# Patient Record
Sex: MALE | Race: WHITE | NOT HISPANIC OR LATINO | Employment: OTHER | URBAN - METROPOLITAN AREA
[De-identification: names, ages, dates, MRNs, and addresses within clinical notes are randomized per-mention and may not be internally consistent; named-entity substitution may affect disease eponyms.]

---

## 2017-01-09 ENCOUNTER — OFFICE VISIT (OUTPATIENT)
Dept: FAMILY MEDICINE CLINIC | Facility: CLINIC | Age: 72
End: 2017-01-09

## 2017-01-09 VITALS
SYSTOLIC BLOOD PRESSURE: 148 MMHG | DIASTOLIC BLOOD PRESSURE: 80 MMHG | HEART RATE: 114 BPM | OXYGEN SATURATION: 95 % | HEIGHT: 72 IN | WEIGHT: 259 LBS | BODY MASS INDEX: 35.08 KG/M2

## 2017-01-09 DIAGNOSIS — IMO0002 UNCONTROLLED TYPE 2 DIABETES MELLITUS WITH COMPLICATION, WITHOUT LONG-TERM CURRENT USE OF INSULIN: Primary | ICD-10-CM

## 2017-01-09 DIAGNOSIS — I10 ESSENTIAL HYPERTENSION: ICD-10-CM

## 2017-01-09 DIAGNOSIS — M51.36 DISC DEGENERATION, LUMBAR: ICD-10-CM

## 2017-01-09 LAB — HBA1C MFR BLD: 7.6 %

## 2017-01-09 PROCEDURE — 99214 OFFICE O/P EST MOD 30 MIN: CPT | Performed by: FAMILY MEDICINE

## 2017-01-09 PROCEDURE — 83036 HEMOGLOBIN GLYCOSYLATED A1C: CPT | Performed by: FAMILY MEDICINE

## 2017-01-09 RX ORDER — LOSARTAN POTASSIUM 50 MG/1
50 TABLET ORAL DAILY
Qty: 90 TABLET | Refills: 3 | Status: SHIPPED | OUTPATIENT
Start: 2017-01-09 | End: 2018-01-04 | Stop reason: SDUPTHER

## 2017-01-09 NOTE — MR AVS SNAPSHOT
Cristian Watt   1/9/2017 11:45 AM   Office Visit    Provider:  Amanda Tate MD   Department:  [facility]   Dept Phone:  [phone]                Your Full Care Plan              Today's Medication Changes          These changes are accurate as of: 1/9/17 12:17 PM.  If you have any questions, ask your nurse or doctor.               New Medication(s)Ordered:     losartan 50 MG tablet   Commonly known as:  COZAAR   Take 1 tablet by mouth Daily.   Started by:  Amanda Tate MD         Stop taking medication(s)listed here:     insulin detemir 100 UNIT/ML injection   Commonly known as:  LEVEMIR   Stopped by:  Amanda Tate MD                Where to Get Your Medications      [place] [address] [address] [facility] [address] - [phone]  - [phone]   [address] [facility] [address]     Phone:  [phone]     losartan 50 MG tablet                  Your Updated Medication List          This list is accurate as of: 1/9/17 12:17 PM.  Always use your most recent med list.                aspirin 81 MG tablet       glimepiride 2 MG tablet   Commonly known as:  AMARYL   Take 1 tablet by mouth Every Morning Before Breakfast.       glucose blood test strip   Use as instructed       JANUVIA 100 MG tablet   Generic drug:  SITagliptin   TAKE ONE TABLET BY MOUTH DAILY       losartan 50 MG tablet   Commonly known as:  COZAAR   Take 1 tablet by mouth Daily.       metFORMIN 1000 MG (OSM) 24 hr tablet   Commonly known as:  FORTAMET   Take 1 tablet by mouth daily.       oxyCODONE-acetaminophen  MG per tablet   Commonly known as:  PERCOCET       rosuvastatin 20 MG tablet   Commonly known as:  CRESTOR   Take 1 tablet by mouth daily.               You Were Diagnosed With        Codes Comments    Uncontrolled type 2 diabetes mellitus with complication, without long-term current  "use of insulin    -  Primary ICD-10-CM: E11.8, E11.65  ICD-9-CM: 250.82     Disc degeneration, lumbar     ICD-10-CM: M51.36  ICD-9-CM: 722.52     Essential hypertension     ICD-10-CM: I10  ICD-9-CM: 401.9       Instructions     None    Patient Instructions History      MyChart Signup     [facility] [employer] allows you to send messages to your doctor, view your test results, renew your prescriptions, schedule appointments, and more. To sign up, go to [employer] and click on the Sign Up Now link in the New User? box. Enter your [employer] Activation Code exactly as it appears below along with the last four digits of your Social Security Number and your Date of Birth () to complete the sign-up process. If you do not sign up before the expiration date, you must request a new code.    [employer] Activation Code: [email or web address]  Expires: 2017 12:17 PM    If you have questions, you can email [email or web address] or call [phone] to talk to our [employer] staff. Remember, [employer] is NOT to be used for urgent needs. For medical emergencies, dial 911.               Other Info from Your Visit           Your Appointments     Feb 15, 2017  2:00 PM EST   New Patient with Misty RODGERS MD   [facility] (--)    [address] [place] [id number] [phone]           Bring all previous medical records and films, along with current medications and insurance information.              Allergies     Penicillins      Shellfish-derived Products        Vital Signs     Blood Pressure Pulse Height Weight Oxygen Saturation Body Mass Index    148/80 114 72\" (182.9 cm) 259 lb (117 kg) 95% 35.13 kg/m2    Smoking Status                   Current Every Day Smoker           Problems and Diagnoses Noted     Degeneration of lumbar or lumbosacral intervertebral disc    High blood pressure    Type II diabetes mellitus without control       "   Care Plan (most recent)      Care Management - 01/09/17 1140     Lifestyle Goals    Lifestyle Goals Eat a healthy diet    Barriers    Barriers Unhealthy food    Self Management     Self Management Dietary Changes - Eat More Fruits/Vegetables    Medication Adherence    Medication Adherence Medications understood    Goal Progress    Goal Progress Making Progress Toward Goal(s)    Readiness Scale    Readiness Scale 5    Confidence Scale    Confidence Scale 5            Treatment Goal(s) as of 1/9/2017 at 12:17 PM     1. Plan meals

## 2017-01-09 NOTE — ASSESSMENT & PLAN NOTE
Hypertension is worsening.  Dietary sodium restriction.  Medication changes per orders.  Blood pressure will be reassessed at the next regular appointment.

## 2017-01-09 NOTE — PROGRESS NOTES
Subjective   Cristian Watt is a 71 y.o. male.  Pt is here to fu on a1c      Diabetes   He has type 2 diabetes mellitus. No MedicAlert identification noted. The initial diagnosis of diabetes was made 2 years ago. His disease course has been improving. Hypoglycemia symptoms include sweats. Pertinent negatives for hypoglycemia include no confusion, dizziness, headaches or nervousness/anxiousness. Pertinent negatives for diabetes include no chest pain, no fatigue, no polydipsia, no polyuria and no weakness. There are no hypoglycemic complications. Symptoms are improving. There are no diabetic complications. Risk factors for coronary artery disease include diabetes mellitus, dyslipidemia, family history, hypertension, male sex, obesity and tobacco exposure. Current diabetic treatment includes diet and oral agent (triple therapy). He is compliant with treatment all of the time. His weight is increasing steadily. He is following a diabetic and generally healthy diet. Meal planning includes ADA exchanges and avoidance of concentrated sweets. He has had a previous visit with a dietitian. He rarely participates in exercise. His home blood glucose trend is decreasing steadily. His breakfast blood glucose range is generally 130-140 mg/dl. (133 at midnight. High 180 140-155) An ACE inhibitor/angiotensin II receptor blocker is being taken. He sees a podiatrist.Eye exam is current.   Hypertension   This is a new problem. The current episode started more than 1 month ago. The problem is uncontrolled. Associated symptoms include malaise/fatigue and sweats. Pertinent negatives include no anxiety, chest pain, headaches, neck pain, palpitations or shortness of breath. There are no associated agents to hypertension. Risk factors for coronary artery disease include dyslipidemia, family history, diabetes mellitus, male gender, smoking/tobacco exposure and sedentary lifestyle. Treatments tried: pt connot recall if ever took ace inh.    "There is no history of angina.   he has seen optho and diabetes educator.  He has started watching sugar intake.      discuss getting refill for percocet for back pain. Taking pain medication 1/2 tab bid.  Helps with his ability to move and get out and do things.  He has only been taking 1/2 tab daily.  He is not overusing the rx.      The following portions of the patient's history were reviewed and updated as appropriate: allergies, current medications, past family history, past medical history, past social history, past surgical history and problem list.    Review of Systems   Constitutional: Positive for malaise/fatigue. Negative for chills, fatigue, fever and unexpected weight change.   HENT: Negative for congestion, ear pain, nosebleeds, rhinorrhea, sinus pressure, sneezing, sore throat and trouble swallowing.    Eyes: Negative for itching and visual disturbance.   Respiratory: Negative for cough, chest tightness, shortness of breath and wheezing.    Cardiovascular: Negative for chest pain, palpitations and leg swelling.   Gastrointestinal: Negative for abdominal pain, anal bleeding, blood in stool, constipation, diarrhea, nausea and vomiting.   Endocrine: Negative for cold intolerance, heat intolerance, polydipsia and polyuria.   Genitourinary: Negative for difficulty urinating, frequency, hematuria and urgency.   Musculoskeletal: Negative for back pain, gait problem, myalgias and neck pain.   Skin: Negative for rash and wound.   Neurological: Negative for dizziness, weakness, numbness and headaches.   Hematological: Negative for adenopathy. Does not bruise/bleed easily.   Psychiatric/Behavioral: Negative for agitation, confusion, decreased concentration and suicidal ideas. The patient is not nervous/anxious.        Objective     Vitals:    01/09/17 1137   BP: 148/80   Pulse: 114   SpO2: 95%   Weight: 259 lb (117 kg)   Height: 72\" (182.9 cm)       Physical Exam   Constitutional: He is oriented to person, " place, and time. He appears well-developed and well-nourished.   HENT:   Head: Normocephalic and atraumatic.   Eyes: Conjunctivae and EOM are normal. Pupils are equal, round, and reactive to light. Right eye exhibits no discharge. Left eye exhibits no discharge. No scleral icterus.   Neck: Normal range of motion. Neck supple. No JVD present. No tracheal deviation present. No thyromegaly present.   Cardiovascular: Normal rate, regular rhythm and normal heart sounds.  Exam reveals no gallop and no friction rub.    No murmur heard.  Pulmonary/Chest: Effort normal and breath sounds normal. No respiratory distress. He has no wheezes. He has no rales. He exhibits no tenderness.   Abdominal: Soft. Bowel sounds are normal. He exhibits no distension and no mass. There is no tenderness.   Musculoskeletal: Normal range of motion. He exhibits no edema.   Neurological: He is alert and oriented to person, place, and time. He has normal reflexes. He displays normal reflexes. No cranial nerve deficit. Coordination normal.   Skin: Skin is warm and dry.   Psychiatric: He has a normal mood and affect. His behavior is normal. Judgment and thought content normal.   Nursing note and vitals reviewed.      Assessment/Plan     Problem List Items Addressed This Visit        Cardiovascular and Mediastinum    Hypertension    Current Assessment & Plan     Hypertension is worsening.  Dietary sodium restriction.  Medication changes per orders.  Blood pressure will be reassessed at the next regular appointment.         Relevant Medications    losartan (COZAAR) 50 MG tablet       Endocrine    Type 2 diabetes mellitus, uncontrolled - Primary    Relevant Medications    losartan (COZAAR) 50 MG tablet    Other Relevant Orders    POC Glycosylated Hemoglobin (Hb A1C)       Musculoskeletal and Integument    Disc degeneration, lumbar    Current Assessment & Plan     Refill oxycodone/acetaminophen 10/325 bid #120 no rf. Compliance with medication discussed.   Avoid with etoh or other sedating meds.  Avoid taking when he can.  Expresses no tolerance or increase in medication need or freq in taking med.               zina reviewed and appropriate.   Start arb, continue current medications for DM.  a1c 7.6 today.  Much improved.    Eye exam utd

## 2017-03-23 RX ORDER — METFORMIN HYDROCHLORIDE EXTENDED-RELEASE TABLETS 1000 MG/1
TABLET, FILM COATED, EXTENDED RELEASE ORAL
Qty: 30 TABLET | Refills: 0 | Status: SHIPPED | OUTPATIENT
Start: 2017-03-23 | End: 2017-04-19 | Stop reason: SDUPTHER

## 2017-04-05 DIAGNOSIS — E78.5 HYPERLIPIDEMIA: ICD-10-CM

## 2017-04-05 RX ORDER — ROSUVASTATIN CALCIUM 20 MG/1
TABLET, COATED ORAL
Qty: 90 TABLET | Refills: 2 | Status: SHIPPED | OUTPATIENT
Start: 2017-04-05 | End: 2018-01-04 | Stop reason: SDUPTHER

## 2017-04-10 ENCOUNTER — OFFICE VISIT (OUTPATIENT)
Dept: FAMILY MEDICINE CLINIC | Facility: CLINIC | Age: 72
End: 2017-04-10

## 2017-04-10 VITALS
HEART RATE: 102 BPM | SYSTOLIC BLOOD PRESSURE: 110 MMHG | DIASTOLIC BLOOD PRESSURE: 78 MMHG | HEIGHT: 72 IN | BODY MASS INDEX: 35.35 KG/M2 | OXYGEN SATURATION: 93 % | WEIGHT: 261 LBS

## 2017-04-10 DIAGNOSIS — IMO0002 UNCONTROLLED TYPE 2 DIABETES MELLITUS WITH COMPLICATION, WITHOUT LONG-TERM CURRENT USE OF INSULIN: Primary | ICD-10-CM

## 2017-04-10 DIAGNOSIS — M51.36 DISC DEGENERATION, LUMBAR: ICD-10-CM

## 2017-04-10 LAB — HBA1C MFR BLD: 8 %

## 2017-04-10 PROCEDURE — 83036 HEMOGLOBIN GLYCOSYLATED A1C: CPT | Performed by: FAMILY MEDICINE

## 2017-04-10 PROCEDURE — 99214 OFFICE O/P EST MOD 30 MIN: CPT | Performed by: FAMILY MEDICINE

## 2017-04-10 NOTE — PROGRESS NOTES
Subjective   Cristian Watt is a 71 y.o. male.  Pt is here due to chronic back, shoulder and knee. Pt also is needs a1c checked.    Diabetes   He presents for his follow-up diabetic visit. He has type 2 diabetes mellitus. No MedicAlert identification noted. The initial diagnosis of diabetes was made 1 year ago. His disease course has been fluctuating. There are no hypoglycemic associated symptoms. Pertinent negatives for hypoglycemia include no confusion, dizziness, headaches or nervousness/anxiousness. Pertinent negatives for diabetes include no chest pain, no fatigue, no polydipsia, no polyuria and no weakness. There are no hypoglycemic complications. Symptoms are stable. There are no diabetic complications. Risk factors for coronary artery disease include dyslipidemia, diabetes mellitus, hypertension, male sex, sedentary lifestyle and tobacco exposure. Current diabetic treatment includes oral agent (monotherapy). He is compliant with treatment all of the time. His weight is stable. He is following a generally healthy diet. Meal planning includes avoidance of concentrated sweets. He has not had a previous visit with a dietitian. He rarely participates in exercise. His home blood glucose trend is increasing steadily. An ACE inhibitor/angiotensin II receptor blocker is being taken. He does not see a podiatrist.Eye exam is current.      He also reports he needs a refill of the Percocet that he takes at night and as needed for his lower back and knee pains.  He tolerates the medicine well.  He has no side effects.  He never takes more than he is prescribed.  He does not have any side effects from the medicine.  He does not have a history of abuse or family history of abuse.  Reports that the medication allows him to be able to do activities of daily living without pain.  Nothing seems to make the symptoms worse.  He rates the pain as a  Constant 4 out of 10.  Is nonradiating.  His lower back and his knees.    The  "following portions of the patient's history were reviewed and updated as appropriate: allergies, current medications, past family history, past medical history, past social history, past surgical history and problem list.    Review of Systems   Constitutional: Negative for chills, fatigue, fever and unexpected weight change.   HENT: Negative for congestion, ear pain, nosebleeds, rhinorrhea, sinus pressure, sneezing, sore throat and trouble swallowing.    Eyes: Negative for itching and visual disturbance.   Respiratory: Negative for cough, chest tightness, shortness of breath and wheezing.    Cardiovascular: Negative for chest pain, palpitations and leg swelling.   Gastrointestinal: Negative for abdominal pain, anal bleeding, blood in stool, constipation, diarrhea, nausea and vomiting.   Endocrine: Negative for cold intolerance, heat intolerance, polydipsia and polyuria.   Genitourinary: Negative for difficulty urinating, frequency, hematuria and urgency.   Musculoskeletal: Negative for back pain, gait problem and myalgias.   Skin: Negative for rash and wound.   Neurological: Negative for dizziness, weakness, numbness and headaches.   Hematological: Negative for adenopathy. Does not bruise/bleed easily.   Psychiatric/Behavioral: Negative for agitation, confusion, decreased concentration and suicidal ideas. The patient is not nervous/anxious.        Objective     Vitals:    04/10/17 1309   BP: 110/78   Pulse: 102   SpO2: 93%   Weight: 261 lb (118 kg)   Height: 72\" (182.9 cm)       Physical Exam   Constitutional: He is oriented to person, place, and time. He appears well-developed and well-nourished.   HENT:   Head: Normocephalic and atraumatic.   Eyes: Conjunctivae and EOM are normal. Pupils are equal, round, and reactive to light. Right eye exhibits no discharge. Left eye exhibits no discharge. No scleral icterus.   Neck: Normal range of motion. Neck supple. No JVD present. No tracheal deviation present. No " thyromegaly present.   Cardiovascular: Normal rate, regular rhythm and normal heart sounds.  Exam reveals no gallop and no friction rub.    No murmur heard.  Pulmonary/Chest: Effort normal and breath sounds normal. No respiratory distress. He has no wheezes. He has no rales. He exhibits no tenderness.   Abdominal: Soft. Bowel sounds are normal. He exhibits no distension and no mass. There is no tenderness.   Musculoskeletal: Normal range of motion. He exhibits no edema.   Neurological: He is alert and oriented to person, place, and time. He has normal reflexes. He displays normal reflexes. No cranial nerve deficit. Coordination normal.   Skin: Skin is warm and dry.   Psychiatric: He has a normal mood and affect. His behavior is normal. Judgment and thought content normal.   Nursing note and vitals reviewed.      Assessment/Plan     Problem List Items Addressed This Visit        Endocrine    Type 2 diabetes mellitus, uncontrolled - Primary    Relevant Orders    POC Glycosylated Hemoglobin (Hb A1C)       Musculoskeletal and Integument    Disc degeneration, lumbar        I believe the medical necessity for and safety in prescribing the controlled substance substantially outweighs the risk of unlawful use or diversion of the controlled substance  Percocet 10/325 bid-qid #120+0  Fu 3 mo recheck a1c  a1c increased 8.0 today.

## 2017-04-19 RX ORDER — METFORMIN HYDROCHLORIDE EXTENDED-RELEASE TABLETS 1000 MG/1
TABLET, FILM COATED, EXTENDED RELEASE ORAL
Qty: 30 TABLET | Refills: 0 | Status: SHIPPED | OUTPATIENT
Start: 2017-04-19 | End: 2017-05-18 | Stop reason: SDUPTHER

## 2017-05-18 RX ORDER — METFORMIN HYDROCHLORIDE EXTENDED-RELEASE TABLETS 1000 MG/1
TABLET, FILM COATED, EXTENDED RELEASE ORAL
Qty: 90 TABLET | Refills: 0 | Status: SHIPPED | OUTPATIENT
Start: 2017-05-18 | End: 2017-08-21 | Stop reason: SDUPTHER

## 2017-08-07 ENCOUNTER — TELEPHONE (OUTPATIENT)
Dept: INTERNAL MEDICINE | Facility: CLINIC | Age: 72
End: 2017-08-07

## 2017-08-07 NOTE — TELEPHONE ENCOUNTER
PATIENT WILL NEED TO RESCHEDULE HIS APPOINTMENT THAT HE HAD SCHEDULED WITH DR CASTILLO ON AUG 8 @ 2:00. PATIENT IS OUT OF TOWN AND WILL BE BACK IN TOWN NEXT MONTH. PT'S CALL BACK NUMBER [phone]

## 2017-08-21 RX ORDER — METFORMIN HYDROCHLORIDE EXTENDED-RELEASE TABLETS 1000 MG/1
TABLET, FILM COATED, EXTENDED RELEASE ORAL
Qty: 90 TABLET | Refills: 0 | Status: SHIPPED | OUTPATIENT
Start: 2017-08-21 | End: 2017-09-18 | Stop reason: SDUPTHER

## 2017-09-18 ENCOUNTER — OFFICE VISIT (OUTPATIENT)
Dept: FAMILY MEDICINE CLINIC | Facility: CLINIC | Age: 72
End: 2017-09-18

## 2017-09-18 VITALS
RESPIRATION RATE: 16 BRPM | HEART RATE: 102 BPM | HEIGHT: 72 IN | WEIGHT: 159 LBS | OXYGEN SATURATION: 93 % | TEMPERATURE: 98.3 F | BODY MASS INDEX: 21.54 KG/M2

## 2017-09-18 DIAGNOSIS — E66.01 MORBID OBESITY DUE TO EXCESS CALORIES (HCC): ICD-10-CM

## 2017-09-18 DIAGNOSIS — R53.83 FATIGUE DUE TO DEPRESSION: Primary | ICD-10-CM

## 2017-09-18 DIAGNOSIS — F32.A DEPRESSION, UNSPECIFIED DEPRESSION TYPE: ICD-10-CM

## 2017-09-18 DIAGNOSIS — F32.A FATIGUE DUE TO DEPRESSION: Primary | ICD-10-CM

## 2017-09-18 DIAGNOSIS — IMO0001 UNCONTROLLED TYPE 2 DIABETES MELLITUS WITHOUT COMPLICATION, WITHOUT LONG-TERM CURRENT USE OF INSULIN: ICD-10-CM

## 2017-09-18 DIAGNOSIS — I10 ESSENTIAL HYPERTENSION: ICD-10-CM

## 2017-09-18 DIAGNOSIS — E78.5 HYPERLIPIDEMIA, UNSPECIFIED HYPERLIPIDEMIA TYPE: ICD-10-CM

## 2017-09-18 DIAGNOSIS — K62.89 RECTAL PAIN: ICD-10-CM

## 2017-09-18 LAB
ALBUMIN SERPL-MCNC: 4.7 G/DL (ref 3.2–4.8)
ALBUMIN/GLOB SERPL: 1.6 G/DL (ref 1.5–2.5)
ALP SERPL-CCNC: 120 U/L (ref 25–100)
ALT SERPL W P-5'-P-CCNC: 29 U/L (ref 7–40)
ANION GAP SERPL CALCULATED.3IONS-SCNC: 7 MMOL/L (ref 3–11)
ARTICHOKE IGE QN: 82 MG/DL (ref 0–130)
AST SERPL-CCNC: 36 U/L (ref 0–33)
BASOPHILS # BLD AUTO: 0.05 10*3/MM3 (ref 0–0.2)
BASOPHILS NFR BLD AUTO: 0.3 % (ref 0–1)
BILIRUB BLD-MCNC: NEGATIVE MG/DL
BILIRUB SERPL-MCNC: 0.3 MG/DL (ref 0.3–1.2)
BUN BLD-MCNC: 12 MG/DL (ref 9–23)
BUN/CREAT SERPL: 12 (ref 7–25)
CALCIUM SPEC-SCNC: 9.9 MG/DL (ref 8.7–10.4)
CHLORIDE SERPL-SCNC: 101 MMOL/L (ref 99–109)
CHOLEST SERPL-MCNC: 153 MG/DL (ref 0–200)
CLARITY, POC: CLEAR
CO2 SERPL-SCNC: 28 MMOL/L (ref 20–31)
COLOR UR: YELLOW
CREAT BLD-MCNC: 1 MG/DL (ref 0.6–1.3)
DEPRECATED RDW RBC AUTO: 49.1 FL (ref 37–54)
EOSINOPHIL # BLD AUTO: 0.14 10*3/MM3 (ref 0–0.3)
EOSINOPHIL NFR BLD AUTO: 0.9 % (ref 0–3)
ERYTHROCYTE [DISTWIDTH] IN BLOOD BY AUTOMATED COUNT: 13.1 % (ref 11.3–14.5)
GFR SERPL CREATININE-BSD FRML MDRD: 74 ML/MIN/1.73
GLOBULIN UR ELPH-MCNC: 3 GM/DL
GLUCOSE BLD-MCNC: 298 MG/DL (ref 70–100)
GLUCOSE UR STRIP-MCNC: ABNORMAL MG/DL
HBA1C MFR BLD: 9.6 %
HCT VFR BLD AUTO: 50 % (ref 38.9–50.9)
HDLC SERPL-MCNC: 41 MG/DL (ref 40–60)
HGB BLD-MCNC: 17 G/DL (ref 13.1–17.5)
IMM GRANULOCYTES # BLD: 0.09 10*3/MM3 (ref 0–0.03)
IMM GRANULOCYTES NFR BLD: 0.6 % (ref 0–0.6)
KETONES UR QL: ABNORMAL
LEUKOCYTE EST, POC: NEGATIVE
LYMPHOCYTES # BLD AUTO: 4.97 10*3/MM3 (ref 0.6–4.8)
LYMPHOCYTES NFR BLD AUTO: 32.9 % (ref 24–44)
MCH RBC QN AUTO: 34.5 PG (ref 27–31)
MCHC RBC AUTO-ENTMCNC: 34 G/DL (ref 32–36)
MCV RBC AUTO: 101.4 FL (ref 80–99)
MONOCYTES # BLD AUTO: 1.03 10*3/MM3 (ref 0–1)
MONOCYTES NFR BLD AUTO: 6.8 % (ref 0–12)
NEUTROPHILS # BLD AUTO: 8.83 10*3/MM3 (ref 1.5–8.3)
NEUTROPHILS NFR BLD AUTO: 58.5 % (ref 41–71)
NITRITE UR-MCNC: NEGATIVE MG/ML
PH UR: 5.5 [PH] (ref 5–8)
PLATELET # BLD AUTO: 331 10*3/MM3 (ref 150–450)
PMV BLD AUTO: 10.2 FL (ref 6–12)
POC CREATININE URINE: 200
POC MICROALBUMIN URINE: 80
POTASSIUM BLD-SCNC: 4.8 MMOL/L (ref 3.5–5.5)
PROT SERPL-MCNC: 7.7 G/DL (ref 5.7–8.2)
PROT UR STRIP-MCNC: NEGATIVE MG/DL
RBC # BLD AUTO: 4.93 10*6/MM3 (ref 4.2–5.76)
RBC # UR STRIP: ABNORMAL /UL
SODIUM BLD-SCNC: 136 MMOL/L (ref 132–146)
SP GR UR: 1.02 (ref 1–1.03)
T4 FREE SERPL-MCNC: 0.97 NG/DL (ref 0.89–1.76)
TRIGL SERPL-MCNC: 404 MG/DL (ref 0–150)
TSH SERPL DL<=0.05 MIU/L-ACNC: 1.47 MIU/ML (ref 0.35–5.35)
UROBILINOGEN UR QL: NORMAL
VIT B12 BLD-MCNC: 424 PG/ML (ref 211–911)
WBC NRBC COR # BLD: 15.11 10*3/MM3 (ref 3.5–10.8)

## 2017-09-18 PROCEDURE — 81003 URINALYSIS AUTO W/O SCOPE: CPT | Performed by: NURSE PRACTITIONER

## 2017-09-18 PROCEDURE — 84439 ASSAY OF FREE THYROXINE: CPT | Performed by: NURSE PRACTITIONER

## 2017-09-18 PROCEDURE — 80053 COMPREHEN METABOLIC PANEL: CPT | Performed by: NURSE PRACTITIONER

## 2017-09-18 PROCEDURE — 99214 OFFICE O/P EST MOD 30 MIN: CPT | Performed by: NURSE PRACTITIONER

## 2017-09-18 PROCEDURE — 85025 COMPLETE CBC W/AUTO DIFF WBC: CPT | Performed by: NURSE PRACTITIONER

## 2017-09-18 PROCEDURE — 82607 VITAMIN B-12: CPT | Performed by: NURSE PRACTITIONER

## 2017-09-18 PROCEDURE — 80061 LIPID PANEL: CPT | Performed by: NURSE PRACTITIONER

## 2017-09-18 PROCEDURE — 82044 UR ALBUMIN SEMIQUANTITATIVE: CPT | Performed by: NURSE PRACTITIONER

## 2017-09-18 PROCEDURE — 36415 COLL VENOUS BLD VENIPUNCTURE: CPT | Performed by: NURSE PRACTITIONER

## 2017-09-18 PROCEDURE — 83036 HEMOGLOBIN GLYCOSYLATED A1C: CPT | Performed by: NURSE PRACTITIONER

## 2017-09-18 PROCEDURE — 84443 ASSAY THYROID STIM HORMONE: CPT | Performed by: NURSE PRACTITIONER

## 2017-09-18 RX ORDER — LANCETS
EACH MISCELLANEOUS
COMMUNITY
Start: 2014-11-10 | End: 2021-07-07

## 2017-09-18 RX ORDER — METFORMIN HYDROCHLORIDE EXTENDED-RELEASE TABLETS 1000 MG/1
1000 TABLET, FILM COATED, EXTENDED RELEASE ORAL 2 TIMES DAILY WITH MEALS
Qty: 180 TABLET | Refills: 0 | Status: SHIPPED | OUTPATIENT
Start: 2017-09-18 | End: 2018-02-05 | Stop reason: SDUPTHER

## 2017-09-18 RX ORDER — BUPROPION HYDROCHLORIDE 150 MG/1
TABLET, EXTENDED RELEASE ORAL
Qty: 30 TABLET | Refills: 5 | Status: SHIPPED | OUTPATIENT
Start: 2017-09-18 | End: 2021-07-07

## 2017-09-18 RX ORDER — LISINOPRIL 20 MG/1
TABLET ORAL
COMMUNITY
Start: 2012-07-03 | End: 2019-04-24 | Stop reason: SDUPTHER

## 2017-09-18 NOTE — PROGRESS NOTES
Subjective   Cristian Watt is a 71 y.o. male.     History of Present Illness Mr Watt is brought in by his son who is a physician with multiple concerns.  1. Son and patient feel he is depressed. Does not want to go out at night, poor appetite, not sleeping well. Has a depressed mood. Not suicidal. Lacks motivation. Lives alone. They are considering some type of assisted living situation.  2. Requests lidocaine jelly for rectal fissure. Having severe pain. Has appt with Dr Botello this week. Denies constipation. Has 2-5 soft brown stools a day. Denies hematochezia and melena. Has not used preparation H.  3. Admits that his blood sugar is high. Readings in 200 range. Eating sweets and bread. Can't exercise due to painful OA in knees. Denies chest pain, palpitations and sob. Denies dysuria.    The following portions of the patient's history were reviewed and updated as appropriate: allergies, current medications, past family history, past medical history, past social history, past surgical history and problem list.    Review of Systems   Constitutional: Negative for fatigue, fever and unexpected weight change.   HENT: Negative for congestion, hearing loss, nosebleeds, rhinorrhea, sore throat, trouble swallowing and voice change.    Eyes: Negative for pain, discharge, redness and visual disturbance.   Respiratory: Negative for cough, chest tightness, shortness of breath and wheezing.    Cardiovascular: Negative for chest pain, palpitations and leg swelling.   Gastrointestinal: Positive for rectal pain. Negative for abdominal distention, abdominal pain, anal bleeding, blood in stool, constipation, diarrhea, nausea and vomiting.   Endocrine: Negative for cold intolerance, heat intolerance, polydipsia, polyphagia and polyuria.   Genitourinary: Negative for dysuria, flank pain, frequency and hematuria.   Musculoskeletal: Negative for arthralgias, gait problem, joint swelling and myalgias.   Skin: Negative for color  change and rash.   Neurological: Negative for dizziness, tremors, seizures, syncope, speech difficulty, weakness, numbness and headaches.   Hematological: Negative.    Psychiatric/Behavioral: Positive for dysphoric mood.       Objective   Physical Exam   Constitutional: He is oriented to person, place, and time. He appears well-developed and well-nourished. No distress.   HENT:   Head: Normocephalic and atraumatic.   Right Ear: Tympanic membrane and external ear normal.   Left Ear: Tympanic membrane and external ear normal.   Nose: Nose normal.   Mouth/Throat: Oropharynx is clear and moist. No oropharyngeal exudate.   Eyes: Conjunctivae are normal. Pupils are equal, round, and reactive to light. Right eye exhibits no discharge. Left eye exhibits no discharge. No scleral icterus.   Neck: Neck supple. No tracheal deviation present. No thyromegaly present.   Cardiovascular: Normal rate, regular rhythm and normal heart sounds.  Exam reveals no gallop and no friction rub.    No murmur heard.  Pulmonary/Chest: Effort normal and breath sounds normal. No respiratory distress. He has no wheezes.   Abdominal: Soft. Bowel sounds are normal. He exhibits no distension and no mass. There is no tenderness.   Musculoskeletal: He exhibits no edema or deformity.   Lymphadenopathy:     He has no cervical adenopathy.   Neurological: He is alert and oriented to person, place, and time. Coordination normal.   Skin: Skin is warm and dry. No rash noted. No erythema.   Psychiatric: He has a normal mood and affect. His speech is normal and behavior is normal. Judgment and thought content normal.   Nursing note and vitals reviewed.      Assessment/Plan   Cristian was seen today for anal fissure and med refill.    Diagnoses and all orders for this visit:    Fatigue due to depression  -     CBC & Differential  -     Vitamin B12  -     TSH  -     T4, Free  -     CBC Auto Differential    Essential hypertension    Hyperlipidemia, unspecified  hyperlipidemia type  -     Lipid Panel    Uncontrolled type 2 diabetes mellitus without complication, without long-term current use of insulin  -     POC Glycosylated Hemoglobin (Hb A1C)  -     POC Microalbumin  -     POC Urinalysis Dipstick, Automated  -     Comprehensive Metabolic Panel  -     metFORMIN (FORTAMET) 1000 MG (OSM) 24 hr tablet; Take 1 tablet by mouth 2 (Two) Times a Day With Meals.    Depression, unspecified depression type  -     buPROPion SR (WELLBUTRIN SR) 150 MG 12 hr tablet; I pill daily for one week, then bid    Morbid obesity due to excess calories    Rectal pain  -     lidocaine (XYLOCAINE) 2 % jelly; Apply  topically As Needed for Mild Pain  or Moderate Pain  (apply to rectal fissure bid prn. use sparringly).    A1c 9.6. Double Metformin. Await labs to make other changes.  Discussed the nature of the disease including, risks, complications, implications, management, safe and proper use of medications. Encouraged therapeutic lifestyle changes including low calorie diet with plenty of fruits and vegetables, daily exercise, medication compliance, and keeping scheduled follow up appointments with me and any other providers. Encouraged patient to have appointment for complete physical, fasting labs, appropriate screenings, and immunizations on an annual basis.    Discussed the importance of low carb diet, annual dilated eye exam, nightly foot checks, annual dentist visit, daily exercise. Monitor blood sugar at least twice a week. Maintain BMI under 25. Have regular follow up appointments for labs and screenings.    Follow up with PCP.

## 2017-09-19 ENCOUNTER — TELEPHONE (OUTPATIENT)
Dept: FAMILY MEDICINE CLINIC | Facility: CLINIC | Age: 72
End: 2017-09-19

## 2017-09-19 NOTE — TELEPHONE ENCOUNTER
Discussed labs with patient. He has elevated WBC. He denies fever, cough and abd pain. The only pain he has is rectal pain and he has appt with colo-rectal in 2 days. I told him we need to repeat this level next week. He verbalized understanding.

## 2017-11-12 DIAGNOSIS — E16.2 HYPOGLYCEMIA: ICD-10-CM

## 2017-11-13 RX ORDER — GLIMEPIRIDE 2 MG/1
TABLET ORAL
Qty: 90 TABLET | Refills: 2 | Status: SHIPPED | OUTPATIENT
Start: 2017-11-13 | End: 2018-02-05 | Stop reason: SDUPTHER

## 2017-12-04 ENCOUNTER — OFFICE VISIT (OUTPATIENT)
Dept: FAMILY MEDICINE CLINIC | Facility: CLINIC | Age: 72
End: 2017-12-04

## 2017-12-04 VITALS
HEART RATE: 105 BPM | DIASTOLIC BLOOD PRESSURE: 86 MMHG | OXYGEN SATURATION: 95 % | BODY MASS INDEX: 34.67 KG/M2 | HEIGHT: 72 IN | SYSTOLIC BLOOD PRESSURE: 128 MMHG | WEIGHT: 256 LBS

## 2017-12-04 DIAGNOSIS — IMO0002 UNCONTROLLED TYPE 2 DIABETES MELLITUS WITH COMPLICATION, WITHOUT LONG-TERM CURRENT USE OF INSULIN: Primary | ICD-10-CM

## 2017-12-04 DIAGNOSIS — Z23 NEEDS FLU SHOT: ICD-10-CM

## 2017-12-04 DIAGNOSIS — R19.7 DIARRHEA, UNSPECIFIED TYPE: ICD-10-CM

## 2017-12-04 DIAGNOSIS — M51.36 DISC DEGENERATION, LUMBAR: ICD-10-CM

## 2017-12-04 DIAGNOSIS — I10 ESSENTIAL HYPERTENSION: ICD-10-CM

## 2017-12-04 DIAGNOSIS — M15.9 OSTEOARTHRITIS OF MULTIPLE JOINTS, UNSPECIFIED OSTEOARTHRITIS TYPE: ICD-10-CM

## 2017-12-04 LAB
EXPIRATION DATE: NORMAL
HBA1C MFR BLD: 10.3 %
Lab: NORMAL

## 2017-12-04 PROCEDURE — 90662 IIV NO PRSV INCREASED AG IM: CPT | Performed by: FAMILY MEDICINE

## 2017-12-04 PROCEDURE — G0008 ADMIN INFLUENZA VIRUS VAC: HCPCS | Performed by: FAMILY MEDICINE

## 2017-12-04 PROCEDURE — 83036 HEMOGLOBIN GLYCOSYLATED A1C: CPT | Performed by: FAMILY MEDICINE

## 2017-12-04 PROCEDURE — 99214 OFFICE O/P EST MOD 30 MIN: CPT | Performed by: FAMILY MEDICINE

## 2017-12-04 NOTE — PROGRESS NOTES
Subjective   Cristian Watt is a 72 y.o. male.     Pt needs refill on metformin due to insurance no longer covering in 2018.    Pt needs refill on Percocet for pain.    Pt states he recently had  c-diff and treated by someone first name Kevin can recall last name or practice name. Pt states he got better but recently started having diarrhea again with stomach pain.    History of Present Illness he has multiple complaints today.  He reports that in the next month or so his metformin is no longer covered.  He has not been checking his fingerstick blood glucose levels.  He is also not been watching his diet.  He is right now taking metformin thousand milligrams daily along with Amaryl 2 mg daily and Januvia 100 mg daily.  He reports his glucose levels are probably out of control because he is just been eating whatever he wants.     He also requests a refill of Percocet.  He's been taking this medication for a number of years for lower back pain and generalized arthritic pain.  He does not take the medication every day but occasionally [place] take half a tablet daily.     He also reports that for the last week or 2 he has started again having diarrhea.  He has had C. difficile treated in the last few months per gastroenterology.    The following portions of the patient's history were reviewed and updated as appropriate: allergies, current medications, past family history, past medical history, past social history, past surgical history and problem list.    Review of Systems   Constitutional: Negative for chills, fatigue, fever and unexpected weight change.   HENT: Negative for congestion, ear pain, nosebleeds, rhinorrhea, sinus pressure, sneezing, sore throat and trouble swallowing.    Eyes: Negative for itching and visual disturbance.   Respiratory: Negative for cough, chest tightness, shortness of breath and wheezing.    Cardiovascular: Negative for chest pain, palpitations and leg swelling.   Gastrointestinal:  "Positive for diarrhea. Negative for abdominal pain, anal bleeding, blood in stool, constipation, nausea and vomiting.   Endocrine: Negative for cold intolerance, heat intolerance, polydipsia and polyuria.   Genitourinary: Negative for difficulty urinating, frequency, hematuria and urgency.   Musculoskeletal: Positive for arthralgias. Negative for back pain, gait problem and myalgias.   Skin: Negative for rash and wound.   Neurological: Negative for dizziness, weakness, numbness and headaches.   Hematological: Negative for adenopathy. Does not bruise/bleed easily.   Psychiatric/Behavioral: Negative for agitation, confusion, decreased concentration and suicidal ideas. The patient is not nervous/anxious.        Objective     Vitals:    12/04/17 1519   BP: 128/86   Pulse: 105   SpO2: 95%   Weight: 116 kg (256 lb)   Height: 182.9 cm (72\")       Physical Exam   Constitutional: He is oriented to person, place, and time. He appears well-developed and well-nourished.   HENT:   Head: Normocephalic and atraumatic.   Eyes: Conjunctivae and EOM are normal. Pupils are equal, round, and reactive to light. Right eye exhibits no discharge. Left eye exhibits no discharge. No scleral icterus.   Neck: Normal range of motion. Neck supple. No JVD present. No tracheal deviation present. No thyromegaly present.   Cardiovascular: Normal rate, regular rhythm and normal heart sounds.  Exam reveals no gallop and no friction rub.    No murmur heard.  Pulmonary/Chest: Effort normal and breath sounds normal. No respiratory distress. He has no wheezes. He has no rales. He exhibits no tenderness.   Abdominal: Soft. Bowel sounds are normal. He exhibits no distension and no mass. There is no tenderness.   Musculoskeletal: Normal range of motion. He exhibits no edema.   Neurological: He is alert and oriented to person, place, and time. He has normal reflexes. He displays normal reflexes. No cranial nerve deficit. Coordination normal.   Skin: Skin is " warm and dry.   Psychiatric: He has a normal mood and affect. His behavior is normal. Judgment and thought content normal.   Nursing note and vitals reviewed.      Assessment/Plan     Problem List Items Addressed This Visit        Cardiovascular and Mediastinum    Hypertension       Digestive    Diarrhea    Relevant Orders    Clostridium Difficile Toxin - Stool, Per Rectum       Endocrine    Type 2 diabetes mellitus, uncontrolled - Primary    Relevant Orders    POC Glycosylated Hemoglobin (Hb A1C) (Completed)    Ambulatory Referral to Endocrinology       Musculoskeletal and Integument    Disc degeneration, lumbar    Osteoarthritis      Other Visit Diagnoses     Needs flu shot        Relevant Orders    Flu Vaccine High Dose PF 65YR+ ([id number]) (Completed)        Percocet 10/325 as neeed.  rx given.  Thanh reviewed and appropriate.  #[id number]  Refer endo.  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  He does not routinely follow up.  He has difficulty at times getting out of his house due to anxiety.  We referred him to psychiatry in the past.  He does not eat on a routine basis.  Greater than 30 minutes were spent with patient, with greater than 95% of time spent in face-to-face communication.

## 2017-12-04 NOTE — PATIENT INSTRUCTIONS
"Influenza (Flu) Vaccine (Inactivated or Recombinant):   1. Why get vaccinated?  Influenza (\"flu\") is a contagious disease that spreads around the [place] every year, usually between October and May.  Flu is caused by influenza viruses, and is spread mainly by coughing, sneezing, and close contact.  Anyone can get flu. Flu strikes suddenly and can last several days. Symptoms vary by age, but can include:  · fever/chills  · sore throat  · muscle aches  · fatigue  · cough  · headache  · runny or stuffy nose  Flu can also lead to pneumonia and blood infections, and cause diarrhea and seizures in children. If you have a medical condition, such as heart or lung disease, flu can make it worse.  Flu is more dangerous for some people. Infants and young children, people 65 years of age and older, pregnant women, and people with certain health conditions or a weakened immune system are at greatest risk.  Each year thousands of people in the [place] die from flu, and many more are hospitalized.  Flu vaccine can:  · keep you from getting flu,  · make flu less severe if you do get it, and  · keep you from spreading flu to your family and other people.  2. Inactivated and recombinant flu vaccines  A dose of flu vaccine is recommended every flu season. Children 6 months through 8 years of age may need two doses during the same flu season. Everyone else needs only one dose each flu season.  Some inactivated flu vaccines contain a very small amount of a mercury-based preservative called thimerosal. Studies have not shown thimerosal in vaccines to be harmful, but flu vaccines that do not contain thimerosal are available.  There is no live flu virus in flu shots. They cannot cause the flu.  There are many flu viruses, and they are always changing. Each year a new flu vaccine is made to protect against three or four viruses that are likely to cause disease in the upcoming flu season. But even when the vaccine doesn't exactly " match these viruses, it may still provide some protection.  Flu vaccine cannot prevent:  · flu that is caused by a virus not covered by the vaccine, or  · illnesses that look like flu but are not.  It takes about 2 weeks for protection to develop after vaccination, and protection lasts through the flu season.  3. Some people should not get this vaccine  Tell the person who is giving you the vaccine:  · If you have any severe, life-threatening allergies. If you ever had a life-threatening allergic reaction after a dose of flu vaccine, or have a severe allergy to any part of this vaccine, you may be advised not to get vaccinated. Most, but not all, types of flu vaccine contain a small amount of egg protein.  · If you ever had Guillain-Boulder Syndrome (also called GBS). Some people with a history of GBS should not get this vaccine. This should be discussed with your doctor.  · If you are not feeling well. It is usually okay to get flu vaccine when you have a mild illness, but you might be asked to come back when you feel better.  4. Risks of a vaccine reaction  With any medicine, including vaccines, there is a chance of reactions. These are usually mild and go away on their own, but serious reactions are also possible.  Most people who get a flu shot do not have any problems with it.  Minor problems following a flu shot include:  · soreness, redness, or swelling where the shot was given  · hoarseness  · sore, red or itchy eyes  · cough  · fever  · aches  · headache  · itching  · fatigue  If these problems occur, they usually begin soon after the shot and last 1 or 2 days.  More serious problems following a flu shot can include the following:  · There may be a small increased risk of Guillain-Boulder Syndrome (GBS) after inactivated flu vaccine. This risk has been estimated at 1 or 2 additional cases per million people vaccinated. This is much lower than the risk of severe complications from flu, which can be prevented by  flu vaccine.  · Young children who get the flu shot along with pneumococcal vaccine (PCV13) and/or DTaP vaccine at the same time might be slightly more likely to have a seizure caused by fever. Ask your doctor for more information. Tell your doctor if a child who is getting flu vaccine has ever had a seizure.  Problems that could happen after any injected vaccine:  · People sometimes faint after a medical procedure, including vaccination. Sitting or lying down for about 15 minutes can help prevent fainting, and injuries caused by a fall. Tell your doctor if you feel dizzy, or have vision changes or ringing in the ears.  · Some people get severe pain in the shoulder and have difficulty moving the arm where a shot was given. This happens very rarely.  · Any medication can cause a severe allergic reaction. Such reactions from a vaccine are very rare, estimated at about 1 in a million doses, and would happen within a few minutes to a few hours after the vaccination.  As with any medicine, there is a very remote chance of a vaccine causing a serious injury or death.  The safety of vaccines is always being monitored. For more information, visit: www.cdc.gov/vaccinesafety/  5. What if there is a serious reaction?  What should I look for?  · Look for anything that concerns you, such as signs of a severe allergic reaction, very high fever, or unusual behavior.  Signs of a severe allergic reaction can include hives, swelling of the face and throat, difficulty breathing, a fast heartbeat, dizziness, and weakness. These would start a few minutes to a few hours after the vaccination.  What should I do?  · If you think it is a severe allergic reaction or other emergency that can't wait, call 9-1-1 and get the person to the nearest hospital. Otherwise, call your doctor.  · Reactions should be reported to the Vaccine Adverse Event Reporting System (VAERS). Your doctor should file this report, or you can do it yourself through the  VAERS web site at www.vaers.[facility].gov, or by calling 1-886.199.6258.  VAERS does not give medical advice.  6. The National Vaccine Injury Compensation Program  The National Vaccine Injury Compensation Program (VICP) is a federal program that was created to compensate people who may have been injured by certain vaccines.  Persons who believe they may have been injured by a vaccine can learn about the program and about filing a claim by calling 1-992.468.2268 or visiting the VICP website at www.[facility].gov/vaccinecompensation. There is a time limit to file a claim for compensation.  7. How can I learn more?  · Ask your healthcare provider. He or she can give you the vaccine package insert or suggest other sources of information.  · Call your local or state health department.  · Contact the Centers for Disease Control and Prevention (CDC):    Call 1-589.999.9145 (2-040-ZYX-INFO) or    Visit CDC's website at www.cdc.gov/flu  Vaccine Information Statement Inactivated Influenza Vaccine (08/07/2015)     This information is not intended to replace advice given to you by your health care provider. Make sure you discuss any questions you have with your health care provider.     Document Released: 10/12/2007 Document Revised: 01/08/2016 Document Reviewed: 08/10/2015  [employer]vier Interactive Patient Education ©2017 [employer]

## 2017-12-05 DIAGNOSIS — F32.A DEPRESSION, UNSPECIFIED DEPRESSION TYPE: ICD-10-CM

## 2017-12-05 RX ORDER — BUPROPION HYDROCHLORIDE 150 MG/1
TABLET, EXTENDED RELEASE ORAL
Qty: 60 TABLET | Refills: 4 | OUTPATIENT
Start: 2017-12-05

## 2017-12-13 DIAGNOSIS — E16.2 HYPOGLYCEMIA: ICD-10-CM

## 2017-12-13 RX ORDER — SITAGLIPTIN 100 MG/1
TABLET, FILM COATED ORAL
Qty: 90 TABLET | Refills: 2 | Status: SHIPPED | OUTPATIENT
Start: 2017-12-13 | End: 2018-02-05 | Stop reason: SDUPTHER

## 2017-12-18 RX ORDER — METFORMIN HYDROCHLORIDE EXTENDED-RELEASE TABLETS 1000 MG/1
TABLET, FILM COATED, EXTENDED RELEASE ORAL
Qty: 30 TABLET | Refills: 2 | Status: SHIPPED | OUTPATIENT
Start: 2017-12-18 | End: 2018-02-05 | Stop reason: SDUPTHER

## 2018-01-04 DIAGNOSIS — IMO0002 UNCONTROLLED TYPE 2 DIABETES MELLITUS WITH COMPLICATION, WITHOUT LONG-TERM CURRENT USE OF INSULIN: ICD-10-CM

## 2018-01-04 DIAGNOSIS — E78.5 HYPERLIPIDEMIA: ICD-10-CM

## 2018-01-08 RX ORDER — ROSUVASTATIN CALCIUM 20 MG/1
TABLET, COATED ORAL
Qty: 90 TABLET | Refills: 1 | Status: SHIPPED | OUTPATIENT
Start: 2018-01-08 | End: 2018-07-25 | Stop reason: SDUPTHER

## 2018-01-08 RX ORDER — LOSARTAN POTASSIUM 50 MG/1
TABLET ORAL
Qty: 90 TABLET | Refills: 2 | Status: SHIPPED | OUTPATIENT
Start: 2018-01-08 | End: 2018-10-24 | Stop reason: SDUPTHER

## 2018-02-05 ENCOUNTER — OFFICE VISIT (OUTPATIENT)
Dept: ENDOCRINOLOGY | Facility: CLINIC | Age: 73
End: 2018-02-05

## 2018-02-05 ENCOUNTER — TELEPHONE (OUTPATIENT)
Dept: INTERNAL MEDICINE | Facility: CLINIC | Age: 73
End: 2018-02-05

## 2018-02-05 VITALS
HEIGHT: 72 IN | HEART RATE: 91 BPM | WEIGHT: 253 LBS | SYSTOLIC BLOOD PRESSURE: 122 MMHG | BODY MASS INDEX: 34.27 KG/M2 | DIASTOLIC BLOOD PRESSURE: 80 MMHG | OXYGEN SATURATION: 98 %

## 2018-02-05 DIAGNOSIS — E78.2 MIXED HYPERLIPIDEMIA: ICD-10-CM

## 2018-02-05 DIAGNOSIS — E11.43 DIABETIC AUTONOMIC NEUROPATHY ASSOCIATED WITH TYPE 2 DIABETES MELLITUS (HCC): ICD-10-CM

## 2018-02-05 DIAGNOSIS — E11.65 UNCONTROLLED TYPE 2 DIABETES MELLITUS WITH HYPERGLYCEMIA, WITHOUT LONG-TERM CURRENT USE OF INSULIN (HCC): Primary | ICD-10-CM

## 2018-02-05 DIAGNOSIS — F17.219 CIGARETTE NICOTINE DEPENDENCE WITH NICOTINE-INDUCED DISORDER: ICD-10-CM

## 2018-02-05 PROBLEM — F33.9 EPISODE OF RECURRENT MAJOR DEPRESSIVE DISORDER: Status: ACTIVE | Noted: 2018-02-05

## 2018-02-05 LAB
ARTICHOKE IGE QN: 69 MG/DL (ref 0–130)
CHOLEST SERPL-MCNC: 166 MG/DL (ref 0–200)
GLUCOSE BLDC GLUCOMTR-MCNC: 500 MG/DL (ref 70–130)
HBA1C MFR BLD: 12.2 %
HDLC SERPL-MCNC: 39 MG/DL (ref 40–60)
TRIGL SERPL-MCNC: 762 MG/DL (ref 0–150)

## 2018-02-05 PROCEDURE — 99204 OFFICE O/P NEW MOD 45 MIN: CPT | Performed by: PHYSICIAN ASSISTANT

## 2018-02-05 PROCEDURE — 80061 LIPID PANEL: CPT | Performed by: PHYSICIAN ASSISTANT

## 2018-02-05 PROCEDURE — 83036 HEMOGLOBIN GLYCOSYLATED A1C: CPT | Performed by: PHYSICIAN ASSISTANT

## 2018-02-05 PROCEDURE — 82947 ASSAY GLUCOSE BLOOD QUANT: CPT | Performed by: PHYSICIAN ASSISTANT

## 2018-02-05 RX ORDER — OXYCODONE HYDROCHLORIDE AND ACETAMINOPHEN 5; 325 MG/1; MG/1
TABLET ORAL
COMMUNITY
Start: 2017-11-09 | End: 2021-07-07

## 2018-02-05 RX ORDER — METFORMIN HYDROCHLORIDE 1000 MG/1
1000 TABLET, FILM COATED, EXTENDED RELEASE ORAL
Qty: 90 TABLET | Refills: 1 | Status: SHIPPED | OUTPATIENT
Start: 2018-02-05 | End: 2018-03-05

## 2018-02-05 RX ORDER — GLIMEPIRIDE 2 MG/1
2 TABLET ORAL
Qty: 90 TABLET | Refills: 2 | Status: SHIPPED | OUTPATIENT
Start: 2018-02-05 | End: 2018-03-05 | Stop reason: SDUPTHER

## 2018-02-05 RX ORDER — METFORMIN HYDROCHLORIDE EXTENDED-RELEASE TABLETS 1000 MG/1
1000 TABLET, FILM COATED, EXTENDED RELEASE ORAL
Qty: 30 TABLET | Refills: 2 | Status: SHIPPED | OUTPATIENT
Start: 2018-02-05 | End: 2018-02-05

## 2018-02-05 RX ORDER — METRONIDAZOLE 250 MG/1
TABLET ORAL
COMMUNITY
Start: 2018-01-23 | End: 2021-07-07

## 2018-02-05 NOTE — TELEPHONE ENCOUNTER
PHARMACIST HAS SOME QUESTIONS ABOUT HOW THE METFORMIN IS PRESCRIBED WITH TWO DIFFERENT FORMULARIES.  SHE SAID BOTH OF THESE TYPES OF RELEASE ARE VERY EXPENSIVE AND WANTED TO MAKE SURE THEY WERE FOR A SPECIFIED PURPOSE.    PLEASE CALL THE PHARMACY BACK [phone]

## 2018-02-05 NOTE — PATIENT INSTRUCTIONS
Diabetes Mellitus and Food  It is important for you to manage your blood sugar (glucose) level. Your blood glucose level can be greatly affected by what you eat. Eating healthier foods in the appropriate amounts throughout the day at about the same time each day will help you control your blood glucose level. It can also help slow or prevent worsening of your diabetes mellitus. Healthy eating may even help you improve the level of your blood pressure and reach or maintain a healthy weight.  General recommendations for healthful eating and cooking habits include:  · Eating meals and snacks regularly. Avoid going long periods of time without eating to lose weight.  · Eating a diet that consists mainly of plant-based foods, such as fruits, vegetables, nuts, legumes, and whole grains.  · Using low-heat cooking methods, such as baking, instead of high-heat cooking methods, such as deep frying.  Work with your dietitian to make sure you understand how to use the Nutrition Facts information on food labels.  How can food affect me?  Carbohydrates   Carbohydrates affect your blood glucose level more than any other type of food. Your dietitian will help you determine how many carbohydrates to eat at each meal and teach you how to count carbohydrates. Counting carbohydrates is important to keep your blood glucose at a healthy level, especially if you are using insulin or taking certain medicines for diabetes mellitus.  Alcohol   Alcohol can cause sudden decreases in blood glucose (hypoglycemia), especially if you use insulin or take certain medicines for diabetes mellitus. Hypoglycemia can be a life-threatening condition. Symptoms of hypoglycemia (sleepiness, dizziness, and disorientation) are similar to symptoms of having too much alcohol.  If your health care provider has given you approval to drink alcohol, do so in moderation and use the following guidelines:  · Women should not have more than one drink per day, and men  should not have more than two drinks per day. One drink is equal to:  ¨ 12 oz of beer.  ¨ 5 oz of wine.  ¨ 1½ oz of hard liquor.  · Do not drink on an empty stomach.  · Keep yourself hydrated. Have water, diet soda, or unsweetened iced tea.  · Regular soda, juice, and other mixers might contain a lot of carbohydrates and should be counted.  What foods are not recommended?  As you make food choices, it is important to remember that all foods are not the same. Some foods have fewer nutrients per serving than other foods, even though they might have the same number of calories or carbohydrates. It is difficult to get your body what it needs when you eat foods with fewer nutrients. Examples of foods that you should avoid that are high in calories and carbohydrates but low in nutrients include:  · Trans fats (most processed foods list trans fats on the Nutrition Facts label).  · Regular soda.  · Juice.  · Candy.  · Sweets, such as cake, pie, doughnuts, and cookies.  · Fried foods.  What foods can I eat?  Eat nutrient-rich foods, which will nourish your body and keep you healthy. The food you should eat also will depend on several factors, including:  · The calories you need.  · The medicines you take.  · Your weight.  · Your blood glucose level.  · Your blood pressure level.  · Your cholesterol level.  You should eat a variety of foods, including:  · Protein.  ¨ Lean cuts of meat.  ¨ Proteins low in saturated fats, such as fish, egg whites, and beans. Avoid processed meats.  · Fruits and vegetables.  ¨ Fruits and vegetables that may help control blood glucose levels, such as apples, mangoes, and yams.  · Dairy products.  ¨ Choose fat-free or low-fat dairy products, such as milk, yogurt, and cheese.  · Grains, bread, pasta, and rice.  ¨ Choose whole grain products, such as multigrain bread, whole oats, and brown rice. These foods may help control blood pressure.  · Fats.  ¨ Foods containing healthful fats, such as nuts,  avocado, olive oil, canola oil, and fish.  Does everyone with diabetes mellitus have the same meal plan?  Because every person with diabetes mellitus is different, there is not one meal plan that works for everyone. It is very important that you meet with a dietitian who will help you create a meal plan that is just right for you.  This information is not intended to replace advice given to you by your health care provider. Make sure you discuss any questions you have with your health care provider.  Document Released: 09/14/2006 Document Revised: 05/25/2017 Document Reviewed: 11/14/2014  [employer] Interactive Patient Education © 2017 [employer]

## 2018-02-05 NOTE — PROGRESS NOTES
Chief Complaint  Establish care for Diabetes Mellitus.    HPI   Cristian Watt is a 72 y.o. male who is here today for evaluation of Diabetes Mellitus type 2. Patient was referred by Amanda Tate MD. The initial diagnosis of diabetes was made 2015.    Comes in with son today.  Quit taking wellbutrin cold turkey 3 weeks ago. Feels depressed. Son doesn't think it was working for him and needs something else. Taking a shower and taking medicine feels like a chore. Stopped taking all DM meds about a month ago when he ran out. Didn't have the energy to call for refills.   Smoking 2 1/2 ppd, drinking 2 beers and 2 vodkas per day.   Multiple BMs per day, but not necessarily diarrhea. Hx cdiff, son doesn't think it completely resolved.    Today 2/5/17- a1c 12.2, BG >500  Labs reviewed- 12/4/17 a1c 10.3, 9/2017- microalb/cr ok, a1c 9.6, TSH 1.4, FT4 0.97, B12 424, LDL 82, , GFR 74    Diabetic complications: none  Eye exam current (within one year): 1.5 years ago  Foot care and dental care: discussed    Current diabetic medications include:  Metformin- pt unsure of dose  januvia 100mg qd  Glimepiride 2mg qd  ACEI/ARB: losartan 50  Statin: crestor 20    Past medications: no    Diabetic Monitoring  - not checking    Nutrition:     Current diet: eats all day long, biggest meal is dinner. No sugary drinks.   Current exercise: none  Seen RD in past: no    The following portions of the patient's history were reviewed and updated by me as appropriate: allergies, current medications, past family history, past social history, past surgical history and problem list.    Past Medical History:   Diagnosis Date   • Depression    • Diabetes mellitus    • Disc degeneration, lumbar    • Emphysema of lung    • Generalized anxiety disorder    • GERD (gastroesophageal reflux disease)    • Hyperlipidemia    • Hypertension    • Hypogonadism male    • Osteoarthritis    • Type 2 diabetes mellitus, uncontrolled    • Vitamin D deficiency         Medications    Current Outpatient Prescriptions:   •  ACCU-CHEK FASTCLIX LANCETS misc, , Disp: , Rfl:   •  aspirin 81 MG tablet, Take  by mouth daily., Disp: , Rfl:   •  Blood Glucose Monitoring Suppl (ACCU-CHEK KYAW) device, Daily., Disp: , Rfl:   •  buPROPion SR (WELLBUTRIN SR) 150 MG 12 hr tablet, I pill daily for one week, then bid, Disp: 30 tablet, Rfl: 5  •  glimepiride (AMARYL) 2 MG tablet, TAKE ONE TABLET BY MOUTH EVERY MORNING BEFORE BREAKFAST, Disp: 90 tablet, Rfl: 2  •  glucose blood test strip, Use as instructed, Disp: 100 each, Rfl: 12  •  JANUVIA 100 MG tablet, TAKE ONE TABLET BY MOUTH DAILY, Disp: 90 tablet, Rfl: 2  •  lisinopril (PRINIVIL,ZESTRIL) 20 MG tablet, Take  by mouth., Disp: , Rfl:   •  losartan (COZAAR) 50 MG tablet, TAKE ONE TABLET BY MOUTH DAILY, Disp: 90 tablet, Rfl: 2  •  metFORMIN (FORTAMET) 1000 MG (OSM) 24 hr tablet, TAKE ONE TABLET BY MOUTH DAILY, Disp: 30 tablet, Rfl: 2  •  metroNIDAZOLE (FLAGYL) 250 MG tablet, , Disp: , Rfl:   •  oxyCODONE-acetaminophen (PERCOCET) 5-325 MG per tablet, , Disp: , Rfl:   •  rosuvastatin (CRESTOR) 20 MG tablet, TAKE ONE TABLET BY MOUTH DAILY, Disp: 90 tablet, Rfl: 1    Current Facility-Administered Medications:   •  lidocaine (XYLOCAINE) 2 % jelly, , Topical, PRN, Robby Velasco, DNP, APRN    Review of Systems  Review of Systems   Constitutional: Positive for fatigue and unexpected weight change (wt gain). Negative for chills and fever.        Feeling poorly   HENT: Negative for ear discharge, ear pain, hearing loss, nosebleeds, rhinorrhea and sore throat.    Eyes: Negative for pain, redness and visual disturbance.   Respiratory: Negative for cough, shortness of breath and wheezing.    Cardiovascular: Negative for chest pain, palpitations and leg swelling.   Gastrointestinal: Negative for abdominal pain, blood in stool, constipation, diarrhea, nausea and vomiting.   Endocrine: Positive for polydipsia. Negative for cold intolerance and  "heat intolerance.   Genitourinary: Negative for difficulty urinating, discharge, dysuria, hematuria, penile pain, penile swelling, scrotal swelling, testicular pain and urgency.   Musculoskeletal: Negative for arthralgias, gait problem, joint swelling and myalgias.   Skin: Negative for rash.   Allergic/Immunologic: Negative.    Neurological: Negative for dizziness, syncope, weakness, numbness and headaches.   Hematological: Negative for adenopathy. Does not bruise/bleed easily.   Psychiatric/Behavioral: Negative for sleep disturbance and suicidal ideas. The patient is not nervous/anxious.         Physical Exam    /80  Pulse 91  Ht 182.9 cm (72\")  Wt 115 kg (253 lb)  SpO2 98%  BMI 34.31 kg/m2Body mass index is 34.31 kg/(m^2).  Physical Exam   Constitutional: He is oriented to person, place, and time. He appears well-developed. No distress.   HENT:   Head: Normocephalic.   Right Ear: External ear normal.   Left Ear: External ear normal.   Nose: Nose normal.   Eyes: Conjunctivae are normal. Right eye exhibits no discharge. Left eye exhibits no discharge. No scleral icterus.   Neck: Neck supple. No JVD present. No tracheal deviation present. No thyromegaly present.   Cardiovascular: Normal rate, regular rhythm, normal heart sounds and intact distal pulses.    No murmur heard.  Pulmonary/Chest: Effort normal and breath sounds normal. No respiratory distress. He has no wheezes.   Abdominal: Soft. Bowel sounds are normal. There is no tenderness.   Musculoskeletal: He exhibits no edema or tenderness.    Cristian had a diabetic foot exam performed (dry skin) today.   During the foot exam he had a monofilament test performed (loss of feeling proximal foot bilaterally, starts to have feeling mid foot).    Vascular Status -  His exam exhibits right foot vasculature normal. His exam exhibits no right foot edema. His exam exhibits left foot vasculature normal. His exam exhibits no left foot edema.   Skin Integrity  -  " His right foot skin is intact.     Cristian 's left foot skin is intact. .  Neurological: He is alert and oriented to person, place, and time.   Skin: Skin is warm and dry. No rash noted. He is not diaphoretic. No erythema.   Psychiatric: He has a normal mood and affect. His behavior is normal. Judgment and thought content normal.       Labs and Imaging   Lab Results   Component Value Date    HGBA1C 10.3 12/04/2017    HGBA1C 9.6 09/18/2017    HGBA1C 8.0 04/10/2017     No visits with results within 1 Month(s) from this visit.  Latest known visit with results is:    Office Visit on 12/04/2017   Component Date Value Ref Range Status   • Hemoglobin A1C 12/04/2017 10.3  % Final   • Lot Number 12/04/2017 [id number]   Final   • Expiration Date 12/04/2017 6/2019   Final     No images are attached to the encounter or orders placed in the encounter.  No Images in the past 120 days found..    Assessment / Plan   Cristian was seen today for diabetes.    Diagnoses and all orders for this visit:    Uncontrolled type 2 diabetes mellitus with hyperglycemia, without long-term current use of insulin  -     Empagliflozin (JARDIANCE) 25 MG tablet; Take 1 tablet by mouth Daily.    Mixed hyperlipidemia  -     Lipid Panel      Diabetes Mellitus 2 is under poor control.  -A1c 12.2, average sugar 300  -noncompliant, mostly due to dperession  -will refill and resume his meds as a start- metformin 1000mg qd, januvia 100mg qd, glimepiride 2mg qd  -increase dose next visit and likely add SGLT2  -discussed with son and pt he ideally needs insulin, but feel like he would be noncompliant with it  -pt tells me he feels ashamed of his recent actions- smoking, drinking, alcohol, not taking care of himself  -BG >500 today, gave pt 10U rapid acting insulin  -asked him to check sugars qd  -discussed risk of hypoglycemia with alcohol intake- he will try to decrease to 2 drinks per day    1.  Diet: 3-4 carb servings per meal for females, 4-5 carb servings per  meal for males  Spread carb intake throughout the day  Increase lean protein and vegetable intake  Avoid sugary drinks and processed carbs including crackers, cookies, cakes  2.  Exercise: Recommend at least 30 minutes of exercise daily, at least 5 days per week. Increase exercise gradually.   3.  Blood Glucose Goal: Blood glucose goal <150 fasting, <180 2 hr postprandial  4.  Microalbumin due 9/2018  5.  Education performed during this visit: long term diabetic complications discussed. , annual eye examinations at [facility] discussed, dental hygiene discussed  and foot care reviewed., home glucose monitoring emphasized, all medications, side effects and compliance discussed carefully and Hypoglycemia management and prevention reviewed. Reviewed ‘ABCs’ of diabetes management (respective goals in parentheses):  A1C (<7), blood pressure (<130/80), and cholesterol (LDL <100, if CVD <70).    Hyperlipidemia  -recheck lipid panel    Depression  -wellbutrin did not help, stopped taking 3 weeks ago cold turkey  -son will make appt with PCP to discuss this  -this is contributing to poor DM control as he is not taking his meds    Neuropathy  -loss of feeling proximal foot bilaterally, but no ulcers or calluses  -will refer to podiatry next visit, not to overwhelm him today    Nicotine dependence  -discussed need to stop smoking, but will tackle after depression addressed    Patient Instructions   Diabetes Mellitus and Food  It is important for you to manage your blood sugar (glucose) level. Your blood glucose level can be greatly affected by what you eat. Eating healthier foods in the appropriate amounts throughout the day at about the same time each day will help you control your blood glucose level. It can also help slow or prevent worsening of your diabetes mellitus. Healthy eating may even help you improve the level of your blood pressure and reach or maintain a healthy weight.  General recommendations for healthful  eating and cooking habits include:  · Eating meals and snacks regularly. Avoid going long periods of time without eating to lose weight.  · Eating a diet that consists mainly of plant-based foods, such as fruits, vegetables, nuts, legumes, and whole grains.  · Using low-heat cooking methods, such as baking, instead of high-heat cooking methods, such as deep frying.  Work with your dietitian to make sure you understand how to use the Nutrition Facts information on food labels.  How can food affect me?  Carbohydrates   Carbohydrates affect your blood glucose level more than any other type of food. Your dietitian will help you determine how many carbohydrates to eat at each meal and teach you how to count carbohydrates. Counting carbohydrates is important to keep your blood glucose at a healthy level, especially if you are using insulin or taking certain medicines for diabetes mellitus.  Alcohol   Alcohol can cause sudden decreases in blood glucose (hypoglycemia), especially if you use insulin or take certain medicines for diabetes mellitus. Hypoglycemia can be a life-threatening condition. Symptoms of hypoglycemia (sleepiness, dizziness, and disorientation) are similar to symptoms of having too much alcohol.  If your health care provider has given you approval to drink alcohol, do so in moderation and use the following guidelines:  · Women should not have more than one drink per day, and men should not have more than two drinks per day. One drink is equal to:  ¨ 12 oz of beer.  ¨ 5 oz of wine.  ¨ 1½ oz of hard liquor.  · Do not drink on an empty stomach.  · Keep yourself hydrated. Have water, diet soda, or unsweetened iced tea.  · Regular soda, juice, and other mixers might contain a lot of carbohydrates and should be counted.  What foods are not recommended?  As you make food choices, it is important to remember that all foods are not the same. Some foods have fewer nutrients per serving than other foods, even though  they might have the same number of calories or carbohydrates. It is difficult to get your body what it needs when you eat foods with fewer nutrients. Examples of foods that you should avoid that are high in calories and carbohydrates but low in nutrients include:  · Trans fats (most processed foods list trans fats on the Nutrition Facts label).  · Regular soda.  · Juice.  · Candy.  · Sweets, such as cake, pie, doughnuts, and cookies.  · Fried foods.  What foods can I eat?  Eat nutrient-rich foods, which will nourish your body and keep you healthy. The food you should eat also will depend on several factors, including:  · The calories you need.  · The medicines you take.  · Your weight.  · Your blood glucose level.  · Your blood pressure level.  · Your cholesterol level.  You should eat a variety of foods, including:  · Protein.  ¨ Lean cuts of meat.  ¨ Proteins low in saturated fats, such as fish, egg whites, and beans. Avoid processed meats.  · Fruits and vegetables.  ¨ Fruits and vegetables that may help control blood glucose levels, such as apples, mangoes, and yams.  · Dairy products.  ¨ Choose fat-free or low-fat dairy products, such as milk, yogurt, and cheese.  · Grains, bread, pasta, and rice.  ¨ Choose whole grain products, such as multigrain bread, whole oats, and brown rice. These foods may help control blood pressure.  · Fats.  ¨ Foods containing healthful fats, such as nuts, avocado, olive oil, canola oil, and fish.  Does everyone with diabetes mellitus have the same meal plan?  Because every person with diabetes mellitus is different, there is not one meal plan that works for everyone. It is very important that you meet with a dietitian who will help you create a meal plan that is just right for you.  This information is not intended to replace advice given to you by your health care provider. Make sure you discuss any questions you have with your health care provider.  Document Released: 09/14/2006  Document Revised: 05/25/2017 Document Reviewed: 11/14/2014  [employer] Patient Education © 2017 [employer]        Follow up: Return in about 4 weeks (around 3/5/2018).    Discussed the nature of the disease including, risks, complications, implications, management, safe and proper use of medications. Encouraged therapeutic lifestyle changes including low calorie diet with plenty of fruits and vegetables, daily exercise, medication compliance, and keeping scheduled follow up appointments with me and any other providers. Encouraged patient to have appointment for complete physical, fasting labs, appropriate screenings, and immunizations on an annual basis.    30 min  of 45 min face-to-face visit time spent for coordination of care and counselling regarding identified problems as outlined in the objective, assessment and discussion portions of the documentation.    Bladimir Bustillos PA-C

## 2018-03-05 ENCOUNTER — OFFICE VISIT (OUTPATIENT)
Dept: ENDOCRINOLOGY | Facility: CLINIC | Age: 73
End: 2018-03-05

## 2018-03-05 VITALS
OXYGEN SATURATION: 98 % | WEIGHT: 254.9 LBS | BODY MASS INDEX: 34.52 KG/M2 | HEART RATE: 101 BPM | DIASTOLIC BLOOD PRESSURE: 78 MMHG | SYSTOLIC BLOOD PRESSURE: 134 MMHG | HEIGHT: 72 IN

## 2018-03-05 DIAGNOSIS — E11.65 UNCONTROLLED TYPE 2 DIABETES MELLITUS WITH HYPERGLYCEMIA, WITHOUT LONG-TERM CURRENT USE OF INSULIN (HCC): ICD-10-CM

## 2018-03-05 DIAGNOSIS — E11.43 DIABETIC AUTONOMIC NEUROPATHY ASSOCIATED WITH TYPE 2 DIABETES MELLITUS (HCC): ICD-10-CM

## 2018-03-05 DIAGNOSIS — E11.65 UNCONTROLLED TYPE 2 DIABETES MELLITUS WITH HYPERGLYCEMIA, WITHOUT LONG-TERM CURRENT USE OF INSULIN (HCC): Primary | ICD-10-CM

## 2018-03-05 DIAGNOSIS — E78.2 MIXED HYPERLIPIDEMIA: ICD-10-CM

## 2018-03-05 LAB
GLUCOSE BLDC GLUCOMTR-MCNC: 321 MG/DL (ref 70–130)
HBA1C MFR BLD: 11.5 %

## 2018-03-05 PROCEDURE — 99214 OFFICE O/P EST MOD 30 MIN: CPT | Performed by: PHYSICIAN ASSISTANT

## 2018-03-05 PROCEDURE — 83036 HEMOGLOBIN GLYCOSYLATED A1C: CPT | Performed by: PHYSICIAN ASSISTANT

## 2018-03-05 PROCEDURE — 82947 ASSAY GLUCOSE BLOOD QUANT: CPT | Performed by: PHYSICIAN ASSISTANT

## 2018-03-05 RX ORDER — METFORMIN HYDROCHLORIDE 500 MG/1
500 TABLET, EXTENDED RELEASE ORAL
Qty: 120 TABLET | Refills: 5 | Status: SHIPPED | OUTPATIENT
Start: 2018-03-05 | End: 2018-03-05 | Stop reason: SDUPTHER

## 2018-03-05 RX ORDER — METFORMIN HYDROCHLORIDE 500 MG/1
TABLET, EXTENDED RELEASE ORAL
Qty: 120 TABLET | Refills: 5 | Status: SHIPPED | OUTPATIENT
Start: 2018-03-05 | End: 2018-11-07 | Stop reason: SDUPTHER

## 2018-03-05 RX ORDER — GLIMEPIRIDE 4 MG/1
4 TABLET ORAL
Qty: 30 TABLET | Refills: 3 | Status: SHIPPED | OUTPATIENT
Start: 2018-03-05 | End: 2020-04-15

## 2018-03-05 RX ORDER — METFORMIN HYDROCHLORIDE 500 MG/1
TABLET, EXTENDED RELEASE ORAL
Qty: 120 TABLET | Refills: 5 | Status: SHIPPED | OUTPATIENT
Start: 2018-03-05 | End: 2018-03-05 | Stop reason: SDUPTHER

## 2018-03-05 RX ORDER — FENOFIBRATE 160 MG/1
160 TABLET ORAL DAILY
Qty: 30 TABLET | Refills: 3 | Status: SHIPPED | OUTPATIENT
Start: 2018-03-05 | End: 2018-12-03 | Stop reason: SDUPTHER

## 2018-03-05 NOTE — PROGRESS NOTES
Chief Complaint  F/u Diabetes Mellitus.    HPI   Cristian Watt is a 72 y.o. male who is here today for f/u on Diabetes Mellitus type 2.The initial diagnosis of diabetes was made 2015.    Comes in with son today. Son is an orthodontist.   Feels depressed.    Smoking 2 1/2 ppd, drinking 2 beers and 2 vodkas per day.   Multiple BMs per day, but not necessarily diarrhea. Hx cdiff, son doesn't think it completely resolved.    2/5/17- a1c 12.2, BG >500  Labs reviewed:  2/5/18- , LDL 69  12/4/17 a1c 10.3, 9/2017- microalb/cr ok, a1c 9.6, TSH 1.4, FT4 0.97, B12 424, LDL 82, , GFR 74    Diabetic complications: none  Eye exam current (within one year): 1.5 years ago  Foot care and dental care: discussed    Current diabetic medications include:  Metformin- pt unsure of dose  januvia 100mg qd  Glimepiride 2mg qd  ACEI/ARB: losartan 50  Statin: crestor 20    Past medications: no    Diabetic Monitoring   Checking daily, BG ranging 225-240  No lows    Nutrition:     Current diet: eats all day long, biggest meal is dinner. No sugary drinks. Stopped eating dessert since last visit.  Current exercise: none  Seen RD in past: no    The following portions of the patient's history were reviewed and updated by me as appropriate: allergies, current medications, past family history, past social history, past surgical history and problem list.    Past Medical History:   Diagnosis Date   • Depression    • Diabetes mellitus    • Disc degeneration, lumbar    • Emphysema of lung    • Generalized anxiety disorder    • GERD (gastroesophageal reflux disease)    • Hyperlipidemia    • Hypertension    • Hypogonadism male    • Osteoarthritis    • Type 2 diabetes mellitus, uncontrolled    • Vitamin D deficiency        Medications    Current Outpatient Prescriptions:   •  ACCU-CHEK FASTCLIX LANCETS misc, , Disp: , Rfl:   •  aspirin 81 MG tablet, Take  by mouth daily., Disp: , Rfl:   •  Blood Glucose Monitoring Suppl (ACCU-CHEK KYAW)  device, Daily., Disp: , Rfl:   •  buPROPion SR (WELLBUTRIN SR) 150 MG 12 hr tablet, I pill daily for one week, then bid, Disp: 30 tablet, Rfl: 5  •  glimepiride (AMARYL) 4 MG tablet, Take 1 tablet by mouth Every Morning Before Breakfast., Disp: 30 tablet, Rfl: 3  •  glucose blood test strip, Use as instructed, Disp: 100 each, Rfl: 12  •  lisinopril (PRINIVIL,ZESTRIL) 20 MG tablet, Take  by mouth., Disp: , Rfl:   •  losartan (COZAAR) 50 MG tablet, TAKE ONE TABLET BY MOUTH DAILY, Disp: 90 tablet, Rfl: 2  •  metroNIDAZOLE (FLAGYL) 250 MG tablet, , Disp: , Rfl:   •  oxyCODONE-acetaminophen (PERCOCET) 5-325 MG per tablet, , Disp: , Rfl:   •  rosuvastatin (CRESTOR) 20 MG tablet, TAKE ONE TABLET BY MOUTH DAILY, Disp: 90 tablet, Rfl: 1  •  SITagliptin (JANUVIA) 100 MG tablet, Take 1 tablet by mouth Daily., Disp: 90 tablet, Rfl: 2  •  dapagliflozin (FARXIGA) 5 MG tablet tablet, Take 1 tablet by mouth Daily., Disp: 30 tablet, Rfl: 3  •  fenofibrate 160 MG tablet, Take 1 tablet by mouth Daily., Disp: 30 tablet, Rfl: 3  •  metFORMIN ER (GLUCOPHAGE-XR) 500 MG 24 hr tablet, Take 2 tab, Disp: 120 tablet, Rfl: 5    Current Facility-Administered Medications:   •  lidocaine (XYLOCAINE) 2 % jelly, , Topical, PRN, Juawakirill Velasco, DNP, APRN    Review of Systems  Review of Systems   Constitutional: Positive for fatigue and unexpected weight change (wt gain). Negative for chills and fever.        Feeling poorly   HENT: Negative for ear discharge, ear pain, hearing loss, nosebleeds, rhinorrhea and sore throat.    Eyes: Negative for pain, redness and visual disturbance.   Respiratory: Negative for cough, shortness of breath and wheezing.    Cardiovascular: Negative for chest pain, palpitations and leg swelling.   Gastrointestinal: Negative for abdominal pain, blood in stool, constipation, diarrhea, nausea and vomiting.   Endocrine: Positive for polydipsia. Negative for cold intolerance and heat intolerance.   Genitourinary: Negative  "for difficulty urinating, discharge, dysuria, hematuria, penile pain, penile swelling, scrotal swelling, testicular pain and urgency.   Musculoskeletal: Negative for arthralgias, gait problem, joint swelling and myalgias.   Skin: Negative for rash.   Allergic/Immunologic: Negative.    Neurological: Negative for dizziness, syncope, weakness, numbness and headaches.   Hematological: Negative for adenopathy. Does not bruise/bleed easily.   Psychiatric/Behavioral: Negative for sleep disturbance and suicidal ideas. The patient is not nervous/anxious.         Physical Exam    /78  Pulse 101  Ht 182.9 cm (72\")  Wt 116 kg (254 lb 14.4 oz)  SpO2 98%  BMI 34.57 kg/m2Body mass index is 34.57 kg/(m^2).  Physical Exam   Constitutional: He is oriented to person, place, and time. He appears well-developed. No distress.   HENT:   Head: Normocephalic.   Right Ear: External ear normal.   Left Ear: External ear normal.   Nose: Nose normal.   Eyes: Conjunctivae are normal. Right eye exhibits no discharge. Left eye exhibits no discharge. No scleral icterus.   Neck: Neck supple. No JVD present. No tracheal deviation present. No thyromegaly present.   Cardiovascular: Normal rate, regular rhythm, normal heart sounds and intact distal pulses.    No murmur heard.  Pulmonary/Chest: Effort normal and breath sounds normal. No respiratory distress. He has no wheezes.   Abdominal: Soft. Bowel sounds are normal. There is no tenderness.   Musculoskeletal: He exhibits no edema or tenderness.    Cristian had a diabetic foot exam performed (dry skin) today.   During the foot exam he had a monofilament test performed (loss of feeling proximal foot bilaterally, starts to have feeling mid foot).    Vascular Status -  His exam exhibits right foot vasculature normal. His exam exhibits no right foot edema. His exam exhibits left foot vasculature normal. His exam exhibits no left foot edema.   Skin Integrity  -  His right foot skin is intact.     " Cristian 's left foot skin is intact. .  Neurological: He is alert and oriented to person, place, and time.   Skin: Skin is warm and dry. No rash noted. He is not diaphoretic. No erythema.   Psychiatric: He has a normal mood and affect. His behavior is normal. Judgment and thought content normal.       Labs and Imaging   Lab Results   Component Value Date    HGBA1C 12.2 02/05/2018    HGBA1C 10.3 12/04/2017    HGBA1C 9.6 09/18/2017     No visits with results within 1 Month(s) from this visit.  Latest known visit with results is:    Office Visit on 02/05/2018   Component Date Value Ref Range Status   • Total Cholesterol 02/05/2018 166  0 - 200 mg/dL Final   • Triglycerides 02/05/2018 762* 0 - 150 mg/dL Final   • HDL Cholesterol 02/05/2018 39* 40 - 60 mg/dL Final   • LDL Cholesterol  02/05/2018 69  0 - 130 mg/dL Final   • Glucose 02/05/2018 500* 70 - 130 mg/dL Final   • Hemoglobin A1C 02/05/2018 12.2  % Final     No images are attached to the encounter or orders placed in the encounter.  No Images in the past 120 days found..    Assessment / Plan   Cristian was seen today for diabetes.    Diagnoses and all orders for this visit:    Uncontrolled type 2 diabetes mellitus with hyperglycemia, without long-term current use of insulin  -     glimepiride (AMARYL) 4 MG tablet; Take 1 tablet by mouth Every Morning Before Breakfast.  -     metFORMIN ER (GLUCOPHAGE-XR) 500 MG 24 hr tablet; Take 2 tab  -     dapagliflozin (FARXIGA) 5 MG tablet tablet; Take 1 tablet by mouth Daily.    Diabetic autonomic neuropathy associated with type 2 diabetes mellitus  -     Ambulatory Referral to Podiatry    Mixed hyperlipidemia  -     fenofibrate 160 MG tablet; Take 1 tablet by mouth Daily.        Diabetes Mellitus 2 is under poor control.  -A1c 11.5, improved from 12.2 last month  -compliance improving. Taking meds regularly. Stopped eating desserts.  -increase metformin ER 500mg to 2 tab BID. If cannot tolerate he knows he can decrease to 1  tab BID  -increase glimepiride to 4mg ac qd. He knows to take ac to avoid lows  -start farxiga 5mg daily.  -cont januvia 100mg qd  -discussed with him and son may need to add insulin next visit  -asked him to check sugars qd  -discussed risk of hypoglycemia with alcohol intake- he has decreased to 2 drinks per day    1.  Diet: 3-4 carb servings per meal for females, 4-5 carb servings per meal for males  Spread carb intake throughout the day  Increase lean protein and vegetable intake  Avoid sugary drinks and processed carbs including crackers, cookies, cakes  2.  Exercise: Recommend at least 30 minutes of exercise daily, at least 5 days per week. Increase exercise gradually.   3.  Blood Glucose Goal: Blood glucose goal <150 fasting, <180 2 hr postprandial  4.  Microalbumin due 9/2018  5.  Education performed during this visit: long term diabetic complications discussed. , annual eye examinations at Ophthalmology discussed, dental hygiene discussed  and foot care reviewed., home glucose monitoring emphasized, all medications, side effects and compliance discussed carefully and Hypoglycemia management and prevention reviewed. Reviewed ‘ABCs’ of diabetes management (respective goals in parentheses):  A1C (<7), blood pressure (<130/80), and cholesterol (LDL <100, if CVD <70).    Hyperlipidemia  -TG >700, LDL within guidelines  -expect improvement with better DM control  -discussed increased risk of pancreatitis  -add fenofibrate  -can also take OTC fish oil    Depression  -has appt with PCP to discuss this    Neuropathy  -will refer to podiatry    Nicotine dependence  -pt not ready to quit. Encouraged decreasing the amount he smokes.    There are no Patient Instructions on file for this visit.    Follow up: Return in about 3 months (around 6/5/2018).    Discussed the nature of the disease including, risks, complications, implications, management, safe and proper use of medications. Encouraged therapeutic lifestyle  changes including low calorie diet with plenty of fruits and vegetables, daily exercise, medication compliance, and keeping scheduled follow up appointments with me and any other providers. Encouraged patient to have appointment for complete physical, fasting labs, appropriate screenings, and immunizations on an annual basis.    20 min  of 30 min face-to-face visit time spent for coordination of care and counselling regarding identified problems as outlined in the objective, assessment and discussion portions of the documentation.    Bladimir Bustillos PA-C

## 2018-03-06 ENCOUNTER — TELEPHONE (OUTPATIENT)
Dept: INTERNAL MEDICINE | Facility: CLINIC | Age: 73
End: 2018-03-06

## 2018-06-25 ENCOUNTER — OFFICE VISIT (OUTPATIENT)
Dept: ENDOCRINOLOGY | Facility: CLINIC | Age: 73
End: 2018-06-25

## 2018-06-25 VITALS
HEART RATE: 102 BPM | BODY MASS INDEX: 34.08 KG/M2 | OXYGEN SATURATION: 96 % | SYSTOLIC BLOOD PRESSURE: 132 MMHG | WEIGHT: 251.25 LBS | DIASTOLIC BLOOD PRESSURE: 78 MMHG

## 2018-06-25 DIAGNOSIS — Z91.119 NONCOMPLIANCE WITH DIET AND MEDICATION REGIMEN: ICD-10-CM

## 2018-06-25 DIAGNOSIS — E11.65 UNCONTROLLED TYPE 2 DIABETES MELLITUS WITH HYPERGLYCEMIA, WITHOUT LONG-TERM CURRENT USE OF INSULIN (HCC): Primary | ICD-10-CM

## 2018-06-25 DIAGNOSIS — Z91.14 NONCOMPLIANCE WITH DIET AND MEDICATION REGIMEN: ICD-10-CM

## 2018-06-25 LAB
GLUCOSE BLDC GLUCOMTR-MCNC: 371 MG/DL (ref 70–130)
HBA1C MFR BLD: 11.8 %

## 2018-06-25 PROCEDURE — 83036 HEMOGLOBIN GLYCOSYLATED A1C: CPT | Performed by: PHYSICIAN ASSISTANT

## 2018-06-25 PROCEDURE — 82947 ASSAY GLUCOSE BLOOD QUANT: CPT | Performed by: PHYSICIAN ASSISTANT

## 2018-06-25 PROCEDURE — 99214 OFFICE O/P EST MOD 30 MIN: CPT | Performed by: PHYSICIAN ASSISTANT

## 2018-06-25 NOTE — PROGRESS NOTES
"Chief Complaint  F/u Diabetes Mellitus.    HPI   Cristian Watt is a 72 y.o. male who is here today for f/u on Diabetes Mellitus type 2.The initial diagnosis of diabetes was made 2015.    Pt reports \"My diet is terrible.\" Eating 4 donuts in AM.   Still smoking 2 1/2 ppd.  He was started on farxiga 5, fenofibrate 160 last visit. He took the those 2 along with glimepiride and then became very nauseous. He stopped taking all the meds.  A1C- 11.8 (6/25/18), 11.5 (3/2018), 12.2 (2/2017)  Labs reviewed:  2/5/18- , LDL 69  12/4/17 a1c 10.3, 9/2017- microalb/cr ok, a1c 9.6, TSH 1.4, FT4 0.97, B12 424, LDL 82, , GFR 74    Diabetic complications: none  Eye exam current (within one year): 1.5 years ago  Foot care and dental care: discussed    Current diabetic medications include:  Metformin ER 500mg 2 tab BID  Januvia 100mg qd  Glimepiride 4mg qd- stopped taking  Farxiga 5mg qd- stopped taking  ACEI/ARB: losartan 50  Statin: crestor 20    Past medications: no    Diabetic Monitoring   Checking sugar every 2-3 days, BG ranging ~260  No lows    Nutrition:     Current diet: eats all day long, biggest meal is dinner. No sugary drinks. Stopped eating dessert since last visit.  Current exercise: none  Seen RD in past: no    The following portions of the patient's history were reviewed and updated by me as appropriate: allergies, current medications, past family history, past social history, past surgical history and problem list.    Past Medical History:   Diagnosis Date   • Depression    • Diabetes mellitus    • Disc degeneration, lumbar    • Emphysema of lung    • Generalized anxiety disorder    • GERD (gastroesophageal reflux disease)    • Hyperlipidemia    • Hypertension    • Hypogonadism male    • Osteoarthritis    • Type 2 diabetes mellitus, uncontrolled    • Vitamin D deficiency        Medications    Current Outpatient Prescriptions:   •  ACCU-CHEK FASTCLIX LANCETS misc, , Disp: , Rfl:   •  aspirin 81 MG tablet, " Take  by mouth daily., Disp: , Rfl:   •  Blood Glucose Monitoring Suppl (ACCU-CHEK KYAW) device, Daily., Disp: , Rfl:   •  buPROPion SR (WELLBUTRIN SR) 150 MG 12 hr tablet, I pill daily for one week, then bid, Disp: 30 tablet, Rfl: 5  •  fenofibrate 160 MG tablet, Take 1 tablet by mouth Daily., Disp: 30 tablet, Rfl: 3  •  glimepiride (AMARYL) 4 MG tablet, Take 1 tablet by mouth Every Morning Before Breakfast., Disp: 30 tablet, Rfl: 3  •  glucose blood test strip, Use as instructed, Disp: 100 each, Rfl: 12  •  lisinopril (PRINIVIL,ZESTRIL) 20 MG tablet, Take  by mouth., Disp: , Rfl:   •  losartan (COZAAR) 50 MG tablet, TAKE ONE TABLET BY MOUTH DAILY, Disp: 90 tablet, Rfl: 2  •  metFORMIN ER (GLUCOPHAGE-XR) 500 MG 24 hr tablet, Take 2 tab BID with meals, Disp: 120 tablet, Rfl: 5  •  rosuvastatin (CRESTOR) 20 MG tablet, TAKE ONE TABLET BY MOUTH DAILY, Disp: 90 tablet, Rfl: 1  •  SITagliptin (JANUVIA) 100 MG tablet, Take 1 tablet by mouth Daily., Disp: 90 tablet, Rfl: 2  •  dapagliflozin (FARXIGA) 5 MG tablet tablet, Take 1 tablet by mouth Daily., Disp: 30 tablet, Rfl: 3  •  metroNIDAZOLE (FLAGYL) 250 MG tablet, , Disp: , Rfl:   •  oxyCODONE-acetaminophen (PERCOCET) 5-325 MG per tablet, , Disp: , Rfl:     Current Facility-Administered Medications:   •  lidocaine (XYLOCAINE) 2 % jelly, , Topical, PRN, Robby Velasco, DNP, APRN    Review of Systems  Review of Systems   Constitutional: Positive for fatigue and unexpected weight change (wt gain). Negative for chills and fever.        Feeling poorly   HENT: Negative for ear discharge, ear pain, hearing loss, nosebleeds, rhinorrhea and sore throat.    Eyes: Negative for pain, redness and visual disturbance.   Respiratory: Negative for cough, shortness of breath and wheezing.    Cardiovascular: Negative for chest pain, palpitations and leg swelling.   Gastrointestinal: Negative for abdominal pain, blood in stool, constipation, diarrhea, nausea and vomiting.   Endocrine:  Positive for polydipsia. Negative for cold intolerance and heat intolerance.   Genitourinary: Negative for difficulty urinating, discharge, dysuria, hematuria, penile pain, penile swelling, scrotal swelling, testicular pain and urgency.   Musculoskeletal: Negative for arthralgias, gait problem, joint swelling and myalgias.   Skin: Negative for rash.   Allergic/Immunologic: Negative.    Neurological: Negative for dizziness, syncope, weakness, numbness and headaches.   Hematological: Negative for adenopathy. Does not bruise/bleed easily.   Psychiatric/Behavioral: Negative for sleep disturbance and suicidal ideas. The patient is not nervous/anxious.         Physical Exam    /78   Pulse 102   Wt 114 kg (251 lb 4 oz)   SpO2 96%   BMI 34.08 kg/m² Body mass index is 34.08 kg/m².  Physical Exam   Constitutional: He is oriented to person, place, and time. He appears well-developed. No distress.   HENT:   Head: Normocephalic.   Right Ear: External ear normal.   Left Ear: External ear normal.   Nose: Nose normal.   Eyes: Conjunctivae are normal. Right eye exhibits no discharge. Left eye exhibits no discharge. No scleral icterus.   Neck: Neck supple. No JVD present. No tracheal deviation present. No thyromegaly present.   Cardiovascular: Normal rate, regular rhythm, normal heart sounds and intact distal pulses.    No murmur heard.  Pulmonary/Chest: Effort normal and breath sounds normal. No respiratory distress. He has no wheezes.   Abdominal: Soft. Bowel sounds are normal. There is no tenderness.   Musculoskeletal: He exhibits no edema or tenderness.    Cristian had a diabetic foot exam performed (dry skin) today.   During the foot exam he had a monofilament test performed (loss of feeling proximal foot bilaterally, starts to have feeling mid foot).  Vascular Status -  His right foot exhibits abnormal foot vasculature . His right foot exhibits no edema. His left foot exhibits abnormal foot vasculature . His left foot  exhibits no edema.  Skin Integrity  -  His right foot skin is not intact.  His left foot skin is not intact..  Neurological: He is alert and oriented to person, place, and time.   Skin: Skin is warm and dry. No rash noted. He is not diaphoretic. No erythema.   Psychiatric: He has a normal mood and affect. His behavior is normal. Judgment and thought content normal.       Labs and Imaging   Lab Results   Component Value Date    HGBA1C 11.8 06/25/2018    HGBA1C 11.5 03/05/2018    HGBA1C 12.2 02/05/2018     Office Visit on 06/25/2018   Component Date Value Ref Range Status   • Glucose 06/25/2018 371* 70 - 130 mg/dL Final   • Hemoglobin A1C 06/25/2018 11.8  % Final     No images are attached to the encounter or orders placed in the encounter.  No Images in the past 120 days found..    Assessment / Plan   Cristian was seen today for follow-up.    Diagnoses and all orders for this visit:    Uncontrolled type 2 diabetes mellitus with hyperglycemia, without long-term current use of insulin  -     POC Glucose Fingerstick  -     POC Glycosylated Hemoglobin (Hb A1C)        Diabetes Mellitus 2 is under poor control.  -A1c 11.8, up from 11.5 3/2018  -admits to diet/med noncompliance  -continue metformin ER 500mg to 2 tab BID.   -resume glimepiride to 4mg ac qd. He knows to take ac to avoid lows  -after a week of glimepiride, resume farxiga 5mg daily.  -cont januvia 100mg qd  -recommended insulin, but he is still very hesitant to take injections  -discussed small diet changes- change from white to wheat bread, decrease sugar intake  -Discussed long term risks of untreated/undertreated diabetes including retinopathy, neuropathy, nephropathy, amputations, hospitalizations, MI, CVA.   -discussed risk of hypoglycemia with alcohol intake- he has decreased to 2 drinks per day    1.  Diet: 3-4 carb servings per meal for females, 4-5 carb servings per meal for males  Spread carb intake throughout the day  Increase lean protein and  vegetable intake  Avoid sugary drinks and processed carbs including crackers, cookies, cakes  2.  Exercise: Recommend at least 30 minutes of exercise daily, at least 5 days per week. Increase exercise gradually.   3.  Blood Glucose Goal: Blood glucose goal <150 fasting, <180 2 hr postprandial  4.  Microalbumin due 9/2018  5.  Education performed during this visit: long term diabetic complications discussed. , annual eye examinations at Ophthalmology discussed, dental hygiene discussed  and foot care reviewed., home glucose monitoring emphasized, all medications, side effects and compliance discussed carefully and Hypoglycemia management and prevention reviewed. Reviewed ‘ABCs’ of diabetes management (respective goals in parentheses):  A1C (<7), blood pressure (<130/80), and cholesterol (LDL <100, if CVD <70).    Hyperlipidemia  -TG >700, LDL within guidelines  -expect improvement with better DM control  -discussed increased risk of pancreatitis  -resume fenofibrate  -can also take OTC fish oil    Nicotine dependence  -he will consider decreasing from 2 1/2 to 2 ppd    There are no Patient Instructions on file for this visit.    Follow up: Return in about 3 months (around 9/25/2018).    Discussed the nature of the disease including, risks, complications, implications, management, safe and proper use of medications. Encouraged therapeutic lifestyle changes including low calorie diet with plenty of fruits and vegetables, daily exercise, medication compliance, and keeping scheduled follow up appointments with me and any other providers. Encouraged patient to have appointment for complete physical, fasting labs, appropriate screenings, and immunizations on an annual basis.    20 min  of 30 min face-to-face visit time spent for coordination of care and counselling regarding identified problems as outlined in the objective, assessment and discussion portions of the documentation.    Bladimir Bustillos PA-C

## 2018-07-25 DIAGNOSIS — E78.5 HYPERLIPIDEMIA: ICD-10-CM

## 2018-07-30 RX ORDER — ROSUVASTATIN CALCIUM 20 MG/1
TABLET, COATED ORAL
Qty: 90 TABLET | Refills: 0 | Status: SHIPPED | OUTPATIENT
Start: 2018-07-30 | End: 2018-10-24 | Stop reason: SDUPTHER

## 2018-10-24 DIAGNOSIS — E78.5 HYPERLIPIDEMIA: ICD-10-CM

## 2018-10-24 DIAGNOSIS — IMO0002 UNCONTROLLED TYPE 2 DIABETES MELLITUS WITH COMPLICATION, WITHOUT LONG-TERM CURRENT USE OF INSULIN: ICD-10-CM

## 2018-10-24 RX ORDER — ROSUVASTATIN CALCIUM 20 MG/1
TABLET, COATED ORAL
Qty: 90 TABLET | Refills: 0 | Status: SHIPPED | OUTPATIENT
Start: 2018-10-24 | End: 2019-01-23 | Stop reason: SDUPTHER

## 2018-10-24 RX ORDER — LOSARTAN POTASSIUM 50 MG/1
TABLET ORAL
Qty: 90 TABLET | Refills: 1 | Status: SHIPPED | OUTPATIENT
Start: 2018-10-24 | End: 2019-04-24 | Stop reason: SDUPTHER

## 2018-11-07 DIAGNOSIS — E11.65 UNCONTROLLED TYPE 2 DIABETES MELLITUS WITH HYPERGLYCEMIA, WITHOUT LONG-TERM CURRENT USE OF INSULIN (HCC): ICD-10-CM

## 2018-11-07 RX ORDER — METFORMIN HYDROCHLORIDE 500 MG/1
TABLET, EXTENDED RELEASE ORAL
Qty: 360 TABLET | Refills: 1 | Status: SHIPPED | OUTPATIENT
Start: 2018-11-07 | End: 2019-05-08 | Stop reason: SDUPTHER

## 2018-11-24 DIAGNOSIS — E11.65 UNCONTROLLED TYPE 2 DIABETES MELLITUS WITH HYPERGLYCEMIA, WITHOUT LONG-TERM CURRENT USE OF INSULIN (HCC): ICD-10-CM

## 2018-11-24 RX ORDER — DAPAGLIFLOZIN 5 MG/1
TABLET, FILM COATED ORAL
Qty: 30 TABLET | Refills: 0 | Status: SHIPPED | OUTPATIENT
Start: 2018-11-24 | End: 2018-12-16 | Stop reason: SDUPTHER

## 2018-11-30 ENCOUNTER — TELEPHONE (OUTPATIENT)
Dept: FAMILY MEDICINE CLINIC | Facility: CLINIC | Age: 73
End: 2018-11-30

## 2018-12-03 DIAGNOSIS — E78.2 MIXED HYPERLIPIDEMIA: ICD-10-CM

## 2018-12-03 RX ORDER — FENOFIBRATE 160 MG/1
TABLET ORAL
Qty: 30 TABLET | Refills: 0 | Status: SHIPPED | OUTPATIENT
Start: 2018-12-03 | End: 2019-01-14 | Stop reason: SDUPTHER

## 2018-12-16 DIAGNOSIS — E11.65 UNCONTROLLED TYPE 2 DIABETES MELLITUS WITH HYPERGLYCEMIA, WITHOUT LONG-TERM CURRENT USE OF INSULIN (HCC): ICD-10-CM

## 2018-12-17 RX ORDER — DAPAGLIFLOZIN 5 MG/1
TABLET, FILM COATED ORAL
Qty: 30 TABLET | Refills: 1 | Status: SHIPPED | OUTPATIENT
Start: 2018-12-17 | End: 2019-03-11 | Stop reason: SDUPTHER

## 2018-12-31 DIAGNOSIS — E11.65 UNCONTROLLED TYPE 2 DIABETES MELLITUS WITH HYPERGLYCEMIA, WITHOUT LONG-TERM CURRENT USE OF INSULIN (HCC): ICD-10-CM

## 2019-01-02 RX ORDER — GLIMEPIRIDE 2 MG/1
TABLET ORAL
Qty: 90 TABLET | Refills: 1 | Status: SHIPPED | OUTPATIENT
Start: 2019-01-02 | End: 2020-04-15

## 2019-01-02 NOTE — TELEPHONE ENCOUNTER
Called patient LVM for patient to Return Call to the office   Needing to check with patient to See if she is taking both 2 and 4 mg of this medication

## 2019-01-14 DIAGNOSIS — E78.2 MIXED HYPERLIPIDEMIA: ICD-10-CM

## 2019-01-14 RX ORDER — FENOFIBRATE 160 MG/1
TABLET ORAL
Qty: 30 TABLET | Refills: 0 | Status: SHIPPED | OUTPATIENT
Start: 2019-01-14 | End: 2019-02-26 | Stop reason: SDUPTHER

## 2019-01-23 DIAGNOSIS — E78.5 HYPERLIPIDEMIA: ICD-10-CM

## 2019-01-23 RX ORDER — ROSUVASTATIN CALCIUM 20 MG/1
TABLET, COATED ORAL
Qty: 90 TABLET | Refills: 0 | Status: SHIPPED | OUTPATIENT
Start: 2019-01-23 | End: 2019-04-24 | Stop reason: SDUPTHER

## 2019-02-13 DIAGNOSIS — E16.2 HYPOGLYCEMIA: ICD-10-CM

## 2019-02-13 RX ORDER — SITAGLIPTIN 100 MG/1
TABLET, FILM COATED ORAL
Qty: 90 TABLET | Refills: 1 | OUTPATIENT
Start: 2019-02-13

## 2019-02-18 DIAGNOSIS — E11.65 UNCONTROLLED TYPE 2 DIABETES MELLITUS WITH HYPERGLYCEMIA, WITHOUT LONG-TERM CURRENT USE OF INSULIN (HCC): ICD-10-CM

## 2019-02-18 RX ORDER — DAPAGLIFLOZIN 5 MG/1
TABLET, FILM COATED ORAL
Qty: 30 TABLET | Refills: 0 | OUTPATIENT
Start: 2019-02-18

## 2019-02-25 DIAGNOSIS — E78.2 MIXED HYPERLIPIDEMIA: ICD-10-CM

## 2019-02-25 RX ORDER — FENOFIBRATE 160 MG/1
TABLET ORAL
Qty: 30 TABLET | Refills: 0 | OUTPATIENT
Start: 2019-02-25

## 2019-02-26 DIAGNOSIS — E78.2 MIXED HYPERLIPIDEMIA: ICD-10-CM

## 2019-02-26 RX ORDER — FENOFIBRATE 160 MG/1
160 TABLET ORAL DAILY
Qty: 90 TABLET | Refills: 0 | Status: SHIPPED | OUTPATIENT
Start: 2019-02-26 | End: 2021-07-07

## 2019-03-11 DIAGNOSIS — E11.65 UNCONTROLLED TYPE 2 DIABETES MELLITUS WITH HYPERGLYCEMIA, WITHOUT LONG-TERM CURRENT USE OF INSULIN (HCC): ICD-10-CM

## 2019-03-11 DIAGNOSIS — E16.2 HYPOGLYCEMIA: ICD-10-CM

## 2019-03-11 RX ORDER — DAPAGLIFLOZIN 5 MG/1
TABLET, FILM COATED ORAL
Qty: 30 TABLET | Refills: 0 | Status: SHIPPED | OUTPATIENT
Start: 2019-03-11 | End: 2019-04-13 | Stop reason: SDUPTHER

## 2019-03-11 RX ORDER — SITAGLIPTIN 100 MG/1
TABLET, FILM COATED ORAL
Qty: 90 TABLET | Refills: 1 | OUTPATIENT
Start: 2019-03-11

## 2019-04-13 DIAGNOSIS — E11.65 UNCONTROLLED TYPE 2 DIABETES MELLITUS WITH HYPERGLYCEMIA, WITHOUT LONG-TERM CURRENT USE OF INSULIN (HCC): ICD-10-CM

## 2019-04-13 RX ORDER — DAPAGLIFLOZIN 5 MG/1
TABLET, FILM COATED ORAL
Qty: 30 TABLET | Refills: 0 | Status: SHIPPED | OUTPATIENT
Start: 2019-04-13 | End: 2019-05-11 | Stop reason: SDUPTHER

## 2019-04-24 DIAGNOSIS — E78.5 HYPERLIPIDEMIA: ICD-10-CM

## 2019-04-24 DIAGNOSIS — IMO0002 UNCONTROLLED TYPE 2 DIABETES MELLITUS WITH COMPLICATION, WITHOUT LONG-TERM CURRENT USE OF INSULIN: ICD-10-CM

## 2019-04-24 RX ORDER — ROSUVASTATIN CALCIUM 20 MG/1
TABLET, COATED ORAL
Qty: 90 TABLET | Refills: 0 | Status: SHIPPED | OUTPATIENT
Start: 2019-04-24 | End: 2019-07-24 | Stop reason: SDUPTHER

## 2019-04-24 RX ORDER — LOSARTAN POTASSIUM 50 MG/1
TABLET ORAL
Qty: 90 TABLET | Refills: 0 | Status: SHIPPED | OUTPATIENT
Start: 2019-04-24 | End: 2019-07-24 | Stop reason: SDUPTHER

## 2019-05-08 DIAGNOSIS — E11.65 UNCONTROLLED TYPE 2 DIABETES MELLITUS WITH HYPERGLYCEMIA, WITHOUT LONG-TERM CURRENT USE OF INSULIN (HCC): ICD-10-CM

## 2019-05-08 RX ORDER — METFORMIN HYDROCHLORIDE 500 MG/1
1000 TABLET, EXTENDED RELEASE ORAL 2 TIMES DAILY WITH MEALS
Qty: 360 TABLET | Refills: 0 | Status: SHIPPED | OUTPATIENT
Start: 2019-05-08 | End: 2020-04-15

## 2019-05-11 DIAGNOSIS — E11.65 UNCONTROLLED TYPE 2 DIABETES MELLITUS WITH HYPERGLYCEMIA, WITHOUT LONG-TERM CURRENT USE OF INSULIN (HCC): ICD-10-CM

## 2019-05-11 RX ORDER — DAPAGLIFLOZIN 5 MG/1
TABLET, FILM COATED ORAL
Qty: 30 TABLET | Refills: 0 | Status: SHIPPED | OUTPATIENT
Start: 2019-05-11 | End: 2019-06-08 | Stop reason: SDUPTHER

## 2019-06-08 DIAGNOSIS — E11.65 UNCONTROLLED TYPE 2 DIABETES MELLITUS WITH HYPERGLYCEMIA, WITHOUT LONG-TERM CURRENT USE OF INSULIN (HCC): ICD-10-CM

## 2019-06-08 RX ORDER — DAPAGLIFLOZIN 5 MG/1
TABLET, FILM COATED ORAL
Qty: 30 TABLET | Refills: 1 | Status: SHIPPED | OUTPATIENT
Start: 2019-06-08 | End: 2019-08-22 | Stop reason: SDUPTHER

## 2019-06-24 DIAGNOSIS — E78.2 MIXED HYPERLIPIDEMIA: ICD-10-CM

## 2019-06-24 RX ORDER — FENOFIBRATE 160 MG/1
TABLET ORAL
Qty: 90 TABLET | Refills: 0 | OUTPATIENT
Start: 2019-06-24

## 2019-06-25 RX ORDER — FENOFIBRATE 160 MG/1
TABLET ORAL
Qty: 90 TABLET | Refills: 0 | OUTPATIENT
Start: 2019-06-25

## 2019-07-24 DIAGNOSIS — IMO0002 UNCONTROLLED TYPE 2 DIABETES MELLITUS WITH COMPLICATION, WITHOUT LONG-TERM CURRENT USE OF INSULIN: ICD-10-CM

## 2019-07-24 DIAGNOSIS — E78.5 HYPERLIPIDEMIA: ICD-10-CM

## 2019-07-24 RX ORDER — ROSUVASTATIN CALCIUM 20 MG/1
TABLET, COATED ORAL
Qty: 90 TABLET | Refills: 0 | Status: SHIPPED | OUTPATIENT
Start: 2019-07-24 | End: 2020-04-13

## 2019-07-24 RX ORDER — LOSARTAN POTASSIUM 50 MG/1
TABLET ORAL
Qty: 90 TABLET | Refills: 0 | Status: SHIPPED | OUTPATIENT
Start: 2019-07-24 | End: 2020-04-13

## 2019-08-05 DIAGNOSIS — E11.65 UNCONTROLLED TYPE 2 DIABETES MELLITUS WITH HYPERGLYCEMIA, WITHOUT LONG-TERM CURRENT USE OF INSULIN (HCC): ICD-10-CM

## 2019-08-05 RX ORDER — DAPAGLIFLOZIN 5 MG/1
TABLET, FILM COATED ORAL
Qty: 30 TABLET | Refills: 0 | OUTPATIENT
Start: 2019-08-05

## 2019-08-07 DIAGNOSIS — E11.65 UNCONTROLLED TYPE 2 DIABETES MELLITUS WITH HYPERGLYCEMIA, WITHOUT LONG-TERM CURRENT USE OF INSULIN (HCC): ICD-10-CM

## 2019-08-07 RX ORDER — METFORMIN HYDROCHLORIDE 500 MG/1
TABLET, EXTENDED RELEASE ORAL
Qty: 360 TABLET | Refills: 0 | OUTPATIENT
Start: 2019-08-07

## 2019-08-12 DIAGNOSIS — E78.2 MIXED HYPERLIPIDEMIA: ICD-10-CM

## 2019-08-12 DIAGNOSIS — E11.65 UNCONTROLLED TYPE 2 DIABETES MELLITUS WITH HYPERGLYCEMIA, WITHOUT LONG-TERM CURRENT USE OF INSULIN (HCC): ICD-10-CM

## 2019-08-12 RX ORDER — FENOFIBRATE 160 MG/1
160 TABLET ORAL DAILY
Qty: 90 TABLET | Refills: 0 | OUTPATIENT
Start: 2019-08-12

## 2019-08-22 DIAGNOSIS — E11.65 UNCONTROLLED TYPE 2 DIABETES MELLITUS WITH HYPERGLYCEMIA, WITHOUT LONG-TERM CURRENT USE OF INSULIN (HCC): ICD-10-CM

## 2019-08-26 RX ORDER — DAPAGLIFLOZIN 5 MG/1
TABLET, FILM COATED ORAL
Qty: 22 TABLET | Refills: 0 | Status: SHIPPED | OUTPATIENT
Start: 2019-08-26 | End: 2019-09-13 | Stop reason: SDUPTHER

## 2019-09-13 DIAGNOSIS — E11.65 UNCONTROLLED TYPE 2 DIABETES MELLITUS WITH HYPERGLYCEMIA, WITHOUT LONG-TERM CURRENT USE OF INSULIN (HCC): ICD-10-CM

## 2019-09-13 RX ORDER — DAPAGLIFLOZIN 5 MG/1
TABLET, FILM COATED ORAL
Qty: 30 TABLET | Refills: 3 | Status: SHIPPED | OUTPATIENT
Start: 2019-09-13 | End: 2020-04-13 | Stop reason: SDUPTHER

## 2019-09-19 DIAGNOSIS — E11.65 UNCONTROLLED TYPE 2 DIABETES MELLITUS WITH HYPERGLYCEMIA, WITHOUT LONG-TERM CURRENT USE OF INSULIN (HCC): ICD-10-CM

## 2019-09-19 RX ORDER — METFORMIN HYDROCHLORIDE 500 MG/1
TABLET, EXTENDED RELEASE ORAL
Qty: 360 TABLET | Refills: 0 | OUTPATIENT
Start: 2019-09-19

## 2019-10-23 DIAGNOSIS — IMO0002 UNCONTROLLED TYPE 2 DIABETES MELLITUS WITH COMPLICATION, WITHOUT LONG-TERM CURRENT USE OF INSULIN: ICD-10-CM

## 2019-10-23 DIAGNOSIS — E78.5 HYPERLIPIDEMIA: ICD-10-CM

## 2019-10-23 RX ORDER — ROSUVASTATIN CALCIUM 20 MG/1
TABLET, COATED ORAL
Qty: 90 TABLET | Refills: 0 | OUTPATIENT
Start: 2019-10-23

## 2019-10-23 RX ORDER — LOSARTAN POTASSIUM 50 MG/1
TABLET ORAL
Qty: 90 TABLET | Refills: 0 | OUTPATIENT
Start: 2019-10-23

## 2019-11-27 DIAGNOSIS — E78.5 HYPERLIPIDEMIA: ICD-10-CM

## 2019-11-27 DIAGNOSIS — IMO0002 UNCONTROLLED TYPE 2 DIABETES MELLITUS WITH COMPLICATION, WITHOUT LONG-TERM CURRENT USE OF INSULIN: ICD-10-CM

## 2019-11-27 RX ORDER — ROSUVASTATIN CALCIUM 20 MG/1
TABLET, COATED ORAL
Qty: 90 TABLET | Refills: 0 | OUTPATIENT
Start: 2019-11-27

## 2019-11-27 RX ORDER — LOSARTAN POTASSIUM 50 MG/1
TABLET ORAL
Qty: 90 TABLET | Refills: 0 | OUTPATIENT
Start: 2019-11-27

## 2020-01-18 DIAGNOSIS — E11.65 UNCONTROLLED TYPE 2 DIABETES MELLITUS WITH HYPERGLYCEMIA, WITHOUT LONG-TERM CURRENT USE OF INSULIN (HCC): ICD-10-CM

## 2020-01-20 RX ORDER — DAPAGLIFLOZIN 5 MG/1
TABLET, FILM COATED ORAL
Qty: 30 TABLET | Refills: 2 | OUTPATIENT
Start: 2020-01-20

## 2020-01-20 NOTE — TELEPHONE ENCOUNTER
Please advise,Pthas not been seen since 6/2018 has cancelled 8 times and No Showed last appt with no future appts

## 2020-04-13 DIAGNOSIS — E11.65 UNCONTROLLED TYPE 2 DIABETES MELLITUS WITH HYPERGLYCEMIA, WITHOUT LONG-TERM CURRENT USE OF INSULIN (HCC): ICD-10-CM

## 2020-04-13 DIAGNOSIS — E78.5 HYPERLIPIDEMIA: ICD-10-CM

## 2020-04-13 DIAGNOSIS — IMO0002 UNCONTROLLED TYPE 2 DIABETES MELLITUS WITH COMPLICATION, WITHOUT LONG-TERM CURRENT USE OF INSULIN: ICD-10-CM

## 2020-04-13 RX ORDER — DAPAGLIFLOZIN 5 MG/1
TABLET, FILM COATED ORAL
Qty: 22 TABLET | Refills: 0 | OUTPATIENT
Start: 2020-04-13

## 2020-04-13 RX ORDER — ROSUVASTATIN CALCIUM 20 MG/1
TABLET, COATED ORAL
Qty: 90 TABLET | Refills: 0 | Status: SHIPPED | OUTPATIENT
Start: 2020-04-13 | End: 2020-07-11

## 2020-04-13 RX ORDER — LOSARTAN POTASSIUM 50 MG/1
TABLET ORAL
Qty: 90 TABLET | Refills: 0 | Status: SHIPPED | OUTPATIENT
Start: 2020-04-13 | End: 2020-07-11

## 2020-04-13 NOTE — TELEPHONE ENCOUNTER
Bladimir, please advice on refill.  Thank you.      LOV:  06/25/18    Last refill:  9/13/19  Q. 30  R. 3    The following appointments, patient has canceled:  09/24/18  10/22/18  12/10/18  01/28/19  03/11/19  05/31/19  08/12/19    09/17/19 - No Show

## 2020-04-13 NOTE — TELEPHONE ENCOUNTER
"Spoke with pt and scheduled him for a telephone visit for Wednesday. Pt kept asking multiple times about his refills from [facility], I told him he would have to contact her office regarding those medications. I will send pt a 1 week refill of the Farxiga. Pt states he has not come in for an apt because he does not like to leave his house and he has been out of medication for \"some time.\"   "

## 2020-04-15 ENCOUNTER — OFFICE VISIT (OUTPATIENT)
Dept: ENDOCRINOLOGY | Facility: CLINIC | Age: 75
End: 2020-04-15

## 2020-04-15 ENCOUNTER — TELEPHONE (OUTPATIENT)
Dept: ENDOCRINOLOGY | Facility: CLINIC | Age: 75
End: 2020-04-15

## 2020-04-15 VITALS — HEIGHT: 72 IN | WEIGHT: 250 LBS | BODY MASS INDEX: 33.86 KG/M2

## 2020-04-15 DIAGNOSIS — E11.65 UNCONTROLLED TYPE 2 DIABETES MELLITUS WITH HYPERGLYCEMIA (HCC): Primary | ICD-10-CM

## 2020-04-15 DIAGNOSIS — E78.2 MIXED HYPERLIPIDEMIA: ICD-10-CM

## 2020-04-15 PROCEDURE — 99442 PR PHYS/QHP TELEPHONE EVALUATION 11-20 MIN: CPT | Performed by: PHYSICIAN ASSISTANT

## 2020-04-15 NOTE — PROGRESS NOTES
Phone visit conducted to comply with patient safety concerns in accordance with CDC recommendations for the COVID-19 pandemic.     Chief Complaint  F/u Diabetes Mellitus.    HPI   Cristian Watt is a 74 y.o. male who is here today for f/u on Diabetes Mellitus type 2.The initial diagnosis of diabetes was made 2015.    Pt has not been seen since 6/2018.  Has been out of diabetes medications for at least a year.  Has not seen PCP recently either.  No recent labs.  Does not check bs.  Admits to poor diet.  Does not get out of the house much. Someone dose grocery shopping for him.   He states he overall feels very well.  Urinating normally.     Diabetic complications: none  Eye exam current (within one year): 1.5 years ago  Foot care and dental care: discussed    Current diabetic medications include:  Metformin ER 500mg 2 tab BID   Januvia 100mg qd   Glimepiride 4mg qd  Farxiga 5mg qd    Has been out of above medications for at least a year    ACEI/ARB: losartan 50  Statin: crestor 20    Past medications: no    Diabetic Monitoring   Does not check    Nutrition:     Current diet: eats all day long, biggest meal is dinner. No sugary drinks. Stopped eating dessert since last visit.  Current exercise: none  Seen RD in past: no    The following portions of the patient's history were reviewed and updated by me as appropriate: allergies, current medications, past family history, past social history, past surgical history and problem list.      Past Medical History:   Diagnosis Date   • Depression    • Diabetes mellitus (CMS/HCC)    • Disc degeneration, lumbar    • Emphysema of lung (CMS/HCC)    • Generalized anxiety disorder    • GERD (gastroesophageal reflux disease)    • Hyperlipidemia    • Hypertension    • Hypogonadism male    • Osteoarthritis    • Type 2 diabetes mellitus, uncontrolled (CMS/HCC)    • Vitamin D deficiency        Medications    Current Outpatient Medications:   •  aspirin 81 MG tablet, Take  by mouth  daily., Disp: , Rfl:   •  ACCU-CHEK FASTCLIX LANCETS misc, , Disp: , Rfl:   •  Blood Glucose Monitoring Suppl (ACCU-CHEK KYAW) device, Daily., Disp: , Rfl:   •  buPROPion SR (WELLBUTRIN SR) 150 MG 12 hr tablet, I pill daily for one week, then bid, Disp: 30 tablet, Rfl: 5  •  dapagliflozin (Farxiga) 5 MG tablet tablet, Take 1 tablet by mouth Daily., Disp: 30 tablet, Rfl: 0  •  fenofibrate 160 MG tablet, Take 1 tablet by mouth Daily., Disp: 90 tablet, Rfl: 0  •  glucose blood test strip, Use as instructed, Disp: 100 each, Rfl: 12  •  losartan (COZAAR) 50 MG tablet, TAKE ONE TABLET BY MOUTH DAILY, Disp: 90 tablet, Rfl: 0  •  metroNIDAZOLE (FLAGYL) 250 MG tablet, , Disp: , Rfl:   •  oxyCODONE-acetaminophen (PERCOCET) 5-325 MG per tablet, , Disp: , Rfl:   •  rosuvastatin (CRESTOR) 20 MG tablet, TAKE ONE TABLET BY MOUTH DAILY, Disp: 90 tablet, Rfl: 0  •  SITagliptin (Januvia) 100 MG tablet, Take 1 tablet by mouth Daily., Disp: 30 tablet, Rfl: 0    Current Facility-Administered Medications:   •  lidocaine (XYLOCAINE) 2 % jelly, , Topical, PRN, Robby Velasco, DNP, APRN    Review of Systems  Review of Systems   Constitutional: Negative for chills, fatigue, fever and unexpected weight change.   HENT: Negative for ear discharge, ear pain, hearing loss, nosebleeds, rhinorrhea and sore throat.    Eyes: Negative for pain, redness and visual disturbance.   Respiratory: Negative for cough, shortness of breath and wheezing.    Cardiovascular: Negative for chest pain, palpitations and leg swelling.   Gastrointestinal: Negative for abdominal pain, blood in stool, constipation, diarrhea, nausea and vomiting.   Endocrine: Negative for cold intolerance, heat intolerance and polydipsia.   Genitourinary: Negative for difficulty urinating, discharge, dysuria, hematuria, penile pain, penile swelling, scrotal swelling, testicular pain and urgency.   Musculoskeletal: Negative for arthralgias, gait problem, joint swelling and myalgias.  "  Skin: Negative for rash.   Allergic/Immunologic: Negative.    Neurological: Negative for dizziness, syncope, weakness, numbness and headaches.   Hematological: Negative for adenopathy. Does not bruise/bleed easily.   Psychiatric/Behavioral: Negative for sleep disturbance and suicidal ideas. The patient is not nervous/anxious.         Physical Exam    Ht 182.9 cm (72\")   Wt 113 kg (250 lb)   BMI 33.91 kg/m² Body mass index is 33.91 kg/m².  Physical Exam   Constitutional: No distress.   AAOx3   Skin: No rash noted. No erythema.   Psychiatric: He has a normal mood and affect. His behavior is normal. Judgment and thought content normal.   limited exam due to phone visit    Labs and Imaging   Lab Results   Component Value Date    HGBA1C 11.8 06/25/2018    HGBA1C 11.5 03/05/2018    HGBA1C 12.2 02/05/2018     No visits with results within 1 Month(s) from this visit.   Latest known visit with results is:   Office Visit on 06/25/2018   Component Date Value Ref Range Status   • Glucose 06/25/2018 371* 70 - 130 mg/dL Final   • Hemoglobin A1C 06/25/2018 11.8  % Final     No images are attached to the encounter or orders placed in the encounter.  No Images in the past 120 days found..    Assessment / Plan   Cristian was seen today for diabetes.    Diagnoses and all orders for this visit:    Uncontrolled type 2 diabetes mellitus with hyperglycemia (CMS/[facility])  -     dapagliflozin (Farxiga) 5 MG tablet tablet; Take 1 tablet by mouth Daily.  -     SITagliptin (Januvia) 100 MG tablet; Take 1 tablet by mouth Daily.  -     Microalbumin / Creatinine Urine Ratio - Urine, Clean Catch; Future  -     Comprehensive Metabolic Panel; Future  -     Lipid Panel; Future  -     TSH; Future  -     Hemoglobin A1c; Future    Mixed hyperlipidemia        Diabetes Mellitus 2 is under poor control.  -A1c 11.8 6/2018  -no a1c today due to phone visit, pt does not check bs at home  -admits to diet noncompliance  -discussed with pt he must have labs done " to ensure it is safe for him to take po diabetes meds  -I will refill farxiga and januvia x 30 days and will have him f/u via telephone visit in about 2-3 weeks  -he must have labs done otherwise I will not be able to provide any further refills  -discussed with pt he will likely need insulin as sugar was very uncontrolled where he was here last. He has historically refused insulin  -I have also asked him to check bs fasting and hs     1.  Diet: 3-4 carb servings per meal for females, 4-5 carb servings per meal for males  Spread carb intake throughout the day  Increase lean protein and vegetable intake  Avoid sugary drinks and processed carbs including crackers, cookies, cakes  2.  Exercise: Recommend at least 30 minutes of exercise daily, at least 5 days per week. Increase exercise gradually.   3.  Blood Glucose Goal: Blood glucose goal <150 fasting, <180 2 hr postprandial  4.  Microalbumin due 9/2018  5.  Education performed during this visit: long term diabetic complications discussed. , annual eye examinations at Ophthalmology discussed, dental hygiene discussed  and foot care reviewed., home glucose monitoring emphasized, all medications, side effects and compliance discussed carefully and Hypoglycemia management and prevention reviewed. Reviewed ‘ABCs’ of diabetes management (respective goals in parentheses):  A1C (<7), blood pressure (<130/80), and cholesterol (LDL <100, if CVD <70).    Hyperlipidemia  -repeat lipid panel    There are no Patient Instructions on file for this visit.    Follow up: Return in about 3 weeks (around 5/6/2020).    Discussed the nature of the disease including, risks, complications, implications, management, safe and proper use of medications. Encouraged therapeutic lifestyle changes including low calorie diet with plenty of fruits and vegetables, daily exercise, medication compliance, and keeping scheduled follow up appointments with me and any other providers. Encouraged patient to  have appointment for complete physical, fasting labs, appropriate screenings, and immunizations on an annual basis.    Total time of discussion was 13 minutes.    Bladimir Bustillos PA-C

## 2020-04-15 NOTE — TELEPHONE ENCOUNTER
LVM. Informed patient to dial in 10 minutes before appointment and have his BS log or meter ready.

## 2020-04-17 ENCOUNTER — TELEPHONE (OUTPATIENT)
Dept: ENDOCRINOLOGY | Facility: CLINIC | Age: 75
End: 2020-04-17

## 2020-04-17 NOTE — TELEPHONE ENCOUNTER
Patient called, stating his pharmacy is needed clarification between SITagliptin (Januvia) 100 MG tablet and dapagliflozin (Farxiga) 5 MG tablet tablet. Please give [facility] pharmacy a call.

## 2020-04-17 NOTE — TELEPHONE ENCOUNTER
Spoke with pharmacy and they stated they do not need clarification, they already filled it and pt picked it up.

## 2020-05-05 ENCOUNTER — DOCUMENTATION (OUTPATIENT)
Dept: ENDOCRINOLOGY | Facility: CLINIC | Age: 75
End: 2020-05-05

## 2020-05-05 NOTE — PROGRESS NOTES
BLAKE Munguia called pt for his appt today. His son answered and said pt was out of town and forgot his cell phone. I called the number back in attempt to speak with son, but no answer. Pt has not had labs done as discussed during his last telephone visit. I made it clear to pt at time of phone visit I cannot refill his diabetes medication without updated labs.

## 2020-05-12 DIAGNOSIS — E11.65 UNCONTROLLED TYPE 2 DIABETES MELLITUS WITH HYPERGLYCEMIA (HCC): ICD-10-CM

## 2020-05-12 RX ORDER — SITAGLIPTIN 100 MG/1
TABLET, FILM COATED ORAL
Qty: 30 TABLET | Refills: 5 | Status: SHIPPED | OUTPATIENT
Start: 2020-05-12 | End: 2021-07-07

## 2020-05-16 DIAGNOSIS — E11.65 UNCONTROLLED TYPE 2 DIABETES MELLITUS WITH HYPERGLYCEMIA (HCC): ICD-10-CM

## 2020-05-18 RX ORDER — DAPAGLIFLOZIN 5 MG/1
TABLET, FILM COATED ORAL
Qty: 30 TABLET | Refills: 5 | Status: SHIPPED | OUTPATIENT
Start: 2020-05-18 | End: 2021-07-07

## 2020-07-11 DIAGNOSIS — E78.5 HYPERLIPIDEMIA: ICD-10-CM

## 2020-07-11 DIAGNOSIS — IMO0002 UNCONTROLLED TYPE 2 DIABETES MELLITUS WITH COMPLICATION, WITHOUT LONG-TERM CURRENT USE OF INSULIN: ICD-10-CM

## 2020-07-11 RX ORDER — LOSARTAN POTASSIUM 50 MG/1
TABLET ORAL
Qty: 30 TABLET | Refills: 0 | Status: SHIPPED | OUTPATIENT
Start: 2020-07-11 | End: 2021-07-07

## 2020-07-11 RX ORDER — ROSUVASTATIN CALCIUM 20 MG/1
TABLET, COATED ORAL
Qty: 30 TABLET | Refills: 0 | Status: SHIPPED | OUTPATIENT
Start: 2020-07-11 | End: 2021-07-07

## 2020-08-08 DIAGNOSIS — IMO0002 UNCONTROLLED TYPE 2 DIABETES MELLITUS WITH COMPLICATION, WITHOUT LONG-TERM CURRENT USE OF INSULIN: ICD-10-CM

## 2020-08-08 DIAGNOSIS — E78.5 HYPERLIPIDEMIA: ICD-10-CM

## 2020-08-08 RX ORDER — ROSUVASTATIN CALCIUM 20 MG/1
TABLET, COATED ORAL
Qty: 30 TABLET | Refills: 0 | OUTPATIENT
Start: 2020-08-08

## 2020-08-08 RX ORDER — LOSARTAN POTASSIUM 50 MG/1
TABLET ORAL
Qty: 30 TABLET | Refills: 0 | OUTPATIENT
Start: 2020-08-08

## 2020-09-18 DIAGNOSIS — E78.5 HYPERLIPIDEMIA: ICD-10-CM

## 2020-09-18 DIAGNOSIS — IMO0002 UNCONTROLLED TYPE 2 DIABETES MELLITUS WITH COMPLICATION, WITHOUT LONG-TERM CURRENT USE OF INSULIN: ICD-10-CM

## 2020-09-18 NOTE — TELEPHONE ENCOUNTER
Patient has not been seen in office since 12/4/2017. Requesting med refills of Rosuvastatin and Losartan. Last Lipid Panel 2/5/18 Last K+, BUN/Creatinine 9/18/17 Please Advise, Thanks

## 2020-09-21 RX ORDER — ROSUVASTATIN CALCIUM 20 MG/1
TABLET, COATED ORAL
Qty: 30 TABLET | Refills: 0 | OUTPATIENT
Start: 2020-09-21

## 2020-09-21 RX ORDER — LOSARTAN POTASSIUM 50 MG/1
TABLET ORAL
Qty: 30 TABLET | Refills: 0 | OUTPATIENT
Start: 2020-09-21

## 2021-06-09 NOTE — ASSESSMENT & PLAN NOTE
Dion vomited x2 at work this morning and was sent home.  Denies fever cough and shortness of breath.  Dion was sent home from work.  Dion also states that he took medicine this morning on an empty stomach.      COVID 19 Nurse Triage Plan/Patient Instructions    Please be aware that novel coronavirus (COVID-19) may be circulating in the community. If you develop symptoms such as fever, cough, or SOB or if you have concerns about the presence of another infection including coronavirus (COVID-19), please contact your health care provider or visit [email or web address].     Disposition/Instructions    Virtual Visit with provider recommended. Reference Visit Selection Guide.    Thank you for taking steps to prevent the spread of this virus.  o Limit your contact with others.  o Wear a simple mask to cover your cough.  o Wash your hands well and often.    Resources    [facility]: About COVID-19: [email or web address]    CDC: What to Do If You're Sick: www.cdc.gov/coronavirus/2019-ncov/about/steps-when-sick.html    CDC: Ending Home Isolation: www.cdc.gov/coronavirus/2019-ncov/hcp/disposition-in-home-patients.html     CDC: Caring for Someone: www.cdc.gov/coronavirus/2019-ncov/if-you-are-sick/care-for-someone.html     [facility]: Interim Guidance for Hospital Discharge to Home: www.health.[facility].mn.us/diseases/coronavirus/hcp/hospdischarge.pdf    [facility] clinical trials (COVID-19 research studies): [email or web address].[email or web address]-clinical-trials     Below are the COVID-19 hotlines at the [facility] of Health ([facility]). Interpreters are available.   o For health questions: Call 322-495-9787 or 1-358.246.9637 (7 a.m. to 7 p.m.)  o For questions about schools and childcare: Call 481-290-5730 or 1-263.478.5759 (7 a.m. to 7 p.m.)       Reason for Disposition    MILD or MODERATE vomiting (e.g., 1 - 5 times / day)    Additional Information    Negative: Shock suspected (e.g., cold/pale/clammy skin, too weak  Refill oxycodone/acetaminophen 10/325 bid #120 no rf. Compliance with medication discussed.  Avoid with etoh or other sedating meds.  Avoid taking when he can.  Expresses no tolerance or increase in medication need or freq in taking med.       "to stand, low BP, rapid pulse)    Negative: Difficult to awaken or acting confused (e.g., disoriented, slurred speech)    Negative: Sounds like a life-threatening emergency to the triager    Negative: [1] Vomiting AND [2] contains red blood or black (\"coffee ground\") material  (Exception: few red streaks in vomit that only happened once)    Negative: Severe pain in one eye    Negative: Recent head injury (within last 3 days)    Negative: Recent abdominal injury (within last 3 days)    Negative: [1] Insulin-dependent diabetes (Type I) AND [2] glucose > 400 mg/dl (22 mmol/l)    Negative: [1] SEVERE vomiting (e.g., 6 or more times/day) AND [2] present > 8 hours    Negative: [1] MODERATE vomiting (e.g., 3 - 5 times/day) AND [2] age > 60    Negative: Severe headache (e.g., excruciating) (Exception: similar to previous migraines)    Negative: High-risk adult (e.g., diabetes mellitus, brain tumor, V-P shunt, hernia)    Negative: [1] Drinking very little AND [2] dehydration suspected (e.g., no urine > 12 hours, very dry mouth, very lightheaded)    Negative: Patient sounds very sick or weak to the triager    Negative: [1] Vomiting AND [2] abdomen looks much more swollen than usual    Negative: [1] Vomiting AND [2] contains bile (green color)    Negative: [1] Constant abdominal pain AND [2] present > 2 hours    Negative: [1] Fever > 103 F (39.4 C) AND [2] not able to get the fever down using Fever Care Advice    Negative: [1] Fever > 101 F (38.3 C) AND [2] age > 60    Negative: [1] Fever > 100.0 F (37.8 C) AND [2] bedridden (e.g., nursing home patient, CVA, chronic illness, recovering from surgery)    Negative: [1] Fever > 100.0 F (37.8 C) AND [2] weak immune system (e.g., HIV positive, cancer chemo, splenectomy, organ transplant, chronic steroids)    Negative: Taking any of the following medications: digoxin (Lanoxin), lithium, theophylline, phenytoin (Dilantin)    Negative: [1] MILD or MODERATE vomiting AND [2] present > 48 " hours (2 days) (Exception: mild vomiting with associated diarrhea)    Negative: Fever present > 3 days (72 hours)    Negative: Vomiting a prescription medication    Negative: [1] MILD vomiting with diarrhea AND [2] present > 5 days    Negative: Alcohol or drug abuse, known or suspected    Negative: Vomiting is a chronic symptom (recurrent or ongoing AND present > 4 weeks)    Negative: [1] SEVERE vomiting (e.g., 6 or more times/day, vomits everything) BUT [2] hydrated    Protocols used: VOMITING-A-AH

## 2021-07-07 ENCOUNTER — APPOINTMENT (OUTPATIENT)
Dept: MRI IMAGING | Facility: HOSPITAL | Age: 76
End: 2021-07-07

## 2021-07-07 ENCOUNTER — HOSPITAL ENCOUNTER (INPATIENT)
Facility: HOSPITAL | Age: 76
LOS: 8 days | Discharge: NURSING, REHAB OR LONG-TERM CARE | End: 2021-07-16
Attending: EMERGENCY MEDICINE | Admitting: INTERNAL MEDICINE

## 2021-07-07 ENCOUNTER — APPOINTMENT (OUTPATIENT)
Dept: CT IMAGING | Facility: HOSPITAL | Age: 76
End: 2021-07-07

## 2021-07-07 DIAGNOSIS — S22.080A COMPRESSION FRACTURE OF T12 VERTEBRA, INITIAL ENCOUNTER (HCC): ICD-10-CM

## 2021-07-07 DIAGNOSIS — M54.9 INTRACTABLE BACK PAIN: Primary | ICD-10-CM

## 2021-07-07 DIAGNOSIS — E11.43 DIABETIC AUTONOMIC NEUROPATHY ASSOCIATED WITH TYPE 2 DIABETES MELLITUS (HCC): ICD-10-CM

## 2021-07-07 DIAGNOSIS — M54.41 ACUTE LOW BACK PAIN WITH RIGHT-SIDED SCIATICA, UNSPECIFIED BACK PAIN LATERALITY: ICD-10-CM

## 2021-07-07 DIAGNOSIS — IMO0002 UNCONTROLLED TYPE 2 DIABETES MELLITUS WITH COMPLICATION, WITHOUT LONG-TERM CURRENT USE OF INSULIN: ICD-10-CM

## 2021-07-07 DIAGNOSIS — Z86.39 HISTORY OF DIABETES MELLITUS: ICD-10-CM

## 2021-07-07 DIAGNOSIS — Z74.09 IMPAIRED FUNCTIONAL MOBILITY, BALANCE, GAIT, AND ENDURANCE: ICD-10-CM

## 2021-07-07 DIAGNOSIS — E78.5 HYPERLIPIDEMIA: ICD-10-CM

## 2021-07-07 PROBLEM — S32.030A COMPRESSION FRACTURE OF L3 VERTEBRA (HCC): Status: ACTIVE | Noted: 2021-07-07

## 2021-07-07 PROBLEM — M54.50 CHRONIC BILATERAL LOW BACK PAIN: Status: ACTIVE | Noted: 2021-07-07

## 2021-07-07 PROBLEM — G89.29 CHRONIC BILATERAL LOW BACK PAIN: Status: ACTIVE | Noted: 2021-07-07

## 2021-07-07 PROBLEM — F39 MOOD DISORDER (HCC): Status: ACTIVE | Noted: 2021-07-07

## 2021-07-07 LAB
ALBUMIN SERPL-MCNC: 3.7 G/DL (ref 3.5–5.2)
ALBUMIN/GLOB SERPL: 1.2 G/DL
ALP SERPL-CCNC: 142 U/L (ref 39–117)
ALT SERPL W P-5'-P-CCNC: 8 U/L (ref 1–41)
ANION GAP SERPL CALCULATED.3IONS-SCNC: 15 MMOL/L (ref 5–15)
AST SERPL-CCNC: 11 U/L (ref 1–40)
BASOPHILS # BLD AUTO: 0.06 10*3/MM3 (ref 0–0.2)
BASOPHILS NFR BLD AUTO: 0.5 % (ref 0–1.5)
BILIRUB SERPL-MCNC: 0.5 MG/DL (ref 0–1.2)
BILIRUB UR QL STRIP: NEGATIVE
BUN SERPL-MCNC: 11 MG/DL (ref 8–23)
BUN/CREAT SERPL: 15.5 (ref 7–25)
CALCIUM SPEC-SCNC: 9.3 MG/DL (ref 8.6–10.5)
CHLORIDE SERPL-SCNC: 92 MMOL/L (ref 98–107)
CK SERPL-CCNC: 97 U/L (ref 20–200)
CLARITY UR: CLEAR
CO2 SERPL-SCNC: 22 MMOL/L (ref 22–29)
COLOR UR: YELLOW
CREAT SERPL-MCNC: 0.71 MG/DL (ref 0.76–1.27)
DEPRECATED RDW RBC AUTO: 39.8 FL (ref 37–54)
EOSINOPHIL # BLD AUTO: 0.05 10*3/MM3 (ref 0–0.4)
EOSINOPHIL NFR BLD AUTO: 0.4 % (ref 0.3–6.2)
ERYTHROCYTE [DISTWIDTH] IN BLOOD BY AUTOMATED COUNT: 11.9 % (ref 12.3–15.4)
GFR SERPL CREATININE-BSD FRML MDRD: 108 ML/MIN/1.73
GLOBULIN UR ELPH-MCNC: 3.2 GM/DL
GLUCOSE BLDC GLUCOMTR-MCNC: 232 MG/DL (ref 70–130)
GLUCOSE BLDC GLUCOMTR-MCNC: 309 MG/DL (ref 70–130)
GLUCOSE SERPL-MCNC: 292 MG/DL (ref 65–99)
GLUCOSE UR STRIP-MCNC: ABNORMAL MG/DL
HBA1C MFR BLD: 12.4 % (ref 4.8–5.6)
HCT VFR BLD AUTO: 50 % (ref 37.5–51)
HGB BLD-MCNC: 17.2 G/DL (ref 13–17.7)
HGB UR QL STRIP.AUTO: NEGATIVE
IMM GRANULOCYTES # BLD AUTO: 0.09 10*3/MM3 (ref 0–0.05)
IMM GRANULOCYTES NFR BLD AUTO: 0.7 % (ref 0–0.5)
KETONES UR QL STRIP: ABNORMAL
LEUKOCYTE ESTERASE UR QL STRIP.AUTO: NEGATIVE
LYMPHOCYTES # BLD AUTO: 2.35 10*3/MM3 (ref 0.7–3.1)
LYMPHOCYTES NFR BLD AUTO: 18.3 % (ref 19.6–45.3)
MCH RBC QN AUTO: 31.2 PG (ref 26.6–33)
MCHC RBC AUTO-ENTMCNC: 34.4 G/DL (ref 31.5–35.7)
MCV RBC AUTO: 90.7 FL (ref 79–97)
MONOCYTES # BLD AUTO: 1.05 10*3/MM3 (ref 0.1–0.9)
MONOCYTES NFR BLD AUTO: 8.2 % (ref 5–12)
NEUTROPHILS NFR BLD AUTO: 71.9 % (ref 42.7–76)
NEUTROPHILS NFR BLD AUTO: 9.24 10*3/MM3 (ref 1.7–7)
NITRITE UR QL STRIP: NEGATIVE
NRBC BLD AUTO-RTO: 0 /100 WBC (ref 0–0.2)
PH UR STRIP.AUTO: 5.5 [PH] (ref 5–8)
PLATELET # BLD AUTO: 357 10*3/MM3 (ref 140–450)
PMV BLD AUTO: 8.1 FL (ref 6–12)
POTASSIUM SERPL-SCNC: 4.4 MMOL/L (ref 3.5–5.2)
PROT SERPL-MCNC: 6.9 G/DL (ref 6–8.5)
PROT UR QL STRIP: NEGATIVE
RBC # BLD AUTO: 5.51 10*6/MM3 (ref 4.14–5.8)
SARS-COV-2 RNA RESP QL NAA+PROBE: NOT DETECTED
SODIUM SERPL-SCNC: 129 MMOL/L (ref 136–145)
SP GR UR STRIP: 1.03 (ref 1–1.03)
UROBILINOGEN UR QL STRIP: ABNORMAL
WBC # BLD AUTO: 12.84 10*3/MM3 (ref 3.4–10.8)

## 2021-07-07 PROCEDURE — 82550 ASSAY OF CK (CPK): CPT | Performed by: NURSE PRACTITIONER

## 2021-07-07 PROCEDURE — 99205 OFFICE O/P NEW HI 60 MIN: CPT | Performed by: PHYSICIAN ASSISTANT

## 2021-07-07 PROCEDURE — 25010000002 MORPHINE PER 10 MG: Performed by: INTERNAL MEDICINE

## 2021-07-07 PROCEDURE — 99223 1ST HOSP IP/OBS HIGH 75: CPT | Performed by: INTERNAL MEDICINE

## 2021-07-07 PROCEDURE — 85025 COMPLETE CBC W/AUTO DIFF WBC: CPT | Performed by: EMERGENCY MEDICINE

## 2021-07-07 PROCEDURE — U0003 INFECTIOUS AGENT DETECTION BY NUCLEIC ACID (DNA OR RNA); SEVERE ACUTE RESPIRATORY SYNDROME CORONAVIRUS 2 (SARS-COV-2) (CORONAVIRUS DISEASE [COVID-19]), AMPLIFIED PROBE TECHNIQUE, MAKING USE OF HIGH THROUGHPUT TECHNOLOGIES AS DESCRIBED BY CMS-2020-01-R: HCPCS | Performed by: INTERNAL MEDICINE

## 2021-07-07 PROCEDURE — 82962 GLUCOSE BLOOD TEST: CPT

## 2021-07-07 PROCEDURE — 72148 MRI LUMBAR SPINE W/O DYE: CPT

## 2021-07-07 PROCEDURE — G0378 HOSPITAL OBSERVATION PER HR: HCPCS

## 2021-07-07 PROCEDURE — 81003 URINALYSIS AUTO W/O SCOPE: CPT | Performed by: EMERGENCY MEDICINE

## 2021-07-07 PROCEDURE — 83036 HEMOGLOBIN GLYCOSYLATED A1C: CPT | Performed by: NURSE PRACTITIONER

## 2021-07-07 PROCEDURE — U0005 INFEC AGEN DETEC AMPLI PROBE: HCPCS | Performed by: INTERNAL MEDICINE

## 2021-07-07 PROCEDURE — P9612 CATHETERIZE FOR URINE SPEC: HCPCS

## 2021-07-07 PROCEDURE — 74176 CT ABD & PELVIS W/O CONTRAST: CPT

## 2021-07-07 PROCEDURE — 99285 EMERGENCY DEPT VISIT HI MDM: CPT

## 2021-07-07 PROCEDURE — 80053 COMPREHEN METABOLIC PANEL: CPT | Performed by: EMERGENCY MEDICINE

## 2021-07-07 PROCEDURE — 63710000001 INSULIN LISPRO (HUMAN) PER 5 UNITS: Performed by: NURSE PRACTITIONER

## 2021-07-07 RX ORDER — BISACODYL 5 MG/1
5 TABLET, DELAYED RELEASE ORAL DAILY PRN
Status: DISCONTINUED | OUTPATIENT
Start: 2021-07-07 | End: 2021-07-16 | Stop reason: HOSPADM

## 2021-07-07 RX ORDER — ROSUVASTATIN CALCIUM 20 MG/1
20 TABLET, COATED ORAL DAILY
Status: DISCONTINUED | OUTPATIENT
Start: 2021-07-07 | End: 2021-07-16 | Stop reason: HOSPADM

## 2021-07-07 RX ORDER — SODIUM CHLORIDE 0.9 % (FLUSH) 0.9 %
10 SYRINGE (ML) INJECTION AS NEEDED
Status: DISCONTINUED | OUTPATIENT
Start: 2021-07-07 | End: 2021-07-16 | Stop reason: HOSPADM

## 2021-07-07 RX ORDER — OXYCODONE HYDROCHLORIDE AND ACETAMINOPHEN 5; 325 MG/1; MG/1
1 TABLET ORAL EVERY 6 HOURS PRN
Status: DISCONTINUED | OUTPATIENT
Start: 2021-07-07 | End: 2021-07-08

## 2021-07-07 RX ORDER — SODIUM CHLORIDE 9 MG/ML
75 INJECTION, SOLUTION INTRAVENOUS CONTINUOUS
Status: ACTIVE | OUTPATIENT
Start: 2021-07-07 | End: 2021-07-07

## 2021-07-07 RX ORDER — BISACODYL 10 MG
10 SUPPOSITORY, RECTAL RECTAL DAILY PRN
Status: DISCONTINUED | OUTPATIENT
Start: 2021-07-07 | End: 2021-07-16 | Stop reason: HOSPADM

## 2021-07-07 RX ORDER — ACETAMINOPHEN 650 MG/1
650 SUPPOSITORY RECTAL EVERY 4 HOURS PRN
Status: DISCONTINUED | OUTPATIENT
Start: 2021-07-07 | End: 2021-07-16 | Stop reason: HOSPADM

## 2021-07-07 RX ORDER — ACETAMINOPHEN 325 MG/1
650 TABLET ORAL EVERY 4 HOURS PRN
Status: DISCONTINUED | OUTPATIENT
Start: 2021-07-07 | End: 2021-07-16 | Stop reason: HOSPADM

## 2021-07-07 RX ORDER — LOSARTAN POTASSIUM 50 MG/1
50 TABLET ORAL DAILY
Status: DISCONTINUED | OUTPATIENT
Start: 2021-07-07 | End: 2021-07-16 | Stop reason: HOSPADM

## 2021-07-07 RX ORDER — POLYETHYLENE GLYCOL 3350 17 G/17G
17 POWDER, FOR SOLUTION ORAL DAILY PRN
Status: DISCONTINUED | OUTPATIENT
Start: 2021-07-07 | End: 2021-07-16 | Stop reason: HOSPADM

## 2021-07-07 RX ORDER — ASPIRIN 81 MG/1
81 TABLET ORAL DAILY
Status: DISCONTINUED | OUTPATIENT
Start: 2021-07-07 | End: 2021-07-16 | Stop reason: HOSPADM

## 2021-07-07 RX ORDER — NICOTINE POLACRILEX 4 MG
15 LOZENGE BUCCAL
Status: DISCONTINUED | OUTPATIENT
Start: 2021-07-07 | End: 2021-07-16 | Stop reason: HOSPADM

## 2021-07-07 RX ORDER — DEXTROSE MONOHYDRATE 25 G/50ML
25 INJECTION, SOLUTION INTRAVENOUS
Status: DISCONTINUED | OUTPATIENT
Start: 2021-07-07 | End: 2021-07-16 | Stop reason: HOSPADM

## 2021-07-07 RX ORDER — SODIUM CHLORIDE 0.9 % (FLUSH) 0.9 %
10 SYRINGE (ML) INJECTION EVERY 12 HOURS SCHEDULED
Status: DISCONTINUED | OUTPATIENT
Start: 2021-07-07 | End: 2021-07-16 | Stop reason: HOSPADM

## 2021-07-07 RX ORDER — ACETAMINOPHEN 160 MG/5ML
650 SOLUTION ORAL EVERY 4 HOURS PRN
Status: DISCONTINUED | OUTPATIENT
Start: 2021-07-07 | End: 2021-07-16 | Stop reason: HOSPADM

## 2021-07-07 RX ORDER — MORPHINE SULFATE 2 MG/ML
1 INJECTION, SOLUTION INTRAMUSCULAR; INTRAVENOUS ONCE AS NEEDED
Status: COMPLETED | OUTPATIENT
Start: 2021-07-07 | End: 2021-07-07

## 2021-07-07 RX ORDER — AMOXICILLIN 250 MG
2 CAPSULE ORAL 2 TIMES DAILY
Status: DISCONTINUED | OUTPATIENT
Start: 2021-07-07 | End: 2021-07-16 | Stop reason: HOSPADM

## 2021-07-07 RX ADMIN — ROSUVASTATIN CALCIUM 20 MG: 20 TABLET, COATED ORAL at 11:21

## 2021-07-07 RX ADMIN — OXYCODONE HYDROCHLORIDE AND ACETAMINOPHEN 1 TABLET: 5; 325 TABLET ORAL at 19:41

## 2021-07-07 RX ADMIN — ASPIRIN 81 MG: 81 TABLET, COATED ORAL at 11:21

## 2021-07-07 RX ADMIN — MORPHINE SULFATE 1 MG: 2 INJECTION, SOLUTION INTRAMUSCULAR; INTRAVENOUS at 21:41

## 2021-07-07 RX ADMIN — SODIUM CHLORIDE 75 ML/HR: 9 INJECTION, SOLUTION INTRAVENOUS at 06:22

## 2021-07-07 RX ADMIN — DOCUSATE SODIUM 50MG AND SENNOSIDES 8.6MG 2 TABLET: 8.6; 5 TABLET, FILM COATED ORAL at 19:41

## 2021-07-07 RX ADMIN — OXYCODONE HYDROCHLORIDE AND ACETAMINOPHEN 1 TABLET: 5; 325 TABLET ORAL at 11:34

## 2021-07-07 RX ADMIN — SODIUM CHLORIDE, PRESERVATIVE FREE 10 ML: 5 INJECTION INTRAVENOUS at 11:26

## 2021-07-07 RX ADMIN — INSULIN LISPRO 3 UNITS: 100 INJECTION, SOLUTION INTRAVENOUS; SUBCUTANEOUS at 18:50

## 2021-07-07 NOTE — H&P
[facility] Medicine Services  HISTORY AND PHYSICAL    Patient Name: Cristian Watt  : 1945  MRN: [id number]  Primary Care Physician: Provider, No Known  Date of admission: 2021      Subjective   Subjective     Chief Complaint:  Back pain    HPI:  Cristian Watt is a 75 y.o. male with hx of chronic and worsening low back pain for past several months.  Pt lives alone in an apartment and he has been unable to get out of bed for past 3 days to do anything.  Pt states the pain just caught up to him 3 days ago.  Denies any falls over last 6 months but states he has almost fallen.  Pt has home he is selling at [employer] and plans to move near son in [place] but currently unable to take care of self.  Denies any fever or chills.  No change in bowel/bladder habits and states he is chronically constipated.  No abd pain, no shortness of breath or chest pain.  No focal weakness.         COVID Details:    Symptoms:    [] NONE [] Fever []  Cough [] Shortness of breath [] Change in taste/smell      The patient qualifies to receive the vaccine, but they have not yet received it.      Review of Systems      All other systems reviewed and are negative.     Personal History     Past Medical History:   Diagnosis Date   • Depression    • Diabetes mellitus (CMS/HCC)    • Disc degeneration, lumbar    • Emphysema of lung (CMS/HCC)    • Generalized anxiety disorder    • GERD (gastroesophageal reflux disease)    • Hyperlipidemia    • Hypertension    • Hypogonadism male    • Osteoarthritis    • Type 2 diabetes mellitus, uncontrolled (CMS/HCC)    • Vitamin D deficiency        Past Surgical History:   Procedure Laterality Date   • HERNIA REPAIR      Inguinal   • KNEE ARTHROPLASTY         Family History:   family history includes Colon cancer in his father; Other in his father; Tuberculosis in his mother. Otherwise pertinent FHx was reviewed and unremarkable.     Social History:  reports that he has  been smoking cigarettes. He has a 90.00 pack-year smoking history. He has never used smokeless tobacco. He reports current alcohol use of about 4.0 standard drinks of alcohol per week. He reports that he does not use drugs.  Social History     Social History Narrative   • Not on file       Medications:  Available home medication information reviewed.  (Not in a hospital admission)      Allergies   Allergen Reactions   • Penicillins    • Shellfish-Derived Products        Objective   Objective     Vital Signs:   Temp:  [98.4 °F (36.9 °C)] 98.4 °F (36.9 °C)  Heart Rate:  [105] 105  Resp:  [16] 16  BP: (103-123)/(73-86) 103/73       Physical Exam    gen; alert, oriented, nad  Heent; perrla, eomi, mmm  Cv; rr w/ tachycardia, no mrg  L; ctab, no wheeze/crackles  Abd; soft, +bs, ntnd, no r/g  Ext; no cce, palpable pulses  Skin; cdi,warm  Neuro; grossly intact w/ neg SLR, 5/5 motor and sensation   Psych; mood and affect appropriate      Result Review:  I have personally reviewed the results from the time of this admission to 7/7/2021 06:00 EDT and agree with these findings:  [x]  Laboratory  []  Microbiology  [x]  Radiology  []  EKG/Telemetry   []  Cardiology/Vascular   []  Pathology  []  Old records  []  Other:  Most notable findings include:    Wbc 12.8  T12 and L3 compressions fractures        LAB RESULTS:      Lab 07/07/21  0449   WBC 12.84*   HEMOGLOBIN 17.2   HEMATOCRIT 50.0   PLATELETS 357   NEUTROS ABS 9.24*   IMMATURE GRANS (ABS) 0.09*   LYMPHS ABS 2.35   MONOS ABS 1.05*   EOS ABS 0.05   MCV 90.7         Lab 07/07/21  0449   SODIUM 129*   POTASSIUM 4.4   CHLORIDE 92*   CO2 22.0   ANION GAP 15.0   BUN 11   CREATININE 0.71*   GLUCOSE 292*   CALCIUM 9.3         Lab 07/07/21  0449   TOTAL PROTEIN 6.9   ALBUMIN 3.70   GLOBULIN 3.2   ALT (SGPT) 8   AST (SGOT) 11   BILIRUBIN 0.5   ALK PHOS 142*                     UA    Urinalysis 7/7/21   Specific [place], UA 1.026   Ketones, UA 15 mg/dL (1+) (A)   Blood, UA Negative    Leukocytes, UA Negative   Nitrite, UA Negative   (A) Abnormal value              Microbiology Results (last 10 days)     ** No results found for the last 240 hours. **          CT Abdomen Pelvis Without Contrast    Result Date: 7/7/2021  AND PELVIS, NONCONTRAST, 7/7/2021 HISTORY: 75-year-old male in the ED with acute onset back pain, right greater than left. Difficulty urinating. TECHNIQUE: CT imaging of the abdomen and pelvis, noncontrast kidney stone protocol. Radiation dose reduction techniques included automated exposure control. Radiation audit for CT and nuclear cardiology exams in the last 12 months: 0. ABDOMEN FINDINGS: There is no stone material within the ureters or bladder, and there is no evidence of urinary obstruction. Tiny nonobstructing calculus in the right mid kidney and in the lower left kidney. Small right mid renal cyst. No gallbladder distention or bile duct dilatation. Small benign cyst in the left hepatic lobe. Liver, pancreas and spleen are otherwise within normal limits without contrast. Normal caliber abdominal aorta. Mild sigmoid and lower descending colonic diverticulosis. Small bowel and colon are otherwise normal in caliber and appearance, as imaged. The appendix is normal. No gastric distention, hiatal hernia or distal esophageal dilatation. PELVIS FINDINGS: Urinary bladder, prostate and rectum are within normal limits. No free pelvic fluid. Previous left inguinal herniorrhaphy.  Lung base images show mild to moderate chronic interstitial fibrosis in a peripheral distribution. Old left 10th and 11th rib fractures. Superior endplate vertebral compression fracture deformity at T1 may be subacute or chronic. This results and mild loss of central vertebral body height. Chronic appearing inferior endplate vertebral compression fracture at L3 with mild loss of central vertebral body height. Degenerative disc disease at L4-5 and L5-S1. Bilateral L5 spondylolysis with degenerative facet  arthropathy. Grade 1 anterolisthesis at L5-S1 and retrolisthesis at L4-5.     Impression: 1.  Subacute or chronic vertebral compression fractures at T12 and L3. 2.  No evidence of urinary obstruction. Single tiny nonobstructing calculus within each kidney. 3.  Mild lower colonic diverticulosis. Normal appendix. Signer Name: Rusty Eller MD  Signed: 7/7/2021 4:19 AM  Workstation [facility]  [facility]          Assessment/Plan   Assessment & Plan     Active Hospital Problems    Diagnosis  POA   • Compression fracture of L3 vertebra (CMS/HCC) [S32.030A]  Yes   • Compression fracture of T12 vertebra (CMS/HCC) [S22.080A]  Yes   • Mood disorder (CMS/HCC) [F39]  Yes   • Chronic bilateral low back pain [M54.5, G89.29]  Unknown   • Mixed hyperlipidemia [E78.2]  Yes   • Hypertension [I10]  Yes   • Uncontrolled type 2 diabetes mellitus with hyperglycemia (CMS/HCC) [E11.65]  Yes   • Osteoarthritis [M19.90]  Yes     76 y/o male w/ L3 and T12 compression fractures w/ worsening back pain;   1. L3/T12 compressions fractures with worsening back pain;   -will ask NS to evaluate   -PT/OT and will likely need rehab regardless of NS evaluation as pt's goal is to return to independent living  -pain control w/ percocet as pt states this works best for him  -will check CK as pt has been lying in bed for 3 days  2. Constipation, chronic;  -bowel regimen  3. Leukocytosis;  -?reactive but will need to monitor         DVT prophylaxis:  mechanical      CODE STATUS:  full  There are no questions and answers to display.       Admission Status:  I believe this patient meets  OBSERVATION status, however if further evaluation or treatment plans warrant, status may change.  Based upon current information, I predict patient's care encounter to be less than or equal to 2 midnights.      Yusra Dillard MD  07/07/21  Electronically signed by Yusra Dillard MD, 07/07/21, 6:22 AM EDT.

## 2021-07-07 NOTE — CONSULTS
Consults    Referring Provider: Dacia GONSALES    Patient Care Team:  Amanda Tate MD as PCP - General (Family Medicine)    Chief Complaint: Back pain    Subjective .     History of present illness:       Patient is a 75-year-old gentleman originally from [facility] who is still a smoker.  Patient been retired for many years and has had over 20 years of back pain.  Patient states that over the last year or 2 his pain is increased and up until the last month or so he started having a crescendo of pain up until about 3 days ago when he was unable to get out of bed and get to the bathroom.  Patient was brought to the emergency department for evaluation of his back pain.    Patient states his back pain is improved somewhat but still very tender to move about the bed.  Patient denies any weakness, numbness or signs of radiculopathy.    Patient had a CT of the abdomen and pelvis which showed some chronic looking fractures of L3 T12 as well as left-sided rib fractures at T11 and T10.  This all looks to be at least subacute if not chronic.    I reviewed the patient's scans with the patient and he is not interested in any surgery.  Patient's pain has improved therefore would not need any surgery.  There is no retropulsion into the canal or any radiculopathy that would need decompression.    I discussed the case with Dr. Zepeda who will see him later today but will likely require a outpatient follow-up as needed.    Review of Systems   Constitutional: Negative for activity change, appetite change, chills, fatigue and fever.   HENT: Negative for congestion, dental problem, ear pain, hearing loss, sinus pressure and tinnitus.    Eyes: Negative for pain and redness.   Respiratory: Negative for apnea, cough, shortness of breath and wheezing.    Cardiovascular: Negative for chest pain, palpitations and leg swelling.   Gastrointestinal: Negative for abdominal distention, abdominal pain, blood in stool, constipation,  diarrhea, nausea and vomiting.   Endocrine: Negative for cold intolerance, heat intolerance and polyuria.   Genitourinary: Negative for enuresis, frequency and urgency.   Musculoskeletal: Positive for back pain and gait problem.   Skin: Negative for color change and rash.   Neurological: Negative for dizziness, tremors, seizures, syncope, speech difficulty, weakness, light-headedness, numbness and headaches.   Psychiatric/Behavioral: Negative for behavioral problems and confusion. The patient is not nervous/anxious.        History  Past Medical History:   Diagnosis Date   • Depression    • Diabetes mellitus (CMS/[facility])    • Disc degeneration, lumbar    • Emphysema of lung (CMS/[facility])    • Generalized anxiety disorder    • GERD (gastroesophageal reflux disease)    • Hyperlipidemia    • Hypertension    • Hypogonadism male    • Osteoarthritis    • Type 2 diabetes mellitus, uncontrolled (CMS/[facility])    • Vitamin D deficiency    ,   Past Surgical History:   Procedure Laterality Date   • HERNIA REPAIR      Inguinal   • KNEE ARTHROPLASTY     ,   Family History   Problem Relation Age of Onset   • Tuberculosis Mother    • Colon cancer Father    • Other Father         polyps of the sigmoid colon   ,   Social History     Socioeconomic History   • Marital status: Single     Spouse name: Not on file   • Number of children: Not on file   • Years of education: Not on file   • Highest education level: Not on file   Tobacco Use   • Smoking status: Current Every Day Smoker     Packs/day: 2.00     Years: 45.00     Pack years: 90.00     Types: Cigarettes   • Smokeless tobacco: Never Used   Substance and Sexual Activity   • Alcohol use: Yes     Alcohol/week: 4.0 standard drinks     Types: 2 Cans of beer, 2 Shots of liquor per week   • Drug use: No   • Sexual activity: Defer     E-cigarette/Vaping     E-cigarette/Vaping Substances     E-cigarette/Vaping Devices       , (Not in a hospital admission)  , Scheduled Meds:  aspirin, 81 mg, Oral,  Daily  insulin lispro, 0-7 Units, Subcutaneous, TID AC  losartan, 50 mg, Oral, Daily  rosuvastatin, 20 mg, Oral, Daily  senna-docusate sodium, 2 tablet, Oral, BID  sodium chloride, 10 mL, Intravenous, Q12H    , Continuous Infusions:  sodium chloride, 75 mL/hr, Last Rate: 75 mL/hr (07/07/21 0622)    , PRN Meds:  •  acetaminophen **OR** acetaminophen **OR** acetaminophen  •  senna-docusate sodium **AND** polyethylene glycol **AND** bisacodyl **AND** bisacodyl  •  dextrose  •  dextrose  •  glucagon (human recombinant)  •  oxyCODONE-acetaminophen  •  sodium chloride and Allergies:  Penicillins and Shellfish-derived products   SMOKING STATUS: I spent greater than 10 minutes educating the patient on smoking cessation, and discussed how smoking pertains to the particular disease process at hand.  The patient acknowledges understanding.    Objective     Vital Signs   Temp:  [98.4 °F (36.9 °C)] 98.4 °F (36.9 °C)  Heart Rate:  [105] 105  Resp:  [16] 16  BP: (103-132)/(71-86) 132/79  Body mass index is 34.3 kg/m².    Physical Exam:     Body mass index is 34.3 kg/m².    GENERAL:           The patient is in no acute distress, and is able to answer all questions appropriately.    Neck:          Supple without lymphadenopathy    Cardiovascular:       Peripheral pulses 2+ at dorsalis pedis and posterior tibialis    Lungs:         Breathing unlabored    Musculoskeletal:            strength is 5 out of 5 bilaterally.        Shoulder abduction is 5 out of 5.         Dorsiflexion is 5/5 Bilaterally       Plantarflexion is 5/5 bilaterally       Hip Flexion 5/5 bilaterally.        Gait not tested secondary to patient pain    No percussive tenderness noted throughout the thoracolumbar spine    Neurologic:          The patient is alert and oriented by 3.          Pupils are equal and reactive to light.         Visual fields are full.         Extraocular movements are intact without nystagmus.         There is no evidence of central  motor drift. No facial droop.  No difficulty with rapid alternating movements.         Sensation is equal bilaterally with no deficit.           Reflexes:  2+ through out    CRANIAL NERVES:         Cranial nerve II: Visual fields are full to confrontation.       Cranial nerves III, IV and VI: PERRLA DC.  Extraocular movements are intact.  Nystagmus is not present.       Cranial nerve V: Facial sensation is intact to light touch.       Cranial nerve VII: Muscles of facial expression revealed no asymmetry.       Cranial nerve VIII: Hearing is intact to finger rub bilaterally.       Cranial nerve IX and X: Palate elevates symmetrically.        Cranial nerve XI: Shoulder shrug is intact.       Cranial nerve XII: Tongue is midline without evidence of Atrophy or fasciculation.      Results Review:   I reviewed the patient's new imaging results and agree with the interpretation.  Discussed with Dr. Zepeda.    Patient has chronic looking L3 and T12 fractures along with left-sided rib fractures at ribs 10 and rib 11.    The compression fractures have less than 25% loss of height with no retropulsion    Assessment/Plan       Intractable back pain    Osteoarthritis    Uncontrolled type 2 diabetes mellitus with hyperglycemia (CMS/HCC)    Hypertension    Mixed hyperlipidemia    Compression fracture of L3 vertebra (CMS/HCC)    Compression fracture of T12 vertebra (CMS/HCC)    Mood disorder (CMS/HCC)    Chronic bilateral low back pain    Acute low back pain with right-sided sciatica      Patient seems to be doing some better and is able to roll around in bed and allow me to percuss on his back.  Patient does not wish to pursue surgery.  If pains no longer managed patient will need pain medicine and possibly a LSO for stabilization and pain control.    If outpatient follow-up needed patient will need MRI to further delineate acuity of these fracturesNegar Zepeda we will see him later today    I discussed the patients findings and  my recommendations with patient and nursing staff    Giovanny Hernandez PA-C  07/07/21  13:54 EDT    Time: 60 minutes

## 2021-07-07 NOTE — PROGRESS NOTES
[facility] Medicine Services  ADMISSION FOLLOW-UP NOTE          Patient admitted after midnight, H&P by my partner performed earlier on today's date reviewed.  Interim findings, labs, and charting also reviewed.        The [facility] Hospital Problem List has been managed and updated to include any new diagnoses:  Active Hospital Problems    Diagnosis  POA   • **Intractable back pain [M54.9]  Yes   • Compression fracture of L3 vertebra (CMS/HCC) [S32.030A]  Yes   • Compression fracture of T12 vertebra (CMS/HCC) [S22.080A]  Yes   • Mood disorder (CMS/HCC) [F39]  Yes   • Chronic bilateral low back pain [M54.5, G89.29]  Unknown   • Acute low back pain with right-sided sciatica [M54.41]  Yes   • Mixed hyperlipidemia [E78.2]  Yes   • Hypertension [I10]  Yes   • Uncontrolled type 2 diabetes mellitus with hyperglycemia (CMS/[facility]) [E11.65]  Yes   • Osteoarthritis [M19.90]  Yes      Resolved Hospital Problems   No resolved problems to display.         ADDITIONAL PLAN:  - detailed assessment and plan from admission reviewed    Summary: This is a 75-year-old male with depression, DM2, emphysema presenting for evaluation of progressive worsening of chronic low back pain becoming intractable to the point of immobility with imaging demonstrating multiple areas of compression fracture likely chronic    Intractable back pain  Likely chronic T12, L3 compression fractures  -Continue pain control  -PT/OT  -Per H&P neurosurgery eval    Hyponatremia, mild  -Greater than 131 corrected for glucose, repeat labs in the a.m.    DM type 2, A1c 12.4%, unknown chronic insulin use  -Continue Accu-Cheks with SSI    Depression  Anxiety  GERD  Hypertension  Hyperlipidemia  -Continue aspirin, losartan, rosuvastatin      Ancelmo Pederson,   07/07/21

## 2021-07-07 NOTE — ED PROVIDER NOTES
[place]    EMERGENCY DEPARTMENT ENCOUNTER      Pt Name: Cristian Watt  MRN: [id number]  YOB: 1945  Date of evaluation: 7/7/2021  Provider: Nhan Keating MD    CHIEF COMPLAINT       Chief Complaint   Patient presents with   • Back Pain         HISTORY OF PRESENT ILLNESS  (Location/Symptom, Timing/Onset, Context/Setting, Quality, Duration, Modifying Factors, Severity.)   Cristian Watt is a 75 y.o. male who presents to the emergency department with moderate aching right lower back pain radiating into the right leg is worse with movement that has been constant for several weeks.  He has been M unable to get out of bed and care for himself for the past 2 to 3 days. Patient denies any history of trauma, unexplained weight loss, neurologic deficits including bowel or bladder dysfunction/lower extremity weakness/lower extremity numbness/saddle anesthesia, fever, history of IV drug use/alcohol abuse/HIV/use of immunosuppressive medications/diabetes mellitus, chronic steroid use, or history of cancer.        Nursing notes were reviewed.    REVIEW OF SYSTEMS    (2-9 systems for level 4, 10 or more for level 5)   ROS:  General:  No fevers, no chills, no weakness  Cardiovascular:  No chest pain, no palpitations  Respiratory:  No shortness of breath, no cough, no wheezing  Gastrointestinal:  No pain, no nausea, no vomiting, no diarrhea  Musculoskeletal:  Back pain  Skin:  No rash  Neurologic:  No speech problems, no headache, no extremity numbness, no extremity tingling, no extremity weakness  Psychiatric:  No anxiety  Genitourinary:  No dysuria, no hematuria    Except as noted above the remainder of the review of systems was reviewed and negative.       PAST MEDICAL HISTORY     Past Medical History:   Diagnosis Date   • Depression    • Diabetes mellitus (CMS/[facility])    • Disc degeneration, lumbar    • Emphysema of lung (CMS/[facility])    • Generalized anxiety disorder    • GERD (gastroesophageal reflux disease)     • Hyperlipidemia    • Hypertension    • Hypogonadism male    • Osteoarthritis    • Type 2 diabetes mellitus, uncontrolled (CMS/HCC)    • Vitamin D deficiency          SURGICAL HISTORY       Past Surgical History:   Procedure Laterality Date   • HERNIA REPAIR      Inguinal   • KNEE ARTHROPLASTY           CURRENT MEDICATIONS       Current Facility-Administered Medications:   •  sennosides-docusate (PERICOLACE) 8.6-50 MG per tablet 2 tablet, 2 tablet, Oral, BID **AND** polyethylene glycol (MIRALAX) packet 17 g, 17 g, Oral, Daily PRN **AND** bisacodyl (DULCOLAX) EC tablet 5 mg, 5 mg, Oral, Daily PRN **AND** bisacodyl (DULCOLAX) suppository 10 mg, 10 mg, Rectal, Daily PRN, Stacey Busch, APRN  •  lidocaine (XYLOCAINE) 2 % jelly, , Topical, PRN, Robby Velasco, BARBARA, APRN  •  oxyCODONE-acetaminophen (PERCOCET) 5-325 MG per tablet 1 tablet, 1 tablet, Oral, Q6H PRN, Stacey Busch, APRN  •  sodium chloride 0.9 % infusion, 75 mL/hr, Intravenous, Continuous, Stacey Busch, APRVIVEK, Last Rate: 75 mL/hr at 07/07/21 0622, 75 mL/hr at 07/07/21 0622    Current Outpatient Medications:   •  ACCU-CHEK FASTCLIX LANCETS misc, , Disp: , Rfl:   •  aspirin 81 MG tablet, Take  by mouth daily., Disp: , Rfl:   •  Blood Glucose Monitoring Suppl (ACCU-CHEK KYAW) device, Daily., Disp: , Rfl:   •  buPROPion SR (WELLBUTRIN SR) 150 MG 12 hr tablet, I pill daily for one week, then bid, Disp: 30 tablet, Rfl: 5  •  FARXIGA 5 MG tablet tablet, TAKE ONE TABLET BY MOUTH DAILY, Disp: 30 tablet, Rfl: 5  •  fenofibrate 160 MG tablet, Take 1 tablet by mouth Daily., Disp: 90 tablet, Rfl: 0  •  glucose blood test strip, Use as instructed, Disp: 100 each, Rfl: 12  •  JANUVIA 100 MG tablet, TAKE ONE TABLET BY MOUTH DAILY, Disp: 30 tablet, Rfl: 5  •  losartan (COZAAR) 50 MG tablet, TAKE ONE TABLET BY MOUTH DAILY, Disp: 30 tablet, Rfl: 0  •  metroNIDAZOLE (FLAGYL) 250 MG tablet, , Disp: , Rfl:   •  oxyCODONE-acetaminophen (PERCOCET)  5-325 MG per tablet, , Disp: , Rfl:   •  rosuvastatin (CRESTOR) 20 MG tablet, TAKE ONE TABLET BY MOUTH DAILY, Disp: 30 tablet, Rfl: 0    ALLERGIES     Penicillins and Shellfish-derived products    FAMILY HISTORY       Family History   Problem Relation Age of Onset   • Tuberculosis Mother    • Colon cancer Father    • Other Father         polyps of the sigmoid colon          SOCIAL HISTORY       Social History     Socioeconomic History   • Marital status: Single     Spouse name: Not on file   • Number of children: Not on file   • Years of education: Not on file   • Highest education level: Not on file   Tobacco Use   • Smoking status: Current Every Day Smoker     Packs/day: 2.00     Years: 45.00     Pack years: 90.00     Types: Cigarettes   • Smokeless tobacco: Never Used   Substance and Sexual Activity   • Alcohol use: Yes     Alcohol/week: 4.0 standard drinks     Types: 2 Cans of beer, 2 Shots of liquor per week   • Drug use: No   • Sexual activity: Defer         PHYSICAL EXAM    (up to 7 for level 4, 8 or more for level 5)     Vitals:    07/07/21 0540 07/07/21 0545 07/07/21 0600 07/07/21 0615   BP: 114/72  120/75    BP Location:       Patient Position:       Pulse:       Resp:       Temp:       TempSrc:       SpO2:  91%  93%   Weight:       Height:           Physical Exam  General: Awake, alert, no acute distress.  HEENT: Conjunctiva normal.  Neck: Trachea midline.  Cardiac: Heart regular rate, rhythm, no murmurs, rubs, or gallops  Lungs: Lungs are clear to auscultation, there is no wheezing, rhonchi, or rales. There is no use of accessory muscles.  Chest wall: There is no tenderness to palpation over the chest wall or over ribs  Abdomen: Abdomen is soft, nontender, nondistended. There is no firm or pulsatile masses, no rebound rigidity or guarding.   Musculoskeletal: Moderate right-sided paraspinal lumbar tenderness.  No midline tenderness or step-off.. There is no asymmetry of BLE. DP/PT pulses are 2+  bilaterally.  Neuro: Motor and sensory function intact in BLE. There is no saddle anesthesia. Patellar/achilles reflexes are 1+ bilaterally.  Dermatology: Skin is warm and dry  Psych: Mentation is grossly normal, cognition is grossly normal. Affect is appropriate.      DIAGNOSTIC RESULTS   RADIOLOGY:   Non-plain film images such as CT, Ultrasound and MRI are read by the radiologist. Plain radiographic images are visualized and preliminarily interpreted by the emergency physician with the below findings:      [x] Radiologist's Report Reviewed:  CT Abdomen Pelvis Without Contrast   Final Result   1.  Subacute or chronic vertebral compression fractures at T12 and L3.   2.  No evidence of urinary obstruction. Single tiny nonobstructing calculus within each kidney.   3.  Mild lower colonic diverticulosis. Normal appendix.      Signer Name: Rusty Eller MD    Signed: 7/7/2021 4:19 AM    Workstation Name: [email or web address]     [facility]          LABS:    I have reviewed and interpreted all of the currently available lab results from this visit (if applicable):  Results for orders placed or performed during the hospital encounter of 07/07/21   Comprehensive Metabolic Panel    Specimen: Blood   Result Value Ref Range    Glucose 292 (H) 65 - 99 mg/dL    BUN 11 8 - 23 mg/dL    Creatinine 0.71 (L) 0.76 - 1.27 mg/dL    Sodium 129 (L) 136 - 145 mmol/L    Potassium 4.4 3.5 - 5.2 mmol/L    Chloride 92 (L) 98 - 107 mmol/L    CO2 22.0 22.0 - 29.0 mmol/L    Calcium 9.3 8.6 - 10.5 mg/dL    Total Protein 6.9 6.0 - 8.5 g/dL    Albumin 3.70 3.50 - 5.20 g/dL    ALT (SGPT) 8 1 - 41 U/L    AST (SGOT) 11 1 - 40 U/L    Alkaline Phosphatase 142 (H) 39 - 117 U/L    Total Bilirubin 0.5 0.0 - 1.2 mg/dL    eGFR Non African Amer 108 >60 mL/min/1.73    Globulin 3.2 gm/dL    A/G Ratio 1.2 g/dL    BUN/Creatinine Ratio 15.5 7.0 - 25.0    Anion Gap 15.0 5.0 - 15.0 mmol/L   Urinalysis With Culture If Indicated - Urine, Catheter     Specimen: Urine, Catheter   Result Value Ref Range    Color, UA Yellow Yellow, Straw    Appearance, UA Clear Clear    pH, UA 5.5 5.0 - 8.0    Specific Gravity, UA 1.026 1.001 - 1.030    Glucose, UA >=1000 mg/dL (3+) (A) Negative    Ketones, UA 15 mg/dL (1+) (A) Negative    Bilirubin, UA Negative Negative    Blood, UA Negative Negative    Protein, UA Negative Negative    Leuk Esterase, UA Negative Negative    Nitrite, UA Negative Negative    Urobilinogen, UA 0.2 E.U./dL 0.2 - 1.0 E.U./dL   CBC Auto Differential    Specimen: Blood   Result Value Ref Range    WBC 12.84 (H) 3.40 - 10.80 10*3/mm3    RBC 5.51 4.14 - 5.80 10*6/mm3    Hemoglobin 17.2 13.0 - 17.7 g/dL    Hematocrit 50.0 37.5 - 51.0 %    MCV 90.7 79.0 - 97.0 fL    MCH 31.2 26.6 - 33.0 pg    MCHC 34.4 31.5 - 35.7 g/dL    RDW 11.9 (L) 12.3 - 15.4 %    RDW-SD 39.8 37.0 - 54.0 fl    MPV 8.1 6.0 - 12.0 fL    Platelets 357 140 - 450 10*3/mm3    Neutrophil % 71.9 42.7 - 76.0 %    Lymphocyte % 18.3 (L) 19.6 - 45.3 %    Monocyte % 8.2 5.0 - 12.0 %    Eosinophil % 0.4 0.3 - 6.2 %    Basophil % 0.5 0.0 - 1.5 %    Immature Grans % 0.7 (H) 0.0 - 0.5 %    Neutrophils, Absolute 9.24 (H) 1.70 - 7.00 10*3/mm3    Lymphocytes, Absolute 2.35 0.70 - 3.10 10*3/mm3    Monocytes, Absolute 1.05 (H) 0.10 - 0.90 10*3/mm3    Eosinophils, Absolute 0.05 0.00 - 0.40 10*3/mm3    Basophils, Absolute 0.06 0.00 - 0.20 10*3/mm3    Immature Grans, Absolute 0.09 (H) 0.00 - 0.05 10*3/mm3    nRBC 0.0 0.0 - 0.2 /100 WBC        All other labs were within normal range or not returned as of this dictation.      EMERGENCY DEPARTMENT COURSE and DIFFERENTIAL DIAGNOSIS/MDM:   Vitals:    Vitals:    07/07/21 0540 07/07/21 0545 07/07/21 0600 07/07/21 0615   BP: 114/72  120/75    BP Location:       Patient Position:       Pulse:       Resp:       Temp:       TempSrc:       SpO2:  91%  93%   Weight:       Height:           ED Course as of Jul 07 0701 Wed Jul 07, 2021   0529 Spoke w/ Dr. Dillard who accepts the  pt for admission    [NS]      ED Course User Index  [NS] Nhan Keating MD       Based on my history and physical examination of the patient, I have low clinical suspicion for emergent cause of back pain.  There is a normal neurologic exam along with no risk factors on history that would raise concern for cord compression/cauda equina, infectious process such as epidural or psoas abscess, osteomyelitis, or discitis, vascular process such as AAA/aortic dissection, occult fracture, or malignancy.        MEDICATIONS ADMINISTERED IN ED:  Medications   oxyCODONE-acetaminophen (PERCOCET) 5-325 MG per tablet 1 tablet (has no administration in time range)   sennosides-docusate (PERICOLACE) 8.6-50 MG per tablet 2 tablet (has no administration in time range)     And   polyethylene glycol (MIRALAX) packet 17 g (has no administration in time range)     And   bisacodyl (DULCOLAX) EC tablet 5 mg (has no administration in time range)     And   bisacodyl (DULCOLAX) suppository 10 mg (has no administration in time range)   sodium chloride 0.9 % infusion (75 mL/hr Intravenous New Bag 7/7/21 0622)         FINAL IMPRESSION      1. Acute low back pain with right-sided sciatica, unspecified back pain laterality    2. History of diabetes mellitus          DISPOSITION/PLAN     ED Disposition     ED Disposition Condition Comment    Decision to Admit  Level of Care: Telemetry [5]   Diagnosis: Acute low back pain with right-sided sciatica, unspecified back pain laterality [[id number]]   Admitting Physician: PAYAL SOMMERS [[id number]]   Attending Physician: PAYAL SOMMERS [[id number]]              Nhan Keating MD  Attending Emergency Physician               Nhan Keating MD  07/07/21 0701

## 2021-07-07 NOTE — PLAN OF CARE
Patients pain improving. Neuro intact. Pts not interested in surgery, no indication for surgery at this time.    Needs out px F/u and pain control. If pain fails to improve he will need MRI lumbar for evaluation of chronicity of fx.     Full consult note to follow.

## 2021-07-08 ENCOUNTER — APPOINTMENT (OUTPATIENT)
Dept: GENERAL RADIOLOGY | Facility: HOSPITAL | Age: 76
End: 2021-07-08

## 2021-07-08 LAB
ANION GAP SERPL CALCULATED.3IONS-SCNC: 10 MMOL/L (ref 5–15)
BUN SERPL-MCNC: 8 MG/DL (ref 8–23)
BUN/CREAT SERPL: 15.4 (ref 7–25)
CALCIUM SPEC-SCNC: 9 MG/DL (ref 8.6–10.5)
CHLORIDE SERPL-SCNC: 91 MMOL/L (ref 98–107)
CO2 SERPL-SCNC: 23 MMOL/L (ref 22–29)
CREAT SERPL-MCNC: 0.52 MG/DL (ref 0.76–1.27)
GFR SERPL CREATININE-BSD FRML MDRD: >150 ML/MIN/1.73
GLUCOSE BLDC GLUCOMTR-MCNC: 163 MG/DL (ref 70–130)
GLUCOSE BLDC GLUCOMTR-MCNC: 170 MG/DL (ref 70–130)
GLUCOSE BLDC GLUCOMTR-MCNC: 204 MG/DL (ref 70–130)
GLUCOSE BLDC GLUCOMTR-MCNC: 238 MG/DL (ref 70–130)
GLUCOSE BLDC GLUCOMTR-MCNC: 242 MG/DL (ref 70–130)
GLUCOSE SERPL-MCNC: 211 MG/DL (ref 65–99)
OSMOLALITY SERPL: 274 MOSM/KG (ref 275–295)
OSMOLALITY UR: 605 MOSM/KG (ref 300–1100)
POTASSIUM SERPL-SCNC: 3.7 MMOL/L (ref 3.5–5.2)
SODIUM SERPL-SCNC: 124 MMOL/L (ref 136–145)
SODIUM SERPL-SCNC: 124 MMOL/L (ref 136–145)
SODIUM SERPL-SCNC: 130 MMOL/L (ref 136–145)
SODIUM UR-SCNC: 29 MMOL/L
TSH SERPL DL<=0.05 MIU/L-ACNC: 2.09 UIU/ML (ref 0.27–4.2)

## 2021-07-08 PROCEDURE — 83935 ASSAY OF URINE OSMOLALITY: CPT | Performed by: INTERNAL MEDICINE

## 2021-07-08 PROCEDURE — 99232 SBSQ HOSP IP/OBS MODERATE 35: CPT | Performed by: INTERNAL MEDICINE

## 2021-07-08 PROCEDURE — 80048 BASIC METABOLIC PNL TOTAL CA: CPT | Performed by: INTERNAL MEDICINE

## 2021-07-08 PROCEDURE — 84300 ASSAY OF URINE SODIUM: CPT | Performed by: INTERNAL MEDICINE

## 2021-07-08 PROCEDURE — 83930 ASSAY OF BLOOD OSMOLALITY: CPT | Performed by: INTERNAL MEDICINE

## 2021-07-08 PROCEDURE — 97162 PT EVAL MOD COMPLEX 30 MIN: CPT

## 2021-07-08 PROCEDURE — 25010000002 MORPHINE PER 10 MG: Performed by: INTERNAL MEDICINE

## 2021-07-08 PROCEDURE — 97110 THERAPEUTIC EXERCISES: CPT

## 2021-07-08 PROCEDURE — G0108 DIAB MANAGE TRN  PER INDIV: HCPCS

## 2021-07-08 PROCEDURE — 63710000001 INSULIN LISPRO (HUMAN) PER 5 UNITS: Performed by: NURSE PRACTITIONER

## 2021-07-08 PROCEDURE — 82962 GLUCOSE BLOOD TEST: CPT

## 2021-07-08 PROCEDURE — 84443 ASSAY THYROID STIM HORMONE: CPT | Performed by: INTERNAL MEDICINE

## 2021-07-08 PROCEDURE — 72100 X-RAY EXAM L-S SPINE 2/3 VWS: CPT

## 2021-07-08 PROCEDURE — 84295 ASSAY OF SERUM SODIUM: CPT | Performed by: INTERNAL MEDICINE

## 2021-07-08 PROCEDURE — 99232 SBSQ HOSP IP/OBS MODERATE 35: CPT | Performed by: NEUROLOGICAL SURGERY

## 2021-07-08 RX ORDER — TAMSULOSIN HYDROCHLORIDE 0.4 MG/1
0.4 CAPSULE ORAL DAILY
Status: DISCONTINUED | OUTPATIENT
Start: 2021-07-08 | End: 2021-07-16 | Stop reason: HOSPADM

## 2021-07-08 RX ORDER — SODIUM CHLORIDE 9 MG/ML
75 INJECTION, SOLUTION INTRAVENOUS CONTINUOUS
Status: DISCONTINUED | OUTPATIENT
Start: 2021-07-08 | End: 2021-07-08

## 2021-07-08 RX ORDER — OXYCODONE AND ACETAMINOPHEN 7.5; 325 MG/1; MG/1
1 TABLET ORAL EVERY 4 HOURS PRN
Status: DISCONTINUED | OUTPATIENT
Start: 2021-07-08 | End: 2021-07-14

## 2021-07-08 RX ORDER — MORPHINE SULFATE 2 MG/ML
1 INJECTION, SOLUTION INTRAMUSCULAR; INTRAVENOUS EVERY 4 HOURS PRN
Status: DISCONTINUED | OUTPATIENT
Start: 2021-07-08 | End: 2021-07-14

## 2021-07-08 RX ADMIN — SODIUM CHLORIDE 75 ML/HR: 9 INJECTION, SOLUTION INTRAVENOUS at 13:07

## 2021-07-08 RX ADMIN — OXYCODONE HYDROCHLORIDE AND ACETAMINOPHEN 1 TABLET: 5; 325 TABLET ORAL at 01:19

## 2021-07-08 RX ADMIN — MORPHINE SULFATE 1 MG: 2 INJECTION, SOLUTION INTRAMUSCULAR; INTRAVENOUS at 17:53

## 2021-07-08 RX ADMIN — OXYCODONE HYDROCHLORIDE AND ACETAMINOPHEN 1 TABLET: 7.5; 325 TABLET ORAL at 17:13

## 2021-07-08 RX ADMIN — INSULIN LISPRO 3 UNITS: 100 INJECTION, SOLUTION INTRAVENOUS; SUBCUTANEOUS at 09:33

## 2021-07-08 RX ADMIN — MORPHINE SULFATE 1 MG: 2 INJECTION, SOLUTION INTRAMUSCULAR; INTRAVENOUS at 13:12

## 2021-07-08 RX ADMIN — POLYETHYLENE GLYCOL 3350 17 G: 17 POWDER, FOR SOLUTION ORAL at 09:35

## 2021-07-08 RX ADMIN — ROSUVASTATIN CALCIUM 20 MG: 20 TABLET, COATED ORAL at 09:34

## 2021-07-08 RX ADMIN — LOSARTAN POTASSIUM 50 MG: 50 TABLET, FILM COATED ORAL at 09:34

## 2021-07-08 RX ADMIN — SODIUM CHLORIDE, PRESERVATIVE FREE 10 ML: 5 INJECTION INTRAVENOUS at 05:52

## 2021-07-08 RX ADMIN — DOCUSATE SODIUM 50MG AND SENNOSIDES 8.6MG 2 TABLET: 8.6; 5 TABLET, FILM COATED ORAL at 09:34

## 2021-07-08 RX ADMIN — DOCUSATE SODIUM 50MG AND SENNOSIDES 8.6MG 2 TABLET: 8.6; 5 TABLET, FILM COATED ORAL at 20:44

## 2021-07-08 RX ADMIN — ASPIRIN 81 MG: 81 TABLET, COATED ORAL at 09:41

## 2021-07-08 RX ADMIN — OXYCODONE HYDROCHLORIDE AND ACETAMINOPHEN 1 TABLET: 7.5; 325 TABLET ORAL at 13:08

## 2021-07-08 RX ADMIN — BISACODYL 5 MG: 5 TABLET, COATED ORAL at 13:08

## 2021-07-08 RX ADMIN — INSULIN LISPRO 3 UNITS: 100 INJECTION, SOLUTION INTRAVENOUS; SUBCUTANEOUS at 13:08

## 2021-07-08 RX ADMIN — SODIUM CHLORIDE, PRESERVATIVE FREE 10 ML: 5 INJECTION INTRAVENOUS at 09:35

## 2021-07-08 RX ADMIN — ACETAMINOPHEN 650 MG: 325 TABLET, FILM COATED ORAL at 09:34

## 2021-07-08 RX ADMIN — TAMSULOSIN HYDROCHLORIDE 0.4 MG: 0.4 CAPSULE ORAL at 13:08

## 2021-07-08 RX ADMIN — SODIUM CHLORIDE, PRESERVATIVE FREE 10 ML: 5 INJECTION INTRAVENOUS at 20:44

## 2021-07-08 RX ADMIN — INSULIN LISPRO 2 UNITS: 100 INJECTION, SOLUTION INTRAVENOUS; SUBCUTANEOUS at 17:51

## 2021-07-08 RX ADMIN — OXYCODONE HYDROCHLORIDE AND ACETAMINOPHEN 1 TABLET: 5; 325 TABLET ORAL at 06:55

## 2021-07-08 NOTE — PLAN OF CARE
Problem: Adult Inpatient Plan of Care  Goal: Plan of Care Review  Flowsheets (Taken 7/8/2021 0513)  Progress: no change  Plan of Care Reviewed With: patient  Outcome Summary: Pt had restless night. C/o frequent/constant back pain. At rest, pain managable, 3-4 out of 10. With ann of movement, turning, exertion pt states pain greater than 10. Hesistant to participate in care due to fear of pain increasing. Prn percocet given Q6 per order. Repositioned pt as frequently as pt would allow. Waffle mattress in place. VSS. Pt requiring 2L NC to maintain O2 sat at/greater than 90 while asleep. MRI completed. Pt unable to initiate void. Required straight cath x2 for nop void and large volumes. Pt states that he unsure if he has urinary retention at home because he is able to void. Pt also states that it has been 4-5 days since last BM. Plan of care discussed with pt. Verbalized understanding but needs reinforcement=.  Goal: Absence of Hospital-Acquired Illness or Injury  Intervention: Identify and Manage Fall Risk  Recent Flowsheet Documentation  Taken 7/8/2021 0400 by Bo Keating, RN  Safety Promotion/Fall Prevention:   activity supervised   assistive device/personal items within reach   clutter free environment maintained   fall prevention program maintained   gait belt   nonskid shoes/slippers when out of bed   room organization consistent   safety round/check completed   toileting scheduled   lighting adjusted  Taken 7/8/2021 0200 by Bo Keating, RN  Safety Promotion/Fall Prevention:   activity supervised   assistive device/personal items within reach   clutter free environment maintained   lighting adjusted   gait belt   nonskid shoes/slippers when out of bed   room organization consistent   safety round/check completed   toileting scheduled  Taken 7/8/2021 0000 by Bo Keating, RN  Safety Promotion/Fall Prevention:   activity supervised   assistive device/personal items within reach   clutter free  environment maintained   gait belt   nonskid shoes/slippers when out of bed   room organization consistent   safety round/check completed   toileting scheduled  Taken 7/7/2021 2230 by Bo Keating RN  Safety Promotion/Fall Prevention:   activity supervised   assistive device/personal items within reach   clutter free environment maintained   fall prevention program maintained   gait belt   nonskid shoes/slippers when out of bed   room organization consistent   safety round/check completed   toileting scheduled  Taken 7/7/2021 2045 by Bo Keating RN  Safety Promotion/Fall Prevention:   activity supervised   assistive device/personal items within reach   clutter free environment maintained   nonskid shoes/slippers when out of bed   room organization consistent   safety round/check completed   toileting scheduled   lighting adjusted  Intervention: Prevent Skin Injury  Recent Flowsheet Documentation  Taken 7/8/2021 0400 by Bo Keating RN  Body Position: neutral body alignment  Skin Protection:   adhesive use limited   incontinence pads utilized   tubing/devices free from skin contact  Taken 7/8/2021 0200 by Bo Keating RN  Body Position: neutral body alignment  Skin Protection:   adhesive use limited   incontinence pads utilized   tubing/devices free from skin contact  Taken 7/8/2021 0000 by Bo Keating RN  Body Position:   weight shift assistance provided   tilted, left  Skin Protection:   adhesive use limited   incontinence pads utilized   tubing/devices free from skin contact  Taken 7/7/2021 2230 by Bo Keating RN  Body Position:   position changed independently   neutral body alignment  Skin Protection:   adhesive use limited   incontinence pads utilized   tubing/devices free from skin contact  Taken 7/7/2021 2045 by Bo Keating RN  Body Position:   weight shift assistance provided   tilted, right  Skin Protection:   adhesive use limited   incontinence pads utilized    tubing/devices free from skin contact   skin sealant/moisture barrier applied  Intervention: Prevent and Manage VTE (venous thromboembolism) Risk  Recent Flowsheet Documentation  Taken 7/7/2021 2045 by Bo Keating RN  VTE Prevention/Management:   bilateral   dorsiflexion/plantar flexion performed  Intervention: Prevent Infection  Recent Flowsheet Documentation  Taken 7/8/2021 0400 by Bo Keating RN  Infection Prevention:   environmental surveillance performed   rest/sleep promoted   single patient room provided  Taken 7/8/2021 0200 by Bo Keating RN  Infection Prevention:   environmental surveillance performed   rest/sleep promoted   single patient room provided  Taken 7/8/2021 0000 by Bo Keating RN  Infection Prevention:   environmental surveillance performed   rest/sleep promoted   single patient room provided  Taken 7/7/2021 2230 by Bo Keating RN  Infection Prevention:   environmental surveillance performed   rest/sleep promoted   single patient room provided  Taken 7/7/2021 2045 by Bo Keating RN  Infection Prevention:   environmental surveillance performed   rest/sleep promoted   single patient room provided  Goal: Optimal Comfort and Wellbeing  Intervention: Provide Person-Centered Care  Recent Flowsheet Documentation  Taken 7/7/2021 2045 by Bo Keating RN  Trust Relationship/Rapport:   care explained   choices provided   questions encouraged   questions answered     Problem: Pain Acute  Goal: Optimal Pain Control  Intervention: Develop Pain Management Plan  Recent Flowsheet Documentation  Taken 7/8/2021 0119 by Bo Keating RN  Pain Management Interventions:   pain management plan reviewed with patient/caregiver   unnecessary movement minimized  Taken 7/8/2021 0000 by Bo Keatign RN  Pain Management Interventions:   pain management plan reviewed with patient/caregiver   position adjusted   unnecessary movement minimized  Taken 7/7/2021 2230 by  Bo Keating RN  Pain Management Interventions:   pain management plan reviewed with patient/caregiver   position adjusted  Taken 7/7/2021 2141 by Bo Keating RN  Pain Management Interventions: (1x dose for MRI) pain management plan reviewed with patient/caregiver  Taken 7/7/2021 2045 by Bo Keating RN  Pain Management Interventions:   pain management plan reviewed with patient/caregiver   position adjusted   quiet environment facilitated   relaxation techniques promoted  Intervention: Prevent or Manage Pain  Recent Flowsheet Documentation  Taken 7/7/2021 2045 by Bo Keating RN  Sensory Stimulation Regulation: care clustered  Intervention: Optimize Psychosocial Wellbeing  Recent Flowsheet Documentation  Taken 7/7/2021 2045 by Bo Keating RN  Supportive Measures:   active listening utilized   decision-making supported   positive reinforcement provided   relaxation techniques promoted  Diversional Activities: television     Problem: Skin Injury Risk Increased  Goal: Skin Health and Integrity  Intervention: Optimize Skin Protection  Recent Flowsheet Documentation  Taken 7/8/2021 0400 by Bo Keating RN  Pressure Reduction Techniques:   frequent weight shift encouraged   pressure points protected   rest period provided between sit times  Head of Bed (HOB): HOB at 20 degrees  Pressure Reduction Devices:   positioning supports utilized   pressure-redistributing mattress utilized  Skin Protection:   adhesive use limited   incontinence pads utilized   tubing/devices free from skin contact  Taken 7/8/2021 0200 by Bo Keating RN  Pressure Reduction Techniques:   frequent weight shift encouraged   pressure points protected   rest period provided between sit times  Head of Bed (HOB): HOB at 20 degrees  Pressure Reduction Devices:   pressure-redistributing mattress utilized   positioning supports utilized  Skin Protection:   adhesive use limited   incontinence pads utilized    tubing/devices free from skin contact  Taken 7/8/2021 0000 by Bo Keating RN  Pressure Reduction Techniques:   frequent weight shift encouraged   weight shift assistance provided   rest period provided between sit times   pressure points protected  Head of Bed (HOB): HOB at 20 degrees  Pressure Reduction Devices:   pressure-redistributing mattress utilized   positioning supports utilized  Skin Protection:   adhesive use limited   incontinence pads utilized   tubing/devices free from skin contact  Taken 7/7/2021 2230 by Bo Keating RN  Pressure Reduction Techniques:   frequent weight shift encouraged   pressure points protected   rest period provided between sit times  Head of Bed (HOB): HOB at 20-30 degrees  Pressure Reduction Devices:   positioning supports utilized   pressure-redistributing mattress utilized  Skin Protection:   adhesive use limited   incontinence pads utilized   tubing/devices free from skin contact  Taken 7/7/2021 2045 by Bo Keating RN  Head of Bed (HOB): HOB at 20 degrees  Skin Protection:   adhesive use limited   incontinence pads utilized   tubing/devices free from skin contact   skin sealant/moisture barrier applied     Problem: Fall Injury Risk  Goal: Absence of Fall and Fall-Related Injury  Intervention: Promote Injury-Free Environment  Recent Flowsheet Documentation  Taken 7/8/2021 0400 by Bo Keating RN  Safety Promotion/Fall Prevention:   activity supervised   assistive device/personal items within reach   clutter free environment maintained   fall prevention program maintained   gait belt   nonskid shoes/slippers when out of bed   room organization consistent   safety round/check completed   toileting scheduled   lighting adjusted  Taken 7/8/2021 0200 by Bo Keating RN  Safety Promotion/Fall Prevention:   activity supervised   assistive device/personal items within reach   clutter free environment maintained   lighting adjusted   gait belt   nonskid  shoes/slippers when out of bed   room organization consistent   safety round/check completed   toileting scheduled  Taken 7/8/2021 0000 by Bo Keating RN  Safety Promotion/Fall Prevention:   activity supervised   assistive device/personal items within reach   clutter free environment maintained   gait belt   nonskid shoes/slippers when out of bed   room organization consistent   safety round/check completed   toileting scheduled  Taken 7/7/2021 2230 by Bo Keating RN  Safety Promotion/Fall Prevention:   activity supervised   assistive device/personal items within reach   clutter free environment maintained   fall prevention program maintained   gait belt   nonskid shoes/slippers when out of bed   room organization consistent   safety round/check completed   toileting scheduled  Taken 7/7/2021 2045 by Bo Keating RN  Safety Promotion/Fall Prevention:   activity supervised   assistive device/personal items within reach   clutter free environment maintained   nonskid shoes/slippers when out of bed   room organization consistent   safety round/check completed   toileting scheduled   lighting adjusted   Goal Outcome Evaluation:  Plan of Care Reviewed With: patient        Progress: no change  Outcome Summary: Pt had restless night. C/o frequent/constant back pain. At rest, pain managable, 3-4 out of 10. With ann of movement, turning, exertion pt states pain greater than 10. Hesistant to participate in care due to fear of pain increasing. Prn percocet given Q6 per order. Repositioned pt as frequently as pt would allow. Waffle mattress in place. VSS. Pt requiring 2L NC to maintain O2 sat at/greater than 90 while asleep. MRI completed. Pt unable to initiate void. Required straight cath x2 for nop void and large volumes. Pt states that he unsure if he has urinary retention at home because he is able to void. Pt also states that it has been 4-5 days since last BM. Plan of care discussed with pt.  Verbalized understanding but needs reinforcement=.

## 2021-07-08 NOTE — CASE MANAGEMENT/SOCIAL WORK
Discharge Planning Assessment  [facility]     Patient Name: Cristian Watt  MRN: [id number]  Today's Date: 7/8/2021    Admit Date: 7/7/2021    Discharge Needs Assessment     Row Name 07/08/21 1450       Living Environment    Lives With  alone    Current Living Arrangements  home/apartment/condo    Primary Care Provided by  self    Provides Primary Care For  no one, unable/limited ability to care for self    Family Caregiver if Needed  child(genevieve), adult;friend(s);other (see comments)     Quality of Family Relationships  helpful;supportive    Able to Return to Prior Arrangements  yes       Transition Planning    Patient/Family Anticipates Transition to  home;home with help/services    Patient/Family Anticipated Services at Transition  ;other (see comments) TBD    Transportation Anticipated  family or friend will provide       Discharge Needs Assessment    Readmission Within the Last 30 Days  no previous admission in last 30 days    Equipment Currently Used at Home  none Has a standard walker available    Concerns to be Addressed  discharge planning    Current Discharge Risk  lives alone;chronically ill        Discharge Plan     Row Name 07/08/21 1451       Plan    Plan  TBD    Patient/Family in Agreement with Plan  yes    Plan Comments  Met with patient in the room to initiate discharge planning. [facility] [place]. He will be moving to [facility] as soon as he is able to. Patient is normally independent with ADLs and mobility. He has a standard walker available. Patient has a  who comes every other day to help with housework and cooking. He states she can assist him with ADLs if needed. Patient is unsure of his discharge needs at this time. PT and OT evaluations are pending. Neurosurgery is following for possible kyphoplasty. Patient states his son or a friend will provide his ride.     Discussed in MDR that patient  will need a new PCP at discharge. Patient states he sees Dr. Amanda Tate in [place] but has not seen her in several years. He states his son,  Alberto Watt, (DMD?) will be his PCP when he moves to [place]. CM will continue to follow patient's medical course and POC for DC planning.    Final Discharge Disposition Code  30 - still a patient        Continued Care and Services - Admitted Since 7/7/2021    Coordination has not been started for this encounter.       Expected Discharge Date and Time     Expected Discharge Date Expected Discharge Time    Jul 10, 2021         Demographic Summary     Row Name 07/08/21 1446       General Information    Admission Type  observation    Referral Source  admission list    Reason for Consult  discharge planning    General Information Comments  Patient states his PCP is Dr. Amanda Tate in [place]. He confirms medical coverage through Medicare A & B and [place] and rx coverage through Medicare D. He denies having a POA or LW.        Functional Status     Row Name 07/08/21 1449       Functional Status    Usual Activity Tolerance  good       Functional Status, IADL    Medications  independent    Meal Preparation  assistive person    Housekeeping  assistive person    Laundry  assistive person    Shopping  assistive person;independent        Psychosocial    No documentation.       Abuse/Neglect    No documentation.       Legal    No documentation.       Substance Abuse    No documentation.       Patient Forms    No documentation.           Dena Candelaria, JACKSON

## 2021-07-08 NOTE — CONSULTS
"Diabetes Education    Patient Name:  Cristian Watt  YOB: 1945  MRN: [id number]  Admit Date:  7/7/2021        Saw Mr. Watt at bedside for diabetes education consult. Pt states he has had diabetes for around 10 years and used to take oral medications and insulin. He states he is not taking anything right now for blood sugars. He has a meter at home but states he has not used it \"for years\". Pt states he has PCP here in [place], but has not seen her \"for a while\". Pt states he will soon be moving to [place] where his son lives, and he will plan to establish care with a new PCP there, states his son will help him with this. Stressed importance of follow up w/ PCP after discharge. Per chart review, no known d/c plans at this time or discussion of possible d/c meds for diabetes. We will continue to follow for possible need for medication or insulin education. Pt needs new meter--will provide. Discussed w/ pt need to monitor blood sugar at home. Discussed current A1c of 12.4 and it's significance, ADA recommended target goal of less than 7%. Pt states his last known A1c was \"around 8\". Reviewed increased risk for developing complications with uncontrolled blood sugars. Pt states he is in pain and requests I come back later. Will follow up visit and provide meter and further education.      Electronically signed by:  Lashae Mckeon RN, [facility]  07/08/21 12:55 EDT  "

## 2021-07-08 NOTE — PLAN OF CARE
Goal Outcome Evaluation:  Plan of Care Reviewed With: patient           Outcome Summary: PT eval completed. Mobility assessment limited due to pain and pt refused OOB or EOB activity. Pt tracy AAROM/AROM of BLE's in all planes with no increase in pain. D/c plans deferred until patient decides whether or not to proceed with surgery and mobility status. PT intervention warranted to increase functional mobility independence.

## 2021-07-08 NOTE — PROGRESS NOTES
"NEUROSURGERY PROGRESS NOTE     LOS: 0 days   Patient Care Team:  Amanda Tate MD as PCP - General (Family Medicine)    Chief Complaint: Acute on chronic low back pain.    Interval History:   Patient Complaints: Ongoing back pain that is worse when upright.  Patient Denies: Arm or leg symptoms including pain or sensory alteration.    Review of Systems:   Musculoskeletal and Neurological systems were reviewed and are negative except for:  Musculoskeletal: positive for back pain.  Neurological: positive for difficulty walking.    Vital Signs: Blood pressure 122/70, pulse 86, temperature 97.7 °F (36.5 °C), temperature source Oral, resp. rate 18, height 185.4 cm (73\"), weight 118 kg (260 lb), SpO2 91 %.  Intake/Output:     Intake/Output Summary (Last 24 hours) at 7/8/2021 0918  Last data filed at 7/8/2021 0230  Gross per 24 hour   Intake 250 ml   Output 1950 ml   Net -1700 ml       Physical Exam:  The patient is awake and alert.  He is propped up in bed and is in no acute distress.  The patient's speech is fluent.  He follows all commands.  He is well oriented.  Motor function and tone are normal in his lower extremities.  Light touch testing is intact throughout.  Ede signs are negative bilaterally.  No clonus is elicited at the ankles.     Data Review:    MRI of the lumbar spine demonstrates acute to subacute fractures of T12 and L3.  There is a low-grade spondylolisthesis of L5 on S1.    Assessment/Plan:  1.  T12 and L3 compression fractures.  The patient adamantly denies any history of trauma.  There are some healed rib fractures noted on the CT of his abdomen.  Fractures may be osteoporotic in their etiology.  They do not have a neoplastic appearance to them.  2.  Chronic mechanical low back pain.  3.  Low-grade L5-S1 spondylolisthesis.  4.  Disposition: If the patient is not making significant progress then I would consider T12 and L3 kyphoplasty.  I have explained what this would entail as well as the " potential risks, complications, and limitations.  He is going to consider this.  We will see how he fares over the next couple of days.      Ruddy Zepeda MD  07/08/21  09:45 EDT

## 2021-07-08 NOTE — PROGRESS NOTES
"    [facility]  PROGRESS NOTE    Patient Name: Cristian Watt  : 1945  MRN: [id number]    Date of Admission: 2021  Primary Care Physician: Amanda Tate MD    Subjective   Subjective     CC:  Back pain     HPI:  States he is in a lot of pain. Difficulty sleeping. Reviewed glucose. He hasn't been to the doctor in 4 years. States his sugars \"don't bother me.\" Has not been able to urinate on his own .     ROS:  Gen- No fevers, chills  CV- No chest pain, palpitations  Resp- No cough, dyspnea  GI- No N/V/D, abd pain     Objective   Objective     Vital Signs:   Temp:  [97.5 °F (36.4 °C)-98 °F (36.7 °C)] 97.7 °F (36.5 °C)  Heart Rate:  [83-96] 86  Resp:  [16-18] 18  BP: (106-132)/(67-90) 122/70        Physical Exam:  Constitutional: No acute distress, awake, alert; chronically ill appearing   HENT: NCAT, mucous membranes moist  Respiratory: Clear to auscultation bilaterally, respiratory effort normal   Cardiovascular: RRR, no murmurs, rubs, or gallops  Gastrointestinal: Positive bowel sounds, soft, nontender, nondistended  Musculoskeletal: No bilateral ankle edema  Psychiatric: Appropriate affect, cooperative  Neurologic: Oriented x 3, strength symmetric in all extremities, Cranial Nerves grossly intact to confrontation, speech clear  Skin: No rashes; multiple SKs     Results Reviewed:  Results from last 7 days   Lab Units 21  0449   WBC 10*3/mm3 12.84*   HEMOGLOBIN g/dL 17.2   HEMATOCRIT % 50.0   PLATELETS 10*3/mm3 357     Results from last 7 days   Lab Units 21  0608 21  0449   SODIUM mmol/L 124* 129*   POTASSIUM mmol/L 3.7 4.4   CHLORIDE mmol/L 91* 92*   CO2 mmol/L 23.0 22.0   BUN mg/dL 8 11   CREATININE mg/dL 0.52* 0.71*   GLUCOSE mg/dL 211* 292*   CALCIUM mg/dL 9.0 9.3   ALT (SGPT) U/L  --  8   AST (SGOT) U/L  --  11     Estimated Creatinine Clearance: 107.3 mL/min (A) (by C-G formula based on SCr of 0.52 mg/dL (L)).    Microbiology Results " Abnormal     Procedure Component Value - Date/Time    COVID PRE-OP / PRE-PROCEDURE SCREENING ORDER (NO ISOLATION) - Swab, Nasopharynx [[id number]]  (Normal) Collected: 07/07/21 0643    Lab Status: Final result Specimen: Swab from Nasopharynx Updated: 07/07/21 0755    Narrative:      The following orders were created for panel order COVID PRE-OP / PRE-PROCEDURE SCREENING ORDER (NO ISOLATION) - Swab, Nasopharynx.  Procedure                               Abnormality         Status                     ---------                               -----------         ------                     COVID-19,CEPHEID,CYNTHIA IN-...[[id number]]  Normal              Final result                 Please view results for these tests on the individual orders.    COVID-19,CEPHEID,CYNTHIA IN-HOUSE(OR EMERGENT/ADD-ON),NP SWAB IN TRANSPORT MEDIA 3-4 HR TAT - Swab, Nasopharynx [[id number]]  (Normal) Collected: 07/07/21 0643    Lab Status: Final result Specimen: Swab from Nasopharynx Updated: 07/07/21 0755     COVID19 Not Detected    Narrative:      Fact sheet for providers: https://www.fda.gov/media/664688/download     Fact sheet for patients: https://www.fda.gov/media/074999/download  Fact sheet for providers: https://www.fda.gov/media/435839/download     Fact sheet for patients: https://www.fda.gov/media/826398/download          Imaging Results (Last 24 Hours)     Procedure Component Value Units Date/Time    MRI Lumbar Spine Without Contrast [[id number]] Resulted: 07/07/21 2220     Updated: 07/07/21 2226              I have reviewed the medications:  Scheduled Meds:aspirin, 81 mg, Oral, Daily  insulin lispro, 0-7 Units, Subcutaneous, TID AC  losartan, 50 mg, Oral, Daily  rosuvastatin, 20 mg, Oral, Daily  senna-docusate sodium, 2 tablet, Oral, BID  sodium chloride, 10 mL, Intravenous, Q12H      Continuous Infusions:   PRN Meds:.•  acetaminophen **OR** acetaminophen **OR** acetaminophen  •  senna-docusate sodium **AND** polyethylene glycol **AND**  bisacodyl **AND** bisacodyl  •  dextrose  •  dextrose  •  glucagon (human recombinant)  •  oxyCODONE-acetaminophen  •  sodium chloride    Assessment/Plan   Assessment & Plan     Active Hospital Problems    Diagnosis  POA   • **Intractable back pain [M54.9]  Yes   • Compression fracture of L3 vertebra (CMS/HCC) [S32.030A]  Yes   • Compression fracture of T12 vertebra (CMS/HCC) [S22.080A]  Yes   • Mood disorder (CMS/[facility]) [F39]  Yes   • Chronic bilateral low back pain [M54.5, G89.29]  Unknown   • Acute low back pain with right-sided sciatica [M54.41]  Yes   • Mixed hyperlipidemia [E78.2]  Yes   • Hypertension [I10]  Yes   • Uncontrolled type 2 diabetes mellitus with hyperglycemia (CMS/[facility]) [E11.65]  Yes   • Osteoarthritis [M19.90]  Yes      Resolved Hospital Problems   No resolved problems to display.        Brief Hospital Course to date:  This is a 75-year-old male with depression, DM2, emphysema presenting for evaluation of progressive worsening of chronic low back pain becoming intractable to the point of immobility with imaging demonstrating multiple areas of compression fracture likely chronic.     Intractable back pain  Likely chronic T12, L3 compression fractures  -Continue pain control  -PT/OT pending  - MRI pending   -neurosurgery evaluated; kyphoplasty reviewed with patient and possibility pending his improvement      Hyponatremia, mild  - trending down; corrected today 126  - obtain TSH and additional workup with urine studies   - patient with significant urine output likely related to elevated glucose  - gentle hydration; trend Na     Urinary retention  - Possibly related to the above; start flomax     DM type 2, A1c 12.4%, unknown chronic insulin use  -Continue Accu-Cheks with SSI     Depression  Anxiety  GERD  Hypertension  Hyperlipidemia  -Continue aspirin, losartan, rosuvastatin     DVT prophylaxis:  Mechanical DVT prophylaxis orders are present.     Disposition: I expect the patient to be discharged  TBD    CODE STATUS:   Code Status and Medical Interventions:   Ordered at: 07/07/21 0600     Level Of Support Discussed With:    Patient     Code Status:    CPR     Medical Interventions (Level of Support Prior to Arrest):    Full       Marietta Johnston DO  07/08/21

## 2021-07-08 NOTE — PLAN OF CARE
Goal Outcome Evaluation:      Pt easily agitated and occasionally inappropriate @ times -use of inappropriate language, forgetful and demanding, MD notified of minimal pain relief w/pt being unable to tolerate any type of movement -new orders received, pt able to rest after 2nd dose of increased meds. Pt unable to void or have BM -PRN meds given and pt requiring I/O cath for bladder relief. Updated MD w/low Na levels -new orders in place, pt educated on PO intake limits -will need reinforcements. Pt did agree to completing xRays ordered per NS. Afebrile, VSS.

## 2021-07-08 NOTE — THERAPY EVALUATION
Patient Name: Cristian Watt  : 1945    MRN: [id number]                              Today's Date: 2021       Admit Date: 2021    Visit Dx:     ICD-10-CM ICD-9-CM   1. Acute low back pain with right-sided sciatica, unspecified back pain laterality  M54.41 724.2     724.3   2. History of diabetes mellitus  Z86.39 V12.29   3. Impaired functional mobility, balance, gait, and endurance  Z74.09 V49.89     Patient Active Problem List   Diagnosis   • C. difficile colitis   • Disc degeneration, lumbar   • Osteoarthritis   • Uncontrolled type 2 diabetes mellitus with hyperglycemia (CMS/[facility])   • Hypertension   • Diarrhea   • Mixed hyperlipidemia   • Diabetic autonomic neuropathy associated with type 2 diabetes mellitus (CMS/[facility])   • Episode of recurrent major depressive disorder (CMS/[facility])   • Cigarette nicotine dependence with nicotine-induced disorder   • Compression fracture of L3 vertebra (CMS/[facility])   • Compression fracture of T12 vertebra (CMS/[facility])   • Mood disorder (CMS/[facility])   • Chronic bilateral low back pain   • Acute low back pain with right-sided sciatica   • Intractable back pain     Past Medical History:   Diagnosis Date   • Depression    • Diabetes mellitus (CMS/[facility])    • Disc degeneration, lumbar    • Emphysema of lung (CMS/[facility])    • Generalized anxiety disorder    • GERD (gastroesophageal reflux disease)    • Hyperlipidemia    • Hypertension    • Hypogonadism male    • Osteoarthritis    • Type 2 diabetes mellitus, uncontrolled (CMS/[facility])    • Vitamin D deficiency      Past Surgical History:   Procedure Laterality Date   • HERNIA REPAIR      Inguinal   • KNEE ARTHROPLASTY       General Information     Row Name 21 1427          Physical Therapy Time and Intention    Document Type  evaluation  -SJ     Mode of Treatment  physical therapy  -     Row Name 21 1427          General Information    Patient Profile Reviewed  yes  -SJ     Prior Level of Function  independent:;all household  mobility;community mobility;gait;transfer;bed mobility;ADL's;driving  -     Existing Precautions/Restrictions  fall;spinal T12 and L3 fracture, spinal precautions for comfort  -     Barriers to Rehab  none identified  -[facility] Name 07/08/21 1427          Living Environment    Lives With  alone  -SJ     Row Name 07/08/21 1427          Cognition    Orientation Status (Cognition)  oriented x 4  -[facility] Name 07/08/21 1427          Safety Issues, Functional Mobility    Impairments Affecting Function (Mobility)  pain;range of motion (ROM)  -     Comment, Safety Issues/Impairments (Mobility)  10/10 pain with mobility  -       User Key  (r) = Recorded By, (t) = Taken By, (c) = Cosigned By    Initials Name Provider Type    Loyda Treviño PT Physical Therapist        Mobility     [facility] Name 07/08/21 1502          Bed Mobility    Bed Mobility  scooting/bridging  -     Scooting/Bridging Hurley (Bed Mobility)  dependent (less than 25% patient effort);2 person assist  -     Assistive Device (Bed Mobility)  draw sheet  -     Comment (Bed Mobility)  scooted to HOB in trendelenberg, significant increase in pain with mobility  -SJ     Row Name 07/08/21 1502          Transfers    Comment (Transfers)  pt refused  -SJ     Row Name 07/08/21 1502          Bed-Chair Transfer    Bed-Chair Hurley (Transfers)  not tested  -SJ     Row Name 07/08/21 1502          Sit-Stand Transfer    Sit-Stand Hurley (Transfers)  not tested  -SJ     Row Name 07/08/21 1502          Gait/Stairs (Locomotion)    Hurley Level (Gait)  not tested  -       User Key  (r) = Recorded By, (t) = Taken By, (c) = Cosigned By    Initials Name Provider Type    Loyda Treviño PT Physical Therapist        Obj/Interventions     [facility] Name 07/08/21 1504          Range of Motion Comprehensive    General Range of Motion  bilateral upper extremity ROM WNL  -[facility] Name 07/08/21 1504          Strength Comprehensive (MMT)     General Manual Muscle Testing (MMT) Assessment  no strength deficits identified  -     Row Name 07/08/21 1504          Motor Skills    Motor Skills  therapeutic exercise  -     Therapeutic Exercise  hip;knee;ankle  -[facility] Name 07/08/21 1504          Hip (Therapeutic Exercise)    Hip (Therapeutic Exercise)  AROM (active range of motion)  -     Hip AROM (Therapeutic Exercise)  bilateral;flexion;aBduction;aDduction;external rotation;internal rotation;supine;10 repetitions  -[facility] Name 07/08/21 1504          Knee (Therapeutic Exercise)    Knee (Therapeutic Exercise)  AROM (active range of motion);strengthening exercise;isometric exercises  -     Knee AROM (Therapeutic Exercise)  bilateral;heel slides;10 repetitions  -     Knee Isometrics (Therapeutic Exercise)  quad sets;gluteal sets;bilateral;supine;10 repetitions  -[facility] Name 07/08/21 1504          Ankle (Therapeutic Exercise)    Ankle (Therapeutic Exercise)  AROM (active range of motion)  -     Ankle AROM (Therapeutic Exercise)  bilateral;dorsiflexion;plantarflexion;supine;10 repetitions  -[facility] Name 07/08/21 1504          Sensory Assessment (Somatosensory)    Sensory Assessment (Somatosensory)  LE sensation intact  -       User Key  (r) = Recorded By, (t) = Taken By, (c) = Cosigned By    Initials Name Provider Type     Loyda Delarosa, PT Physical Therapist        Goals/Plan     [facility] Name 07/08/21 1521          Bed Mobility Goal 1 (PT)    Activity/Assistive Device (Bed Mobility Goal 1, PT)  rolling to left;rolling to right;sidelying to sit/sit to sidelying  -     Fleming Level/Cues Needed (Bed Mobility Goal 1, PT)  moderate assist (50-74% patient effort);1 person assist  -     Time Frame (Bed Mobility Goal 1, PT)  long term goal (LTG);2 weeks  -[facility] Name 07/08/21 1521          Transfer Goal 1 (PT)    Activity/Assistive Device (Transfer Goal 1, PT)  sit-to-stand/stand-to-sit;bed-to-chair/chair-to-bed;walker, rolling   -SJ     Leonard Level/Cues Needed (Transfer Goal 1, PT)  minimum assist (75% or more patient effort);1 person assist  -SJ     Time Frame (Transfer Goal 1, PT)  long term goal (LTG);2 weeks  -[facility] Name 07/08/21 1521          Gait Training Goal 1 (PT)    Activity/Assistive Device (Gait Training Goal 1, PT)  gait (walking locomotion)  -SJ     Leonard Level (Gait Training Goal 1, PT)  modified independence  -SJ     Distance (Gait Training Goal 1, PT)  200  -SJ     Time Frame (Gait Training Goal 1, PT)  long term goal (LTG);2 weeks  -     Row Name 07/08/21 1521          Patient Education Goal (PT)    Activity (Patient Education Goal, PT)  HEP  -SJ     Leonard/Cues/Accuracy (Memory Goal 2, PT)  demonstrates adequately;verbalizes understanding  -SJ     Time Frame (Patient Education Goal, PT)  long term goal (LTG);2 weeks  -SJ       User Key  (r) = Recorded By, (t) = Taken By, (c) = Cosigned By    Initials Name Provider Type    Loyda Treviño, PT Physical Therapist        Clinical Impression     Row Name 07/08/21 1506          Pain    Additional Documentation  Pain Scale: Numbers Pre/Post-Treatment (Group)  -[facility] 07/08/21 1506          Pain Scale: Numbers Pre/Post-Treatment    Pretreatment Pain Rating  8/10  -SJ     Posttreatment Pain Rating  10/10  -SJ     Pain Location - Orientation  lower  -SJ     Pain Location  back  -SJ     Pain Intervention(s)  Ambulation/increased activity;Repositioned  -[facility] Name 07/08/21 1503          Plan of Care Review    Plan of Care Reviewed With  patient  -     Outcome Summary  PT eval completed. Mobility assessment limited due to pain and pt refused OOB or EOB activity. Pt tracy AAROM/AROM of BLE's in all planes with no increase in pain. D/c plans deferred until patient decides whether or not to proceed with surgery and mobility status. PT intervention warranted to increase functional mobility independence.  -[facility] Name 07/08/21 3047           Therapy Assessment/Plan (PT)    Patient/Family Therapy Goals Statement (PT)  decrease pain  -SJ     Rehab Potential (PT)  fair, will monitor progress closely  -SJ     Criteria for Skilled Interventions Met (PT)  yes;skilled treatment is necessary  -SJ     Predicted Duration of Therapy Intervention (PT)  2wks  -SJ     Row Name 07/08/21 1506          Vital Signs    Pre Systolic BP Rehab  -- VSS  -SJ     Pre SpO2 (%)  90  -SJ     O2 Delivery Pre Treatment  room air  -SJ     Post SpO2 (%)  92  -SJ     O2 Delivery Post Treatment  room air  -SJ     Pre Patient Position  Supine  -SJ     Intra Patient Position  Supine  -SJ     Post Patient Position  Supine  -SJ     Row Name 07/08/21 1506          Positioning and Restraints    Pre-Treatment Position  in bed  -SJ     Post Treatment Position  bed  -SJ     In Bed  supine;call light within reach;encouraged to call for assist;SCD pump applied  -       User Key  (r) = Recorded By, (t) = Taken By, (c) = Cosigned By    Initials Name Provider Type    Loyda Treviño PT Physical Therapist        Outcome Measures     Row Name 07/08/21 1522          How much help from another person do you currently need...    Turning from your back to your side while in flat bed without using bedrails?  2  -SJ     Moving from lying on back to sitting on the side of a flat bed without bedrails?  1  -SJ     Moving to and from a bed to a chair (including a wheelchair)?  1  -SJ     Standing up from a chair using your arms (e.g., wheelchair, bedside chair)?  1  -SJ     Climbing 3-5 steps with a railing?  1  -SJ     To walk in hospital room?  1  -SJ     AM-PAC 6 Clicks Score (PT)  7  -SJ     [facility] Name 07/08/21 1522          Functional Assessment    Outcome Measure Options  AM-PAC 6 Clicks Basic Mobility (PT)  -       User Key  (r) = Recorded By, (t) = Taken By, (c) = Cosigned By    Initials Name Provider Type    Loyda Treviño PT Physical Therapist        Physical Therapy Education                  Title: PT OT SLP Therapies (In Progress)     Topic: Physical Therapy (In Progress)     Point: Mobility training (Not Started)     Learner Progress:  Not documented in this visit.          Point: Home exercise program (Done)     Learning Progress Summary           Patient Nonacceptance, E,D, VU,NR by  at 7/8/2021 1522                   Point: Body mechanics (Not Started)     Learner Progress:  Not documented in this visit.          Point: Precautions (Not Started)     Learner Progress:  Not documented in this visit.                      User Key     Initials Effective Dates Name Provider Type Discipline     06/16/21 -  Loyda Delarosa PT Physical Therapist PT              PT Recommendation and Plan  Planned Therapy Interventions (PT): balance training, bed mobility training, gait training, home exercise program, strengthening, stair training, ROM (range of motion), patient/family education, transfer training  Plan of Care Reviewed With: patient  Outcome Summary: PT eval completed. Mobility assessment limited due to pain and pt refused OOB or EOB activity. Pt tracy AAROM/AROM of BLE's in all planes with no increase in pain. D/c plans deferred until patient decides whether or not to proceed with surgery and mobility status. PT intervention warranted to increase functional mobility independence.     Time Calculation:   PT Charges     Row Name 07/08/21 1524             Time Calculation    Start Time  1427  -      PT Non-Billable Time (min)  46 min  -      PT Received On  07/08/21  -      PT Goal Re-Cert Due Date  07/18/21  -         Time Calculation- PT    Total Timed Code Minutes- PT  15 minute(s)  -SJ         Timed Charges    53099 - PT Therapeutic Exercise Minutes  15  -SJ         Total Minutes    Timed Charges Total Minutes  15  -SJ       Total Minutes  15  -SJ        User Key  (r) = Recorded By, (t) = Taken By, (c) = Cosigned By    Initials Name Provider Type     Loyda Delarosa PT Physical  Therapist        Therapy Charges for Today     Code Description Service Date Service Provider Modifiers Qty    69967512221  PT THER PROC EA 15 MIN 7/8/2021 Loyda Delarosa, PT GP 1    03575486393 HC PT EVAL MOD COMPLEXITY 3 7/8/2021 Loyda Delarosa, PT GP 1          PT G-Codes  Outcome Measure Options: AM-PAC 6 Clicks Basic Mobility (PT)  AM-PAC 6 Clicks Score (PT): 7    Loyda Delarosa, PT  7/8/2021

## 2021-07-09 LAB
ANION GAP SERPL CALCULATED.3IONS-SCNC: 11 MMOL/L (ref 5–15)
BUN SERPL-MCNC: 7 MG/DL (ref 8–23)
BUN/CREAT SERPL: 12.7 (ref 7–25)
CALCIUM SPEC-SCNC: 8.9 MG/DL (ref 8.6–10.5)
CHLORIDE SERPL-SCNC: 91 MMOL/L (ref 98–107)
CO2 SERPL-SCNC: 24 MMOL/L (ref 22–29)
CORTIS SERPL-MCNC: 8.44 MCG/DL
CREAT SERPL-MCNC: 0.55 MG/DL (ref 0.76–1.27)
DEPRECATED RDW RBC AUTO: 39.9 FL (ref 37–54)
ERYTHROCYTE [DISTWIDTH] IN BLOOD BY AUTOMATED COUNT: 11.9 % (ref 12.3–15.4)
GFR SERPL CREATININE-BSD FRML MDRD: 145 ML/MIN/1.73
GLUCOSE BLDC GLUCOMTR-MCNC: 166 MG/DL (ref 70–130)
GLUCOSE BLDC GLUCOMTR-MCNC: 180 MG/DL (ref 70–130)
GLUCOSE BLDC GLUCOMTR-MCNC: 216 MG/DL (ref 70–130)
GLUCOSE BLDC GLUCOMTR-MCNC: 240 MG/DL (ref 70–130)
GLUCOSE SERPL-MCNC: 193 MG/DL (ref 65–99)
HCT VFR BLD AUTO: 46.1 % (ref 37.5–51)
HGB BLD-MCNC: 15.5 G/DL (ref 13–17.7)
MCH RBC QN AUTO: 30.8 PG (ref 26.6–33)
MCHC RBC AUTO-ENTMCNC: 33.6 G/DL (ref 31.5–35.7)
MCV RBC AUTO: 91.5 FL (ref 79–97)
OSMOLALITY UR: 314 MOSM/KG (ref 300–1100)
PLATELET # BLD AUTO: 308 10*3/MM3 (ref 140–450)
PMV BLD AUTO: 8.3 FL (ref 6–12)
POTASSIUM SERPL-SCNC: 3.8 MMOL/L (ref 3.5–5.2)
RBC # BLD AUTO: 5.04 10*6/MM3 (ref 4.14–5.8)
SODIUM SERPL-SCNC: 126 MMOL/L (ref 136–145)
SODIUM SERPL-SCNC: 127 MMOL/L (ref 136–145)
SODIUM SERPL-SCNC: 129 MMOL/L (ref 136–145)
SODIUM SERPL-SCNC: 130 MMOL/L (ref 136–145)
SODIUM UR-SCNC: 28 MMOL/L
WBC # BLD AUTO: 9.36 10*3/MM3 (ref 3.4–10.8)

## 2021-07-09 PROCEDURE — 82962 GLUCOSE BLOOD TEST: CPT

## 2021-07-09 PROCEDURE — G0108 DIAB MANAGE TRN  PER INDIV: HCPCS

## 2021-07-09 PROCEDURE — 85027 COMPLETE CBC AUTOMATED: CPT | Performed by: INTERNAL MEDICINE

## 2021-07-09 PROCEDURE — 99231 SBSQ HOSP IP/OBS SF/LOW 25: CPT | Performed by: NEUROLOGICAL SURGERY

## 2021-07-09 PROCEDURE — 80048 BASIC METABOLIC PNL TOTAL CA: CPT | Performed by: INTERNAL MEDICINE

## 2021-07-09 PROCEDURE — 63710000001 INSULIN DETEMIR PER 5 UNITS: Performed by: INTERNAL MEDICINE

## 2021-07-09 PROCEDURE — 63710000001 INSULIN LISPRO (HUMAN) PER 5 UNITS: Performed by: NURSE PRACTITIONER

## 2021-07-09 PROCEDURE — 84300 ASSAY OF URINE SODIUM: CPT | Performed by: INTERNAL MEDICINE

## 2021-07-09 PROCEDURE — 82533 TOTAL CORTISOL: CPT | Performed by: INTERNAL MEDICINE

## 2021-07-09 PROCEDURE — 83935 ASSAY OF URINE OSMOLALITY: CPT | Performed by: INTERNAL MEDICINE

## 2021-07-09 PROCEDURE — 84295 ASSAY OF SERUM SODIUM: CPT | Performed by: INTERNAL MEDICINE

## 2021-07-09 PROCEDURE — 99233 SBSQ HOSP IP/OBS HIGH 50: CPT | Performed by: INTERNAL MEDICINE

## 2021-07-09 RX ORDER — GABAPENTIN 100 MG/1
100 CAPSULE ORAL EVERY 8 HOURS SCHEDULED
Status: DISCONTINUED | OUTPATIENT
Start: 2021-07-09 | End: 2021-07-16 | Stop reason: HOSPADM

## 2021-07-09 RX ORDER — SODIUM CHLORIDE 9 MG/ML
50 INJECTION, SOLUTION INTRAVENOUS CONTINUOUS
Status: ACTIVE | OUTPATIENT
Start: 2021-07-09 | End: 2021-07-09

## 2021-07-09 RX ADMIN — OXYCODONE HYDROCHLORIDE AND ACETAMINOPHEN 1 TABLET: 7.5; 325 TABLET ORAL at 04:01

## 2021-07-09 RX ADMIN — INSULIN DETEMIR 10 UNITS: 100 INJECTION, SOLUTION SUBCUTANEOUS at 20:26

## 2021-07-09 RX ADMIN — ASPIRIN 81 MG: 81 TABLET, COATED ORAL at 10:48

## 2021-07-09 RX ADMIN — INSULIN LISPRO 3 UNITS: 100 INJECTION, SOLUTION INTRAVENOUS; SUBCUTANEOUS at 12:44

## 2021-07-09 RX ADMIN — GABAPENTIN 100 MG: 100 CAPSULE ORAL at 21:56

## 2021-07-09 RX ADMIN — SODIUM CHLORIDE 50 ML/HR: 9 INJECTION, SOLUTION INTRAVENOUS at 10:49

## 2021-07-09 RX ADMIN — BISACODYL 5 MG: 5 TABLET, COATED ORAL at 20:27

## 2021-07-09 RX ADMIN — SODIUM CHLORIDE, PRESERVATIVE FREE 10 ML: 5 INJECTION INTRAVENOUS at 20:27

## 2021-07-09 RX ADMIN — DOCUSATE SODIUM 50MG AND SENNOSIDES 8.6MG 2 TABLET: 8.6; 5 TABLET, FILM COATED ORAL at 20:27

## 2021-07-09 RX ADMIN — OXYCODONE HYDROCHLORIDE AND ACETAMINOPHEN 1 TABLET: 7.5; 325 TABLET ORAL at 16:20

## 2021-07-09 RX ADMIN — OXYCODONE HYDROCHLORIDE AND ACETAMINOPHEN 1 TABLET: 7.5; 325 TABLET ORAL at 10:48

## 2021-07-09 RX ADMIN — OXYCODONE HYDROCHLORIDE AND ACETAMINOPHEN 1 TABLET: 7.5; 325 TABLET ORAL at 21:56

## 2021-07-09 RX ADMIN — INSULIN LISPRO 2 UNITS: 100 INJECTION, SOLUTION INTRAVENOUS; SUBCUTANEOUS at 10:47

## 2021-07-09 RX ADMIN — SODIUM CHLORIDE, PRESERVATIVE FREE 10 ML: 5 INJECTION INTRAVENOUS at 10:49

## 2021-07-09 RX ADMIN — GABAPENTIN 100 MG: 100 CAPSULE ORAL at 10:47

## 2021-07-09 RX ADMIN — LOSARTAN POTASSIUM 50 MG: 50 TABLET, FILM COATED ORAL at 10:48

## 2021-07-09 RX ADMIN — INSULIN LISPRO 2 UNITS: 100 INJECTION, SOLUTION INTRAVENOUS; SUBCUTANEOUS at 18:31

## 2021-07-09 RX ADMIN — DOCUSATE SODIUM 50MG AND SENNOSIDES 8.6MG 2 TABLET: 8.6; 5 TABLET, FILM COATED ORAL at 10:48

## 2021-07-09 RX ADMIN — ROSUVASTATIN CALCIUM 20 MG: 20 TABLET, COATED ORAL at 10:47

## 2021-07-09 RX ADMIN — POLYETHYLENE GLYCOL 3350 17 G: 17 POWDER, FOR SOLUTION ORAL at 10:48

## 2021-07-09 RX ADMIN — TAMSULOSIN HYDROCHLORIDE 0.4 MG: 0.4 CAPSULE ORAL at 10:48

## 2021-07-09 NOTE — CONSULTS
Diabetes Education    Patient Name:  Cristian Watt  YOB: 1945  MRN: [id number]  Admit Date:  7/7/2021        Follow up visit to Mr. Watt for diabetes education. Provided donated Contour next One meter kit- pt declined full meter teach as he stated during yesterday's visit he has used many meters in the past. Let pt know meter kit has 10 test strips and 10 lancets. Pt does have insurance coverage and will need prescription for additional testing supplies. Discussed w/ pt again the importance of follow up with primary care after discharge. Pt asked how often he should test; explained that this will partly depend on d/c medications for blood sugar. Per pt's report, he was not taking any insulin or oral medications for diabetes at home and has not for some time. Discussed w/ pt that he would need to monitor at least once a day but possibly more frequently depending on meds. Pt has education materials provided yesterday and my contact info. We will continue to follow for d/c plan and assess for need for medication/insulin teaching. Thank you!      Electronically signed by:  Lashae Mckeon RN, [facility]  07/09/21 11:30 EDT

## 2021-07-09 NOTE — PROGRESS NOTES
"NEUROSURGERY PROGRESS NOTE     LOS: 1 day   Patient Care Team:  Amanda Tate MD as PCP - General (Family Medicine)    Chief Complaint: Acute on chronic low back pain.    Interval History:   Patient Complaints: Ongoing back pain that is worse with movement.  Patient Denies: Lower extremity pain, weakness, sensory alteration.    Review of Systems:   Musculoskeletal and Neurological systems were reviewed and are negative except for:  Musculoskeletal: positive for back pain.  Neurological: positive for difficulty walking.    Vital Signs: Blood pressure 130/76, pulse 95, temperature 97.7 °F (36.5 °C), temperature source Oral, resp. rate 18, height 185.4 cm (73\"), weight 118 kg (260 lb), SpO2 97 %.  Intake/Output:     Intake/Output Summary (Last 24 hours) at 7/9/2021 0829  Last data filed at 7/9/2021 0401  Gross per 24 hour   Intake 240 ml   Output 1950 ml   Net -1710 ml       Physical Exam:  The patient is resting quietly in bed.  He is too uncomfortable to rollover for back examination.  Motor function and tone are normal in his lower extremities.  Sensation is intact to light touch testing.     Data Review:    MRI of the lumbar spine demonstrates acute to subacute fractures of T12 and L3.  There is a low-grade spondylolisthesis of L5 on S1.    Plain films of the lumbar spine from yesterday confirmed the known compression fractures.  As expected bone density appears to be diminished.    Results from last 7 days   Lab Units 07/09/21  0534   SODIUM mmol/L 126*   POTASSIUM mmol/L 3.8   CHLORIDE mmol/L 91*   CO2 mmol/L 24.0   BUN mg/dL 7*   CREATININE mg/dL 0.55*   GLUCOSE mg/dL 193*   CALCIUM mg/dL 8.9         Assessment/Plan:  1.  T12 and L3 compression fractures.  The patient adamantly denies any history of trauma.  There are some healed rib fractures noted on the CT of his abdomen.  Fractures may be osteoporotic in their etiology.  They do not have a neoplastic appearance to them.  2.  Chronic mechanical low back " pain.  3.  Low-grade L5-S1 spondylolisthesis.  4.  Hyponatremia.  5.  Uncontrolled diabetes mellitus.  6.  Disposition: If the patient is not making significant progress then I would consider T12 and L3 kyphoplasty.  I have explained what this would entail as well as the potential risks, complications, and limitations.  He is going to consider this.  The patient is still considering his options.      Ruddy Zepeda MD  07/09/21  08:29 EDT

## 2021-07-09 NOTE — PROGRESS NOTES
[facility] Medicine Services  PROGRESS NOTE    Patient Name: Cristian Watt  : 1945  MRN: [id number]    Date of Admission: 2021  Primary Care Physician: Amanda Tate MD    Subjective   Subjective     CC:  Back pain     HPI:  States he is uncomfortable. Asking for help with a position that is comfortable but then states no position is comfortable. Review glucose, gabapentin and Na levels.     ROS:  Gen- No fevers, chills  CV- No chest pain, palpitations  Resp- No cough, dyspnea  GI- No N/V/D, abd pain     Objective   Objective     Vital Signs:   Temp:  [97.3 °F (36.3 °C)-97.7 °F (36.5 °C)] 97.7 °F (36.5 °C)  Heart Rate:  [83-95] 95  Resp:  [16-18] 18  BP: (112-136)/(75-81) 130/76        Physical Exam:  Constitutional: No acute distress, awake, alert; restless in bed   HENT: NCAT, mucous membranes moist  Respiratory: Clear to auscultation bilaterally, respiratory effort normal    Cardiovascular: RRR, no murmurs, rubs, or gallops  Gastrointestinal: Positive bowel sounds, soft, nontender, nondistended  Musculoskeletal: No bilateral ankle edema  Psychiatric:  cooperative  Neurologic: Oriented x 3, strength symmetric in all extremities, Cranial Nerves grossly intact to confrontation, speech clear  Skin: No rashes    Results Reviewed:  Results from last 7 days   Lab Units 21  0534 21  0449   WBC 10*3/mm3 9.36 12.84*   HEMOGLOBIN g/dL 15.5 17.2   HEMATOCRIT % 46.1 50.0   PLATELETS 10*3/mm3 308 357     Results from last 7 days   Lab Units 21  0534 21  0006 21  1822 21  0608 21  0449   SODIUM mmol/L 126* 127* 130* 124* 129*   POTASSIUM mmol/L 3.8  --   --  3.7 4.4   CHLORIDE mmol/L 91*  --   --  91* 92*   CO2 mmol/L 24.0  --   --  23.0 22.0   BUN mg/dL 7*  --   --  8 11   CREATININE mg/dL 0.55*  --   --  0.52* 0.71*   GLUCOSE mg/dL 193*  --   --  211* 292*   CALCIUM mg/dL 8.9  --   --  9.0 9.3   ALT (SGPT) U/L  --   --   --   --  8   AST  (SGOT) U/L  --   --   --   --  11     Estimated Creatinine Clearance: 107.3 mL/min (A) (by C-G formula based on SCr of 0.55 mg/dL (L)).    Microbiology Results Abnormal     Procedure Component Value - Date/Time    COVID PRE-OP / PRE-PROCEDURE SCREENING ORDER (NO ISOLATION) - Swab, Nasopharynx [[id number]]  (Normal) Collected: 07/07/21 0643    Lab Status: Final result Specimen: Swab from Nasopharynx Updated: 07/07/21 0755    Narrative:      The following orders were created for panel order COVID PRE-OP / PRE-PROCEDURE SCREENING ORDER (NO ISOLATION) - Swab, Nasopharynx.  Procedure                               Abnormality         Status                     ---------                               -----------         ------                     COVID-19,CEPHEID,CYNTHIA IN-...[[id number]]  Normal              Final result                 Please view results for these tests on the individual orders.    COVID-19,CEPHEID,CYNTHIA IN-HOUSE(OR EMERGENT/ADD-ON),NP SWAB IN TRANSPORT MEDIA 3-4 HR TAT - Swab, Nasopharynx [[id number]]  (Normal) Collected: 07/07/21 0643    Lab Status: Final result Specimen: Swab from Nasopharynx Updated: 07/07/21 0755     COVID19 Not Detected    Narrative:      Fact sheet for providers: https://www.fda.gov/media/545300/download     Fact sheet for patients: https://www.fda.gov/media/276325/download  Fact sheet for providers: https://www.fda.gov/media/734583/download     Fact sheet for patients: https://www.fda.gov/media/791706/download          Imaging Results (Last 24 Hours)     Procedure Component Value Units Date/Time    XR Spine Lumbar AP & Lateral [[id number]] Collected: 07/08/21 1644     Updated: 07/08/21 1903    Narrative:      EXAMINATION: XR SPINE, LUMBAR, AP AND LATERAL-07/08/2021:      INDICATION: Lumbar fractures; M54.41-Lumbago with sciatica, right side;  Z86.39-Personal history of other endocrine, nutritional and metabolic  disease; Z74.09-Other reduced mobility.      COMPARISON: Lumbar spine x-ray  04/19/2013.     FINDINGS: AP and lateral views of the lumbar spine reveal increased  anterior wedging of the T12 level new from prior comparison 2013  concerning for compression deformity, age-indeterminate, with 25-50%  height loss anteriorly, along with retrolisthesis of L4 on L5 mild  involvement and associated degenerative changes.       Impression:      Increased anterior wedging of the T12 level new from prior  comparison 2013 concerning for compression deformity, age-indeterminate,  with 25-50% height loss anteriorly, along with retrolisthesis of L4 on  L5 mild involvement and associated degenerative changes. Flexion and  extension views may be performed along with MRI lumbar spine for further  evaluation or aging of the T12 compression deformity process.     D:  07/08/2021  E:  07/08/2021     This report was finalized on 7/8/2021 7:00 PM by Dr. Blu Jeffrey.       MRI Lumbar Spine Without Contrast [[id number]] Collected: 07/08/21 0915     Updated: 07/08/21 1734    Narrative:      EXAMINATION: MRI LUMBAR SPINE WO CONTRAST-07/07/2021:     INDICATION: Fractures; M54.41-Lumbago with sciatica, right side;  Z86.39-Personal history of other endocrine, nutritional and metabolic  disease, chronic low back pain, fall.     TECHNIQUE: Routine multiplanar imaging was obtained of the lumbar spine  without the administration of Gadolinium contrast.     COMPARISON: 04/22/2013.     FINDINGS: There is abnormal signal intensity involving the superior  endplate of T12 and the L3 vertebral body. There is edema seen diffusely  throughout the L3 vertebral body with linear abnormality seen along the  inferior endplate. There is loss of height centrally. There is also  fracture of the superior endplate of T12. Normal signal intensity within  the conus. The spinal cord terminates at the T12 level. No abnormal mass  or fluid collection seen within the paraspinal muscles. There is a cyst  identified on the right kidney.     Axial  imaging reveals at the L1/L2 level small broad-based disc bulge  creating no significant central spinal canal stenosis or nerve root  contact or compromise.     At the L2/L3 level, there is no significant central spinal canal  stenosis or nerve root contact or compromise.     At the L3/L4 level, there is a broad-based disc bulge with some  lateralization to the left creating narrowing of the left neural  foramina. Contact seen of the left nerve root. Compromise cannot be  excluded and clinical correlation is needed.     At the L4/L5 level, there is no significant central spinal canal  stenosis or nerve root contact or compromise. No neuroforaminal  narrowing.     At the L5/S1 level, there is a broad-based disc bulge creating no  significant central spinal canal stenosis or nerve root contact or  compromise.       Impression:      Compression deformity seen of the superior endplate of T12.  There is also acute compression deformity seen of the inferior aspect of  L3 with edema seen diffusely throughout the L3 vertebral body level and  superior endplate of T12. There is no significant retropulsion of the  posterior elements into the spinal canal. Broad-based disc bulge at the  L3/L4 level with lateralization to the left creating narrowing of the  left neural foramina. Possible contact of the left nerve root.      D:  07/08/2021  E:  07/08/2021     This report was finalized on 7/8/2021 5:31 PM by Dr. Myrtle De Souza MD.                 I have reviewed the medications:  Scheduled Meds:aspirin, 81 mg, Oral, Daily  insulin lispro, 0-7 Units, Subcutaneous, TID AC  losartan, 50 mg, Oral, Daily  rosuvastatin, 20 mg, Oral, Daily  senna-docusate sodium, 2 tablet, Oral, BID  sodium chloride, 10 mL, Intravenous, Q12H  tamsulosin, 0.4 mg, Oral, Daily      Continuous Infusions:sodium chloride, 50 mL/hr      PRN Meds:.•  acetaminophen **OR** acetaminophen **OR** acetaminophen  •  senna-docusate sodium **AND** polyethylene  glycol **AND** bisacodyl **AND** bisacodyl  •  dextrose  •  dextrose  •  glucagon (human recombinant)  •  Morphine  •  oxyCODONE-acetaminophen  •  sodium chloride    Assessment/Plan   Assessment & Plan     Active Hospital Problems    Diagnosis  POA   • **Intractable back pain [M54.9]  Yes   • Compression fracture of L3 vertebra (CMS/HCC) [S32.030A]  Yes   • Compression fracture of T12 vertebra (CMS/HCC) [S22.080A]  Yes   • Mood disorder (CMS/[facility]) [F39]  Yes   • Chronic bilateral low back pain [M54.5, G89.29]  Unknown   • Acute low back pain with right-sided sciatica [M54.41]  Yes   • Mixed hyperlipidemia [E78.2]  Yes   • Hypertension [I10]  Yes   • Uncontrolled type 2 diabetes mellitus with hyperglycemia (CMS/[facility]) [E11.65]  Yes   • Osteoarthritis [M19.90]  Yes      Resolved Hospital Problems   No resolved problems to display.        Brief Hospital Course to date:  This is a 75-year-old male with depression, DM2, emphysema presenting for evaluation of progressive worsening of chronic low back pain becoming intractable to the point of immobility with imaging demonstrating multiple areas of compression fracture likely chronic.     Intractable back pain  Likely chronic T12, L3 compression fractures  -Continue pain control  -PT/OT with limited assessment due to pain   - MRI with compression deformity of the superior endplate of T12, acute compression deformity of L3 with edema ; disc bulge at L3/L4   -neurosurgery evaluated; kyphoplasty reviewed with patient and possibility pending his improvement   - PRN pain control with percocet - add gabapentin today and titrate as tolerated      Hyponatremia, mild  - TSH ok; Cortisol ok  - initial urine studies consistent more with hypovolemic hyponatremia and posisbly SIADH   - Na improved with isotonic saline and repeat studies with some improvement  - Continue gentle fluids again today and close monitoring of Na; corrected Na today 128   - continue fluid restriction      Urinary  retention  - Possibly related to the above; start flomax     DM type 2, A1c 12.4%, unknown chronic insulin use  -Continue Accu-Cheks with SSI  - start levemir 10 units qHS      Depression  Anxiety  GERD  Hypertension  Hyperlipidemia  -Continue aspirin, losartan, rosuvastatin     DVT prophylaxis:  Mechanical DVT prophylaxis orders are present.      Disposition: I expect the patient to be discharged TBD       CODE STATUS:   Code Status and Medical Interventions:   Ordered at: 07/07/21 0600     Level Of Support Discussed With:    Patient     Code Status:    CPR     Medical Interventions (Level of Support Prior to Arrest):    Full       Marietta Johnston DO  07/09/21

## 2021-07-10 LAB
GLUCOSE BLDC GLUCOMTR-MCNC: 175 MG/DL (ref 70–130)
GLUCOSE BLDC GLUCOMTR-MCNC: 179 MG/DL (ref 70–130)
GLUCOSE BLDC GLUCOMTR-MCNC: 213 MG/DL (ref 70–130)
GLUCOSE BLDC GLUCOMTR-MCNC: 257 MG/DL (ref 70–130)
SODIUM SERPL-SCNC: 126 MMOL/L (ref 136–145)
SODIUM SERPL-SCNC: 128 MMOL/L (ref 136–145)
SODIUM SERPL-SCNC: 129 MMOL/L (ref 136–145)
SODIUM SERPL-SCNC: 130 MMOL/L (ref 136–145)

## 2021-07-10 PROCEDURE — 63710000001 INSULIN DETEMIR PER 5 UNITS: Performed by: INTERNAL MEDICINE

## 2021-07-10 PROCEDURE — 25810000003 SODIUM CHLORIDE 0.9 % WITH KCL 20 MEQ 20-0.9 MEQ/L-% SOLUTION: Performed by: INTERNAL MEDICINE

## 2021-07-10 PROCEDURE — 84295 ASSAY OF SERUM SODIUM: CPT | Performed by: INTERNAL MEDICINE

## 2021-07-10 PROCEDURE — 82962 GLUCOSE BLOOD TEST: CPT

## 2021-07-10 PROCEDURE — 97166 OT EVAL MOD COMPLEX 45 MIN: CPT

## 2021-07-10 PROCEDURE — 99232 SBSQ HOSP IP/OBS MODERATE 35: CPT | Performed by: NURSE PRACTITIONER

## 2021-07-10 PROCEDURE — 99231 SBSQ HOSP IP/OBS SF/LOW 25: CPT | Performed by: NEUROLOGICAL SURGERY

## 2021-07-10 PROCEDURE — 63710000001 INSULIN LISPRO (HUMAN) PER 5 UNITS: Performed by: NURSE PRACTITIONER

## 2021-07-10 RX ORDER — SODIUM CHLORIDE AND POTASSIUM CHLORIDE 150; 900 MG/100ML; MG/100ML
75 INJECTION, SOLUTION INTRAVENOUS CONTINUOUS
Status: DISCONTINUED | OUTPATIENT
Start: 2021-07-10 | End: 2021-07-12

## 2021-07-10 RX ADMIN — INSULIN DETEMIR 10 UNITS: 100 INJECTION, SOLUTION SUBCUTANEOUS at 22:12

## 2021-07-10 RX ADMIN — OXYCODONE HYDROCHLORIDE AND ACETAMINOPHEN 1 TABLET: 7.5; 325 TABLET ORAL at 17:54

## 2021-07-10 RX ADMIN — INSULIN LISPRO 2 UNITS: 100 INJECTION, SOLUTION INTRAVENOUS; SUBCUTANEOUS at 10:07

## 2021-07-10 RX ADMIN — POLYETHYLENE GLYCOL 3350 17 G: 17 POWDER, FOR SOLUTION ORAL at 10:05

## 2021-07-10 RX ADMIN — INSULIN LISPRO 3 UNITS: 100 INJECTION, SOLUTION INTRAVENOUS; SUBCUTANEOUS at 13:07

## 2021-07-10 RX ADMIN — DOCUSATE SODIUM 50MG AND SENNOSIDES 8.6MG 2 TABLET: 8.6; 5 TABLET, FILM COATED ORAL at 10:06

## 2021-07-10 RX ADMIN — LOSARTAN POTASSIUM 50 MG: 50 TABLET, FILM COATED ORAL at 10:06

## 2021-07-10 RX ADMIN — BISACODYL 5 MG: 5 TABLET, COATED ORAL at 10:05

## 2021-07-10 RX ADMIN — DOCUSATE SODIUM 50MG AND SENNOSIDES 8.6MG 2 TABLET: 8.6; 5 TABLET, FILM COATED ORAL at 22:13

## 2021-07-10 RX ADMIN — ASPIRIN 81 MG: 81 TABLET, COATED ORAL at 10:06

## 2021-07-10 RX ADMIN — TAMSULOSIN HYDROCHLORIDE 0.4 MG: 0.4 CAPSULE ORAL at 10:07

## 2021-07-10 RX ADMIN — GABAPENTIN 100 MG: 100 CAPSULE ORAL at 22:13

## 2021-07-10 RX ADMIN — INSULIN LISPRO 4 UNITS: 100 INJECTION, SOLUTION INTRAVENOUS; SUBCUTANEOUS at 17:54

## 2021-07-10 RX ADMIN — SODIUM CHLORIDE, PRESERVATIVE FREE 10 ML: 5 INJECTION INTRAVENOUS at 22:13

## 2021-07-10 RX ADMIN — GABAPENTIN 100 MG: 100 CAPSULE ORAL at 13:13

## 2021-07-10 RX ADMIN — OXYCODONE HYDROCHLORIDE AND ACETAMINOPHEN 1 TABLET: 7.5; 325 TABLET ORAL at 10:06

## 2021-07-10 RX ADMIN — OXYCODONE HYDROCHLORIDE AND ACETAMINOPHEN 1 TABLET: 7.5; 325 TABLET ORAL at 22:14

## 2021-07-10 RX ADMIN — SODIUM CHLORIDE, PRESERVATIVE FREE 10 ML: 5 INJECTION INTRAVENOUS at 10:07

## 2021-07-10 RX ADMIN — OXYCODONE HYDROCHLORIDE AND ACETAMINOPHEN 1 TABLET: 7.5; 325 TABLET ORAL at 03:24

## 2021-07-10 RX ADMIN — GABAPENTIN 100 MG: 100 CAPSULE ORAL at 06:16

## 2021-07-10 RX ADMIN — ROSUVASTATIN CALCIUM 20 MG: 20 TABLET, COATED ORAL at 10:06

## 2021-07-10 RX ADMIN — POTASSIUM CHLORIDE AND SODIUM CHLORIDE 75 ML/HR: 900; 150 INJECTION, SOLUTION INTRAVENOUS at 17:54

## 2021-07-10 NOTE — PLAN OF CARE
Problem: Adult Inpatient Plan of Care  Goal: Plan of Care Review  Recent Flowsheet Documentation  Taken 7/10/2021 0810 by Richard White, OT  Progress: improving  Plan of Care Reviewed With: patient  Outcome Summary:   VSS   Pt presents at fxl baseline/independent with ADL performance, fxl mobility for ADLs. No AE/DME needs identified. No further acute skilled OT services indicated at this time. Recommend home at discharge.   Goal Outcome Evaluation:  Plan of Care Reviewed With: patient        Progress: improving  Outcome Summary: VSS; Pt presents at fxl baseline/independent with ADL performance, fxl mobility for ADLs. No AE/DME needs identified. No further acute skilled OT services indicated at this time. Recommend home at discharge.

## 2021-07-10 NOTE — PROGRESS NOTES
[facility] Medicine Services  PROGRESS NOTE    Patient Name: Cristian Watt  : 1945  MRN: [id number]    Date of Admission: 2021  Primary Care Physician: Amanda Tate MD    Subjective   Subjective     CC:  Follow up back pain     HPI:  Patient seen resting in bed in no apparent distress. No acute events overnight per nursing.  Pain is currently well controlled.  He states that he plans to undergo kyphoplasty with Dr. Zepeda early next week.  No new concerns at this time.    ROS:  Gen- No fevers, chills  CV- No chest pain, palpitations  Resp- No cough, dyspnea  GI- No N/V/D, abd pain    Objective   Objective     Vital Signs:   Temp:  [97.5 °F (36.4 °C)-98.3 °F (36.8 °C)] 97.7 °F (36.5 °C)  Heart Rate:  [83-96] 86  Resp:  [16-18] 18  BP: (105-121)/(63-82) 121/71        Physical Exam:  Constitutional: No acute distress, awake, alert, resting in bed  HENT: NCAT, mucous membranes moist  Respiratory: Clear to auscultation bilaterally, respiratory effort normal   Cardiovascular: RRR, no murmurs, cap refill brisk   Gastrointestinal: Positive bowel sounds, soft, nontender, nondistended  Musculoskeletal: No BLE edema   Psychiatric: Appropriate affect, cooperative  Neurologic: Oriented x 3, moves all extremities, speech clear  Skin: warm, dry, no visible rash       Results Reviewed:  Results from last 7 days   Lab Units 21  0534 21  0449   WBC 10*3/mm3 9.36 12.84*   HEMOGLOBIN g/dL 15.5 17.2   HEMATOCRIT % 46.1 50.0   PLATELETS 10*3/mm3 308 357     Results from last 7 days   Lab Units 07/10/21  0029 21  1740 21  1206 21  0534 21  0608 21  0449   SODIUM mmol/L 129* 130* 129* 126* 124* 129*   POTASSIUM mmol/L  --   --   --  3.8 3.7 4.4   CHLORIDE mmol/L  --   --   --  91* 91* 92*   CO2 mmol/L  --   --   --  24.0 23.0 22.0   BUN mg/dL  --   --   --  7* 8 11   CREATININE mg/dL  --   --   --  0.55* 0.52* 0.71*   GLUCOSE mg/dL  --   --   --  193*  211* 292*   CALCIUM mg/dL  --   --   --  8.9 9.0 9.3   ALT (SGPT) U/L  --   --   --   --   --  8   AST (SGOT) U/L  --   --   --   --   --  11     Estimated Creatinine Clearance: 107.3 mL/min (A) (by C-G formula based on SCr of 0.55 mg/dL (L)).    Microbiology Results Abnormal     Procedure Component Value - Date/Time    COVID PRE-OP / PRE-PROCEDURE SCREENING ORDER (NO ISOLATION) - Swab, Nasopharynx [[id number]]  (Normal) Collected: 07/07/21 0643    Lab Status: Final result Specimen: Swab from Nasopharynx Updated: 07/07/21 0755    Narrative:      The following orders were created for panel order COVID PRE-OP / PRE-PROCEDURE SCREENING ORDER (NO ISOLATION) - Swab, Nasopharynx.  Procedure                               Abnormality         Status                     ---------                               -----------         ------                     COVID-19,CEPHEID,CYNTHIA IN-...[[id number]]  Normal              Final result                 Please view results for these tests on the individual orders.    COVID-19,CEPHEID,CYNTHIA IN-HOUSE(OR EMERGENT/ADD-ON),NP SWAB IN TRANSPORT MEDIA 3-4 HR TAT - Swab, Nasopharynx [[id number]]  (Normal) Collected: 07/07/21 0643    Lab Status: Final result Specimen: Swab from Nasopharynx Updated: 07/07/21 0755     COVID19 Not Detected    Narrative:      Fact sheet for providers: https://www.fda.gov/media/245761/download     Fact sheet for patients: https://www.fda.gov/media/469623/download  Fact sheet for providers: https://www.fda.gov/media/582497/download     Fact sheet for patients: https://www.fda.gov/media/101187/download          Imaging Results (Last 24 Hours)     ** No results found for the last 24 hours. **              I have reviewed the medications:  Scheduled Meds:aspirin, 81 mg, Oral, Daily  gabapentin, 100 mg, Oral, Q8H  insulin detemir, 10 Units, Subcutaneous, Nightly  insulin lispro, 0-7 Units, Subcutaneous, TID AC  losartan, 50 mg, Oral, Daily  rosuvastatin, 20 mg, Oral,  Daily  senna-docusate sodium, 2 tablet, Oral, BID  sodium chloride, 10 mL, Intravenous, Q12H  tamsulosin, 0.4 mg, Oral, Daily      Continuous Infusions:   PRN Meds:.•  acetaminophen **OR** acetaminophen **OR** acetaminophen  •  senna-docusate sodium **AND** polyethylene glycol **AND** bisacodyl **AND** bisacodyl  •  dextrose  •  dextrose  •  glucagon (human recombinant)  •  Morphine  •  oxyCODONE-acetaminophen  •  sodium chloride    Assessment/Plan   Assessment & Plan     Active Hospital Problems    Diagnosis  POA   • **Intractable back pain [M54.9]  Yes   • Compression fracture of L3 vertebra (CMS/HCC) [S32.030A]  Yes   • Compression fracture of T12 vertebra (CMS/HCC) [S22.080A]  Yes   • Mood disorder (CMS/[facility]) [F39]  Yes   • Chronic bilateral low back pain [M54.5, G89.29]  Unknown   • Acute low back pain with right-sided sciatica [M54.41]  Yes   • Mixed hyperlipidemia [E78.2]  Yes   • Hypertension [I10]  Yes   • Uncontrolled type 2 diabetes mellitus with hyperglycemia (CMS/[facility]) [E11.65]  Yes   • Osteoarthritis [M19.90]  Yes      Resolved Hospital Problems   No resolved problems to display.        Brief Hospital Course to date:  Cristian Watt is a 75 y.o. male with past medical history significant for depression, DM2, and emphysema who presented for evaluation of progressive worsening of chronic low back pain becoming intractable to the point of immobility with imaging demonstrating multiple areas of compression fracture likely chronic.    This patient's problems and plans were partially entered by my partner and updated as appropriate by me 07/10/21.     Intractable back pain  Likely chronic T12, L3 compression fractures  -PT/OT with limited assessment due to pain   - MRI with compression deformity of the superior endplate of T12, acute compression deformity of L3 with edema ; disc bulge at L3/L4   -neurosurgery evaluated; kyphoplasty reviewed with patient and possibility pending his improvement   - PRN pain  control with percocet   - continue gabapentin (started 7/9) and titrate as tolerated      Hyponatremia  - TSH ok; Cortisol ok  - initial urine studies consistent more with hypovolemic hyponatremia and posisbly SIADH   - Na improved with isotonic saline and repeat studies with some improvement  - s/p gentle IV fluids  - Sodium 126 this AM   -Nephrology consult  -continue close monitoring of Na  -continue fluid restriction   -BMP in AM      Urinary retention  - Possibly related to the above  - Meza in place for now   - continue flomax    DM type 2, A1c 12.4%, unknown chronic insulin use  -Continue Accu-Cheks with SSI  - continue levemir 10 units qHS      Depression  Anxiety  GERD  Hypertension  Hyperlipidemia  -Continue aspirin, losartan, rosuvastatin     DVT prophylaxis:  Mechanical DVT prophylaxis orders are present.      Disposition: I expect the patient to be discharged TBD    CODE STATUS:   Code Status and Medical Interventions:   Ordered at: 07/07/21 0600     Level Of Support Discussed With:    Patient     Code Status:    CPR     Medical Interventions (Level of Support Prior to Arrest):    Full       Gavin Hill, ASHA  07/10/21

## 2021-07-10 NOTE — PLAN OF CARE
Problem: Adult Inpatient Plan of Care  Goal: Plan of Care Review  Flowsheets (Taken 7/10/2021 0513)  Progress: no change  Plan of Care Reviewed With: patient  Outcome Summary: Pt up for most of shift. C/o constant back pain, prn oral pain medication administered. VSS. 2L NC. F/c anchored due to severe swelling in foreskin. Unable to retract during straight cath attempt. Took two Rn's in order to anchor f/c. Pt alert, answers questions appropiately but extremely forgetful and repeats himself often. Skin care provided, waffle mattress in place. Plan of care discussed. Reinforcement needed.  Goal: Absence of Hospital-Acquired Illness or Injury  Intervention: Identify and Manage Fall Risk  Recent Flowsheet Documentation  Taken 7/10/2021 0400 by Bo Keating RN  Safety Promotion/Fall Prevention:   activity supervised   clutter free environment maintained   assistive device/personal items within reach   fall prevention program maintained   gait belt   nonskid shoes/slippers when out of bed   room organization consistent   safety round/check completed   toileting scheduled  Taken 7/10/2021 0200 by Bo Keating RN  Safety Promotion/Fall Prevention:   activity supervised   assistive device/personal items within reach   clutter free environment maintained   fall prevention program maintained   gait belt   nonskid shoes/slippers when out of bed   room organization consistent   safety round/check completed   toileting scheduled   lighting adjusted  Taken 7/10/2021 0000 by Bo Keating RN  Safety Promotion/Fall Prevention:   activity supervised   assistive device/personal items within reach   clutter free environment maintained   fall prevention program maintained   gait belt   nonskid shoes/slippers when out of bed   room organization consistent   safety round/check completed   toileting scheduled  Taken 7/9/2021 2200 by Bo Keating RN  Safety Promotion/Fall Prevention:   activity supervised   clutter  free environment maintained   assistive device/personal items within reach   fall prevention program maintained   gait belt   nonskid shoes/slippers when out of bed   room organization consistent   toileting scheduled   safety round/check completed   lighting adjusted  Taken 7/9/2021 2026 by Bo Keating RN  Safety Promotion/Fall Prevention:   activity supervised   assistive device/personal items within reach   clutter free environment maintained   fall prevention program maintained   gait belt   nonskid shoes/slippers when out of bed   room organization consistent   safety round/check completed   toileting scheduled  Intervention: Prevent Skin Injury  Recent Flowsheet Documentation  Taken 7/10/2021 0400 by Bo Keating RN  Body Position: tilted, left  Skin Protection:   adhesive use limited   incontinence pads utilized   tubing/devices free from skin contact  Taken 7/10/2021 0200 by Bo Keating RN  Body Position: neutral body alignment  Skin Protection:   adhesive use limited   incontinence pads utilized   tubing/devices free from skin contact  Taken 7/10/2021 0000 by Bo Keating RN  Body Position:   position maintained   tilted, left  Skin Protection:   adhesive use limited   incontinence pads utilized   tubing/devices free from skin contact  Taken 7/9/2021 2200 by Bo Keating RN  Body Position:   position changed independently   tilted, left  Skin Protection:   adhesive use limited   incontinence pads utilized   tubing/devices free from skin contact  Taken 7/9/2021 2026 by Bo Keating RN  Body Position: tilted, left  Skin Protection:   adhesive use limited   incontinence pads utilized   skin sealant/moisture barrier applied   tubing/devices free from skin contact  Intervention: Prevent and Manage VTE (venous thromboembolism) Risk  Recent Flowsheet Documentation  Taken 7/9/2021 2026 by Bo Keating RN  VTE Prevention/Management:   bilateral   dorsiflexion/plantar  flexion performed   sequential compression devices off  Intervention: Prevent Infection  Recent Flowsheet Documentation  Taken 7/10/2021 0400 by Bo Keating RN  Infection Prevention:   environmental surveillance performed   rest/sleep promoted   single patient room provided  Taken 7/10/2021 0200 by Bo Keating RN  Infection Prevention:   environmental surveillance performed   rest/sleep promoted   single patient room provided  Taken 7/10/2021 0000 by Bo Keating RN  Infection Prevention:   environmental surveillance performed   rest/sleep promoted   single patient room provided  Taken 7/9/2021 2200 by Bo Keating RN  Infection Prevention:   environmental surveillance performed   rest/sleep promoted   single patient room provided  Taken 7/9/2021 2026 by Bo Keating RN  Infection Prevention:   environmental surveillance performed   single patient room provided   rest/sleep promoted  Goal: Optimal Comfort and Wellbeing  Intervention: Provide Person-Centered Care  Recent Flowsheet Documentation  Taken 7/9/2021 2026 by Bo Keating RN  Trust Relationship/Rapport:   care explained   choices provided   questions answered   questions encouraged     Problem: Fall Injury Risk  Goal: Absence of Fall and Fall-Related Injury  Intervention: Identify and Manage Contributors to Fall Injury Risk  Recent Flowsheet Documentation  Taken 7/9/2021 2200 by Bo Keating RN  Medication Review/Management: medications reviewed  Taken 7/9/2021 2026 by Bo Keating RN  Medication Review/Management: medications reviewed  Intervention: Promote Injury-Free Environment  Recent Flowsheet Documentation  Taken 7/10/2021 0400 by Bo Keating RN  Safety Promotion/Fall Prevention:   activity supervised   clutter free environment maintained   assistive device/personal items within reach   fall prevention program maintained   gait belt   nonskid shoes/slippers when out of bed   room organization  consistent   safety round/check completed   toileting scheduled  Taken 7/10/2021 0200 by Bo Keating RN  Safety Promotion/Fall Prevention:   activity supervised   assistive device/personal items within reach   clutter free environment maintained   fall prevention program maintained   gait belt   nonskid shoes/slippers when out of bed   room organization consistent   safety round/check completed   toileting scheduled   lighting adjusted  Taken 7/10/2021 0000 by Bo Keating RN  Safety Promotion/Fall Prevention:   activity supervised   assistive device/personal items within reach   clutter free environment maintained   fall prevention program maintained   gait belt   nonskid shoes/slippers when out of bed   room organization consistent   safety round/check completed   toileting scheduled  Taken 7/9/2021 2200 by Bo Keating RN  Safety Promotion/Fall Prevention:   activity supervised   clutter free environment maintained   assistive device/personal items within reach   fall prevention program maintained   gait belt   nonskid shoes/slippers when out of bed   room organization consistent   toileting scheduled   safety round/check completed   lighting adjusted  Taken 7/9/2021 2026 by Bo Keating RN  Safety Promotion/Fall Prevention:   activity supervised   assistive device/personal items within reach   clutter free environment maintained   fall prevention program maintained   gait belt   nonskid shoes/slippers when out of bed   room organization consistent   safety round/check completed   toileting scheduled   Goal Outcome Evaluation:  Plan of Care Reviewed With: patient        Progress: no change  Outcome Summary: Pt up for most of shift. C/o constant back pain, prn oral pain medication administered. VSS. 2L NC. F/c anchored due to severe swelling in foreskin. Unable to retract during straight cath attempt. Took two Rn's in order to anchor f/c. Pt alert, answers questions appropiately but  extremely forgetful and repeats himself often. Skin care provided, waffle mattress in place. Plan of care discussed. Reinforcement needed.

## 2021-07-10 NOTE — PROGRESS NOTES
"NEUROSURGERY PROGRESS NOTE     LOS: 2 days   Patient Care Team:  Amanda Tate MD as PCP - General (Family Medicine)    Chief Complaint: Acute on chronic low back pain.    Interval History:   Patient Complaints: Ongoing back pain.  Patient Denies: Extremity weakness or numbness.    Review of Systems:   Musculoskeletal and Neurological systems were reviewed and are negative except for:  Musculoskeletal: positive for back pain.  Neurological: positive for difficulty walking due to pain.    Vital Signs: Blood pressure 121/71, pulse 86, temperature 97.7 °F (36.5 °C), temperature source Oral, resp. rate 18, height 185.4 cm (73\"), weight 118 kg (260 lb), SpO2 92 %.  Intake/Output:     Intake/Output Summary (Last 24 hours) at 7/10/2021 0830  Last data filed at 7/10/2021 0305  Gross per 24 hour   Intake --   Output 2550 ml   Net -2550 ml       Physical Exam:   Patient awakens and is appropriate.  He is well oriented.  He follows simple commands.  He is really immobile and cannot move very well given the back pain.  Motor function and tone are normal in his lower extremities.     Data Review:    Results from last 7 days   Lab Units 07/10/21  0029 07/09/21  0534   SODIUM mmol/L 129* 126*   POTASSIUM mmol/L  --  3.8   CHLORIDE mmol/L  --  91*   CO2 mmol/L  --  24.0   BUN mg/dL  --  7*   CREATININE mg/dL  --  0.55*   GLUCOSE mg/dL  --  193*   CALCIUM mg/dL  --  8.9         Assessment/Plan:  1.  T12 and L3 compression fractures.  The patient adamantly denies any history of trauma.  There are some healed rib fractures noted on the CT of his abdomen.  Fractures may be osteoporotic in their etiology.  They do not have a neoplastic appearance to them.  2.  Chronic mechanical low back pain.  3.  Low-grade L5-S1 spondylolisthesis.  4.  Hyponatremia.  5.  Uncontrolled diabetes mellitus.  6.  Disposition: The patient is quite limited due to his back pain.  I have strongly recommended T12 and L3 kyphoplasty.  I would pursue this on " Monday.  He remains ambivalent.  He once again indicates that he is going to discuss this with his son.       Ruddy Zepeda MD  07/10/21  08:30 EDT

## 2021-07-10 NOTE — PLAN OF CARE
"Goal Outcome Evaluation:  Patient rates pain 7/10 before Percocet pain medication. Patient asks for pain pill, upon entering room patient is resting with eyes closed. Patient woke up and asked why staff was in his room. I stated that he requested pain medication. He states, \"I did?\"  This occurred two times today. Patient rates pain 7/10 after percocet given. Patient eating 100% all meals. Patient still has not had a BM since prior to admission. Patient on bowel regimen and has been given PRN medication as well. Patient's foreskin remains edematous and red. Unable to retract foreskin to clean around meatus. Trevor BARRY aware of issue. Meza cath remains patent and draining.   "

## 2021-07-10 NOTE — CONSULTS
Referring Provider: Dr. Alessia Johnston  Reason for Consultation: Hyponatremia    Subjective     Chief complaint back pain    History of present illness:    Patient admitted with back pain found to have hyponatremia serum sodium 124 which is slowly up to 129 at this time.  Looking at old records patient has a history of triglyceridemia with a serum triglyceride 749 in 2018.  Cortisol level is normal..  Previous sodium has been within acceptable range.  He does not have polyuria polydipsia, no diarrhea, headache, nausea.  Patient is being admitted for back problem.  Renal function has been stable.    History  Past Medical History:   Diagnosis Date   • Depression    • Diabetes mellitus (CMS/HCC)    • Disc degeneration, lumbar    • Emphysema of lung (CMS/HCC)    • Generalized anxiety disorder    • GERD (gastroesophageal reflux disease)    • Hyperlipidemia    • Hypertension    • Hypogonadism male    • Osteoarthritis    • Type 2 diabetes mellitus, uncontrolled (CMS/[facility])    • Vitamin D deficiency    ,   Past Surgical History:   Procedure Laterality Date   • HERNIA REPAIR      Inguinal   • KNEE ARTHROPLASTY     ,   Family History   Problem Relation Age of Onset   • Tuberculosis Mother    • Colon cancer Father    • Other Father         polyps of the sigmoid colon   ,   Social History     Socioeconomic History   • Marital status: Single     Spouse name: Not on file   • Number of children: Not on file   • Years of education: Not on file   • Highest education level: Not on file   Tobacco Use   • Smoking status: Current Every Day Smoker     Packs/day: 2.00     Years: 45.00     Pack years: 90.00     Types: Cigarettes   • Smokeless tobacco: Never Used   Substance and Sexual Activity   • Alcohol use: Yes     Alcohol/week: 4.0 standard drinks     Types: 2 Cans of beer, 2 Shots of liquor per week   • Drug use: No   • Sexual activity: Defer     E-cigarette/Vaping     E-cigarette/Vaping Substances     E-cigarette/Vaping Devices        ,   Facility-Administered Medications Prior to Admission   Medication Dose Route Frequency Provider Last Rate Last Admin   • [DISCONTINUED] lidocaine (XYLOCAINE) 2 % jelly   Topical PRN Robby Velasco, DNP, APRN         No medications prior to admission.   , Scheduled Meds:  aspirin, 81 mg, Oral, Daily  gabapentin, 100 mg, Oral, Q8H  insulin detemir, 10 Units, Subcutaneous, Nightly  insulin lispro, 0-7 Units, Subcutaneous, TID AC  losartan, 50 mg, Oral, Daily  rosuvastatin, 20 mg, Oral, Daily  senna-docusate sodium, 2 tablet, Oral, BID  sodium chloride, 10 mL, Intravenous, Q12H  tamsulosin, 0.4 mg, Oral, Daily    , Continuous Infusions:   , PRN Meds:  •  acetaminophen **OR** acetaminophen **OR** acetaminophen  •  senna-docusate sodium **AND** polyethylene glycol **AND** bisacodyl **AND** bisacodyl  •  dextrose  •  dextrose  •  glucagon (human recombinant)  •  Morphine  •  oxyCODONE-acetaminophen  •  sodium chloride and Allergies:  Penicillins and Shellfish-derived products    Review of Systems  Pertinent items are noted in HPI, all other systems reviewed and negative    Objective     Vital Signs  Temp:  [97.7 °F (36.5 °C)-98.3 °F (36.8 °C)] 97.9 °F (36.6 °C)  Heart Rate:  [85-96] 87  Resp:  [16-18] 18  BP: (109-122)/(63-81) 122/81    I/O this shift:  In: 840 [P.O.:840]  Out: 800 [Urine:800]  I/O last 3 completed shifts:  In: -   Out: 3550 [Urine:3550]    Physical Exam:  General Appearance: Alert, oriented, no obvious distress.   male very pleasant.  HEENT: Atraumatic normocephalic head, eyes pupils reactive, extraocular muscle intact, nose no bleed, oropharynx are clear, tongue is moist, neck is supple, trachea midline, no lymph enlargement or tenderness.  Lungs: Clear auscultation, no rales rhonchi's, equal chest movement, nonlabored.  Heart: No gallop, murmur, rub, RRR.  Abdomen: Soft, nontender, positive bowel sounds, no organomegaly.  Extremities: No edema, no cyanosis.  Neuro: No focal  deficit, moving all extremities, alert oriented X 3  Psych: Mood and affect are normal and appropriate.  Skin is warm and dry.    Results Review:   I reviewed the patient's new clinical results.    WBC WBC   Date Value Ref Range Status   07/09/2021 9.36 3.40 - 10.80 10*3/mm3 Final      HGB Hemoglobin   Date Value Ref Range Status   07/09/2021 15.5 13.0 - 17.7 g/dL Final      HCT Hematocrit   Date Value Ref Range Status   07/09/2021 46.1 37.5 - 51.0 % Final      Platlets No results found for: LABPLAT   MCV MCV   Date Value Ref Range Status   07/09/2021 91.5 79.0 - 97.0 fL Final          Sodium Sodium   Date Value Ref Range Status   07/10/2021 130 (L) 136 - 145 mmol/L Final   07/10/2021 126 (L) 136 - 145 mmol/L Final   07/10/2021 129 (L) 136 - 145 mmol/L Final   07/09/2021 130 (L) 136 - 145 mmol/L Final   07/09/2021 129 (L) 136 - 145 mmol/L Final   07/09/2021 126 (L) 136 - 145 mmol/L Final   07/09/2021 127 (L) 136 - 145 mmol/L Final   07/08/2021 130 (L) 136 - 145 mmol/L Final   07/08/2021 124 (L) 136 - 145 mmol/L Final   07/08/2021 124 (L) 136 - 145 mmol/L Final      Potassium Potassium   Date Value Ref Range Status   07/09/2021 3.8 3.5 - 5.2 mmol/L Final   07/08/2021 3.7 3.5 - 5.2 mmol/L Final      Chloride Chloride   Date Value Ref Range Status   07/09/2021 91 (L) 98 - 107 mmol/L Final   07/08/2021 91 (L) 98 - 107 mmol/L Final      CO2 CO2   Date Value Ref Range Status   07/09/2021 24.0 22.0 - 29.0 mmol/L Final   07/08/2021 23.0 22.0 - 29.0 mmol/L Final      BUN BUN   Date Value Ref Range Status   07/09/2021 7 (L) 8 - 23 mg/dL Final   07/08/2021 8 8 - 23 mg/dL Final      Creatinine Creatinine   Date Value Ref Range Status   07/09/2021 0.55 (L) 0.76 - 1.27 mg/dL Final   07/08/2021 0.52 (L) 0.76 - 1.27 mg/dL Final      Calcium Calcium   Date Value Ref Range Status   07/09/2021 8.9 8.6 - 10.5 mg/dL Final   07/08/2021 9.0 8.6 - 10.5 mg/dL Final      PO4 No results found for: CAPO4   Albumin No results found for: ALBUMIN    Magnesium No results found for: MG   Uric Acid No results found for: URICACID         aspirin, 81 mg, Oral, Daily  gabapentin, 100 mg, Oral, Q8H  insulin detemir, 10 Units, Subcutaneous, Nightly  insulin lispro, 0-7 Units, Subcutaneous, TID AC  losartan, 50 mg, Oral, Daily  rosuvastatin, 20 mg, Oral, Daily  senna-docusate sodium, 2 tablet, Oral, BID  sodium chloride, 10 mL, Intravenous, Q12H  tamsulosin, 0.4 mg, Oral, Daily           Assessment/Plan       Intractable back pain    Osteoarthritis    Uncontrolled type 2 diabetes mellitus with hyperglycemia (CMS/HCC)    Hypertension    Mixed hyperlipidemia    Compression fracture of L3 vertebra (CMS/HCC)    Compression fracture of T12 vertebra (CMS/HCC)    Mood disorder (CMS/HCC)    Chronic bilateral low back pain    Acute low back pain with right-sided sciatica    1.  Hyponatremia: Serum sodium 128 126, 129, and 130.  Cortisol levels normal 8.44, glucose 212, serum osmolality 314, urine sodium 28, urine osmolarity 605  Looking at the previous labs patient triglyceride was 762 on 2/5/2018.  There is a possibility that he may have pseudohyponatremia.  We will check the triglyceride level.  Other possibility could be volume depletion.  2.  Diabetes type 2  3.  Hypertension.  4.  Compression fracture as 3, T12  Plan:  Check triglyceride level  Okay to give IV normal saline  High risk patient with multiple problems  Control nausea, as ADH can be increased with nausea.  Heparin can also cause decrease aldosterone secretion and action at the renal tubular level.  We will check aldosterone level and renal level in the morning.    I discussed the patients findings and my recommendations with patient and nursing staff    Whitney Richards MD  07/10/21  @NOW

## 2021-07-10 NOTE — THERAPY DISCHARGE NOTE
Acute Care - Occupational Therapy Discharge  [facility]    Patient Name: Cristian Watt  : 1945    MRN: [id number]                              Today's Date: 7/10/2021       Admit Date: 2021    Visit Dx:     ICD-10-CM ICD-9-CM   1. Acute low back pain with right-sided sciatica, unspecified back pain laterality  M54.41 724.2     724.3   2. History of diabetes mellitus  Z86.39 V12.29   3. Impaired functional mobility, balance, gait, and endurance  Z74.09 V49.89     Patient Active Problem List   Diagnosis   • C. difficile colitis   • Disc degeneration, lumbar   • Osteoarthritis   • Uncontrolled type 2 diabetes mellitus with hyperglycemia (CMS/HCC)   • Hypertension   • Diarrhea   • Mixed hyperlipidemia   • Diabetic autonomic neuropathy associated with type 2 diabetes mellitus (CMS/HCC)   • Episode of recurrent major depressive disorder (CMS/HCC)   • Cigarette nicotine dependence with nicotine-induced disorder   • Compression fracture of L3 vertebra (CMS/HCC)   • Compression fracture of T12 vertebra (CMS/HCC)   • Mood disorder (CMS/HCC)   • Chronic bilateral low back pain   • Acute low back pain with right-sided sciatica   • Intractable back pain     Past Medical History:   Diagnosis Date   • Depression    • Diabetes mellitus (CMS/HCC)    • Disc degeneration, lumbar    • Emphysema of lung (CMS/HCC)    • Generalized anxiety disorder    • GERD (gastroesophageal reflux disease)    • Hyperlipidemia    • Hypertension    • Hypogonadism male    • Osteoarthritis    • Type 2 diabetes mellitus, uncontrolled (CMS/HCC)    • Vitamin D deficiency      Past Surgical History:   Procedure Laterality Date   • HERNIA REPAIR      Inguinal   • KNEE ARTHROPLASTY       General Information     Row Name 07/10/21 0810          OT Time and Intention    Document Type  evaluation;discharge evaluation/summary  -TA     Mode of Treatment  occupational therapy  -TA     Row Name 07/10/21 0810          General Information    Patient  Profile Reviewed  yes  -TA     Prior Level of Function  independent:;all household mobility;community mobility;gait;transfer;bed mobility;ADL's;cooking;home management;driving;using stairs  -TA     Existing Precautions/Restrictions  no known precautions/restrictions  -TA     Row Name 07/10/21 0810          Occupational Profile    Reason for Services/Referral (Occupational Profile)  worsening back pain, fxl decline from PLOF  -TA     Patient Goals (Occupational Profile)  Return home  -TA     Row Name 07/10/21 0810          Living Environment    Lives With  alone Children live nearby  -TA     Row Name 07/10/21 0810          Home Main Entrance    Number of Stairs, Main Entrance  two  -TA     Stair Railings, Main Entrance  railings safe and in good condition  -TA     Row Name 07/10/21 0810          Stairs Within Home, Primary    Number of Stairs, Within Home, Primary  two  -TA     Stair Railings, Within Home, Primary  railings safe and in good condition  -TA     Row Name 07/10/21 0810          Cognition    Orientation Status (Cognition)  oriented x 4  -TA       User Key  (r) = Recorded By, (t) = Taken By, (c) = Cosigned By    Initials Name Provider Type    TA Richard White, OT Occupational Therapist        Mobility/ADL's     Row Name 07/10/21 0810          Bed Mobility    Bed Mobility  supine-sit  -TA     Scooting/Bridging [place] (Bed Mobility)  modified independence  -TA     Supine-Sit [place] (Bed Mobility)  not tested  -TA     Assistive Device (Bed Mobility)  head of bed elevated  -TA     Row Name 07/10/21 0810          Transfers    Transfers  sit-stand transfer;toilet transfer  -TA     Sit-Stand [place] (Transfers)  independent  -TA     [place] Level (Toilet Transfer)  independent  -TA     Assistive Device (Toilet Transfer)  grab bars/safety frame  -TA     Row Name 07/10/21 0810          Sit-Stand Transfer    Assistive Device (Sit-Stand Transfers)  -- No AD  -TA     Row Name 07/10/21 0810           Toilet Transfer    Type (Toilet Transfer)  sit-stand;stand-sit  -TA     Row Name 07/10/21 0810          Functional Mobility    Functional Mobility- Ind. Level  independent  -TA     Functional Mobility- Device  -- No AD  -TA     Functional Mobility-Distance (Feet)  20  -TA     Functional Mobility- Comment  No unsteadiness or LOB noted  -TA     Row Name 07/10/21 0810          Activities of Daily Living    BADL Assessment/Intervention  upper body dressing;lower body dressing;grooming;toileting  -TA     Row Name 07/10/21 0810          Upper Body Dressing Assessment/Training    [place] Level (Upper Body Dressing)  doff;pajama/robe;set up  -TA     Position (Upper Body Dressing)  edge of bed sitting  -TA     Row Name 07/10/21 0810          Lower Body Dressing Assessment/Training    [place] Level (Lower Body Dressing)  don;socks;independent  -TA     Position (Lower Body Dressing)  [place] sitting  -TA     Row Name 07/10/21 0810          Grooming Assessment/Training    [place] Level (Grooming)  wash face, hands;set up  -TA     Position (Grooming)  edge of bed sitting  -TA     Row Name 07/10/21 0810          Toileting Assessment/Training    [place] Level (Toileting)  adjust/manage clothing;perform perineal hygiene;independent  -TA     Position (Toileting)  unsupported sitting  -TA       User Key  (r) = Recorded By, (t) = Taken By, (c) = Cosigned By    Initials Name Provider Type    Richard Guzman OT Occupational Therapist        Obj/Interventions     Row Name 07/10/21 0810          Sensory Assessment (Somatosensory)    Sensory Assessment (Somatosensory)  bilateral UE;sensation intact  -[facility] Name 07/10/21 0810          Vision Assessment/Intervention    Visual Impairment/Limitations  WFL;corrective lenses full-time  -TA     Row Name 07/10/21 0810          Range of Motion Comprehensive    General Range of Motion  bilateral upper extremity ROM WFL  -TA     Row Name 07/10/21 0810           Strength Comprehensive (MMT)    General Manual Muscle Testing (MMT) Assessment  no strength deficits identified  -TA     Comment, General Manual Muscle Testing (MMT) Assessment  BUE 5/5  -TA     Row Name 07/10/21 0810          Balance    Balance Assessment  sitting dynamic balance;standing dynamic balance  -TA     Dynamic Sitting Balance  WFL;sitting, edge of bed  -TA     Dynamic Standing Balance  WFL;unsupported;standing  -TA     Balance Interventions  sit to stand;occupation based/functional task;weight shifting activity  -TA       User Key  (r) = Recorded By, (t) = Taken By, (c) = Cosigned By    Initials Name Provider Type    Richard Guzman, OT Occupational Therapist        Goals/Plan    No documentation.       Clinical Impression     Row Name 07/10/21 0810          Pain Assessment    Additional Documentation  Pain Scale: Numbers Pre/Post-Treatment (Group)  -TA     Row Name 07/10/21 0810          Pain Scale: Numbers Pre/Post-Treatment    Pretreatment Pain Rating  0/10 - no pain  -TA     Posttreatment Pain Rating  0/10 - no pain  -TA     Pre/Posttreatment Pain Comment  Pt denied pain, tolerated  -TA     Pain Intervention(s)  Ambulation/increased activity;Repositioned  -TA     Row Name 07/10/21 0810          Plan of Care Review    Plan of Care Reviewed With  patient  -TA     Progress  improving  -TA     Outcome Summary  VSS; Pt presents at fxl baseline/independent with ADL performance, fxl mobility for ADLs. No AE/DME needs identified. No further acute skilled OT services indicated at this time. Recommend home at discharge.  -TA     Row Name 07/10/21 0810          Therapy Assessment/Plan (OT)    Patient/Family Therapy Goal Statement (OT)  Return home  -TA     Criteria for Skilled Therapeutic Interventions Met (OT)  no;no problems identified which require skilled intervention  -TA     Therapy Frequency (OT)  evaluation only  -TA     Predicted Duration of Therapy Intervention (OT)  1 day  -TA     Row Name  07/10/21 0810          Therapy Plan Review/Discharge Plan (OT)    Anticipated Discharge Disposition (OT)  home  -TA     Row Name 07/10/21 0810          Vital Signs    Pre Systolic BP Rehab  111  -TA     Pre Treatment Diastolic BP  61  -TA     Post Systolic BP Rehab  126  -TA     Post Treatment Diastolic BP  56  -TA     O2 Delivery Pre Treatment  room air  -TA     O2 Delivery Intra Treatment  room air  -TA     O2 Delivery Post Treatment  room air  -TA     Pre Patient Position  Supine  -TA     Intra Patient Position  Standing  -TA     Post Patient Position  Sitting  -TA     Row Name 07/10/21 0810          Positioning and Restraints    Pre-Treatment Position  in bed  -TA     Post Treatment Position  chair  -TA     In Chair  reclined;notified nsg;call light within reach;encouraged to call for assist;exit alarm on;with nsg;legs elevated  -TA       User Key  (r) = Recorded By, (t) = Taken By, (c) = Cosigned By    Initials Name Provider Type    Richard Guzman OT Occupational Therapist        Outcome Measures     Row Name 07/10/21 0810          How much help from another is currently needed...    Putting on and taking off regular lower body clothing?  4  -TA     Bathing (including washing, rinsing, and drying)  4  -TA     Toileting (which includes using toilet bed pan or urinal)  4  -TA     Putting on and taking off regular upper body clothing  4  -TA     Taking care of personal grooming (such as brushing teeth)  4  -TA     Eating meals  4  -TA     AM-PAC 6 Clicks Score (OT)  24  -TA     Row Name 07/10/21 0810          Functional Assessment    Outcome Measure Options  AM-PAC 6 Clicks Daily Activity (OT)  -TA       User Key  (r) = Recorded By, (t) = Taken By, (c) = Cosigned By    Initials Name Provider Type    Richard Guzman OT Occupational Therapist        Occupational Therapy Education                 Title: PT OT SLP Therapies (In Progress)     Topic: Occupational Therapy (In Progress)     Point: ADL  training (Done)     Description:   Instruct learner(s) on proper safety adaptation and remediation techniques during self care or transfers.   Instruct in proper use of assistive devices.              Learning Progress Summary           Patient Acceptance, E, VU by TA at 7/10/2021 0915                   Point: Home exercise program (Not Started)     Description:   Instruct learner(s) on appropriate technique for monitoring, assisting and/or progressing therapeutic exercises/activities.              Learner Progress:  Not documented in this visit.          Point: Precautions (Done)     Description:   Instruct learner(s) on prescribed precautions during self-care and functional transfers.              Learning Progress Summary           Patient Acceptance, E, VU by TA at 7/10/2021 0915                   Point: Body mechanics (Not Started)     Description:   Instruct learner(s) on proper positioning and spine alignment during self-care, functional mobility activities and/or exercises.              Learner Progress:  Not documented in this visit.                      User Key     Initials Effective Dates Name Provider Type Discipline    [place] 06/16/21 -  Richard White, OT Occupational Therapist OT              OT Recommendation and Plan  Retired Outcome Summary/Treatment Plan (OT)  Anticipated Discharge Disposition (OT): home  Therapy Frequency (OT): evaluation only  Plan of Care Review  Plan of Care Reviewed With: patient  Progress: improving  Outcome Summary: VSS; Pt presents at fxl baseline/independent with ADL performance, fxl mobility for ADLs. No AE/DME needs identified. No further acute skilled OT services indicated at this time. Recommend home at discharge.  Plan of Care Reviewed With: patient  Outcome Summary: VSS; Pt presents at fxl baseline/independent with ADL performance, fxl mobility for ADLs. No AE/DME needs identified. No further acute skilled OT services indicated at this time. Recommend home at  discharge.     Time Calculation:   Time Calculation- OT     Row Name 07/10/21 0810             Time Calculation- OT    OT Start Time  0810 ttc 0 minutes  -TA      Total Timed Code Minutes- OT  0 minute(s)  -TA      OT Received On  07/10/21  -TA         Untimed Charges    OT Eval/Re-eval Minutes  49  -TA         Total Minutes    Untimed Charges Total Minutes  49  -TA       Total Minutes  49  -TA        User Key  (r) = Recorded By, (t) = Taken By, (c) = Cosigned By    Initials Name Provider Type    TA Richard White OT Occupational Therapist        Therapy Charges for Today     Code Description Service Date Service Provider Modifiers Qty    38030888926 HC OT EVAL MOD COMPLEXITY 4 7/10/2021 Richard White OT GO 1               Richard White OT  7/10/2021

## 2021-07-11 LAB
ANION GAP SERPL CALCULATED.3IONS-SCNC: 7 MMOL/L (ref 5–15)
BUN SERPL-MCNC: 5 MG/DL (ref 8–23)
BUN/CREAT SERPL: 9.3 (ref 7–25)
CALCIUM SPEC-SCNC: 9.1 MG/DL (ref 8.6–10.5)
CHLORIDE SERPL-SCNC: 93 MMOL/L (ref 98–107)
CO2 SERPL-SCNC: 27 MMOL/L (ref 22–29)
CREAT SERPL-MCNC: 0.54 MG/DL (ref 0.76–1.27)
GFR SERPL CREATININE-BSD FRML MDRD: 148 ML/MIN/1.73
GLUCOSE BLDC GLUCOMTR-MCNC: 163 MG/DL (ref 70–130)
GLUCOSE BLDC GLUCOMTR-MCNC: 166 MG/DL (ref 70–130)
GLUCOSE BLDC GLUCOMTR-MCNC: 174 MG/DL (ref 70–130)
GLUCOSE BLDC GLUCOMTR-MCNC: 182 MG/DL (ref 70–130)
GLUCOSE SERPL-MCNC: 185 MG/DL (ref 65–99)
POTASSIUM SERPL-SCNC: 4 MMOL/L (ref 3.5–5.2)
SODIUM SERPL-SCNC: 127 MMOL/L (ref 136–145)
SODIUM SERPL-SCNC: 131 MMOL/L (ref 136–145)
SODIUM SERPL-SCNC: 132 MMOL/L (ref 136–145)
SODIUM SERPL-SCNC: 135 MMOL/L (ref 136–145)
TRIGL SERPL-MCNC: 137 MG/DL (ref 0–150)

## 2021-07-11 PROCEDURE — 84478 ASSAY OF TRIGLYCERIDES: CPT | Performed by: INTERNAL MEDICINE

## 2021-07-11 PROCEDURE — 82962 GLUCOSE BLOOD TEST: CPT

## 2021-07-11 PROCEDURE — 84295 ASSAY OF SERUM SODIUM: CPT | Performed by: INTERNAL MEDICINE

## 2021-07-11 PROCEDURE — 99231 SBSQ HOSP IP/OBS SF/LOW 25: CPT | Performed by: NEUROLOGICAL SURGERY

## 2021-07-11 PROCEDURE — 99232 SBSQ HOSP IP/OBS MODERATE 35: CPT | Performed by: NURSE PRACTITIONER

## 2021-07-11 PROCEDURE — 63710000001 INSULIN DETEMIR PER 5 UNITS: Performed by: INTERNAL MEDICINE

## 2021-07-11 PROCEDURE — 82088 ASSAY OF ALDOSTERONE: CPT | Performed by: INTERNAL MEDICINE

## 2021-07-11 PROCEDURE — 84244 ASSAY OF RENIN: CPT | Performed by: INTERNAL MEDICINE

## 2021-07-11 PROCEDURE — 97166 OT EVAL MOD COMPLEX 45 MIN: CPT

## 2021-07-11 PROCEDURE — 97110 THERAPEUTIC EXERCISES: CPT

## 2021-07-11 PROCEDURE — 63710000001 INSULIN LISPRO (HUMAN) PER 5 UNITS: Performed by: NURSE PRACTITIONER

## 2021-07-11 PROCEDURE — 25810000003 SODIUM CHLORIDE 0.9 % WITH KCL 20 MEQ 20-0.9 MEQ/L-% SOLUTION: Performed by: INTERNAL MEDICINE

## 2021-07-11 PROCEDURE — 80048 BASIC METABOLIC PNL TOTAL CA: CPT | Performed by: NURSE PRACTITIONER

## 2021-07-11 RX ORDER — FAMOTIDINE 20 MG/1
20 TABLET, FILM COATED ORAL
Status: CANCELLED | OUTPATIENT
Start: 2021-07-11

## 2021-07-11 RX ADMIN — OXYCODONE HYDROCHLORIDE AND ACETAMINOPHEN 1 TABLET: 7.5; 325 TABLET ORAL at 21:15

## 2021-07-11 RX ADMIN — INSULIN LISPRO 2 UNITS: 100 INJECTION, SOLUTION INTRAVENOUS; SUBCUTANEOUS at 08:40

## 2021-07-11 RX ADMIN — POTASSIUM CHLORIDE AND SODIUM CHLORIDE 75 ML/HR: 900; 150 INJECTION, SOLUTION INTRAVENOUS at 06:19

## 2021-07-11 RX ADMIN — LOSARTAN POTASSIUM 50 MG: 50 TABLET, FILM COATED ORAL at 08:39

## 2021-07-11 RX ADMIN — INSULIN LISPRO 2 UNITS: 100 INJECTION, SOLUTION INTRAVENOUS; SUBCUTANEOUS at 12:00

## 2021-07-11 RX ADMIN — SODIUM CHLORIDE, PRESERVATIVE FREE 10 ML: 5 INJECTION INTRAVENOUS at 21:16

## 2021-07-11 RX ADMIN — ROSUVASTATIN CALCIUM 20 MG: 20 TABLET, COATED ORAL at 08:39

## 2021-07-11 RX ADMIN — TAMSULOSIN HYDROCHLORIDE 0.4 MG: 0.4 CAPSULE ORAL at 08:39

## 2021-07-11 RX ADMIN — DOCUSATE SODIUM 50MG AND SENNOSIDES 8.6MG 2 TABLET: 8.6; 5 TABLET, FILM COATED ORAL at 08:40

## 2021-07-11 RX ADMIN — INSULIN LISPRO 2 UNITS: 100 INJECTION, SOLUTION INTRAVENOUS; SUBCUTANEOUS at 16:57

## 2021-07-11 RX ADMIN — SODIUM CHLORIDE, PRESERVATIVE FREE 10 ML: 5 INJECTION INTRAVENOUS at 08:39

## 2021-07-11 RX ADMIN — OXYCODONE HYDROCHLORIDE AND ACETAMINOPHEN 1 TABLET: 7.5; 325 TABLET ORAL at 16:57

## 2021-07-11 RX ADMIN — OXYCODONE HYDROCHLORIDE AND ACETAMINOPHEN 1 TABLET: 7.5; 325 TABLET ORAL at 08:46

## 2021-07-11 RX ADMIN — OXYCODONE HYDROCHLORIDE AND ACETAMINOPHEN 1 TABLET: 7.5; 325 TABLET ORAL at 03:42

## 2021-07-11 RX ADMIN — GABAPENTIN 100 MG: 100 CAPSULE ORAL at 05:00

## 2021-07-11 RX ADMIN — POTASSIUM CHLORIDE AND SODIUM CHLORIDE 75 ML/HR: 900; 150 INJECTION, SOLUTION INTRAVENOUS at 20:12

## 2021-07-11 RX ADMIN — INSULIN DETEMIR 10 UNITS: 100 INJECTION, SOLUTION SUBCUTANEOUS at 21:16

## 2021-07-11 RX ADMIN — GABAPENTIN 100 MG: 100 CAPSULE ORAL at 12:00

## 2021-07-11 RX ADMIN — ASPIRIN 81 MG: 81 TABLET, COATED ORAL at 08:40

## 2021-07-11 RX ADMIN — GABAPENTIN 100 MG: 100 CAPSULE ORAL at 21:15

## 2021-07-11 NOTE — PLAN OF CARE
Goal Outcome Evaluation:  Plan of Care Reviewed With: patient        Progress: no change  Outcome Summary: Patient refusing to get out of bed. Awaiting surgery. He does participate in bed exercises. Will encourage daily to mobilize.

## 2021-07-11 NOTE — THERAPY TREATMENT NOTE
Patient Name: Cristian Watt  : 1945    MRN: [id number]                              Today's Date: 2021       Admit Date: 2021    Visit Dx:     ICD-10-CM ICD-9-CM   1. Acute low back pain with right-sided sciatica, unspecified back pain laterality  M54.41 724.2     724.3   2. History of diabetes mellitus  Z86.39 V12.29   3. Impaired functional mobility, balance, gait, and endurance  Z74.09 V49.89   4. Compression fracture of T12 vertebra, initial encounter (CMS/[facility])  S22.080A 805.2     Patient Active Problem List   Diagnosis   • C. difficile colitis   • Disc degeneration, lumbar   • Osteoarthritis   • Uncontrolled type 2 diabetes mellitus with hyperglycemia (CMS/[facility])   • Hypertension   • Diarrhea   • Mixed hyperlipidemia   • Diabetic autonomic neuropathy associated with type 2 diabetes mellitus (CMS/[facility])   • Episode of recurrent major depressive disorder (CMS/[facility])   • Cigarette nicotine dependence with nicotine-induced disorder   • Compression fracture of L3 vertebra (CMS/[facility])   • Compression fracture of T12 vertebra (CMS/[facility])   • Mood disorder (CMS/[facility])   • Chronic bilateral low back pain   • Acute low back pain with right-sided sciatica   • Intractable back pain     Past Medical History:   Diagnosis Date   • Depression    • Diabetes mellitus (CMS/[facility])    • Disc degeneration, lumbar    • Emphysema of lung (CMS/[facility])    • Generalized anxiety disorder    • GERD (gastroesophageal reflux disease)    • Hyperlipidemia    • Hypertension    • Hypogonadism male    • Osteoarthritis    • Type 2 diabetes mellitus, uncontrolled (CMS/[facility])    • Vitamin D deficiency      Past Surgical History:   Procedure Laterality Date   • HERNIA REPAIR      Inguinal   • KNEE ARTHROPLASTY       General Information     Row Name 21 1034          Physical Therapy Time and Intention    Document Type  therapy note (daily note)  -SC     Mode of Treatment  physical therapy  -SC     Row Name 21 1034          General  Information    Existing Precautions/Restrictions  fall;spinal  -[facility] Name 07/11/21 1034          Cognition    Orientation Status (Cognition)  oriented x 4  -[facility] Name 07/11/21 1034          Safety Issues, Functional Mobility    Impairments Affecting Function (Mobility)  pain  -SC     Comment, Safety Issues/Impairments (Mobility)  alert, following commands, refusing to walk  -SC       User Key  (r) = Recorded By, (t) = Taken By, (c) = Cosigned By    Initials Name Provider Type    SC Deyvi Neal, PT Physical Therapist        Mobility     [facility] Name 07/11/21 1042          Bed Mobility    Comment (Bed Mobility)  refusing to move in bed  -[facility] Name 07/11/21 1042          Transfers    Comment (Transfers)  refusing to get up  -[facility] Name 07/11/21 1042          Gait/Stairs (Locomotion)    Comment (Gait/Stairs)  refusing to walke- agreeable to bed exercises  -SC       User Key  (r) = Recorded By, (t) = Taken By, (c) = Cosigned By    Initials Name Provider Type    SC Deyvi Neal, PT Physical Therapist        Obj/Interventions     [facility] Name 07/11/21 1042          Motor Skills    Therapeutic Exercise  hip;shoulder;knee;ankle  -[facility] Name 07/11/21 1042          Shoulder (Therapeutic Exercise)    Shoulder (Therapeutic Exercise)  AROM (active range of motion)  -SC     Shoulder AROM (Therapeutic Exercise)  bilateral;flexion;extension;supine;10 repetitions  -[facility] Name 07/11/21 1042          Hip (Therapeutic Exercise)    Hip (Therapeutic Exercise)  AROM (active range of motion)  -SC     Hip AROM (Therapeutic Exercise)  10 repetitions supine: heel slides, Bilateral bent knee drop offs, supine hip marching  -SC       User Key  (r) = Recorded By, (t) = Taken By, (c) = Cosigned By    Initials Name Provider Type    SC Deyvi Neal, PT Physical Therapist        Goals/Plan    No documentation.       Clinical Impression     [facility] Name 07/11/21 1044          Pain    Additional Documentation  Pain Scale:  FACES Pre/Post-Treatment (Group)  -[facility] Name 07/11/21 1044          Pain Scale: Numbers Pre/Post-Treatment    Pretreatment Pain Rating  0/10 - no pain  -SC     Posttreatment Pain Rating  0/10 - no pain  -SC     Pain Location  back  -[facility] Name 07/11/21 1044          Plan of Care Review    Plan of Care Reviewed With  patient  -SC     Progress  no change  -SC     Outcome Summary  Patient refusing to get out of bed. Awaiting surgery. He does participate in bed exercises. Will encourage daily to mobilize.  -[facility] Name 07/11/21 1044          Therapy Assessment/Plan (PT)    Rehab Potential (PT)  fair, will monitor progress closely  -SC     Criteria for Skilled Interventions Met (PT)  yes;skilled treatment is necessary  -[facility] Name 07/11/21 1044          Positioning and Restraints    Pre-Treatment Position  in bed  -SC     Post Treatment Position  bed  -SC     In Bed  notified nsg;supine;exit alarm on;encouraged to call for assist;call light within reach  -SC       User Key  (r) = Recorded By, (t) = Taken By, (c) = Cosigned By    Initials Name Provider Type    SC Deyvi Neal, PT Physical Therapist        Outcome Measures     Row Name 07/11/21 1045          How much help from another person do you currently need...    Turning from your back to your side while in flat bed without using bedrails?  2  -SC     Moving from lying on back to sitting on the side of a flat bed without bedrails?  1  -SC     Moving to and from a bed to a chair (including a wheelchair)?  1  -SC     Standing up from a chair using your arms (e.g., wheelchair, bedside chair)?  1  -SC     Climbing 3-5 steps with a railing?  1  -SC     To walk in hospital room?  1  -SC     AM-PAC 6 Clicks Score (PT)  7  -[facility] Name 07/11/21 1045          Functional Assessment    Outcome Measure Options  AM-PAC 6 Clicks Basic Mobility (PT)  -SC       User Key  (r) = Recorded By, (t) = Taken By, (c) = Cosigned By    Initials Name Provider Type     SC Deyvi Neal PT Physical Therapist        Physical Therapy Education                 Title: PT OT SLP Therapies (In Progress)     Topic: Physical Therapy (Done)     Point: Mobility training (Done)     Learning Progress Summary           Patient Eager, E, VU,NR by SC at 7/11/2021 1046    Comment: reviewed HEP in bed                   Point: Home exercise program (Done)     Learning Progress Summary           Patient Eager, E, VU,NR by SC at 7/11/2021 1046    Comment: reviewed HEP in bed    Nonacceptance, E,D, VU,NR by  at 7/8/2021 1522                   Point: Body mechanics (Done)     Learning Progress Summary           Patient Eager, E, VU,NR by SC at 7/11/2021 1046    Comment: reviewed HEP in bed                   Point: Precautions (Done)     Learning Progress Summary           Patient Eager, E, VU,NR by SC at 7/11/2021 1046    Comment: reviewed HEP in bed                               User Key     Initials Effective Dates Name Provider Type Discipline    [facility] 06/16/21 -  Deyvi Neal PT Physical Therapist PT     06/16/21 -  Loyda Delarosa PT Physical Therapist PT              PT Recommendation and Plan     Plan of Care Reviewed With: patient  Progress: no change  Outcome Summary: Patient refusing to get out of bed. Awaiting surgery. He does participate in bed exercises. Will encourage daily to mobilize.     Time Calculation:   PT Charges     Row Name 07/11/21 1013             Time Calculation    Start Time  1013  -SC      PT Received On  07/11/21  -SC      PT Goal Re-Cert Due Date  07/18/21  -SC         Time Calculation- PT    Total Timed Code Minutes- PT  17 minute(s)  -SC         Timed Charges    35063 - PT Therapeutic Exercise Minutes  17  -SC         Total Minutes    Timed Charges Total Minutes  17  -SC       Total Minutes  17  -SC        User Key  (r) = Recorded By, (t) = Taken By, (c) = Cosigned By    Initials Name Provider Type    SC Deyvi Neal PT Physical Therapist        Therapy  Charges for Today     Code Description Service Date Service Provider Modifiers Qty    28368829173 HC PT THER PROC EA 15 MIN 7/11/2021 Deyvi Neal, PT GP 1          PT G-Codes  Outcome Measure Options: AM-PAC 6 Clicks Basic Mobility (PT)  AM-PAC 6 Clicks Score (PT): 7  AM-PAC 6 Clicks Score (OT): 24    Deyvi Neal, PT  7/11/2021

## 2021-07-11 NOTE — PROGRESS NOTES
[facility] Medicine Services  PROGRESS NOTE    Patient Name: Cristian Watt  : 1945  MRN: [id number]    Date of Admission: 2021  Primary Care Physician: Amanda Tate MD    Subjective   Subjective     CC:  Follow-up back pain    HPI:  Patient seen lying in bed in no apparent distress.  No acute events overnight per nursing.  Back pain is currently well controlled on current position.  He feels constipated and has not had significant bowel movement.  He reports tentative plan to undergo kyphoplasty with Dr. Zepeda next Wednesday.  No new concerns at this time.    ROS:  Gen- No fevers, chills  CV- No chest pain, palpitations  Resp- No cough, dyspnea  GI- No N/V/D, abd pain    Objective   Objective     Vital Signs:   Temp:  [97.3 °F (36.3 °C)-98 °F (36.7 °C)] 98 °F (36.7 °C)  Heart Rate:  [75-96] 85  Resp:  [16-18] 18  BP: (107-135)/(63-84) 134/84        Physical Exam:  Constitutional: No acute distress, awake, alert  HENT: NCAT, mucous membranes moist  Respiratory: Clear to auscultation bilaterally, respiratory effort normal   Cardiovascular: RRR, no murmurs, cap refill brisk   Gastrointestinal: Positive bowel sounds, soft, nontender, nondistended  : Meza in place, mild penile edema noted   Musculoskeletal: No BLE edema   Psychiatric: Appropriate affect, cooperative  Neurologic: Oriented x 3, moves all extremities, speech clear  Skin: warm, dry, no visible rash     Results Reviewed:  Results from last 7 days   Lab Units 21  0534 21  0449   WBC 10*3/mm3 9.36 12.84*   HEMOGLOBIN g/dL 15.5 17.2   HEMATOCRIT % 46.1 50.0   PLATELETS 10*3/mm3 308 357     Results from last 7 days   Lab Units 21  0601 21  0004 07/10/21  1741 21  0534 21  0608 21  0449   SODIUM mmol/L 127* 132* 128* 126* 124* 129*   POTASSIUM mmol/L 4.0  --   --  3.8 3.7 4.4   CHLORIDE mmol/L 93*  --   --  91* 91* 92*   CO2 mmol/L 27.0  --   --  24.0 23.0 22.0   BUN  mg/dL 5*  --   --  7* 8 11   CREATININE mg/dL 0.54*  --   --  0.55* 0.52* 0.71*   GLUCOSE mg/dL 185*  --   --  193* 211* 292*   CALCIUM mg/dL 9.1  --   --  8.9 9.0 9.3   ALT (SGPT) U/L  --   --   --   --   --  8   AST (SGOT) U/L  --   --   --   --   --  11     Estimated Creatinine Clearance: 107.3 mL/min (A) (by C-G formula based on SCr of 0.54 mg/dL (L)).    Microbiology Results Abnormal     Procedure Component Value - Date/Time    COVID PRE-OP / PRE-PROCEDURE SCREENING ORDER (NO ISOLATION) - Swab, Nasopharynx [[id number]]  (Normal) Collected: 07/07/21 0643    Lab Status: Final result Specimen: Swab from Nasopharynx Updated: 07/07/21 0755    Narrative:      The following orders were created for panel order COVID PRE-OP / PRE-PROCEDURE SCREENING ORDER (NO ISOLATION) - Swab, Nasopharynx.  Procedure                               Abnormality         Status                     ---------                               -----------         ------                     COVID-19,CEPHEID,CYNTHIA IN-...[[id number]]  Normal              Final result                 Please view results for these tests on the individual orders.    COVID-19,CEPHEID,CYNTHIA IN-HOUSE(OR EMERGENT/ADD-ON),NP SWAB IN TRANSPORT MEDIA 3-4 HR TAT - Swab, Nasopharynx [[id number]]  (Normal) Collected: 07/07/21 0643    Lab Status: Final result Specimen: Swab from Nasopharynx Updated: 07/07/21 0755     COVID19 Not Detected    Narrative:      Fact sheet for providers: https://www.fda.gov/media/617306/download     Fact sheet for patients: https://www.fda.gov/media/325261/download  Fact sheet for providers: https://www.fda.gov/media/902587/download     Fact sheet for patients: https://www.fda.gov/media/476919/download          Imaging Results (Last 24 Hours)     ** No results found for the last 24 hours. **              I have reviewed the medications:  Scheduled Meds:aspirin, 81 mg, Oral, Daily  gabapentin, 100 mg, Oral, Q8H  insulin detemir, 10 Units, Subcutaneous,  Nightly  insulin lispro, 0-7 Units, Subcutaneous, TID AC  losartan, 50 mg, Oral, Daily  rosuvastatin, 20 mg, Oral, Daily  senna-docusate sodium, 2 tablet, Oral, BID  sodium chloride, 10 mL, Intravenous, Q12H  tamsulosin, 0.4 mg, Oral, Daily      Continuous Infusions:sodium chloride 0.9 % with KCl 20 mEq, 75 mL/hr, Last Rate: 75 mL/hr (07/11/21 0619)      PRN Meds:.•  acetaminophen **OR** acetaminophen **OR** acetaminophen  •  senna-docusate sodium **AND** polyethylene glycol **AND** bisacodyl **AND** bisacodyl  •  dextrose  •  dextrose  •  glucagon (human recombinant)  •  Morphine  •  oxyCODONE-acetaminophen  •  sodium chloride    Assessment/Plan   Assessment & Plan     Active Hospital Problems    Diagnosis  POA   • **Intractable back pain [M54.9]  Yes   • Compression fracture of L3 vertebra (CMS/HCC) [S32.030A]  Yes   • Compression fracture of T12 vertebra (CMS/HCC) [S22.080A]  Yes   • Mood disorder (CMS/HCC) [F39]  Yes   • Chronic bilateral low back pain [M54.5, G89.29]  Unknown   • Acute low back pain with right-sided sciatica [M54.41]  Yes   • Mixed hyperlipidemia [E78.2]  Yes   • Hypertension [I10]  Yes   • Uncontrolled type 2 diabetes mellitus with hyperglycemia (CMS/HCC) [E11.65]  Yes   • Osteoarthritis [M19.90]  Yes      Resolved Hospital Problems   No resolved problems to display.        Brief Hospital Course to date:  Cristian Watt is a 75 y.o. male with past medical history significant for depression, DM2, and emphysema who presented for evaluation of progressive worsening of chronic low back pain becoming intractable to the point of immobility with imaging demonstrating multiple areas of compression fracture likely chronic.    This patient's problems and plans were partially entered by my partner and updated as appropriate by me 07/11/21.     Intractable back pain  Likely chronic T12, L3 compression fractures  -PT/OT with limited assessment due to pain   - MRI with compression deformity of the  superior endplate of T12, acute compression deformity of L3 with edema ; disc bulge at L3/L4   -neurosurgery, Dr. Zepeda following  - PRN pain control with percocet   - continue gabapentin (started 7/9) and titrate as tolerated   - tentative plan for kyphoplasty with Dr. Zepeda on Wednesday, Pt to discuss this with his son      Hyponatremia  - TSH ok; Cortisol ok, Triglycerides 137  - initial urine studies consistent more with hypovolemic hyponatremia and posisbly SIADH   - Sodium 127 this AM   -Nephrology following  -NS w/ 20k at 75 ml/hr   -continue close monitoring of Na  -continue fluid restriction   -BMP in AM      Urinary retention  - Possibly related to the above  - Meza in place for now   - continue flomax     DM type 2, A1c 12.4%, unknown chronic insulin use  -Continue Accu-Cheks with SSI  - continue levemir 10 units qHS     Constipation  -As needed bowel regimen     Depression  Anxiety  GERD  Hypertension  Hyperlipidemia  -Continue aspirin, losartan, rosuvastatin     DVT prophylaxis:  Mechanical DVT prophylaxis orders are present.      Disposition: I expect the patient to be discharged TBD    CODE STATUS:   Code Status and Medical Interventions:   Ordered at: 07/07/21 0600     Level Of Support Discussed With:    Patient     Code Status:    CPR     Medical Interventions (Level of Support Prior to Arrest):    Full       Gavin Hill, ASHA  07/11/21

## 2021-07-11 NOTE — THERAPY EVALUATION
Patient Name: Cristian Watt  : 1945    MRN: [id number]                              Today's Date: 2021       Admit Date: 2021    Visit Dx:     ICD-10-CM ICD-9-CM   1. Acute low back pain with right-sided sciatica, unspecified back pain laterality  M54.41 724.2     724.3   2. History of diabetes mellitus  Z86.39 V12.29   3. Impaired functional mobility, balance, gait, and endurance  Z74.09 V49.89   4. Compression fracture of T12 vertebra, initial encounter (CMS/Formerly [facility])  S22.080A 805.2     Patient Active Problem List   Diagnosis   • C. difficile colitis   • Disc degeneration, lumbar   • Osteoarthritis   • Uncontrolled type 2 diabetes mellitus with hyperglycemia (CMS/Formerly [facility])   • Hypertension   • Diarrhea   • Mixed hyperlipidemia   • Diabetic autonomic neuropathy associated with type 2 diabetes mellitus (CMS/Formerly [facility])   • Episode of recurrent major depressive disorder (CMS/Formerly [facility])   • Cigarette nicotine dependence with nicotine-induced disorder   • Compression fracture of L3 vertebra (CMS/Formerly [facility])   • Compression fracture of T12 vertebra (CMS/Formerly [facility])   • Mood disorder (CMS/Formerly [facility])   • Chronic bilateral low back pain   • Acute low back pain with right-sided sciatica   • Intractable back pain     Past Medical History:   Diagnosis Date   • Depression    • Diabetes mellitus (CMS/Formerly [facility])    • Disc degeneration, lumbar    • Emphysema of lung (CMS/Formerly [facility])    • Generalized anxiety disorder    • GERD (gastroesophageal reflux disease)    • Hyperlipidemia    • Hypertension    • Hypogonadism male    • Osteoarthritis    • Type 2 diabetes mellitus, uncontrolled (CMS/Formerly [facility])    • Vitamin D deficiency      Past Surgical History:   Procedure Laterality Date   • HERNIA REPAIR      Inguinal   • KNEE ARTHROPLASTY       General Information     Row Name 21 1359          OT Time and Intention    Document Type  evaluation  -TA     Mode of Treatment  occupational therapy  -TA     Row Name 21 1359          General Information    Patient Profile  Reviewed  yes  -TA     Prior Level of Function  independent:;all household mobility;gait;transfer;bed mobility;ADL's 1 month prior to this admission  -TA     Existing Precautions/Restrictions  spinal;fall T12, L3 fractures; anticipating kyphoplasty 07/14  -TA     Barriers to Rehab  medically complex;previous functional deficit;cognitive status  -TA     Row Name 07/11/21 [facility]9          Occupational Profile    Reason for Services/Referral (Occupational Profile)  fxl decline from PLOF  -TA     Patient Goals (Occupational Profile)  Have less pain  -TA     Row Name 07/11/21 [facility]9          Living Environment    Lives With  alone  -TA     Row Name 07/11/21 [facility]          Home Main Entrance    Number of Stairs, Main Entrance  -- Pt could not recall  -TA     Row Name 07/11/21 [facility]          Stairs Within Home, Primary    Number of Stairs, Within Home, Primary  none  -TA     Row Name 07/11/21 [facility]          Cognition    Orientation Status (Cognition)  oriented to;person;place;situation;verbal cues/prompts needed for orientation;time possible mild memory deficits as pt repeated several things to therapist in conversation  -TA     Row Name 07/11/21 [facility]9          Safety Issues, Functional Mobility    Safety Issues Affecting Function (Mobility)  insight into deficits/self-awareness  -TA     Impairments Affecting Function (Mobility)  balance;endurance/activity tolerance;pain;strength;postural/trunk control  -TA       User Key  (r) = Recorded By, (t) = Taken By, (c) = Cosigned By    Initials Name Provider Type    TA Richard White OT Occupational Therapist          Mobility/ADL's     Row Name 07/11/21 [facility]5          Bed Mobility    Bed Mobility  rolling left;rolling right;scooting/bridging  -TA     Rolling Left [place] (Bed Mobility)  minimum assist (75% patient effort);verbal cues;nonverbal cues (demo/gesture)  -TA     Rolling Right [place] (Bed Mobility)  minimum assist (75% patient effort);verbal cues;nonverbal cues  (demo/gesture)  -TA     Scooting/Bridging [place] (Bed Mobility)  maximum assist (25% patient effort);1 person assist;verbal cues  -TA     Comment (Bed Mobility)  Pt declined EOB/OOB activities  -TA     Row Name 07/11/21 1359          Transfers    Comment (Transfers)  Pt declined  -TA     Row Name 07/11/21 1359          Functional Mobility    Functional Mobility- Ind. Level  not tested  -TA     Functional Mobility- Comment  Pt declined EOB/OOB activities  -TA     Row Name 07/11/21 1359          Activities of Daily Living    BADL Assessment/Intervention  toileting;lower body dressing  -TA     Row Name 07/11/21 [facility]9          Lower Body Dressing Assessment/Training    [place] Level (Lower Body Dressing)  don;doff;socks;dependent (less than 25% patient effort)  -TA     Position (Lower Body Dressing)  supine  -TA     Row Name 07/11/21 1359          Toileting Assessment/Training    [place] Level (Toileting)  adjust/manage clothing;perform perineal hygiene;dependent (less than 25% patient effort)  -TA     Position (Toileting)  supine  -TA       User Key  (r) = Recorded By, (t) = Taken By, (c) = Cosigned By    Initials Name Provider Type    Richard Guzman, OT Occupational Therapist        Obj/Interventions     [facility] Name 07/11/21 1359          Sensory Assessment (Somatosensory)    Sensory Assessment (Somatosensory)  bilateral UE;sensation intact  -TA     Row Name 07/11/21 1359          Vision Assessment/Intervention    Visual Impairment/Limitations  WFL;corrective lenses full-time  -TA     Row Name 07/11/21 1359          Range of Motion Comprehensive    General Range of Motion  bilateral upper extremity ROM WFL  -TA     Row Name 07/11/21 1359          Strength Comprehensive (MMT)    General Manual Muscle Testing (MMT) Assessment  upper extremity strength deficits identified  -TA     Comment, General Manual Muscle Testing (MMT) Assessment  BUE 4/5 Increased back pain with MMT  -TA     Row Name 07/11/21  1359          Shoulder (Therapeutic Exercise)    Shoulder AROM (Therapeutic Exercise)  bilateral;flexion;extension;10 repetitions  -TA     Row Name 07/11/21 1359          Elbow/Forearm (Therapeutic Exercise)    Elbow/Forearm (Therapeutic Exercise)  AROM (active range of motion)  -TA     Elbow/Forearm AROM (Therapeutic Exercise)  bilateral;flexion;extension;10 repetitions  -TA     Row Name 07/11/21 1359          Hand (Therapeutic Exercise)    Hand (Therapeutic Exercise)  AROM (active range of motion)  -TA     Hand AROM/AAROM (Therapeutic Exercise)  bilateral;finger flexion;finger extension;10 repetitions  squeezes  -TA     Row Name 07/11/21 1359          Balance    Comment, Balance  Unable to assess sitting balance 2/2 pt declined EOB  -TA     Row Name 07/11/21 1359          Therapeutic Exercise    Therapeutic Exercise  shoulder;elbow/forearm;hand  -TA       User Key  (r) = Recorded By, (t) = Taken By, (c) = Cosigned By    Initials Name Provider Type    Richard Guzman OT Occupational Therapist        Goals/Plan     Row Name 07/11/21 1359          Bed Mobility Goal 1 (OT)    Activity/Assistive Device (Bed Mobility Goal 1, OT)  supine to sit;sit to supine  -TA     [place] Level/Cues Needed (Bed Mobility Goal 1, OT)  moderate assist (50-74% patient effort)  -TA     Time Frame (Bed Mobility Goal 1, OT)  by discharge  -TA     Progress/Outcomes (Bed Mobility Goal 1, OT)  goal ongoing  -TA     Row Name 07/11/21 1359          Transfer Goal 1 (OT)    Activity/Assistive Device (Transfer Goal 1, OT)  sit-to-stand/stand-to-sit;bed-to-chair/chair-to-bed;toilet;walker, rolling  -TA     [place] Level/Cues Needed (Transfer Goal 1, OT)  moderate assist (50-74% patient effort)  -TA     Time Frame (Transfer Goal 1, OT)  by discharge  -TA     Progress/Outcome (Transfer Goal 1, OT)  goal ongoing  -TA     Row Name 07/11/21 1359          Dressing Goal 1 (OT)    Activity/Device (Dressing Goal 1, OT)  lower body  dressing;long-handled shoe horn;reacher;sock-aid AE NOT issued at time of eval  -TA     [place]/Cues Needed (Dressing Goal 1, OT)  minimum assist (75% or more patient effort)  -TA     Time Frame (Dressing Goal 1, OT)  by discharge  -TA     Progress/Outcome (Dressing Goal 1, OT)  goal ongoing  -TA     Row Name 07/11/21 7349          Toileting Goal 1 (OT)    Activity/Device (Toileting Goal 1, OT)  adjust/manage clothing;perform perineal hygiene  -TA     [place] Level/Cues Needed (Toileting Goal 1, OT)  moderate assist (50-74% patient effort)  -TA     Time Frame (Toileting Goal 1, OT)  by discharge  -TA     Progress/Outcome (Toileting Goal 1, OT)  goal ongoing  -TA     Row Name 07/11/21 1359          Therapy Assessment/Plan (OT)    Planned Therapy Interventions (OT)  activity tolerance training;adaptive equipment training;BADL retraining;functional balance retraining;occupation/activity based interventions;patient/caregiver education/training;ROM/therapeutic exercise;strengthening exercise;transfer/mobility retraining  -TA       User Key  (r) = Recorded By, (t) = Taken By, (c) = Cosigned By    Initials Name Provider Type    TA Richard White OT Occupational Therapist        Clinical Impression     Row Name 07/11/21 3539          Pain Assessment    Additional Documentation  Pain Scale: Numbers Pre/Post-Treatment (Group)  -TA     Row Name 07/11/21 6904          Pain Scale: Numbers Pre/Post-Treatment    Pretreatment Pain Rating  7/10 with movement  -TA     Posttreatment Pain Rating  2/10 resting in supine  -TA     Pain Location - Orientation  lower  -TA     Pain Location  back  -TA     Pre/Posttreatment Pain Comment  Increased pain with bed mobility, improved with rest/positioning  -TA     Pain Intervention(s)  Medication (See MAR);Ambulation/increased activity;Repositioned  -TA     Row Name 07/11/21 9944          Plan of Care Review    Plan of Care Reviewed With  patient  -TA     Outcome Summary  VSS; Pt  presents with fxl decline from PLOF, deficits in ADL performance, fxl mobility, occupational endurance. Pt limited by weakness, high pain level with fxl mobility, required encouragement to roll R/L and scoot up in bed. Min A for R/L rolling and Max A to scoot up in bed. Pt is dependent for toileting and LBD. Pt completed BUE ex's to support ADL performance. Pt currently anticipating kyphoplasty on 07/14. Pt will benefit from skilled OT services to address deficits, facilitate increased fxl I. Recommend IRF at discharge.  -TA     Row Name 07/11/21 3313          Therapy Assessment/Plan (OT)    Patient/Family Therapy Goal Statement (OT)  have less pain  -TA     Rehab Potential (OT)  fair, will monitor progress closely  -TA     Criteria for Skilled Therapeutic Interventions Met (OT)  yes;skilled treatment is necessary  -TA     Therapy Frequency (OT)  daily  -TA     Predicted Duration of Therapy Intervention (OT)  1 week  -TA     Row Name 07/11/21 3291          Therapy Plan Review/Discharge Plan (OT)    Anticipated Discharge Disposition (OT)  inpatient rehabilitation facility  -TA     Row Name 07/11/21 1350          Vital Signs    Pre Systolic BP Rehab  -- VSS; RN cleared pt for tx  -TA     O2 Delivery Pre Treatment  room air  -TA     O2 Delivery Intra Treatment  room air  -TA     Post SpO2 (%)  92  -TA     O2 Delivery Post Treatment  room air  -TA     Pre Patient Position  Supine  -TA     Intra Patient Position  Side Lying  -TA     Post Patient Position  Supine  -TA     Row Name 07/11/21 2269          Positioning and Restraints    Pre-Treatment Position  in bed  -TA     Post Treatment Position  bed  -TA     In Bed  notified nsg;supine;call light within reach;encouraged to call for assist;exit alarm on;side rails up x3;legs elevated  -TA       User Key  (r) = Recorded By, (t) = Taken By, (c) = Cosigned By    Initials Name Provider Type    Richard Guzman, OT Occupational Therapist        Outcome Measures     [facility]  Name 07/11/21 1359          How much help from another is currently needed...    Putting on and taking off regular lower body clothing?  1  -TA     Bathing (including washing, rinsing, and drying)  2  -TA     Toileting (which includes using toilet bed pan or urinal)  1  -TA     Putting on and taking off regular upper body clothing  2  -TA     Taking care of personal grooming (such as brushing teeth)  3  -TA     Eating meals  4  -TA     AM-PAC 6 Clicks Score (OT)  13  -TA     Row Name 07/11/21 1045 07/11/21 0800       How much help from another person do you currently need...    Turning from your back to your side while in flat bed without using bedrails?  2  -SC  2  -CA    Moving from lying on back to sitting on the side of a flat bed without bedrails?  1  -SC  2  -CA    Moving to and from a bed to a chair (including a wheelchair)?  1  -SC  2  -CA    Standing up from a chair using your arms (e.g., wheelchair, bedside chair)?  1  -SC  2  -CA    Climbing 3-5 steps with a railing?  1  -SC  2  -CA    To walk in hospital room?  1  -SC  2  -CA    AM-PAC 6 Clicks Score (PT)  7  -SC  12  -CA    Row Name 07/11/21 1359 07/11/21 1045       Functional Assessment    Outcome Measure Options  AM-PAC 6 Clicks Daily Activity (OT)  -TA  AM-PAC 6 Clicks Basic Mobility (PT)  -SC      User Key  (r) = Recorded By, (t) = Taken By, (c) = Cosigned By    Initials Name Provider Type    Deyvi Hernandez, PT Physical Therapist    Antony Zuniga, RN Registered Nurse    Richard Guzman OT Occupational Therapist        Occupational Therapy Education                 Title: PT OT SLP Therapies (In Progress)     Topic: Occupational Therapy (In Progress)     Point: ADL training (Done)     Description:   Instruct learner(s) on proper safety adaptation and remediation techniques during self care or transfers.   Instruct in proper use of assistive devices.              Learning Progress Summary           Patient Acceptance, E, VU by TA at  7/10/2021 0915                   Point: Home exercise program (Not Started)     Description:   Instruct learner(s) on appropriate technique for monitoring, assisting and/or progressing therapeutic exercises/activities.              Learner Progress:  Not documented in this visit.          Point: Precautions (Done)     Description:   Instruct learner(s) on prescribed precautions during self-care and functional transfers.              Learning Progress Summary           Patient Acceptance, E,D, VU,NR by TA at 7/11/2021 1457    Acceptance, E, VU by TA at 7/10/2021 0915                   Point: Body mechanics (Done)     Description:   Instruct learner(s) on proper positioning and spine alignment during self-care, functional mobility activities and/or exercises.              Learning Progress Summary           Patient Acceptance, E,D, VU,NR by TA at 7/11/2021 1457                               User Key     Initials Effective Dates Name Provider Type Discipline    [place] 06/16/21 -  Richard White OT Occupational Therapist OT              OT Recommendation and Plan  Planned Therapy Interventions (OT): activity tolerance training, adaptive equipment training, BADL retraining, functional balance retraining, occupation/activity based interventions, patient/caregiver education/training, ROM/therapeutic exercise, strengthening exercise, transfer/mobility retraining  Therapy Frequency (OT): daily  Plan of Care Review  Plan of Care Reviewed With: patient  Progress: improving  Outcome Summary: VSS; Pt presents with fxl decline from PLOF, deficits in ADL performance, fxl mobility, occupational endurance. Pt limited by weakness, high pain level with fxl mobility, required encouragement to roll R/L and scoot up in bed. Min A for R/L rolling and Max A to scoot up in bed. Pt is dependent for toileting and LBD. Pt completed BUE ex's to support ADL performance. Pt currently anticipating kyphoplasty on 07/14. Pt will benefit from  skilled OT services to address deficits, facilitate increased fxl I. Recommend IRF at discharge.     Time Calculation:   Time Calculation- OT     Row Name 07/11/21 1359             Time Calculation- OT    OT Start Time  1359 ttc 0 minutes  -TA      Total Timed Code Minutes- OT  0 minute(s)  -TA      OT Received On  07/11/21  -TA      OT Goal Re-Cert Due Date  07/21/21  -TA         SNF Occupational Therapy Minutes    Skilled Minutes- OT  49 min  -TA        User Key  (r) = Recorded By, (t) = Taken By, (c) = Cosigned By    Initials Name Provider Type    TA Richard White OT Occupational Therapist        Therapy Charges for Today     Code Description Service Date Service Provider Modifiers Qty    27203099185 HC OT EVAL MOD COMPLEXITY 4 7/11/2021 Richard White OT GO 1               Richard White OT  7/11/2021

## 2021-07-11 NOTE — PLAN OF CARE
Goal Outcome Evaluation:  Plan of Care Reviewed With: patient        Progress: no change  Outcome Summary: A&Ox4, VSS, on RA. 1500mL fluid restrictions maintained with 240mL per tray and 390mL per shift. PRN percocet x2 for c/o back pain. Intervention effective per patient. Frequently calls out and then denies that he pushed the call bell or forgot what he wanted. Made some inappropriate comments towards tech and HUC. Educated patient on not being inappropriate. Patient refused to get OOB this shift. Educated on the need to stay mobile due to upcoming planned kyphoplasty. Kyphoplasty tentatively for Wednesday, 07/14. Denies any current c/o pain or further needs at this time.

## 2021-07-11 NOTE — PLAN OF CARE
Goal Outcome Evaluation:  Plan of Care Reviewed With: patient        Progress: no change  Outcome Summary: a/o x 4; forgetful, repetitive questioning; VSS, c/o back pain, PRN meds given; otherwise rested comfortably, no acute episodes to report overnight; will continue to monitor

## 2021-07-11 NOTE — PLAN OF CARE
Problem: Adult Inpatient Plan of Care  Goal: Plan of Care Review  Recent Flowsheet Documentation  Taken 7/11/2021 1359 by Richard White, OT  Plan of Care Reviewed With: patient  Outcome Summary:   VSS   Pt presents with fxl decline from PLOF, deficits in ADL performance, fxl mobility, occupational endurance. Pt limited by weakness, high pain level with fxl mobility, required encouragement to roll R/L and scoot up in bed. Min A for R/L rolling and Max A to scoot up in bed. Pt is dependent for toileting and LBD. Pt completed BUE ex's to support ADL performance. Pt currently anticipating kyphoplasty on 07/14. Pt will benefit from skilled OT services to address deficits, facilitate increased fxl I. Recommend IRF at discharge.   Goal Outcome Evaluation:  Plan of Care Reviewed With: patient        Progress: improving  Outcome Summary: VSS; Pt presents with fxl decline from PLOF, deficits in ADL performance, fxl mobility, occupational endurance. Pt limited by weakness, high pain level with fxl mobility, required encouragement to roll R/L and scoot up in bed. Min A for R/L rolling and Max A to scoot up in bed. Pt is dependent for toileting and LBD. Pt completed BUE ex's to support ADL performance. Pt currently anticipating kyphoplasty on 07/14. Pt will benefit from skilled OT services to address deficits, facilitate increased fxl I. Recommend IRF at discharge.

## 2021-07-11 NOTE — PROGRESS NOTES
"   LOS: 3 days    Patient Care Team:  Amanda Tate MD as PCP - General (Family Medicine)    Chief Complaint: Back pain    Subjective   Patient admitted with back pain found to have hyponatremia serum sodium 124 which is slowly up to 129 at this time.  Looking at old records patient has a history of triglyceridemia with a serum triglyceride 749 in 2018.  Cortisol level is normal..  Previous sodium has been within acceptable range.  He does not have polyuria polydipsia, no diarrhea, headache, nausea.  Patient is being admitted for back problem.  Renal function has been stable  Interval History:   Serum sodium level in improved to 131  Review of Systems:   Patient denies shortness of breath, chest pain, dysuria, hematuria, nausea, vomiting.       Objective     Vital Sign Min/Max for last 24 hours  Temp  Min: 97.3 °F (36.3 °C)  Max: 98 °F (36.7 °C)   BP  Min: 107/63  Max: 155/92   Pulse  Min: 75  Max: 100   Resp  Min: 16  Max: 18   SpO2  Min: 87 %  Max: 96 %   Flow (L/min)  Min: 2  Max: 2   No data recorded     Flowsheet Rows      First Filed Value   Admission Height  185.4 cm (73\") Documented at 07/07/2021 0332   Admission Weight  118 kg (260 lb) Documented at 07/07/2021 0332          I/O this shift:  In: 360 [P.O.:360]  Out: -   I/O last 3 completed shifts:  In: 2200 [P.O.:2200]  Out: 4900 [Urine:4900]    Physical Exam:  General Appearance: Alert, oriented, no obvious distress.  Eyes: PER, EOMI.  Neck: Supple no JVD.  Lungs: Clear auscultation, no rales rhonchi's, equal chest movement, nonlabored.  Heart: No gallop, murmur, rub, RRR.  Abdomen: Soft, nontender, positive bowel sounds, no organomegaly.  Extremities: No edema, no cyanosis.  Neuro: No focal deficit, moving all extremities, alert oriented X 3      WBC WBC   Date Value Ref Range Status   07/09/2021 9.36 3.40 - 10.80 10*3/mm3 Final      HGB Hemoglobin   Date Value Ref Range Status   07/09/2021 15.5 13.0 - 17.7 g/dL Final      HCT Hematocrit   Date Value " Ref Range Status   07/09/2021 46.1 37.5 - 51.0 % Final      Platlets No results found for: LABPLAT   MCV MCV   Date Value Ref Range Status   07/09/2021 91.5 79.0 - 97.0 fL Final          Sodium Sodium   Date Value Ref Range Status   07/11/2021 131 (L) 136 - 145 mmol/L Final   07/11/2021 127 (L) 136 - 145 mmol/L Final   07/11/2021 132 (L) 136 - 145 mmol/L Final   07/10/2021 128 (L) 136 - 145 mmol/L Final   07/10/2021 130 (L) 136 - 145 mmol/L Final   07/10/2021 126 (L) 136 - 145 mmol/L Final   07/10/2021 129 (L) 136 - 145 mmol/L Final   07/09/2021 130 (L) 136 - 145 mmol/L Final   07/09/2021 129 (L) 136 - 145 mmol/L Final   07/09/2021 126 (L) 136 - 145 mmol/L Final   07/09/2021 127 (L) 136 - 145 mmol/L Final   07/08/2021 130 (L) 136 - 145 mmol/L Final   07/08/2021 124 (L) 136 - 145 mmol/L Final      Potassium Potassium   Date Value Ref Range Status   07/11/2021 4.0 3.5 - 5.2 mmol/L Final     Comment:     Slight hemolysis detected by analyzer. Results may be affected.   07/09/2021 3.8 3.5 - 5.2 mmol/L Final      Chloride Chloride   Date Value Ref Range Status   07/11/2021 93 (L) 98 - 107 mmol/L Final   07/09/2021 91 (L) 98 - 107 mmol/L Final      CO2 CO2   Date Value Ref Range Status   07/11/2021 27.0 22.0 - 29.0 mmol/L Final   07/09/2021 24.0 22.0 - 29.0 mmol/L Final      BUN BUN   Date Value Ref Range Status   07/11/2021 5 (L) 8 - 23 mg/dL Final   07/09/2021 7 (L) 8 - 23 mg/dL Final      Creatinine Creatinine   Date Value Ref Range Status   07/11/2021 0.54 (L) 0.76 - 1.27 mg/dL Final   07/09/2021 0.55 (L) 0.76 - 1.27 mg/dL Final      Calcium Calcium   Date Value Ref Range Status   07/11/2021 9.1 8.6 - 10.5 mg/dL Final   07/09/2021 8.9 8.6 - 10.5 mg/dL Final      PO4 No results found for: CAPO4   Albumin No results found for: ALBUMIN   Magnesium No results found for: MG   Uric Acid No results found for: URICACID        Results Review:     I reviewed the patient's new clinical results.    aspirin, 81 mg, Oral,  Daily  gabapentin, 100 mg, Oral, Q8H  insulin detemir, 10 Units, Subcutaneous, Nightly  insulin lispro, 0-7 Units, Subcutaneous, TID AC  losartan, 50 mg, Oral, Daily  rosuvastatin, 20 mg, Oral, Daily  senna-docusate sodium, 2 tablet, Oral, BID  sodium chloride, 10 mL, Intravenous, Q12H  tamsulosin, 0.4 mg, Oral, Daily      sodium chloride 0.9 % with KCl 20 mEq, 75 mL/hr, Last Rate: 75 mL/hr (07/11/21 0619)        Medication Review: Reviewed    Assessment/Plan       Intractable back pain    Osteoarthritis    Uncontrolled type 2 diabetes mellitus with hyperglycemia (CMS/HCC)    Hypertension    Mixed hyperlipidemia    Compression fracture of L3 vertebra (CMS/HCC)    Compression fracture of T12 vertebra (CMS/HCC)    Mood disorder (CMS/HCC)    Chronic bilateral low back pain    Acute low back pain with right-sided sciatica    1.  Hyponatremia: Serum sodium 131 cortisol levels normal 8.44, glucose 212, serum osmolality 314, urine sodium 28, urine osmolarity 605  Triglyceride 137.  Other possibility could be volume depletion.  2.  Diabetes type 2  3.  Hypertension.  4.  Compression fracture T3, T12  Plan:  Pending aldosterone and renin level.  Okay to give IV normal saline  High risk patient with multiple problems  Nausea better, increase ADH secretion is noted with nausea which can cause hyponatremia.  Heparin can also cause decrease aldosterone secretion and action at the renal tubular level.  We will check aldosterone level and renal level         Whitney Richards MD  07/11/21  11:41 EDT

## 2021-07-12 ENCOUNTER — HOSPITAL ENCOUNTER (OUTPATIENT)
Facility: HOSPITAL | Age: 76
Setting detail: HOSPITAL OUTPATIENT SURGERY
End: 2021-07-12
Attending: NEUROLOGICAL SURGERY | Admitting: NEUROLOGICAL SURGERY

## 2021-07-12 LAB
ANION GAP SERPL CALCULATED.3IONS-SCNC: 12 MMOL/L (ref 5–15)
BUN SERPL-MCNC: 5 MG/DL (ref 8–23)
BUN/CREAT SERPL: 10.4 (ref 7–25)
CALCIUM SPEC-SCNC: 8.8 MG/DL (ref 8.6–10.5)
CHLORIDE SERPL-SCNC: 95 MMOL/L (ref 98–107)
CO2 SERPL-SCNC: 23 MMOL/L (ref 22–29)
CREAT SERPL-MCNC: 0.48 MG/DL (ref 0.76–1.27)
DEPRECATED RDW RBC AUTO: 40.6 FL (ref 37–54)
ERYTHROCYTE [DISTWIDTH] IN BLOOD BY AUTOMATED COUNT: 12.1 % (ref 12.3–15.4)
GFR SERPL CREATININE-BSD FRML MDRD: >150 ML/MIN/1.73
GLUCOSE BLDC GLUCOMTR-MCNC: 136 MG/DL (ref 70–130)
GLUCOSE BLDC GLUCOMTR-MCNC: 137 MG/DL (ref 70–130)
GLUCOSE BLDC GLUCOMTR-MCNC: 154 MG/DL (ref 70–130)
GLUCOSE BLDC GLUCOMTR-MCNC: 219 MG/DL (ref 70–130)
GLUCOSE SERPL-MCNC: 157 MG/DL (ref 65–99)
HCT VFR BLD AUTO: 45.6 % (ref 37.5–51)
HGB BLD-MCNC: 15.3 G/DL (ref 13–17.7)
MCH RBC QN AUTO: 30.8 PG (ref 26.6–33)
MCHC RBC AUTO-ENTMCNC: 33.6 G/DL (ref 31.5–35.7)
MCV RBC AUTO: 91.8 FL (ref 79–97)
PLATELET # BLD AUTO: 341 10*3/MM3 (ref 140–450)
PMV BLD AUTO: 8.4 FL (ref 6–12)
POTASSIUM SERPL-SCNC: 4.2 MMOL/L (ref 3.5–5.2)
RBC # BLD AUTO: 4.97 10*6/MM3 (ref 4.14–5.8)
SODIUM SERPL-SCNC: 126 MMOL/L (ref 136–145)
SODIUM SERPL-SCNC: 130 MMOL/L (ref 136–145)
SODIUM SERPL-SCNC: 132 MMOL/L (ref 136–145)
WBC # BLD AUTO: 8.86 10*3/MM3 (ref 3.4–10.8)

## 2021-07-12 PROCEDURE — 25810000003 SODIUM CHLORIDE 0.9 % WITH KCL 20 MEQ 20-0.9 MEQ/L-% SOLUTION: Performed by: INTERNAL MEDICINE

## 2021-07-12 PROCEDURE — 63710000001 INSULIN LISPRO (HUMAN) PER 5 UNITS: Performed by: NURSE PRACTITIONER

## 2021-07-12 PROCEDURE — 84295 ASSAY OF SERUM SODIUM: CPT | Performed by: INTERNAL MEDICINE

## 2021-07-12 PROCEDURE — 99232 SBSQ HOSP IP/OBS MODERATE 35: CPT | Performed by: INTERNAL MEDICINE

## 2021-07-12 PROCEDURE — 97110 THERAPEUTIC EXERCISES: CPT

## 2021-07-12 PROCEDURE — 80048 BASIC METABOLIC PNL TOTAL CA: CPT | Performed by: NURSE PRACTITIONER

## 2021-07-12 PROCEDURE — 82962 GLUCOSE BLOOD TEST: CPT

## 2021-07-12 PROCEDURE — 63710000001 INSULIN DETEMIR PER 5 UNITS: Performed by: INTERNAL MEDICINE

## 2021-07-12 PROCEDURE — 85027 COMPLETE CBC AUTOMATED: CPT | Performed by: NURSE PRACTITIONER

## 2021-07-12 RX ORDER — SODIUM CHLORIDE 9 MG/ML
100 INJECTION, SOLUTION INTRAVENOUS CONTINUOUS
Status: DISCONTINUED | OUTPATIENT
Start: 2021-07-12 | End: 2021-07-13

## 2021-07-12 RX ADMIN — DOCUSATE SODIUM 50MG AND SENNOSIDES 8.6MG 2 TABLET: 8.6; 5 TABLET, FILM COATED ORAL at 21:38

## 2021-07-12 RX ADMIN — GABAPENTIN 100 MG: 100 CAPSULE ORAL at 14:44

## 2021-07-12 RX ADMIN — OXYCODONE HYDROCHLORIDE AND ACETAMINOPHEN 1 TABLET: 7.5; 325 TABLET ORAL at 01:51

## 2021-07-12 RX ADMIN — SODIUM CHLORIDE, PRESERVATIVE FREE 10 ML: 5 INJECTION INTRAVENOUS at 08:56

## 2021-07-12 RX ADMIN — DOCUSATE SODIUM 50MG AND SENNOSIDES 8.6MG 2 TABLET: 8.6; 5 TABLET, FILM COATED ORAL at 08:56

## 2021-07-12 RX ADMIN — GABAPENTIN 100 MG: 100 CAPSULE ORAL at 05:13

## 2021-07-12 RX ADMIN — POTASSIUM CHLORIDE AND SODIUM CHLORIDE 75 ML/HR: 900; 150 INJECTION, SOLUTION INTRAVENOUS at 09:44

## 2021-07-12 RX ADMIN — INSULIN DETEMIR 10 UNITS: 100 INJECTION, SOLUTION SUBCUTANEOUS at 21:39

## 2021-07-12 RX ADMIN — LOSARTAN POTASSIUM 50 MG: 50 TABLET, FILM COATED ORAL at 08:55

## 2021-07-12 RX ADMIN — OXYCODONE HYDROCHLORIDE AND ACETAMINOPHEN 1 TABLET: 7.5; 325 TABLET ORAL at 09:03

## 2021-07-12 RX ADMIN — ROSUVASTATIN CALCIUM 20 MG: 20 TABLET, COATED ORAL at 08:55

## 2021-07-12 RX ADMIN — TAMSULOSIN HYDROCHLORIDE 0.4 MG: 0.4 CAPSULE ORAL at 08:55

## 2021-07-12 RX ADMIN — OXYCODONE HYDROCHLORIDE AND ACETAMINOPHEN 1 TABLET: 7.5; 325 TABLET ORAL at 15:54

## 2021-07-12 RX ADMIN — INSULIN LISPRO 2 UNITS: 100 INJECTION, SOLUTION INTRAVENOUS; SUBCUTANEOUS at 17:09

## 2021-07-12 RX ADMIN — GABAPENTIN 100 MG: 100 CAPSULE ORAL at 21:38

## 2021-07-12 RX ADMIN — SODIUM CHLORIDE, PRESERVATIVE FREE 10 ML: 5 INJECTION INTRAVENOUS at 21:39

## 2021-07-12 RX ADMIN — SODIUM CHLORIDE 100 ML/HR: 9 INJECTION, SOLUTION INTRAVENOUS at 15:54

## 2021-07-12 RX ADMIN — ASPIRIN 81 MG: 81 TABLET, COATED ORAL at 08:56

## 2021-07-12 RX ADMIN — OXYCODONE HYDROCHLORIDE AND ACETAMINOPHEN 1 TABLET: 7.5; 325 TABLET ORAL at 21:38

## 2021-07-12 NOTE — PROGRESS NOTES
[facility] Medicine Services  PROGRESS NOTE    Patient Name: Cristian Watt  : 1945  MRN: [id number]    Date of Admission: 2021  Primary Care Physician: Amanda Tate MD    Subjective   Subjective     CC: Follow-up back pain    HPI: No acute events overnight, patient he rested well, pain is relatively controlled.    ROS:  Gen- No fevers, chills  CV- No chest pain, palpitations  Resp- No cough, dyspnea  GI- No N/V/D, abd pain    All other systems reviewed and are negative  Objective   Objective     Vital Signs:   Temp:  [97.3 °F (36.3 °C)-98.3 °F (36.8 °C)] 97.3 °F (36.3 °C)  Heart Rate:  [82-93] 84  Resp:  [16-20] 18  BP: (124-151)/(77-93) 151/93        Physical Exam:  Constitutional: Chronically ill-appearing elderly male, in no acute distress, awake, alert  HENT: NCAT, mucous membranes moist  Respiratory: Clear to auscultation bilaterally, respiratory effort normal   Cardiovascular: RRR, no murmurs, rubs, or gallops  Gastrointestinal: Positive bowel sounds, soft, nontender, nondistended  : Meza catheter in place  Musculoskeletal: No bilateral ankle edema  Psychiatric: Appropriate affect, cooperative  Neurologic: Oriented x 3, nonfocal  Skin: No rashes    Results Reviewed:  Results from last 7 days   Lab Units 21  0534 21  0449   WBC 10*3/mm3 9.36 12.84*   HEMOGLOBIN g/dL 15.5 17.2   HEMATOCRIT % 46.1 50.0   PLATELETS 10*3/mm3 308 357     Results from last 7 days   Lab Units 21  2340 21  1544 21  1106 21  0601 21  0534 21  0608 21  0449   SODIUM mmol/L 132* 135* 131* 127* 126* 124* 129*   POTASSIUM mmol/L  --   --   --  4.0 3.8 3.7 4.4   CHLORIDE mmol/L  --   --   --  93* 91* 91* 92*   CO2 mmol/L  --   --   --  27.0 24.0 23.0 22.0   BUN mg/dL  --   --   --  5* 7* 8 11   CREATININE mg/dL  --   --   --  0.54* 0.55* 0.52* 0.71*   GLUCOSE mg/dL  --   --   --  185* 193* 211* 292*   CALCIUM mg/dL  --   --   --  9.1 8.9  9.0 9.3   ALT (SGPT) U/L  --   --   --   --   --   --  8   AST (SGOT) U/L  --   --   --   --   --   --  11     Estimated Creatinine Clearance: 107.3 mL/min (A) (by C-G formula based on SCr of 0.54 mg/dL (L)).    Microbiology Results Abnormal     Procedure Component Value - Date/Time    COVID PRE-OP / PRE-PROCEDURE SCREENING ORDER (NO ISOLATION) - Swab, Nasopharynx [[id number]]  (Normal) Collected: 07/07/21 0643    Lab Status: Final result Specimen: Swab from Nasopharynx Updated: 07/07/21 0755    Narrative:      The following orders were created for panel order COVID PRE-OP / PRE-PROCEDURE SCREENING ORDER (NO ISOLATION) - Swab, Nasopharynx.  Procedure                               Abnormality         Status                     ---------                               -----------         ------                     COVID-19,CEPHEID,CYNTHIA IN-...[[id number]]  Normal              Final result                 Please view results for these tests on the individual orders.    COVID-19,CEPHEID,CYNTHIA IN-HOUSE(OR EMERGENT/ADD-ON),NP SWAB IN TRANSPORT MEDIA 3-4 HR TAT - Swab, Nasopharynx [[id number]]  (Normal) Collected: 07/07/21 0643    Lab Status: Final result Specimen: Swab from Nasopharynx Updated: 07/07/21 0755     COVID19 Not Detected    Narrative:      Fact sheet for providers: https://www.fda.gov/media/906905/download     Fact sheet for patients: https://www.fda.gov/media/755632/download  Fact sheet for providers: https://www.fda.gov/media/158187/download     Fact sheet for patients: https://www.fda.gov/media/978608/download          Imaging Results (Last 24 Hours)     ** No results found for the last 24 hours. **              I have reviewed the medications:  Scheduled Meds:aspirin, 81 mg, Oral, Daily  gabapentin, 100 mg, Oral, Q8H  insulin detemir, 10 Units, Subcutaneous, Nightly  insulin lispro, 0-7 Units, Subcutaneous, TID AC  losartan, 50 mg, Oral, Daily  rosuvastatin, 20 mg, Oral, Daily  senna-docusate sodium, 2 tablet,  Oral, BID  sodium chloride, 10 mL, Intravenous, Q12H  tamsulosin, 0.4 mg, Oral, Daily      Continuous Infusions:sodium chloride 0.9 % with KCl 20 mEq, 75 mL/hr, Last Rate: 75 mL/hr (07/11/21 2012)      PRN Meds:.•  acetaminophen **OR** acetaminophen **OR** acetaminophen  •  senna-docusate sodium **AND** polyethylene glycol **AND** bisacodyl **AND** bisacodyl  •  dextrose  •  dextrose  •  glucagon (human recombinant)  •  Morphine  •  oxyCODONE-acetaminophen  •  sodium chloride    Assessment/Plan   Assessment & Plan     Active Hospital Problems    Diagnosis  POA   • **Intractable back pain [M54.9]  Yes   • Compression fracture of L3 vertebra (CMS/HCC) [S32.030A]  Yes   • Compression fracture of T12 vertebra (CMS/HCC) [S22.080A]  Yes   • Mood disorder (CMS/HCC) [F39]  Yes   • Chronic bilateral low back pain [M54.5, G89.29]  Unknown   • Acute low back pain with right-sided sciatica [M54.41]  Yes   • Mixed hyperlipidemia [E78.2]  Yes   • Hypertension [I10]  Yes   • Uncontrolled type 2 diabetes mellitus with hyperglycemia (CMS/HCC) [E11.65]  Yes   • Osteoarthritis [M19.90]  Yes      Resolved Hospital Problems   No resolved problems to display.        Brief Hospital Course to date:  Cristian Watt is a 75 y.o. male history of hypertension, poorly controlled type 2 diabetes, hyperlipidemia, anxiety depression, osteoarthritis, hyperlipidemia.  Patient presents with progressively worsening chronic low back pain, imaging concerning for T12 and L3 compression fractures.  Plan for kyphoplasty 7/14    Plan  Intractable back pain with T12 and L3 compression fractures  -Etiology unknown, denies any recent trauma  -Neurosurgery following, plan for T12- L3 kyphoplasty tentatively 7/14  -Continue pain control, PT/OT    Poorly controlled type 2 diabetes with A1c 12.4%, neuropathy  -FSBG's have been reviewed and currently appropriate,  -Continue current basal and SSI insulin.  -Continue gabapentin 100 mg TID.    Urinary  retention  -Meza catheter in place, will likely keep this in place until his procedure, plan for voiding trials thereafter, continue Flomax for now.    Hyponatremia  -Suspect hypovolemic hyponatremia,  -nephrology following, follow-up aldosterone and renin levels, he has normal cortisol levels  -Continue IV fluids  -Follow-up repeat labs    Hypertension-BP currently mildly elevated, continue home losartan    Hyperlipidemia-continue statin    Anxiety and depression    GERD    DVT prophylaxis:  Mechanical DVT prophylaxis orders are present.     Disposition: TBD    All problems listed above are new to me as this is my first encounter with patient    CODE STATUS:   Code Status and Medical Interventions:   Ordered at: 07/07/21 0600     Level Of Support Discussed With:    Patient     Code Status:    CPR     Medical Interventions (Level of Support Prior to Arrest):    Full       Shana Pardo MD  07/12/21

## 2021-07-12 NOTE — CONSULTS
Chart reviewed for diabetes education. Seen 7/8 and 7/9. Will follow as appropriate. Should patient desire further education please re consult or call [phone]

## 2021-07-12 NOTE — PLAN OF CARE
Problem: Adult Inpatient Plan of Care  Goal: Plan of Care Review  Outcome: Ongoing, Not Progressing  Goal: Patient-Specific Goal (Individualized)  Outcome: Ongoing, Not Progressing  Goal: Absence of Hospital-Acquired Illness or Injury  Outcome: Ongoing, Not Progressing  Intervention: Identify and Manage Fall Risk  Recent Flowsheet Documentation  Taken 7/12/2021 1600 by Myles Salvador RN  Safety Promotion/Fall Prevention: activity supervised  Taken 7/12/2021 1400 by Myles Salvador RN  Safety Promotion/Fall Prevention: activity supervised  Taken 7/12/2021 1200 by Myles Salvador RN  Safety Promotion/Fall Prevention: activity supervised  Taken 7/12/2021 1002 by Myles Salvador RN  Safety Promotion/Fall Prevention:   safety round/check completed   room organization consistent  Taken 7/12/2021 0808 by Myles Salvador RN  Safety Promotion/Fall Prevention: activity supervised  Intervention: Prevent Skin Injury  Recent Flowsheet Documentation  Taken 7/12/2021 1600 by Myles Salvdaor RN  Body Position: position changed independently  Skin Protection: adhesive use limited  Taken 7/12/2021 1400 by Myles Salvador RN  Body Position: patient/family refused  Skin Protection: adhesive use limited  Taken 7/12/2021 1200 by Myles Salvador RN  Body Position: patient/family refused  Skin Protection: adhesive use limited  Taken 7/12/2021 1002 by Myles Salvador RN  Body Position: patient/family refused  Skin Protection: adhesive use limited  Taken 7/12/2021 0808 by Myles Salvador RN  Body Position: patient/family refused  Skin Protection:   adhesive use limited   electrode sites changed  Intervention: Prevent and Manage VTE (venous thromboembolism) Risk  Recent Flowsheet Documentation  Taken 7/12/2021 1600 by Myles Salvador RN  VTE Prevention/Management: patient refused intervention  Taken 7/12/2021 1400 by Myles Salvador RN  VTE Prevention/Management: patient refused intervention  Taken 7/12/2021 0808 by [place]  Myles FISHMAN RN  VTE Prevention/Management: patient refused intervention  Intervention: Prevent Infection  Recent Flowsheet Documentation  Taken 7/12/2021 1600 by Myles Salvador RN  Infection Prevention: single patient room provided  Taken 7/12/2021 1400 by Myles Salvador RN  Infection Prevention: single patient room provided  Taken 7/12/2021 1200 by Myles Salvador RN  Infection Prevention:   rest/sleep promoted   single patient room provided  Taken 7/12/2021 1002 by Myles Salvador RN  Infection Prevention:   rest/sleep promoted   single patient room provided  Taken 7/12/2021 0808 by Myles Salvador RN  Infection Prevention:   rest/sleep promoted   single patient room provided  Goal: Optimal Comfort and Wellbeing  Outcome: Ongoing, Not Progressing  Intervention: Provide Person-Centered Care  Recent Flowsheet Documentation  Taken 7/12/2021 0808 by Myles Salvador, RN  Trust Relationship/Rapport:   care explained   choices provided   emotional support provided   empathic listening provided   questions answered   questions encouraged   reassurance provided   thoughts/feelings acknowledged  Goal: Readiness for Transition of Care  Outcome: Ongoing, Not Progressing   Goal Outcome Evaluation:      Pt resting well most of shift.  Complains of back pain with little relief from pain medication and numerous attempts to redirect or position.  Pt refusing to get up out of bed as well as CHG bath and greer care.  Educated patient extensively regarding these issues regarding refusal of care with son at bedside.  VSS, will continue to monitor.

## 2021-07-12 NOTE — CASE MANAGEMENT/SOCIAL WORK
Continued Stay Note  [facility]     Patient Name: Cristian Watt  MRN: [id number]  Today's Date: 7/12/2021    Admit Date: 7/7/2021    Discharge Plan     Row Name 07/12/21 1344       Plan    Plan Comments   I met with Mr. Watt today at the bedside to discuss discharge planning. He is not medically ready for discharge today, he is scheduled to have a kyphoplasty this Wednesday. I anticipate physical therapy and occupational therapy recommendations later this week. Case management will continue to follow Mr. Watt's progress and provide for him any anticipated discharge needs. Mr. Rausch will have family transport him home by car at the time of discharge if medically safe to do so.      Final Discharge Disposition Code  30 - still a patient            Expected Discharge Date and Time     Expected Discharge Date Expected Discharge Time    Jul 16, 2021       Jessica Driscoll RN

## 2021-07-12 NOTE — PLAN OF CARE
Goal Outcome Evaluation:  Plan of Care Reviewed With: patient        Progress: no change  Outcome Summary: Pt declined OOB or ther ex this date. Spent 10 minutes educating pt on importance of PT services, improving his mobility, and expectations for post-surgery care. Educated pt on spinal precautions and HEP with packet provided to pt. Educated pt on d/c recommendations to improve independence with mobility and safety and recommend IRF at d/c.

## 2021-07-12 NOTE — PROGRESS NOTES
"   LOS: 4 days    Patient Care Team:  Amanda Tate MD as PCP - General (Family Medicine)    Chief Complaint: Back pain    Subjective   Patient admitted with back pain found to have hyponatremia serum sodium 124 which is slowly up to 129 at this time.  Looking at old records patient has a history of triglyceridemia with a serum triglyceride 749 in 2018.  Cortisol level is normal..  Previous sodium has been within acceptable range.  He does not have polyuria polydipsia, no diarrhea, headache, nausea.  Patient is being admitted for back problem.  Renal function has been stable  Interval History:   Serum sodium level in improved to 131  Review of Systems:   Patient denies shortness of breath, chest pain, dysuria, hematuria, nausea, vomiting.       Objective     Vital Sign Min/Max for last 24 hours  Temp  Min: 97.3 °F (36.3 °C)  Max: 98.3 °F (36.8 °C)   BP  Min: 133/82  Max: 151/93   Pulse  Min: 81  Max: 96   Resp  Min: 16  Max: 18   SpO2  Min: 90 %  Max: 98 %   No data recorded   No data recorded     Flowsheet Rows      First Filed Value   Admission Height  185.4 cm (73\") Documented at 07/07/2021 0332   Admission Weight  118 kg (260 lb) Documented at 07/07/2021 0332          I/O this shift:  In: 240 [P.O.:240]  Out: 1250 [Urine:1250]  I/O last 3 completed shifts:  In: 1483 [P.O.:1483]  Out: 4700 [Urine:4700]    Physical Exam:  General Appearance: Alert, oriented, no obvious distress.  Eyes: PER, EOMI.  Neck: Supple no JVD.  Lungs: Clear auscultation, no rales rhonchi's, equal chest movement, nonlabored.  Heart: No gallop, murmur, rub, RRR.  Abdomen: Soft, nontender, positive bowel sounds, no organomegaly.  Extremities: No edema, no cyanosis.  Neuro: No focal deficit, moving all extremities, alert oriented X 3      WBC WBC   Date Value Ref Range Status   07/12/2021 8.86 3.40 - 10.80 10*3/mm3 Final      HGB Hemoglobin   Date Value Ref Range Status   07/12/2021 15.3 13.0 - 17.7 g/dL Final      HCT Hematocrit   Date " Value Ref Range Status   07/12/2021 45.6 37.5 - 51.0 % Final      Platlets No results found for: LABPLAT   MCV MCV   Date Value Ref Range Status   07/12/2021 91.8 79.0 - 97.0 fL Final          Sodium Sodium   Date Value Ref Range Status   07/12/2021 130 (L) 136 - 145 mmol/L Final   07/11/2021 132 (L) 136 - 145 mmol/L Final   07/11/2021 135 (L) 136 - 145 mmol/L Final   07/11/2021 131 (L) 136 - 145 mmol/L Final   07/11/2021 127 (L) 136 - 145 mmol/L Final   07/11/2021 132 (L) 136 - 145 mmol/L Final   07/10/2021 128 (L) 136 - 145 mmol/L Final   07/10/2021 130 (L) 136 - 145 mmol/L Final   07/10/2021 126 (L) 136 - 145 mmol/L Final   07/10/2021 129 (L) 136 - 145 mmol/L Final   07/09/2021 130 (L) 136 - 145 mmol/L Final      Potassium Potassium   Date Value Ref Range Status   07/12/2021 4.2 3.5 - 5.2 mmol/L Final   07/11/2021 4.0 3.5 - 5.2 mmol/L Final     Comment:     Slight hemolysis detected by analyzer. Results may be affected.      Chloride Chloride   Date Value Ref Range Status   07/12/2021 95 (L) 98 - 107 mmol/L Final   07/11/2021 93 (L) 98 - 107 mmol/L Final      CO2 CO2   Date Value Ref Range Status   07/12/2021 23.0 22.0 - 29.0 mmol/L Final   07/11/2021 27.0 22.0 - 29.0 mmol/L Final      BUN BUN   Date Value Ref Range Status   07/12/2021 5 (L) 8 - 23 mg/dL Final   07/11/2021 5 (L) 8 - 23 mg/dL Final      Creatinine Creatinine   Date Value Ref Range Status   07/12/2021 0.48 (L) 0.76 - 1.27 mg/dL Final   07/11/2021 0.54 (L) 0.76 - 1.27 mg/dL Final      Calcium Calcium   Date Value Ref Range Status   07/12/2021 8.8 8.6 - 10.5 mg/dL Final   07/11/2021 9.1 8.6 - 10.5 mg/dL Final      PO4 No results found for: CAPO4   Albumin No results found for: ALBUMIN   Magnesium No results found for: MG   Uric Acid No results found for: URICACID        Results Review:     I reviewed the patient's new clinical results.    aspirin, 81 mg, Oral, Daily  gabapentin, 100 mg, Oral, Q8H  insulin detemir, 10 Units, Subcutaneous,  Nightly  insulin lispro, 0-7 Units, Subcutaneous, TID AC  losartan, 50 mg, Oral, Daily  rosuvastatin, 20 mg, Oral, Daily  senna-docusate sodium, 2 tablet, Oral, BID  sodium chloride, 10 mL, Intravenous, Q12H  tamsulosin, 0.4 mg, Oral, Daily      sodium chloride 0.9 % with KCl 20 mEq, 75 mL/hr, Last Rate: 75 mL/hr (07/12/21 0944)        Medication Review: Reviewed    Assessment/Plan       Intractable back pain    Osteoarthritis    Uncontrolled type 2 diabetes mellitus with hyperglycemia (CMS/HCC)    Hypertension    Mixed hyperlipidemia    Compression fracture of L3 vertebra (CMS/HCC)    Compression fracture of T12 vertebra (CMS/HCC)    Mood disorder (CMS/HCC)    Chronic bilateral low back pain    Acute low back pain with right-sided sciatica    1.  Hyponatremia:  cortisol levels normal 8.44, glucose 212, serum osmolality 274, urine sodium 28, urine osmolarity 605  Triglyceride 137.   2.  Diabetes type 2  3.  Hypertension.  4.  Compression fracture T3, T12    Plan  Fluid restriction   Daily sodium level   Continue with NS infusion.   Monitor I/o     Discussed with patient and son at bedside.     Adam Ac MD  07/12/21  13:06 EDT

## 2021-07-13 LAB
GLUCOSE BLDC GLUCOMTR-MCNC: 152 MG/DL (ref 70–130)
GLUCOSE BLDC GLUCOMTR-MCNC: 163 MG/DL (ref 70–130)
GLUCOSE BLDC GLUCOMTR-MCNC: 198 MG/DL (ref 70–130)
GLUCOSE BLDC GLUCOMTR-MCNC: 214 MG/DL (ref 70–130)
SODIUM SERPL-SCNC: 128 MMOL/L (ref 136–145)
SODIUM SERPL-SCNC: 129 MMOL/L (ref 136–145)
SODIUM SERPL-SCNC: 130 MMOL/L (ref 136–145)

## 2021-07-13 PROCEDURE — 63710000001 INSULIN LISPRO (HUMAN) PER 5 UNITS: Performed by: NURSE PRACTITIONER

## 2021-07-13 PROCEDURE — 63710000001 INSULIN DETEMIR PER 5 UNITS: Performed by: INTERNAL MEDICINE

## 2021-07-13 PROCEDURE — 82962 GLUCOSE BLOOD TEST: CPT

## 2021-07-13 PROCEDURE — 84295 ASSAY OF SERUM SODIUM: CPT | Performed by: INTERNAL MEDICINE

## 2021-07-13 PROCEDURE — 99232 SBSQ HOSP IP/OBS MODERATE 35: CPT | Performed by: INTERNAL MEDICINE

## 2021-07-13 RX ORDER — SODIUM CHLORIDE 1000 MG
1 TABLET, SOLUBLE MISCELLANEOUS 2 TIMES DAILY WITH MEALS
Status: DISCONTINUED | OUTPATIENT
Start: 2021-07-13 | End: 2021-07-16 | Stop reason: HOSPADM

## 2021-07-13 RX ORDER — FUROSEMIDE 20 MG/1
20 TABLET ORAL DAILY
Status: DISCONTINUED | OUTPATIENT
Start: 2021-07-13 | End: 2021-07-16 | Stop reason: HOSPADM

## 2021-07-13 RX ADMIN — GABAPENTIN 100 MG: 100 CAPSULE ORAL at 15:30

## 2021-07-13 RX ADMIN — INSULIN LISPRO 2 UNITS: 100 INJECTION, SOLUTION INTRAVENOUS; SUBCUTANEOUS at 17:13

## 2021-07-13 RX ADMIN — FUROSEMIDE 20 MG: 20 TABLET ORAL at 15:30

## 2021-07-13 RX ADMIN — DOCUSATE SODIUM 50MG AND SENNOSIDES 8.6MG 2 TABLET: 8.6; 5 TABLET, FILM COATED ORAL at 20:10

## 2021-07-13 RX ADMIN — OXYCODONE HYDROCHLORIDE AND ACETAMINOPHEN 1 TABLET: 7.5; 325 TABLET ORAL at 15:30

## 2021-07-13 RX ADMIN — INSULIN LISPRO 2 UNITS: 100 INJECTION, SOLUTION INTRAVENOUS; SUBCUTANEOUS at 12:14

## 2021-07-13 RX ADMIN — OXYCODONE HYDROCHLORIDE AND ACETAMINOPHEN 1 TABLET: 7.5; 325 TABLET ORAL at 21:49

## 2021-07-13 RX ADMIN — SODIUM CHLORIDE, PRESERVATIVE FREE 10 ML: 5 INJECTION INTRAVENOUS at 09:28

## 2021-07-13 RX ADMIN — GABAPENTIN 100 MG: 100 CAPSULE ORAL at 20:10

## 2021-07-13 RX ADMIN — TAMSULOSIN HYDROCHLORIDE 0.4 MG: 0.4 CAPSULE ORAL at 09:25

## 2021-07-13 RX ADMIN — INSULIN LISPRO 2 UNITS: 100 INJECTION, SOLUTION INTRAVENOUS; SUBCUTANEOUS at 09:26

## 2021-07-13 RX ADMIN — LOSARTAN POTASSIUM 50 MG: 50 TABLET, FILM COATED ORAL at 09:26

## 2021-07-13 RX ADMIN — GABAPENTIN 100 MG: 100 CAPSULE ORAL at 05:47

## 2021-07-13 RX ADMIN — INSULIN DETEMIR 10 UNITS: 100 INJECTION, SOLUTION SUBCUTANEOUS at 20:09

## 2021-07-13 RX ADMIN — ROSUVASTATIN CALCIUM 20 MG: 20 TABLET, COATED ORAL at 09:26

## 2021-07-13 RX ADMIN — ASPIRIN 81 MG: 81 TABLET, COATED ORAL at 09:25

## 2021-07-13 RX ADMIN — OXYCODONE HYDROCHLORIDE AND ACETAMINOPHEN 1 TABLET: 7.5; 325 TABLET ORAL at 01:53

## 2021-07-13 RX ADMIN — Medication 1 G: at 18:24

## 2021-07-13 RX ADMIN — DOCUSATE SODIUM 50MG AND SENNOSIDES 8.6MG 2 TABLET: 8.6; 5 TABLET, FILM COATED ORAL at 09:26

## 2021-07-13 RX ADMIN — OXYCODONE HYDROCHLORIDE AND ACETAMINOPHEN 1 TABLET: 7.5; 325 TABLET ORAL at 09:25

## 2021-07-13 NOTE — SIGNIFICANT NOTE
"Patient was pleasant overnight, but still noncompliant with activity. Patient refuses to move in the bed and out of the bed. Patient refuses to roll or be turned, hardly will allow staff to pull him up in bed. Patient states \"he can't because of his back pain\". PRN pain medication offered and administered multiple times throughout the night. Patient still refuses to engage in any activity involving movement. Educated patient on importance of moving and walking. Also educated patient on activity/mobility expectations for upcoming surgery tomorrow. Patient understood teaching but did not agree and dismissed education altogether. Education reinforcement needed.   "

## 2021-07-13 NOTE — PROGRESS NOTES
[facility] Medicine Services  PROGRESS NOTE    Patient Name: Cristian Watt  : 1945  MRN: [id number]    Date of Admission: 2021  Primary Care Physician: Amanda Tate MD    Subjective   Subjective     CC: Follow-up back pain    HPI: No acute events overnight, patient rested well, back pain is relatively controlled.    ROS:  Gen- No fevers, chills  CV- No chest pain, palpitations  Resp- No cough, dyspnea  GI- No N/V/D, abd pain    All other systems reviewed and are negative  Objective   Objective     Vital Signs:   Temp:  [97.3 °F (36.3 °C)-98.3 °F (36.8 °C)] 97.3 °F (36.3 °C)  Heart Rate:  [] 89  Resp:  [16-18] 18  BP: (138-152)/(76-97) 143/97        Physical Exam:  Constitutional: Elderly male, in no acute distress, awake, alert  HENT: NCAT, mucous membranes moist  Respiratory: Clear to auscultation bilaterally, respiratory effort normal   Cardiovascular: RRR, no murmurs, rubs, or gallops  Gastrointestinal: Positive bowel sounds, soft, nontender, nondistended  Musculoskeletal: No bilateral ankle edema  Psychiatric: Appropriate affect, cooperative  Neurologic: Oriented x 3, nonfocal  Skin: No rashes    Results Reviewed:  Results from last 7 days   Lab Units 21  0928 21  0534 21  0449   WBC 10*3/mm3 8.86 9.36 12.84*   HEMOGLOBIN g/dL 15.3 15.5 17.2   HEMATOCRIT % 45.6 46.1 50.0   PLATELETS 10*3/mm3 341 308 357     Results from last 7 days   Lab Units 21  0732 21  0130 21  1538 21  0928 21  0601 21  0534 21  0449   SODIUM mmol/L 128* 129* 126* 130* 127* 126* 129*   POTASSIUM mmol/L  --   --   --  4.2 4.0 3.8 4.4   CHLORIDE mmol/L  --   --   --  95* 93* 91* 92*   CO2 mmol/L  --   --   --  23.0 27.0 24.0 22.0   BUN mg/dL  --   --   --  5* 5* 7* 11   CREATININE mg/dL  --   --   --  0.48* 0.54* 0.55* 0.71*   GLUCOSE mg/dL  --   --   --  157* 185* 193* 292*   CALCIUM mg/dL  --   --   --  8.8 9.1 8.9 9.3   ALT  (SGPT) U/L  --   --   --   --   --   --  8   AST (SGOT) U/L  --   --   --   --   --   --  11     Estimated Creatinine Clearance: 107.3 mL/min (A) (by C-G formula based on SCr of 0.48 mg/dL (L)).    Microbiology Results Abnormal     Procedure Component Value - Date/Time    COVID PRE-OP / PRE-PROCEDURE SCREENING ORDER (NO ISOLATION) - Swab, Nasopharynx [[id number]]  (Normal) Collected: 07/07/21 0643    Lab Status: Final result Specimen: Swab from Nasopharynx Updated: 07/07/21 0755    Narrative:      The following orders were created for panel order COVID PRE-OP / PRE-PROCEDURE SCREENING ORDER (NO ISOLATION) - Swab, Nasopharynx.  Procedure                               Abnormality         Status                     ---------                               -----------         ------                     COVID-19,CEPHEID,CYNTHIA IN-...[[id number]]  Normal              Final result                 Please view results for these tests on the individual orders.    COVID-19,CEPHEID,CYNTHIA IN-HOUSE(OR EMERGENT/ADD-ON),NP SWAB IN TRANSPORT MEDIA 3-4 HR TAT - Swab, Nasopharynx [[id number]]  (Normal) Collected: 07/07/21 0643    Lab Status: Final result Specimen: Swab from Nasopharynx Updated: 07/07/21 0755     COVID19 Not Detected    Narrative:      Fact sheet for providers: https://www.fda.gov/media/057956/download     Fact sheet for patients: https://www.fda.gov/media/305974/download  Fact sheet for providers: https://www.fda.gov/media/513359/download     Fact sheet for patients: https://www.fda.gov/media/399299/download          Imaging Results (Last 24 Hours)     ** No results found for the last 24 hours. **              I have reviewed the medications:  Scheduled Meds:aspirin, 81 mg, Oral, Daily  gabapentin, 100 mg, Oral, Q8H  insulin detemir, 10 Units, Subcutaneous, Nightly  insulin lispro, 0-7 Units, Subcutaneous, TID AC  losartan, 50 mg, Oral, Daily  rosuvastatin, 20 mg, Oral, Daily  senna-docusate sodium, 2 tablet, Oral, BID  sodium  chloride, 10 mL, Intravenous, Q12H  tamsulosin, 0.4 mg, Oral, Daily      Continuous Infusions:sodium chloride, 100 mL/hr, Last Rate: 100 mL/hr (07/12/21 1554)      PRN Meds:.•  acetaminophen **OR** acetaminophen **OR** acetaminophen  •  senna-docusate sodium **AND** polyethylene glycol **AND** bisacodyl **AND** bisacodyl  •  dextrose  •  dextrose  •  glucagon (human recombinant)  •  Morphine  •  oxyCODONE-acetaminophen  •  sodium chloride    Assessment/Plan   Assessment & Plan     Active Hospital Problems    Diagnosis  POA   • **Intractable back pain [M54.9]  Yes   • Compression fracture of L3 vertebra (CMS/HCC) [S32.030A]  Yes   • Compression fracture of T12 vertebra (CMS/HCC) [S22.080A]  Yes   • Mood disorder (CMS/HCC) [F39]  Yes   • Chronic bilateral low back pain [M54.5, G89.29]  Unknown   • Acute low back pain with right-sided sciatica [M54.41]  Yes   • Mixed hyperlipidemia [E78.2]  Yes   • Hypertension [I10]  Yes   • Uncontrolled type 2 diabetes mellitus with hyperglycemia (CMS/HCC) [E11.65]  Yes   • Osteoarthritis [M19.90]  Yes      Resolved Hospital Problems   No resolved problems to display.        Brief Hospital Course to date:  Cristian Watt is a 75 y.o. male history of hypertension, poorly controlled type 2 diabetes, hyperlipidemia, anxiety depression, osteoarthritis, hyperlipidemia.  Patient presents with progressively worsening chronic low back pain, imaging concerning for T12 and L3 compression fractures.  Plan for kyphoplasty 7/14     Plan  Intractable back pain with T12 and L3 compression fractures  -Etiology unknown, denies any recent trauma  -Neurosurgery following, plan for T12- L3 kyphoplasty tentatively 7/14  -Continue pain control, PT/OT     Poorly controlled type 2 diabetes with A1c 12.4%, neuropathy  -FSBG's have been reviewed and currently appropriate,  -Continue current basal and SSI insulin.  -Continue gabapentin 100 mg TID.     Urinary retention  -Meza catheter in place, will likely  keep this in place until his procedure, plan for voiding trials thereafter, continue Flomax for now.     Hyponatremia, improving  -Suspect hypovolemic hyponatremia,  -nephrology following, follow-up aldosterone and renin levels, he has normal cortisol levels  -Continue IV fluids    Hypertension-BP currently well controlled, continue home losartan     Hyperlipidemia-continue statin     Anxiety and depression     GERD     DVT prophylaxis:  Mechanical DVT prophylaxis orders are present.      Disposition: TBD    CODE STATUS:   Code Status and Medical Interventions:   Ordered at: 07/07/21 0600     Level Of Support Discussed With:    Patient     Code Status:    CPR     Medical Interventions (Level of Support Prior to Arrest):    Full       Shana Pardo MD  07/13/21

## 2021-07-13 NOTE — PLAN OF CARE
Problem: Adult Inpatient Plan of Care  Goal: Plan of Care Review  Outcome: Ongoing, Progressing  Goal: Patient-Specific Goal (Individualized)  Outcome: Ongoing, Progressing  Goal: Absence of Hospital-Acquired Illness or Injury  Outcome: Ongoing, Progressing  Intervention: Identify and Manage Fall Risk  Recent Flowsheet Documentation  Taken 7/13/2021 1602 by Myles Salvador RN  Safety Promotion/Fall Prevention: activity supervised  Taken 7/13/2021 1400 by Myles Salvador RN  Safety Promotion/Fall Prevention: activity supervised  Taken 7/13/2021 1201 by Myles Salvador RN  Safety Promotion/Fall Prevention: activity supervised  Taken 7/13/2021 1000 by Myles Salvador RN  Safety Promotion/Fall Prevention: clutter free environment maintained  Taken 7/13/2021 0806 by Myles Salvador RN  Safety Promotion/Fall Prevention: activity supervised  Intervention: Prevent Skin Injury  Recent Flowsheet Documentation  Taken 7/13/2021 1602 by Myles Salvador RN  Body Position: position changed independently  Taken 7/13/2021 1400 by Myles Salvador RN  Body Position: position changed independently  Taken 7/13/2021 1201 by Myles Salvador RN  Body Position: patient/family refused  Skin Protection: adhesive use limited  Taken 7/13/2021 1000 by Myles Salvador RN  Body Position: patient/family refused  Taken 7/13/2021 0806 by Myles Salvador RN  Body Position: patient/family refused  Skin Protection: adhesive use limited  Intervention: Prevent and Manage VTE (venous thromboembolism) Risk  Recent Flowsheet Documentation  Taken 7/13/2021 1602 by Myles Salvador RN  VTE Prevention/Management: patient refused intervention  Taken 7/13/2021 1201 by Myles Salvador RN  VTE Prevention/Management: patient refused intervention  Taken 7/13/2021 0806 by Myles Salvador RN  VTE Prevention/Management: patient refused intervention  Intervention: Prevent Infection  Recent Flowsheet Documentation  Taken 7/13/2021 1602 by Myles Salvador  RN  Infection Prevention: single patient room provided  Taken 7/13/2021 1400 by Myles Salvador RN  Infection Prevention: single patient room provided  Taken 7/13/2021 1201 by Myles Salvador RN  Infection Prevention: single patient room provided  Taken 7/13/2021 1000 by Myles Salvador RN  Infection Prevention: single patient room provided  Taken 7/13/2021 0806 by Myles Salvador, RN  Infection Prevention: single patient room provided  Goal: Optimal Comfort and Wellbeing  Outcome: Ongoing, Progressing  Intervention: Provide Person-Centered Care  Recent Flowsheet Documentation  Taken 7/13/2021 0806 by Myles Salvador, RN  Trust Relationship/Rapport:   care explained   choices provided   emotional support provided   empathic listening provided   questions answered   questions encouraged   thoughts/feelings acknowledged  Goal: Readiness for Transition of Care  Outcome: Ongoing, Progressing   Goal Outcome Evaluation:      Pt much more agreeable and pleasant today. Allowed nursing staff to perform greer care along with CHG wipes.  Complains of moderate back pain.  Scheduled for kyphoplasty tomorrow at 10am. VSS, sodium continues to be low, given 1g sodium chloride tablet at dinner.  Will continue to monitor.

## 2021-07-13 NOTE — PROGRESS NOTES
"   LOS: 5 days    Patient Care Team:  Amanda Tate MD as PCP - General (Family Medicine)    Chief Complaint: Back pain    Subjective   Patient admitted with back pain found to have hyponatremia serum sodium 124 which is slowly up to 129 at this time.  Looking at old records patient has a history of triglyceridemia with a serum triglyceride 749 in 2018.  Cortisol level is normal..  Previous sodium has been within acceptable range.  He does not have polyuria polydipsia, no diarrhea, headache, nausea.  Patient is being admitted for back problem.  Renal function has been stable  Interval History:   No acute events overnight. Back pain.   Review of Systems:   No SOA, CP, N/V       Objective     Vital Sign Min/Max for last 24 hours  Temp  Min: 97.3 °F (36.3 °C)  Max: 98.3 °F (36.8 °C)   BP  Min: 138/90  Max: 152/85   Pulse  Min: 82  Max: 101   Resp  Min: 16  Max: 18   SpO2  Min: 88 %  Max: 93 %   No data recorded   No data recorded     Flowsheet Rows      First Filed Value   Admission Height  185.4 cm (73\") Documented at 07/07/2021 0332   Admission Weight  118 kg (260 lb) Documented at 07/07/2021 0332          No intake/output data recorded.  I/O last 3 completed shifts:  In: 600 [P.O.:600]  Out: 6250 [Urine:6250]    Physical Exam:  General Appearance: Alert, oriented, no obvious distress.  Eyes: PER, EOMI.  Neck: Supple no JVD.  Lungs: Clear auscultation, no rales rhonchi's, equal chest movement, nonlabored.  Heart: No gallop, murmur, rub, RRR.  Abdomen: Soft, nontender, positive bowel sounds, no organomegaly.  Extremities: No edema, no cyanosis.  Neuro: No focal deficit, moving all extremities, alert oriented X 3      WBC WBC   Date Value Ref Range Status   07/12/2021 8.86 3.40 - 10.80 10*3/mm3 Final      HGB Hemoglobin   Date Value Ref Range Status   07/12/2021 15.3 13.0 - 17.7 g/dL Final      HCT Hematocrit   Date Value Ref Range Status   07/12/2021 45.6 37.5 - 51.0 % Final      Platlets No results found for: " LABPLAT   MCV MCV   Date Value Ref Range Status   07/12/2021 91.8 79.0 - 97.0 fL Final          Sodium Sodium   Date Value Ref Range Status   07/13/2021 128 (L) 136 - 145 mmol/L Final   07/13/2021 129 (L) 136 - 145 mmol/L Final   07/12/2021 126 (L) 136 - 145 mmol/L Final   07/12/2021 130 (L) 136 - 145 mmol/L Final   07/11/2021 132 (L) 136 - 145 mmol/L Final   07/11/2021 135 (L) 136 - 145 mmol/L Final   07/11/2021 131 (L) 136 - 145 mmol/L Final   07/11/2021 127 (L) 136 - 145 mmol/L Final   07/11/2021 132 (L) 136 - 145 mmol/L Final   07/10/2021 128 (L) 136 - 145 mmol/L Final   07/10/2021 130 (L) 136 - 145 mmol/L Final      Potassium Potassium   Date Value Ref Range Status   07/12/2021 4.2 3.5 - 5.2 mmol/L Final   07/11/2021 4.0 3.5 - 5.2 mmol/L Final     Comment:     Slight hemolysis detected by analyzer. Results may be affected.      Chloride Chloride   Date Value Ref Range Status   07/12/2021 95 (L) 98 - 107 mmol/L Final   07/11/2021 93 (L) 98 - 107 mmol/L Final      CO2 CO2   Date Value Ref Range Status   07/12/2021 23.0 22.0 - 29.0 mmol/L Final   07/11/2021 27.0 22.0 - 29.0 mmol/L Final      BUN BUN   Date Value Ref Range Status   07/12/2021 5 (L) 8 - 23 mg/dL Final   07/11/2021 5 (L) 8 - 23 mg/dL Final      Creatinine Creatinine   Date Value Ref Range Status   07/12/2021 0.48 (L) 0.76 - 1.27 mg/dL Final   07/11/2021 0.54 (L) 0.76 - 1.27 mg/dL Final      Calcium Calcium   Date Value Ref Range Status   07/12/2021 8.8 8.6 - 10.5 mg/dL Final   07/11/2021 9.1 8.6 - 10.5 mg/dL Final      PO4 No results found for: CAPO4   Albumin No results found for: ALBUMIN   Magnesium No results found for: MG   Uric Acid No results found for: URICACID        Results Review:     I reviewed the patient's new clinical results.    aspirin, 81 mg, Oral, Daily  gabapentin, 100 mg, Oral, Q8H  insulin detemir, 10 Units, Subcutaneous, Nightly  insulin lispro, 0-7 Units, Subcutaneous, TID AC  losartan, 50 mg, Oral, Daily  rosuvastatin, 20 mg,  Oral, Daily  senna-docusate sodium, 2 tablet, Oral, BID  sodium chloride, 10 mL, Intravenous, Q12H  tamsulosin, 0.4 mg, Oral, Daily      sodium chloride, 100 mL/hr, Last Rate: 100 mL/hr (07/12/21 1554)        Medication Review: Reviewed    Assessment/Plan       Intractable back pain    Osteoarthritis    Uncontrolled type 2 diabetes mellitus with hyperglycemia (CMS/HCC)    Hypertension    Mixed hyperlipidemia    Compression fracture of L3 vertebra (CMS/HCC)    Compression fracture of T12 vertebra (CMS/HCC)    Mood disorder (CMS/HCC)    Chronic bilateral low back pain    Acute low back pain with right-sided sciatica    1.  Hyponatremia:  cortisol levels normal 8.44, glucose 212, serum osmolality 274, urine sodium 28, urine osmolarity 605  Triglyceride 137.   2.  Diabetes type 2  3.  Hypertension.  4.  Compression fracture T3, T12    Plan  Fluid restriction   Sodium chloride tablets.   Daily sodium level    Monitor I/o     Discussed with patient      Adam Ac MD  07/13/21  10:08 EDT

## 2021-07-13 NOTE — CASE MANAGEMENT/SOCIAL WORK
Continued Stay Note   [facility]     Patient Name: Cristian Watt  MRN: [id number]  Today's Date: 7/13/2021    Admit Date: 7/7/2021    Discharge Plan     Row Name 07/13/21 1500       Plan    Plan  Acute vs skilled rehab    Patient/Family in Agreement with Plan  yes    Plan Comments  Met with patient in the room to discuss discharge planning. Per MDR and chart, patient is scheduled for a kyphoplasty on Wednesday, 7/14/21. Discussed with patient the differences between acute and skilled rehab. Patient would like to see what PT and OT recommend after surgery. He is okay with a referral to [facility] and does not have a preference for a SNF.     Called a referral for acute rehab to Tahmina with [facility], and she will start following patient in [facility]. Called a referral for skilled rehab to [facility]. She is not sure about bed availability this week but will follow in Epic. Called referral to Susanna for any of [facility] facilities. Please ensure patient has PT and OT ordered after surgery. Thank you.     Anticipate patient will need EMS or [facility] transportation to the facility. CM will continue to follow.    Final Discharge Disposition Code  30 - still a patient        Discharge Codes    No documentation.       Expected Discharge Date and Time     Expected Discharge Date Expected Discharge Time    Jul 16, 2021             Dena Candelaria RN

## 2021-07-14 ENCOUNTER — APPOINTMENT (OUTPATIENT)
Dept: GENERAL RADIOLOGY | Facility: HOSPITAL | Age: 76
End: 2021-07-14

## 2021-07-14 ENCOUNTER — ANESTHESIA (OUTPATIENT)
Dept: PERIOP | Facility: HOSPITAL | Age: 76
End: 2021-07-14

## 2021-07-14 ENCOUNTER — ANESTHESIA EVENT (OUTPATIENT)
Dept: PERIOP | Facility: HOSPITAL | Age: 76
End: 2021-07-14

## 2021-07-14 LAB
GLUCOSE BLDC GLUCOMTR-MCNC: 157 MG/DL (ref 70–130)
GLUCOSE BLDC GLUCOMTR-MCNC: 212 MG/DL (ref 70–130)
GLUCOSE BLDC GLUCOMTR-MCNC: 233 MG/DL (ref 70–130)
POTASSIUM SERPL-SCNC: 3.7 MMOL/L (ref 3.5–5.2)
QT INTERVAL: 408 MS
QTC INTERVAL: 490 MS
RENIN PLAS-CCNC: 0.44 NG/ML/HR (ref 0.17–5.38)
SODIUM SERPL-SCNC: 127 MMOL/L (ref 136–145)
SODIUM SERPL-SCNC: 128 MMOL/L (ref 136–145)
SODIUM SERPL-SCNC: 130 MMOL/L (ref 136–145)

## 2021-07-14 PROCEDURE — 25010000002 VANCOMYCIN: Performed by: NEUROLOGICAL SURGERY

## 2021-07-14 PROCEDURE — 97168 OT RE-EVAL EST PLAN CARE: CPT

## 2021-07-14 PROCEDURE — 0QS03ZZ REPOSITION LUMBAR VERTEBRA, PERCUTANEOUS APPROACH: ICD-10-PCS | Performed by: NEUROLOGICAL SURGERY

## 2021-07-14 PROCEDURE — 25010000002 ONDANSETRON PER 1 MG: Performed by: NURSE ANESTHETIST, CERTIFIED REGISTERED

## 2021-07-14 PROCEDURE — 22515 PERQ VERTEBRAL AUGMENTATION: CPT | Performed by: NEUROLOGICAL SURGERY

## 2021-07-14 PROCEDURE — 0 IOPAMIDOL 41 % SOLUTION: Performed by: NEUROLOGICAL SURGERY

## 2021-07-14 PROCEDURE — 25010000002 VANCOMYCIN 10 G RECONSTITUTED SOLUTION: Performed by: NEUROLOGICAL SURGERY

## 2021-07-14 PROCEDURE — 22510 PERQ CERVICOTHORACIC INJECT: CPT

## 2021-07-14 PROCEDURE — 84295 ASSAY OF SERUM SODIUM: CPT | Performed by: INTERNAL MEDICINE

## 2021-07-14 PROCEDURE — 97116 GAIT TRAINING THERAPY: CPT

## 2021-07-14 PROCEDURE — 25010000002 FENTANYL CITRATE (PF) 50 MCG/ML SOLUTION: Performed by: NURSE ANESTHETIST, CERTIFIED REGISTERED

## 2021-07-14 PROCEDURE — 84132 ASSAY OF SERUM POTASSIUM: CPT | Performed by: ANESTHESIOLOGY

## 2021-07-14 PROCEDURE — 88307 TISSUE EXAM BY PATHOLOGIST: CPT | Performed by: NEUROLOGICAL SURGERY

## 2021-07-14 PROCEDURE — 25010000002 DEXAMETHASONE PER 1 MG: Performed by: NURSE ANESTHETIST, CERTIFIED REGISTERED

## 2021-07-14 PROCEDURE — 97530 THERAPEUTIC ACTIVITIES: CPT

## 2021-07-14 PROCEDURE — 0PS43ZZ REPOSITION THORACIC VERTEBRA, PERCUTANEOUS APPROACH: ICD-10-PCS | Performed by: NEUROLOGICAL SURGERY

## 2021-07-14 PROCEDURE — 97535 SELF CARE MNGMENT TRAINING: CPT

## 2021-07-14 PROCEDURE — 22513 PERQ VERTEBRAL AUGMENTATION: CPT | Performed by: NEUROLOGICAL SURGERY

## 2021-07-14 PROCEDURE — 0PU43JZ SUPPLEMENT THORACIC VERTEBRA WITH SYNTHETIC SUBSTITUTE, PERCUTANEOUS APPROACH: ICD-10-PCS | Performed by: NEUROLOGICAL SURGERY

## 2021-07-14 PROCEDURE — 0PB43ZX EXCISION OF THORACIC VERTEBRA, PERCUTANEOUS APPROACH, DIAGNOSTIC: ICD-10-PCS | Performed by: NEUROLOGICAL SURGERY

## 2021-07-14 PROCEDURE — 99232 SBSQ HOSP IP/OBS MODERATE 35: CPT | Performed by: INTERNAL MEDICINE

## 2021-07-14 PROCEDURE — 97164 PT RE-EVAL EST PLAN CARE: CPT

## 2021-07-14 PROCEDURE — 93010 ELECTROCARDIOGRAM REPORT: CPT | Performed by: INTERNAL MEDICINE

## 2021-07-14 PROCEDURE — 84295 ASSAY OF SERUM SODIUM: CPT | Performed by: NEUROLOGICAL SURGERY

## 2021-07-14 PROCEDURE — 0QU03JZ SUPPLEMENT LUMBAR VERTEBRA WITH SYNTHETIC SUBSTITUTE, PERCUTANEOUS APPROACH: ICD-10-PCS | Performed by: NEUROLOGICAL SURGERY

## 2021-07-14 PROCEDURE — 97110 THERAPEUTIC EXERCISES: CPT

## 2021-07-14 PROCEDURE — 25010000002 PROPOFOL 10 MG/ML EMULSION: Performed by: NURSE ANESTHETIST, CERTIFIED REGISTERED

## 2021-07-14 PROCEDURE — 88311 DECALCIFY TISSUE: CPT | Performed by: NEUROLOGICAL SURGERY

## 2021-07-14 PROCEDURE — 63710000001 INSULIN LISPRO (HUMAN) PER 5 UNITS: Performed by: NEUROLOGICAL SURGERY

## 2021-07-14 PROCEDURE — 0QB03ZX EXCISION OF LUMBAR VERTEBRA, PERCUTANEOUS APPROACH, DIAGNOSTIC: ICD-10-PCS | Performed by: NEUROLOGICAL SURGERY

## 2021-07-14 PROCEDURE — 25010000002 NEOSTIGMINE 10 MG/10ML SOLUTION: Performed by: NURSE ANESTHETIST, CERTIFIED REGISTERED

## 2021-07-14 PROCEDURE — 93005 ELECTROCARDIOGRAM TRACING: CPT | Performed by: ANESTHESIOLOGY

## 2021-07-14 PROCEDURE — C1713 ANCHOR/SCREW BN/BN,TIS/BN: HCPCS | Performed by: NEUROLOGICAL SURGERY

## 2021-07-14 PROCEDURE — 63710000001 INSULIN DETEMIR PER 5 UNITS: Performed by: NEUROLOGICAL SURGERY

## 2021-07-14 PROCEDURE — 82962 GLUCOSE BLOOD TEST: CPT

## 2021-07-14 DEVICE — CEMENT C01A KYPHX HV-R BONE CEMENT EN
Type: IMPLANTABLE DEVICE | Site: SPINE LUMBAR | Status: FUNCTIONAL
Brand: KYPHON® HV-R® BONE CEMENT

## 2021-07-14 RX ORDER — FAMOTIDINE 10 MG/ML
20 INJECTION, SOLUTION INTRAVENOUS ONCE
Status: CANCELLED | OUTPATIENT
Start: 2021-07-14 | End: 2021-07-14

## 2021-07-14 RX ORDER — PROMETHAZINE HYDROCHLORIDE 25 MG/1
25 SUPPOSITORY RECTAL ONCE AS NEEDED
Status: DISCONTINUED | OUTPATIENT
Start: 2021-07-14 | End: 2021-07-14 | Stop reason: HOSPADM

## 2021-07-14 RX ORDER — MEPERIDINE HYDROCHLORIDE 25 MG/ML
12.5 INJECTION INTRAMUSCULAR; INTRAVENOUS; SUBCUTANEOUS
Status: DISCONTINUED | OUTPATIENT
Start: 2021-07-14 | End: 2021-07-14 | Stop reason: HOSPADM

## 2021-07-14 RX ORDER — NEOSTIGMINE METHYLSULFATE 1 MG/ML
INJECTION, SOLUTION INTRAVENOUS AS NEEDED
Status: DISCONTINUED | OUTPATIENT
Start: 2021-07-14 | End: 2021-07-14 | Stop reason: SURG

## 2021-07-14 RX ORDER — LIDOCAINE HYDROCHLORIDE 10 MG/ML
0.5 INJECTION, SOLUTION EPIDURAL; INFILTRATION; INTRACAUDAL; PERINEURAL ONCE AS NEEDED
Status: DISCONTINUED | OUTPATIENT
Start: 2021-07-14 | End: 2021-07-14 | Stop reason: HOSPADM

## 2021-07-14 RX ORDER — SODIUM CHLORIDE 0.9 % (FLUSH) 0.9 %
10 SYRINGE (ML) INJECTION AS NEEDED
Status: DISCONTINUED | OUTPATIENT
Start: 2021-07-14 | End: 2021-07-14 | Stop reason: HOSPADM

## 2021-07-14 RX ORDER — DROPERIDOL 2.5 MG/ML
0.62 INJECTION, SOLUTION INTRAMUSCULAR; INTRAVENOUS ONCE AS NEEDED
Status: DISCONTINUED | OUTPATIENT
Start: 2021-07-14 | End: 2021-07-14 | Stop reason: HOSPADM

## 2021-07-14 RX ORDER — SODIUM CHLORIDE, SODIUM LACTATE, POTASSIUM CHLORIDE, CALCIUM CHLORIDE 600; 310; 30; 20 MG/100ML; MG/100ML; MG/100ML; MG/100ML
9 INJECTION, SOLUTION INTRAVENOUS CONTINUOUS
Status: DISCONTINUED | OUTPATIENT
Start: 2021-07-14 | End: 2021-07-14

## 2021-07-14 RX ORDER — BUPIVACAINE HCL/0.9 % NACL/PF 0.125 %
PLASTIC BAG, INJECTION (ML) EPIDURAL AS NEEDED
Status: DISCONTINUED | OUTPATIENT
Start: 2021-07-14 | End: 2021-07-14 | Stop reason: SURG

## 2021-07-14 RX ORDER — PROMETHAZINE HYDROCHLORIDE 25 MG/1
25 TABLET ORAL ONCE AS NEEDED
Status: DISCONTINUED | OUTPATIENT
Start: 2021-07-14 | End: 2021-07-14 | Stop reason: HOSPADM

## 2021-07-14 RX ORDER — HYDROCODONE BITARTRATE AND ACETAMINOPHEN 5; 325 MG/1; MG/1
1 TABLET ORAL ONCE AS NEEDED
Status: DISCONTINUED | OUTPATIENT
Start: 2021-07-14 | End: 2021-07-14 | Stop reason: HOSPADM

## 2021-07-14 RX ORDER — LIDOCAINE HYDROCHLORIDE 20 MG/ML
JELLY TOPICAL AS NEEDED
Status: DISCONTINUED | OUTPATIENT
Start: 2021-07-14 | End: 2021-07-14 | Stop reason: SURG

## 2021-07-14 RX ORDER — MORPHINE SULFATE 2 MG/ML
1 INJECTION, SOLUTION INTRAMUSCULAR; INTRAVENOUS EVERY 4 HOURS PRN
Status: DISCONTINUED | OUTPATIENT
Start: 2021-07-14 | End: 2021-07-16 | Stop reason: HOSPADM

## 2021-07-14 RX ORDER — DIPHENHYDRAMINE HCL 25 MG
25 CAPSULE ORAL NIGHTLY PRN
Status: DISCONTINUED | OUTPATIENT
Start: 2021-07-14 | End: 2021-07-16 | Stop reason: HOSPADM

## 2021-07-14 RX ORDER — FAMOTIDINE 20 MG/1
20 TABLET, FILM COATED ORAL ONCE
Status: COMPLETED | OUTPATIENT
Start: 2021-07-14 | End: 2021-07-14

## 2021-07-14 RX ORDER — HYDRALAZINE HYDROCHLORIDE 20 MG/ML
5 INJECTION INTRAMUSCULAR; INTRAVENOUS
Status: DISCONTINUED | OUTPATIENT
Start: 2021-07-14 | End: 2021-07-14 | Stop reason: HOSPADM

## 2021-07-14 RX ORDER — HYDROMORPHONE HYDROCHLORIDE 1 MG/ML
0.5 INJECTION, SOLUTION INTRAMUSCULAR; INTRAVENOUS; SUBCUTANEOUS
Status: DISCONTINUED | OUTPATIENT
Start: 2021-07-14 | End: 2021-07-14 | Stop reason: HOSPADM

## 2021-07-14 RX ORDER — LIDOCAINE HYDROCHLORIDE 10 MG/ML
INJECTION, SOLUTION EPIDURAL; INFILTRATION; INTRACAUDAL; PERINEURAL AS NEEDED
Status: DISCONTINUED | OUTPATIENT
Start: 2021-07-14 | End: 2021-07-14 | Stop reason: SURG

## 2021-07-14 RX ORDER — FENTANYL CITRATE 50 UG/ML
50 INJECTION, SOLUTION INTRAMUSCULAR; INTRAVENOUS
Status: DISCONTINUED | OUTPATIENT
Start: 2021-07-14 | End: 2021-07-14 | Stop reason: HOSPADM

## 2021-07-14 RX ORDER — FENTANYL CITRATE 50 UG/ML
INJECTION, SOLUTION INTRAMUSCULAR; INTRAVENOUS AS NEEDED
Status: DISCONTINUED | OUTPATIENT
Start: 2021-07-14 | End: 2021-07-14 | Stop reason: SURG

## 2021-07-14 RX ORDER — SODIUM CHLORIDE 0.9 % (FLUSH) 0.9 %
3 SYRINGE (ML) INJECTION EVERY 12 HOURS SCHEDULED
Status: DISCONTINUED | OUTPATIENT
Start: 2021-07-14 | End: 2021-07-14 | Stop reason: HOSPADM

## 2021-07-14 RX ORDER — ROCURONIUM BROMIDE 10 MG/ML
INJECTION, SOLUTION INTRAVENOUS AS NEEDED
Status: DISCONTINUED | OUTPATIENT
Start: 2021-07-14 | End: 2021-07-14 | Stop reason: SURG

## 2021-07-14 RX ORDER — LABETALOL HYDROCHLORIDE 5 MG/ML
5 INJECTION, SOLUTION INTRAVENOUS
Status: DISCONTINUED | OUTPATIENT
Start: 2021-07-14 | End: 2021-07-14 | Stop reason: HOSPADM

## 2021-07-14 RX ORDER — NALOXONE HCL 0.4 MG/ML
0.4 VIAL (ML) INJECTION AS NEEDED
Status: DISCONTINUED | OUTPATIENT
Start: 2021-07-14 | End: 2021-07-14 | Stop reason: HOSPADM

## 2021-07-14 RX ORDER — SODIUM CHLORIDE 0.9 % (FLUSH) 0.9 %
10 SYRINGE (ML) INJECTION EVERY 12 HOURS SCHEDULED
Status: DISCONTINUED | OUTPATIENT
Start: 2021-07-14 | End: 2021-07-14 | Stop reason: HOSPADM

## 2021-07-14 RX ORDER — OXYCODONE AND ACETAMINOPHEN 7.5; 325 MG/1; MG/1
1 TABLET ORAL EVERY 4 HOURS PRN
Status: DISCONTINUED | OUTPATIENT
Start: 2021-07-14 | End: 2021-07-16 | Stop reason: HOSPADM

## 2021-07-14 RX ORDER — SODIUM CHLORIDE 0.9 % (FLUSH) 0.9 %
3-10 SYRINGE (ML) INJECTION AS NEEDED
Status: DISCONTINUED | OUTPATIENT
Start: 2021-07-14 | End: 2021-07-14 | Stop reason: HOSPADM

## 2021-07-14 RX ORDER — DEXAMETHASONE SODIUM PHOSPHATE 4 MG/ML
INJECTION, SOLUTION INTRA-ARTICULAR; INTRALESIONAL; INTRAMUSCULAR; INTRAVENOUS; SOFT TISSUE AS NEEDED
Status: DISCONTINUED | OUTPATIENT
Start: 2021-07-14 | End: 2021-07-14 | Stop reason: SURG

## 2021-07-14 RX ORDER — ONDANSETRON 2 MG/ML
4 INJECTION INTRAMUSCULAR; INTRAVENOUS ONCE AS NEEDED
Status: DISCONTINUED | OUTPATIENT
Start: 2021-07-14 | End: 2021-07-14 | Stop reason: HOSPADM

## 2021-07-14 RX ORDER — MIDAZOLAM HYDROCHLORIDE 1 MG/ML
0.5 INJECTION INTRAMUSCULAR; INTRAVENOUS
Status: DISCONTINUED | OUTPATIENT
Start: 2021-07-14 | End: 2021-07-14 | Stop reason: HOSPADM

## 2021-07-14 RX ORDER — GLYCOPYRROLATE 0.2 MG/ML
INJECTION INTRAMUSCULAR; INTRAVENOUS AS NEEDED
Status: DISCONTINUED | OUTPATIENT
Start: 2021-07-14 | End: 2021-07-14 | Stop reason: SURG

## 2021-07-14 RX ORDER — IPRATROPIUM BROMIDE AND ALBUTEROL SULFATE 2.5; .5 MG/3ML; MG/3ML
3 SOLUTION RESPIRATORY (INHALATION) ONCE AS NEEDED
Status: DISCONTINUED | OUTPATIENT
Start: 2021-07-14 | End: 2021-07-14 | Stop reason: HOSPADM

## 2021-07-14 RX ORDER — MAGNESIUM HYDROXIDE 1200 MG/15ML
LIQUID ORAL AS NEEDED
Status: DISCONTINUED | OUTPATIENT
Start: 2021-07-14 | End: 2021-07-14 | Stop reason: HOSPADM

## 2021-07-14 RX ORDER — PROPOFOL 10 MG/ML
VIAL (ML) INTRAVENOUS CONTINUOUS PRN
Status: DISCONTINUED | OUTPATIENT
Start: 2021-07-14 | End: 2021-07-14 | Stop reason: SURG

## 2021-07-14 RX ORDER — SODIUM CHLORIDE, SODIUM LACTATE, POTASSIUM CHLORIDE, CALCIUM CHLORIDE 600; 310; 30; 20 MG/100ML; MG/100ML; MG/100ML; MG/100ML
75 INJECTION, SOLUTION INTRAVENOUS CONTINUOUS
Status: DISCONTINUED | OUTPATIENT
Start: 2021-07-14 | End: 2021-07-16 | Stop reason: HOSPADM

## 2021-07-14 RX ORDER — ONDANSETRON 2 MG/ML
INJECTION INTRAMUSCULAR; INTRAVENOUS AS NEEDED
Status: DISCONTINUED | OUTPATIENT
Start: 2021-07-14 | End: 2021-07-14 | Stop reason: SURG

## 2021-07-14 RX ORDER — DROPERIDOL 2.5 MG/ML
0.62 INJECTION, SOLUTION INTRAMUSCULAR; INTRAVENOUS AS NEEDED
Status: DISCONTINUED | OUTPATIENT
Start: 2021-07-14 | End: 2021-07-14 | Stop reason: HOSPADM

## 2021-07-14 RX ADMIN — Medication 160 MCG: at 10:02

## 2021-07-14 RX ADMIN — FENTANYL CITRATE 50 MCG: 0.05 INJECTION, SOLUTION INTRAMUSCULAR; INTRAVENOUS at 11:21

## 2021-07-14 RX ADMIN — LIDOCAINE HYDROCHLORIDE 50 MG: 10 INJECTION, SOLUTION EPIDURAL; INFILTRATION; INTRACAUDAL; PERINEURAL at 09:49

## 2021-07-14 RX ADMIN — BISACODYL 5 MG: 5 TABLET, COATED ORAL at 22:12

## 2021-07-14 RX ADMIN — SODIUM CHLORIDE, PRESERVATIVE FREE 10 ML: 5 INJECTION INTRAVENOUS at 22:12

## 2021-07-14 RX ADMIN — SODIUM CHLORIDE, POTASSIUM CHLORIDE, SODIUM LACTATE AND CALCIUM CHLORIDE: 600; 310; 30; 20 INJECTION, SOLUTION INTRAVENOUS at 10:26

## 2021-07-14 RX ADMIN — FAMOTIDINE 20 MG: 20 TABLET ORAL at 08:59

## 2021-07-14 RX ADMIN — DEXAMETHASONE SODIUM PHOSPHATE 4 MG: 4 INJECTION, SOLUTION INTRA-ARTICULAR; INTRALESIONAL; INTRAMUSCULAR; INTRAVENOUS; SOFT TISSUE at 10:03

## 2021-07-14 RX ADMIN — FENTANYL CITRATE 50 MCG: 0.05 INJECTION, SOLUTION INTRAMUSCULAR; INTRAVENOUS at 11:14

## 2021-07-14 RX ADMIN — GLYCOPYRROLATE 0.8 MG: 0.4 INJECTION INTRAMUSCULAR; INTRAVENOUS at 10:27

## 2021-07-14 RX ADMIN — INSULIN DETEMIR 10 UNITS: 100 INJECTION, SOLUTION SUBCUTANEOUS at 22:11

## 2021-07-14 RX ADMIN — FENTANYL CITRATE 50 MCG: 50 INJECTION, SOLUTION INTRAMUSCULAR; INTRAVENOUS at 09:49

## 2021-07-14 RX ADMIN — ONDANSETRON 4 MG: 2 INJECTION INTRAMUSCULAR; INTRAVENOUS at 10:03

## 2021-07-14 RX ADMIN — VANCOMYCIN HYDROCHLORIDE 1750 MG: 1 INJECTION, POWDER, LYOPHILIZED, FOR SOLUTION INTRAVENOUS at 09:44

## 2021-07-14 RX ADMIN — LIDOCAINE HYDROCHLORIDE 2 ML: 20 JELLY TOPICAL at 09:50

## 2021-07-14 RX ADMIN — SODIUM CHLORIDE, POTASSIUM CHLORIDE, SODIUM LACTATE AND CALCIUM CHLORIDE 9 ML/HR: 600; 310; 30; 20 INJECTION, SOLUTION INTRAVENOUS at 08:52

## 2021-07-14 RX ADMIN — PROPOFOL 110 MG: 10 INJECTION, EMULSION INTRAVENOUS at 09:49

## 2021-07-14 RX ADMIN — VANCOMYCIN HYDROCHLORIDE 1750 MG: 100 INJECTION, POWDER, LYOPHILIZED, FOR SOLUTION INTRAVENOUS at 22:13

## 2021-07-14 RX ADMIN — NEOSTIGMINE 5 MG: 1 INJECTION INTRAVENOUS at 10:27

## 2021-07-14 RX ADMIN — GABAPENTIN 100 MG: 100 CAPSULE ORAL at 22:12

## 2021-07-14 RX ADMIN — DOCUSATE SODIUM 50MG AND SENNOSIDES 8.6MG 2 TABLET: 8.6; 5 TABLET, FILM COATED ORAL at 22:12

## 2021-07-14 RX ADMIN — OXYCODONE HYDROCHLORIDE AND ACETAMINOPHEN 1 TABLET: 7.5; 325 TABLET ORAL at 22:12

## 2021-07-14 RX ADMIN — SODIUM CHLORIDE, POTASSIUM CHLORIDE, SODIUM LACTATE AND CALCIUM CHLORIDE: 600; 310; 30; 20 INJECTION, SOLUTION INTRAVENOUS at 10:27

## 2021-07-14 RX ADMIN — OXYCODONE HYDROCHLORIDE AND ACETAMINOPHEN 1 TABLET: 7.5; 325 TABLET ORAL at 15:20

## 2021-07-14 RX ADMIN — INSULIN LISPRO 3 UNITS: 100 INJECTION, SOLUTION INTRAVENOUS; SUBCUTANEOUS at 18:10

## 2021-07-14 RX ADMIN — Medication 160 MCG: at 10:15

## 2021-07-14 RX ADMIN — Medication 1 G: at 18:13

## 2021-07-14 RX ADMIN — Medication 160 MCG: at 10:00

## 2021-07-14 RX ADMIN — VANCOMYCIN HYDROCHLORIDE 1750 MG: 1 INJECTION, POWDER, LYOPHILIZED, FOR SOLUTION INTRAVENOUS at 08:52

## 2021-07-14 RX ADMIN — ROCURONIUM BROMIDE 30 MG: 10 INJECTION, SOLUTION INTRAVENOUS at 09:49

## 2021-07-14 RX ADMIN — SODIUM CHLORIDE, POTASSIUM CHLORIDE, SODIUM LACTATE AND CALCIUM CHLORIDE 75 ML/HR: 600; 310; 30; 20 INJECTION, SOLUTION INTRAVENOUS at 11:56

## 2021-07-14 RX ADMIN — SODIUM CHLORIDE, PRESERVATIVE FREE 10 ML: 5 INJECTION INTRAVENOUS at 08:52

## 2021-07-14 RX ADMIN — GABAPENTIN 100 MG: 100 CAPSULE ORAL at 15:20

## 2021-07-14 RX ADMIN — PROPOFOL 25 MCG/KG/MIN: 10 INJECTION, EMULSION INTRAVENOUS at 09:56

## 2021-07-14 NOTE — THERAPY RE-EVALUATION
Patient Name: Cristian Watt  : 1945    MRN: [id number]                              Today's Date: 2021       Admit Date: 2021    Visit Dx:     ICD-10-CM ICD-9-CM   1. Acute low back pain with right-sided sciatica, unspecified back pain laterality  M54.41 724.2     724.3   2. History of diabetes mellitus  Z86.39 V12.29   3. Impaired functional mobility, balance, gait, and endurance  Z74.09 V49.89   4. Compression fracture of T12 vertebra, initial encounter (CMS/[facility])  S22.080A 805.2     Patient Active Problem List   Diagnosis   • C. difficile colitis   • Disc degeneration, lumbar   • Osteoarthritis   • Uncontrolled type 2 diabetes mellitus with hyperglycemia (CMS/[facility])   • Hypertension   • Diarrhea   • Mixed hyperlipidemia   • Diabetic autonomic neuropathy associated with type 2 diabetes mellitus (CMS/[facility])   • Episode of recurrent major depressive disorder (CMS/[facility])   • Cigarette nicotine dependence with nicotine-induced disorder   • Compression fracture of L3 vertebra (CMS/[facility])   • Compression fracture of T12 vertebra (CMS/[facility])   • Mood disorder (CMS/[facility])   • Chronic bilateral low back pain   • Acute low back pain with right-sided sciatica   • Intractable back pain     Past Medical History:   Diagnosis Date   • Depression    • Diabetes mellitus (CMS/[facility])    • Disc degeneration, lumbar    • Emphysema of lung (CMS/[facility])    • Generalized anxiety disorder    • GERD (gastroesophageal reflux disease)    • Hyperlipidemia    • Hypertension    • Hypogonadism male    • Osteoarthritis    • Type 2 diabetes mellitus, uncontrolled (CMS/[facility])    • Vitamin D deficiency      Past Surgical History:   Procedure Laterality Date   • HERNIA REPAIR      Inguinal   • KNEE ARTHROPLASTY       General Information     Row Name 21 1505          OT Time and Intention    Document Type  re-evaluation  -JY     Mode of Treatment  occupational therapy  -JY     Row Name 21 1505          General Information    Patient  Profile Reviewed  yes  -JY     Prior Level of Function  -- refer to OT IE  -JY     Existing Precautions/Restrictions  fall;spinal;other (see comments) greer catheter, requires increased motivation  -JY     Barriers to Rehab  medically complex;previous functional deficit;cognitive status  -[facility]     Row Name 07/14/21 1505          Living Environment    Lives With  alone;other (see comments) pt has caregiver A to aid in housekeeping, meal prep, shopping, reports not bathing in > 1 yr including sponge bathing (u/a to explain reasoning), denies need to go out of home thus u/a to state who assists w/ driving  -[facility]     Row Name 07/14/21 1505          Home Main Entrance    Number of Stairs, Main Entrance  other (see comments) pt with difficulty in recall of # of steps yet indicates not using often  -[facility]     Row Name 07/14/21 1505          Stairs Within Home, Primary    Stairs, Within Home, Primary  0  -JY     Number of Stairs, Within Home, Primary  none  -JY     Stairs Comment, Within Home, Primary  pt has walk in shower with seat and average toilet height; DME: has standard wx (did not use), shower chair  -[facility]     Row Name 07/14/21 1505          Cognition    Orientation Status (Cognition)  oriented x 3  -[facility]     Row Name 07/14/21 1505          Safety Issues, Functional Mobility    Safety Issues Affecting Function (Mobility)  awareness of need for assistance;insight into deficits/self-awareness;positioning of assistive device;safety precaution awareness;safety precautions follow-through/compliance;sequencing abilities  -JY     Impairments Affecting Function (Mobility)  balance;endurance/activity tolerance;pain;postural/trunk control;motor control;coordination;other (see comments) strong R and posterior lean in sitting and standing  -JY     Comment, Safety Issues/Impairments (Mobility)  pt req'd increased motivation for OOB activity however post education on benefits pt was agreeable  -JY       User Jeffrey  (r) = Recorded By, (t) =  Taken By, (c) = Cosigned By    Initials Name Provider Type    Sharon Bragg OT Occupational Therapist          Mobility/ADL's     Row Name 07/14/21 1510          Bed Mobility    Bed Mobility  rolling left;sidelying-sit  -JY     Rolling Left [place] (Bed Mobility)  minimum assist (75% patient effort);verbal cues  -JY     Sidelying-Sit [place] (Bed Mobility)  minimum assist (75% patient effort);verbal cues  -JY     Assistive Device (Bed Mobility)  bed rails  -JY     Comment (Bed Mobility)  pt u/a to recall log roll tech prior to education thus education on tech provided and pt able to demo with variable cues, gross min A req'd, presents with R and posterior lean upon sitting  -JACKELIN     Row Name 07/14/21 1510          Transfers    Transfers  sit-stand transfer  -JY     Comment (Transfers)  skilled cues for optimal hand placement for controlled ascend, descend from seated surfaces while adhering to spinal precautions, initial attempt min A x 2 and improved to CGA x 2 on 2nd attempt with improved seq from recliner vs bed surface. Pt presents with R and posterior lean in standing  -JY     Sit-Stand [place] (Transfers)  minimum assist (75% patient effort);contact guard;2 person assist;verbal cues;other (see comments) pt req'd CGA x 2 on second attempt from recliner  -[employer]     Row Name 07/14/21 1510          Sit-Stand Transfer    Assistive Device (Sit-Stand Transfers)  walker, front-wheeled  -[employer]     Row Name 07/14/21 1510          Functional Mobility    Functional Mobility-Distance (Feet)  -- in room distances for ADLs  -JY     Functional Mobility- Comment  pt presents with narrow Justice and ataxic movement in standing with FWW used throughout and support x 2 with R and posterior leaning throughout brief fxl mobility  -[employer]     Row Name 07/14/21 1510          Activities of Daily Living    BADL Assessment/Intervention  upper body dressing;grooming;lower body dressing  -     Row Name 07/14/21 1510          Upper  Body Dressing Assessment/Training    [place] Level (Upper Body Dressing)  doff;don;pajama/robe;minimum assist (75% patient effort);verbal cues  -     Position (Upper Body Dressing)  edge of bed sitting  -     Comment (Upper Body Dressing)  pt educated on optimal tech, position for adherence to spinal precautions, impacted by R and posterior leaning with UEs integrated in task and not providing support  -JY     Row Name 07/14/21 1510          Lower Body Dressing Assessment/Training    [place] Level (Lower Body Dressing)  doff;don;socks;dependent (less than 25% patient effort)  -     Position (Lower Body Dressing)  supine  -JY     Row Name 07/14/21 1510          Grooming Assessment/Training    [place] Level (Grooming)  wash face, hands;supervision;verbal cues  -     Position (Grooming)  supported sitting  -JY     Row Name 07/14/21 1510          Toileting Assessment/Training    [place] Level (Toileting)  dependent (less than 25% patient effort)  -     Comment (Toileting)  currently with Meza catheter  -       User Key  (r) = Recorded By, (t) = Taken By, (c) = Cosigned By    Initials Name Provider Type    Sharon Bragg, OT Occupational Therapist        Obj/Interventions     Row Name 07/14/21 1518          Sensory Assessment (Somatosensory)    Sensory Assessment (Somatosensory)  bilateral UE;sensation intact  -     Bilateral UE Sensory Assessment  general sensation;light touch awareness;light touch localization;intact  -JY     Row Name 07/14/21 1518          Sensory Interventions    Comment, Sensory Intervention  pt denies any numbness or tingling and able to recognize all stimuli at BUEs  -[facility] 07/14/21 1518          Vision Assessment/Intervention    Visual Impairment/Limitations  corrective lenses full-time  -     Vision Assessment Comment  pt denies any acute visual deficits  -JY     Row Name 07/14/21 1518          Range of Motion Comprehensive    General Range of  Motion  bilateral upper extremity ROM WFL  -JY     Comment, General Range of Motion  BUE AROM WFL  -JY     Row Name 07/14/21 1518          Strength Comprehensive (MMT)    General Manual Muscle Testing (MMT) Assessment  upper extremity strength deficits identified  -JY     Comment, General Manual Muscle Testing (MMT) Assessment  abbreviated MMT d/t pain at back s/p sx yet grossly 4/5 per observation and initial MMT  -JY     Row Name 07/14/21 1518          Motor Skills    Motor Skills  coordination  -J     Coordination  bilateral;lower extremity;gross motor deficit;ataxia;moderate impairment  -JY     Row Name 07/14/21 1518          Balance    Balance Assessment  sitting static balance;sitting dynamic balance;standing static balance;standing dynamic balance  -JY     Static Sitting Balance  moderate impairment;supported;sitting, edge of bed  -JY     Dynamic Sitting Balance  moderate impairment;supported;sitting, edge of bed;other (see comments) pre transfer skills  -JY     Static Standing Balance  moderate impairment;supported;standing  -JY     Dynamic Standing Balance  moderate impairment;supported;standing;other (see comments) fxl transfer/mobility  -JY     Balance Interventions  sitting;standing;static;dynamic;sit to stand;supported;occupation based/functional task  -JY     Comment, Balance  pt presents with frequent R and posterior lean both in sitting and standing, pt does not appear to recognize until brought to attention, u/a to state if this was occurring at PLOF, facilitated weight shifting and midline orientation  -JY       User Key  (r) = Recorded By, (t) = Taken By, (c) = Cosigned By    Initials Name Provider Type    Sharon Bragg OT Occupational Therapist        Goals/Plan     Row Name 07/14/21 1533          Bed Mobility Goal 1 (OT)    Activity/Assistive Device (Bed Mobility Goal 1, OT)  sidelying to sit;sit to sidelying;rolling to left;rolling to right;bed rails w/ adherence to spinal precautions and  log roll tech  -JY     [place] Level/Cues Needed (Bed Mobility Goal 1, OT)  contact guard assist;verbal cues required  -JY     Time Frame (Bed Mobility Goal 1, OT)  long term goal (LTG);by discharge  -JY     Progress/Outcomes (Bed Mobility Goal 1, OT)  goal revised this date;goal ongoing  -JACKELIN     Row Name 07/14/21 1533          Transfer Goal 1 (OT)    Activity/Assistive Device (Transfer Goal 1, OT)  sit-to-stand/stand-to-sit;bed-to-chair/chair-to-bed;toilet;commode;walker, rolling;other (see comments) w/ adherence to spinal precautions  -JY     [place] Level/Cues Needed (Transfer Goal 1, OT)  contact guard assist;verbal cues required  -JY     Time Frame (Transfer Goal 1, OT)  long term goal (LTG);by discharge  -JY     Progress/Outcome (Transfer Goal 1, OT)  goal revised this date;goal ongoing  -JACKELIN     Row Name 07/14/21 1533          Dressing Goal 1 (OT)    Activity/Device (Dressing Goal 1, OT)  lower body dressing;long-handled shoe horn;reacher;sock-aid;other (see comments) pt to d/d LB garments with AE and adhering to spinal precautions  -JY     [place]/Cues Needed (Dressing Goal 1, OT)  minimum assist (75% or more patient effort);verbal cues required  -JY     Time Frame (Dressing Goal 1, OT)  long term goal (LTG);by discharge  -JY     Progress/Outcome (Dressing Goal 1, OT)  goal revised this date;other (see comments)  -[employer]     Row Name 07/14/21 1533          Toileting Goal 1 (OT)    Activity/Device (Toileting Goal 1, OT)  adjust/manage clothing;perform perineal hygiene;commode;grab bar/safety frame;raised toilet seat  -JY     [place] Level/Cues Needed (Toileting Goal 1, OT)  moderate assist (50-74% patient effort)  -JY     Time Frame (Toileting Goal 1, OT)  long term goal (LTG);by discharge  -JY     Progress/Outcome (Toileting Goal 1, OT)  goal ongoing  -JY       User Key  (r) = Recorded By, (t) = Taken By, (c) = Cosigned By    Initials Name Provider Type    Sharon Bragg, OT Occupational  Therapist        Clinical Impression     Row Name 07/14/21 1522          Pain Assessment    Additional Documentation  Pain Scale: Numbers Pre/Post-Treatment (Group)  -JY     Row Name 07/14/21 1522          Pain Scale: Numbers Pre/Post-Treatment    Pretreatment Pain Rating  0/10 - no pain  -JY     Posttreatment Pain Rating  5/10  -JY     Pain Location - Side  Bilateral  -JY     Pain Location - Orientation  lower  -JY     Pain Location  back  -JY     Pre/Posttreatment Pain Comment  reported increased pain with mobility, upward of 5/10 at B mid to low back, tolerated OT interventions, educated on spinal precautions for safety and comfort  -JY     Pain Intervention(s)  Repositioned;Ambulation/increased activity  -JY     Row Name 07/14/21 1522          Plan of Care Review    Plan of Care Reviewed With  patient  -JY     Progress  improving  -JY     Outcome Summary  OT re eval completed post chart review given pt s/p t12/L3 kyphoplasty. Pt req'd increased motivation for participation OOB. Pt A & O x3, reported no pain initially with increase to upward of 5/10 with mobility. Pt not able to recall spinal precautions or log roll tech prior to OT education and training with some limited recall of prec later in session. Pt req'd min A x 1 for bed mobility with log roll tech facilitated, min A x 2 for initial STS at EOB and improved to CGA x 2 in 2nd stand at recliner level with improved hand placement and more controlled ascend. Pt did present with both R lateral and posterior leaning in sitting EOB and standing, fxl mobility with cues for midline orientation. Pt req'd A x 2 in in room mobility d/t narrow [place] and ataxic movement patterns with FWW support throughout. Pt completed UBD with min A, hand/face hygiene with sup and set up, dependent for LBD d/d socks and dep for toileting currently with Meza catheter. Initiated education regarding AE for LBD, LBB with pt able to demo limited recall of spinal prec from earlier in  session. Pt would benefit from IPOT POC to address remaining underlying impairments and recommend IRF at d/c given decreased fxl endurance, impaired balance, muscle weakness, decreased I in ADLs with limited distal reach w/o AE.  -JJOANNE     [place] [facility] Name 07/14/21 1522          Therapy Assessment/Plan (OT)    Patient/Family Therapy Goal Statement (OT)  to increase I in ADLs, related t/f, return to PLOF  -JY     Rehab Potential (OT)  good, to achieve stated therapy goals  -JY     Criteria for Skilled Therapeutic Interventions Met (OT)  yes;skilled treatment is necessary  -JY     Therapy Frequency (OT)  daily  -JJOANNE     Row Name 07/14/21 1522          Therapy Plan Review/Discharge Plan (OT)    Equipment Needs Upon Discharge (OT)  dressing equipment;bathing equipment  -JY     Anticipated Discharge Disposition (OT)  inpatient rehabilitation facility  -[facility] Name 07/14/21 1522          Vital Signs    Pre Systolic BP Rehab  129  -JY     Pre Treatment Diastolic BP  76  -JY     Post Systolic BP Rehab  127  -JY     Post Treatment Diastolic BP  80  -JY     Pretreatment Heart Rate (beats/min)  94  -JY     Posttreatment Heart Rate (beats/min)  109  -JY     Pre SpO2 (%)  92  -JY     O2 Delivery Pre Treatment  room air  -JY     Intra SpO2 (%)  90  -JY     O2 Delivery Intra Treatment  room air  -JY     Post SpO2 (%)  93  -JY     O2 Delivery Post Treatment  room air  -JY     Pre Patient Position  Supine  -JY     Intra Patient Position  Standing  -JY     Post Patient Position  Sitting  -JY     Row Name 07/14/21 1522          Positioning and Restraints    Pre-Treatment Position  in bed  -JY     Post Treatment Position  chair  -JY     In Chair  notified nsg;reclined;call light within reach;encouraged to call for assist;exit alarm on;RUE elevated;waffle cushion;on mechanical lift sling;legs elevated pt with pillow support at R side to assist with leaning  -JY       User Key  (r) = Recorded By, (t) = Taken By, (c) = Cosigned By    Initials  Name Provider Type    Sharon Bragg OT Occupational Therapist        Outcome Measures     Row Name 07/14/21 1536          How much help from another is currently needed...    Putting on and taking off regular lower body clothing?  1  -JY     Bathing (including washing, rinsing, and drying)  2  -JY     Toileting (which includes using toilet bed pan or urinal)  1  -JY     Putting on and taking off regular upper body clothing  3  -JY     Taking care of personal grooming (such as brushing teeth)  3  -JY     Eating meals  4  -JY     AM-PAC 6 Clicks Score (OT)  14  -JY     Row Name 07/14/21 1525          How much help from another person do you currently need...    Turning from your back to your side while in flat bed without using bedrails?  3  -CD     Moving from lying on back to sitting on the side of a flat bed without bedrails?  2  -CD     Moving to and from a bed to a chair (including a wheelchair)?  2  -CD     Standing up from a chair using your arms (e.g., wheelchair, bedside chair)?  2  -CD     Climbing 3-5 steps with a railing?  2  -CD     To walk in hospital room?  2  -CD     AM-PAC 6 Clicks Score (PT)  13  -CD     Row Name 07/14/21 1536          Functional Assessment    Outcome Measure Options  AM-PAC 6 Clicks Daily Activity (OT)  -JACKELIN       User Key  (r) = Recorded By, (t) = Taken By, (c) = Cosigned By    Initials Name Provider Type    CD Luz Maria Sims, PT Physical Therapist    Sharon Bragg OT Occupational Therapist        Occupational Therapy Education                 Title: PT OT SLP Therapies (In Progress)     Topic: Occupational Therapy (In Progress)     Point: ADL training (In Progress)     Description:   Instruct learner(s) on proper safety adaptation and remediation techniques during self care or transfers.   Instruct in proper use of assistive devices.              Learning Progress Summary           Patient Acceptance, E,D, NR by JACKELIN at 7/14/2021 1407    Acceptance, E, VU by CHANTALE at 7/10/2021  0915                   Point: Home exercise program (Not Started)     Description:   Instruct learner(s) on appropriate technique for monitoring, assisting and/or progressing therapeutic exercises/activities.              Learner Progress:  Not documented in this visit.          Point: Precautions (In Progress)     Description:   Instruct learner(s) on prescribed precautions during self-care and functional transfers.              Learning Progress Summary           Patient Acceptance, E,D, NR by JY at 7/14/2021 1407    Acceptance, E,D, VU,NR by TA at 7/11/2021 1457    Acceptance, E, VU by TA at 7/10/2021 0915                   Point: Body mechanics (In Progress)     Description:   Instruct learner(s) on proper positioning and spine alignment during self-care, functional mobility activities and/or exercises.              Learning Progress Summary           Patient Acceptance, E,D, NR by JJOANNE at 7/14/2021 1407    Acceptance, E,D, VU,NR by TA at 7/11/2021 1457                               User Key     Initials Effective Dates Name Provider Type Discipline    CHANTALE 06/16/21 -  Richard White OT Occupational Therapist OT    JACKELIN 06/16/21 -  Sharon Diaz OT Occupational Therapist OT              OT Recommendation and Plan  Therapy Frequency (OT): daily  Plan of Care Review  Plan of Care Reviewed With: patient  Progress: improving  Outcome Summary: OT charlotte nugent completed post chart review given pt s/p t12/L3 kyphoplasty. Pt req'd increased motivation for participation OOB. Pt A & O x3, reported no pain initially with increase to upward of 5/10 with mobility. Pt not able to recall spinal precautions or log roll tech prior to OT education and training with some limited recall of prec later in session. Pt req'd min A x 1 for bed mobility with log roll tech facilitated, min A x 2 for initial STS at EOB and improved to CGA x 2 in 2nd stand at recliner level with improved hand placement and more controlled ascend. Pt did  present with both R lateral and posterior leaning in sitting EOB and standing, fxl mobility with cues for midline orientation. Pt req'd A x 2 in in room mobility d/t narrow [place] and ataxic movement patterns with FWW support throughout. Pt completed UBD with min A, hand/face hygiene with sup and set up, dependent for LBD d/d socks and dep for toileting currently with Meza catheter. Initiated education regarding AE for LBD, LBB with pt able to demo limited recall of spinal prec from earlier in session. Pt would benefit from IPOT POC to address remaining underlying impairments and recommend IRF at d/c given decreased fxl endurance, impaired balance, muscle weakness, decreased I in ADLs with limited distal reach w/o AE.     Time Calculation:   Time Calculation- OT     Row Name 07/14/21 1539 07/14/21 1527          Time Calculation- OT    OT Start Time  1407  -JY  --     OT Received On  07/14/21  -JY  --     OT Goal Re-Cert Due Date  07/24/21  -JY  --        Timed Charges    41586 - Gait Training Minutes   --  10  -CD     87737 - OT Therapeutic Activity Minutes  8  -JY  --     82225 - OT Self Care/Mgmt Minutes  16  -JY  --        Untimed Charges    OT Eval/Re-eval Minutes  38  -JY  --        Total Minutes    Timed Charges Total Minutes  24  -JY  10  -CD     Untimed Charges Total Minutes  38  -JY  --      Total Minutes  62  -JY  10  -CD       User Key  (r) = Recorded By, (t) = Taken By, (c) = Cosigned By    Initials Name Provider Type    Luz Maria Palacios PT Physical Therapist    Sharon Bragg OT Occupational Therapist        Therapy Charges for Today     Code Description Service Date Service Provider Modifiers Qty    54429992121 HC OT SELF CARE/MGMT/TRAIN EA 15 MIN 7/14/2021 Sharon Diaz OT GO 1    65932359410 HC OT THERAPEUTIC ACT EA 15 MIN 7/14/2021 Sharon Diaz OT GO 1    07507359706 HC OT RE-EVAL 2 7/14/2021 Sharon Diaz OT GO 1               Sharon Diaz OT  7/14/2021

## 2021-07-14 NOTE — PLAN OF CARE
Problem: Adult Inpatient Plan of Care  Goal: Plan of Care Review  Recent Flowsheet Documentation  Taken 7/14/2021 1522 by Sharon Diaz OT  Progress: improving  Plan of Care Reviewed With: patient  Outcome Summary: OT charlotte raffi completed post chart review given pt s/p T12/L3 kyphoplasty. Pt req'd increased motivation for participation OOB. Pt A & O x3, reported no pain initially, increased to 5/10 with mobility. Pt u/a to recall spinal precautions or log roll tech prior to OT education and training with some limited recall of prec later in session. Pt req'd min A x 1 for bed mobility with log roll tech facilitated, min A x 2 for initial STS at EOB and improved to CGA x 2 in 2nd stand at recliner level with improved hand placement and more controlled ascend. Pt did present with both R lateral and posterior leaning in sitting EOB and standing, fxl mobility with cues for midline orientation. Pt req'd A x 2 in in room mobility d/t narrow Justice and ataxic movement patterns with FWW support throughout. Pt completed UBD with min A, hand/face hygiene with sup and set up, dependent for LBD d/d socks and dep for toileting currently with Meza catheter. Initiated education regarding AE for LBD, LBB with pt able to demo limited recall of spinal prec from earlier in session. Pt would benefit from IPOT POC to address remaining underlying impairments and recommend IRF at d/c given decreased fxl endurance, impaired balance, muscle weakness, decreased I in ADLs with limited distal reach w/o AE.

## 2021-07-14 NOTE — THERAPY RE-EVALUATION
Patient Name: Cristian Watt  : 1945    MRN: [id number]                              Today's Date: 2021       Admit Date: 2021    Visit Dx:     ICD-10-CM ICD-9-CM   1. Acute low back pain with right-sided sciatica, unspecified back pain laterality  M54.41 724.2     724.3   2. History of diabetes mellitus  Z86.39 V12.29   3. Impaired functional mobility, balance, gait, and endurance  Z74.09 V49.89   4. Compression fracture of T12 vertebra, initial encounter (CMS/[facility])  S22.080A 805.2     Patient Active Problem List   Diagnosis   • C. difficile colitis   • Disc degeneration, lumbar   • Osteoarthritis   • Uncontrolled type 2 diabetes mellitus with hyperglycemia (CMS/[facility])   • Hypertension   • Diarrhea   • Mixed hyperlipidemia   • Diabetic autonomic neuropathy associated with type 2 diabetes mellitus (CMS/[facility])   • Episode of recurrent major depressive disorder (CMS/[facility])   • Cigarette nicotine dependence with nicotine-induced disorder   • Compression fracture of L3 vertebra (CMS/[facility])   • Compression fracture of T12 vertebra (CMS/[facility])   • Mood disorder (CMS/[facility])   • Chronic bilateral low back pain   • Acute low back pain with right-sided sciatica   • Intractable back pain     Past Medical History:   Diagnosis Date   • Depression    • Diabetes mellitus (CMS/[facility])    • Disc degeneration, lumbar    • Emphysema of lung (CMS/[facility])    • Generalized anxiety disorder    • GERD (gastroesophageal reflux disease)    • Hyperlipidemia    • Hypertension    • Hypogonadism male    • Osteoarthritis    • Type 2 diabetes mellitus, uncontrolled (CMS/[facility])    • Vitamin D deficiency      Past Surgical History:   Procedure Laterality Date   • HERNIA REPAIR      Inguinal   • KNEE ARTHROPLASTY       General Information     Row Name 21 1448          Physical Therapy Time and Intention    Document Type  re-evaluation  -CD     Mode of Treatment  physical therapy  -CD     Row Name 21 1448          General Information     Patient Profile Reviewed  yes  -CD     Existing Precautions/Restrictions  fall;spinal REID CATHETER.  -CD     Barriers to Rehab  medically complex;previous functional deficit;cognitive status  -CD     Row Name 07/14/21 1448          Living Environment    Lives With  alone HAS A CAREGIVER WHO COOKS, CLEANS, AND SHOPS. PT REPORTS HE HAS NOT BATHED IN OVER A YEAR BUT CANNOT EXPLAIN WHY.  -CD     Row Name 07/14/21 1448          Cognition    Orientation Status (Cognition)  oriented x 3  -CD     Row Name 07/14/21 1448          Safety Issues, Functional Mobility    Safety Issues Affecting Function (Mobility)  awareness of need for assistance;insight into deficits/self-awareness;positioning of assistive device;safety precaution awareness;safety precautions follow-through/compliance;sequencing abilities  -CD     Impairments Affecting Function (Mobility)  balance;endurance/activity tolerance;pain;postural/trunk control;motor control;coordination  -CD       User Key  (r) = Recorded By, (t) = Taken By, (c) = Cosigned By    Initials Name Provider Type    CD Luz Maria Sims, PT Physical Therapist        Mobility     Row Name 07/14/21 1500          Bed Mobility    Bed Mobility  rolling left;sidelying-sit  -CD     Rolling Left [place] (Bed Mobility)  verbal cues;contact guard  -CD     Sidelying-Sit [place] (Bed Mobility)  minimum assist (75% patient effort);verbal cues  -CD     Assistive Device (Bed Mobility)  bed rails  -CD     Comment (Bed Mobility)  PT CUES TO LOG ROLL FOR SUPINE TO SIT. EDUCATED IN BACK PRECAUTIONS. PT UNSTEADY UPON SITTING EOB WITH LEAN TO THE R AND POSTERIOR.  -CD     Row Name 07/14/21 1500          Transfers    Comment (Transfers)  CUES FOR HAND PLACEMENT. STS FROM EOB AND RECLINER. PT IMPULSIVE INITIALLY AND NOT FOLLOWING COMMANDS FOR SAFETY, IMPROVED ON 2ND REP FROM RECLINER.,  -CD     Row Name 07/14/21 1500          Sit-Stand Transfer    Sit-Stand [place] (Transfers)  minimum assist (75%  patient effort);2 person assist IMPROVED TO CGA X2 ON 2ND REP FROM RECLINER.  -CD     Assistive Device (Sit-Stand Transfers)  walker, front-wheeled  -CD     Row Name 07/14/21 1500          Gait/Stairs (Locomotion)    [place] Level (Gait)  moderate assist (50% patient effort);2 person assist  -CD     Assistive Device (Gait)  walker, front-wheeled  -CD     Distance in Feet (Gait)  14 FEET  -CD     Deviations/Abnormal Patterns (Gait)  ataxic;base of support, narrow;gait speed decreased;weight shifting decreased  -CD     Right Sided Gait Deviations  leans right;forward flexed posture  -CD     Comment (Gait/Stairs)  PT LEANS HEAVILY TO THE R AND POSTERIORLY. NEEDS MANUAL ASSIST TO GUIDE R WALKER AND MAINTAIN MIDLINE ORIENTATION. PT ATAXIC.  -CD       User Key  (r) = Recorded By, (t) = Taken By, (c) = Cosigned By    Initials Name Provider Type    CD Luz Maria Sims, PT Physical Therapist        Obj/Interventions     Row Name 07/14/21 1505          Range of Motion Comprehensive    General Range of Motion  bilateral lower extremity ROM WFL  -CD     Row Name 07/14/21 1505          Strength Comprehensive (MMT)    General Manual Muscle Testing (MMT) Assessment  lower extremity strength deficits identified  -CD     Comment, General Manual Muscle Testing (MMT) Assessment  GROSSLY 4/5 B LE'S  -CD     Row Name 07/14/21 1505          Motor Skills    Motor Skills  coordination  -CD     Coordination  gross motor deficit;bilateral;lower extremity;ataxia;moderate impairment  -CD     Row Name 07/14/21 1505          Balance    Balance Assessment  sitting static balance;standing static balance;sitting dynamic balance  -CD     Static Sitting Balance  moderate impairment;supported;sitting, edge of bed IMPROVED FROM MOD ASSIST TO CGA WITH CUES AND POSITIONING.  -CD     Dynamic Sitting Balance  moderate impairment;supported;sitting, edge of bed  -CD     Static Standing Balance  moderate impairment;supported;standing  -CD     Dynamic  Standing Balance  supported;standing;moderate impairment  -CD     Balance Interventions  sitting;standing;sit to stand;supported;static;dynamic;weight shifting activity  -CD     Comment, Balance  PT LEANS HEAVILY TO THE R AND POSTERIOR IN SITTING AND STANDING. WORKED ON MIDLINE ORIENTATION.  -CD     Row Name 07/14/21 1505          Sensory Assessment (Somatosensory)    Sensory Assessment (Somatosensory)  sensation intact B LE'S  -CD       User Key  (r) = Recorded By, (t) = Taken By, (c) = Cosigned By    Initials Name Provider Type    CD Luz Maria Sims, PT Physical Therapist        Goals/Plan     Row Name 07/14/21 1523          Bed Mobility Goal 1 (PT)    Activity/Assistive Device (Bed Mobility Goal 1, PT)  sit to supine/supine to sit VIA LOG ROLL TECHNIQUE.  -CD     [place] Level/Cues Needed (Bed Mobility Goal 1, PT)  independent  -CD     Time Frame (Bed Mobility Goal 1, PT)  long term goal (LTG)  -CD     Row Name 07/14/21 1523          Transfer Goal 1 (PT)    Activity/Assistive Device (Transfer Goal 1, PT)  sit-to-stand/stand-to-sit;bed-to-chair/chair-to-bed;walker, rolling  -CD     [place] Level/Cues Needed (Transfer Goal 1, PT)  contact guard assist  -CD     Time Frame (Transfer Goal 1, PT)  long term goal (LTG);2 weeks  -CD     Row Name 07/14/21 1523          Gait Training Goal 1 (PT)    Activity/Assistive Device (Gait Training Goal 1, PT)  gait (walking locomotion);walker, rolling  -CD     [place] Level (Gait Training Goal 1, PT)  contact guard assist  -CD     Distance (Gait Training Goal 1, PT)  200 FEET  -CD     Time Frame (Gait Training Goal 1, PT)  long term goal (LTG);2 weeks  -CD     Row Name 07/14/21 1523          Patient Education Goal (PT)    Activity (Patient Education Goal, PT)  DEMONSTRATE AND VERBALIZE BACK PRECAUTIONS  -CD     [place]/Cues/Accuracy (Memory Goal 2, PT)  demonstrates adequately;verbalizes understanding;independent  -CD     Time Frame (Patient Education Goal, PT)   long term goal (LTG);2 weeks  -CD       User Key  (r) = Recorded By, (t) = Taken By, (c) = Cosigned By    Initials Name Provider Type    CD uLz Maria Sims, PT Physical Therapist        Clinical Impression     Row Name 07/14/21 1516          Pain Scale: Numbers Pre/Post-Treatment    Pretreatment Pain Rating  0/10 - no pain  -CD     Posttreatment Pain Rating  4/10  -CD     Pain Location - Orientation  lower  -CD     Pain Location  back  -CD     Pre/Posttreatment Pain Comment  TOLERATED GAIT IN ROOM.  -CD     Pain Intervention(s)  Repositioned;Rest;Other (Comment) EDUCATION IN BACK PRECAUTIONS.  -CD     Row Name 07/14/21 1516          Plan of Care Review    Plan of Care Reviewed With  patient  -CD     Outcome Summary  PT PRESENTS WITH EVOLVING SYMPTOMS S/P COMPRESSION FRACTURES AND T12/L3 KYPHOPLATSTIES. PRIOR TO SURGERY, PT HAS REFUSED OOB ACTIVITY DUE TO PAIN . TOLERATED SUPINE TO SIT WITH MIN ASSIST, STS WITH MOD ASSIST AND GAIT X 14 FEET WITH R WALKER AND MOD ASSIST OF 2. PT LEANS HEAVILY TO THE R AND POSTERIOR AND HAS ATAXIC GAIT. RECOMMEND IRF AT D/C.  -CD     Row Name 07/14/21 1516          Therapy Assessment/Plan (PT)    Patient/Family Therapy Goals Statement (PT)  TO RETURN TO PLOF.  -CD     Rehab Potential (PT)  good, to achieve stated therapy goals  -CD     Criteria for Skilled Interventions Met (PT)  yes;skilled treatment is necessary  -CD     Row Name 07/14/21 1516          Vital Signs    Intra Systolic BP Rehab  127  -CD     Intra Treatment Diastolic BP  80  -CD     Intratreatment Heart Rate (beats/min)  112  -CD     Pre SpO2 (%)  90  -CD     O2 Delivery Pre Treatment  room air  -CD     Intra SpO2 (%)  85  -CD     O2 Delivery Intra Treatment  room air  -CD     Post SpO2 (%)  88  -CD     O2 Delivery Post Treatment  room air  -CD     Pre Patient Position  Supine  -CD     Intra Patient Position  Standing  -CD     Post Patient Position  Sitting  -CD     Row Name 07/14/21 1516          Positioning and  Restraints    Pre-Treatment Position  in bed  -CD     Post Treatment Position  chair  -CD     In Chair  notified nsg;reclined;call light within reach;encouraged to call for assist;legs elevated  -CD       User Key  (r) = Recorded By, (t) = Taken By, (c) = Cosigned By    Initials Name Provider Type    Luz Maria Palacios PT Physical Therapist        Outcome Measures     Row Name 07/14/21 1525          How much help from another person do you currently need...    Turning from your back to your side while in flat bed without using bedrails?  3  -CD     Moving from lying on back to sitting on the side of a flat bed without bedrails?  2  -CD     Moving to and from a bed to a chair (including a wheelchair)?  2  -CD     Standing up from a chair using your arms (e.g., wheelchair, bedside chair)?  2  -CD     Climbing 3-5 steps with a railing?  2  -CD     To walk in hospital room?  2  -CD     AM-PAC 6 Clicks Score (PT)  13  -CD       User Key  (r) = Recorded By, (t) = Taken By, (c) = Cosigned By    Initials Name Provider Type    CD Luz Maria Sims PT Physical Therapist        Physical Therapy Education                 Title: PT OT SLP Therapies (In Progress)     Topic: Physical Therapy (Done)     Point: Mobility training (Done)     Learning Progress Summary           Patient Acceptance, E, VU,NR by  at 7/14/2021 1525    Comment: BENEFITS OF OOB ACTIVITY, SAFETY WITH MOBILITY, BACK PRECAUTIONS S/P KYPHOPLASTY, D/C PLANNING, PROGRESSION OF POC.    Acceptance, E,TB, VU by  at 7/12/2021 1428    Comment: Pt education and HEP packet provided for prehab/rehab for anticipated surgery on 7/14. Exercises included ankle pumps, quad sets, glute sets, heel slides, ab sets, bent knee fall outs, overhead flexion. Edu on d/c recommendations and spinal precautions.    [place], E, VU,NR by SC at 7/11/2021 1046    Comment: reviewed HEP in bed   Family Acceptance, E,TB, VU by  at 7/12/2021 1428    Comment: Pt education and HEP packet provided  for prehab/rehab for anticipated surgery on 7/14. Exercises included ankle pumps, quad sets, glute sets, heel slides, ab sets, bent knee fall outs, overhead flexion. Edu on d/c recommendations and spinal precautions.                   Point: Home exercise program (Done)     Learning Progress Summary           Patient Acceptance, E, VU,NR by CD at 7/14/2021 1525    Comment: BENEFITS OF OOB ACTIVITY, SAFETY WITH MOBILITY, BACK PRECAUTIONS S/P KYPHOPLASTY, D/C PLANNING, PROGRESSION OF POC.    Acceptance, E,TB, VU by  at 7/12/2021 1428    Comment: Pt education and HEP packet provided for prehab/rehab for anticipated surgery on 7/14. Exercises included ankle pumps, quad sets, glute sets, heel slides, ab sets, bent knee fall outs, overhead flexion. Edu on d/c recommendations and spinal precautions.    Eager, E, VU,NR by SC at 7/11/2021 1046    Comment: reviewed HEP in bed    Nonacceptance, E,D, VU,NR by  at 7/8/2021 1522   Family Acceptance, E,TB, VU by  at 7/12/2021 1428    Comment: Pt education and HEP packet provided for prehab/rehab for anticipated surgery on 7/14. Exercises included ankle pumps, quad sets, glute sets, heel slides, ab sets, bent knee fall outs, overhead flexion. Edu on d/c recommendations and spinal precautions.                   Point: Body mechanics (Done)     Learning Progress Summary           Patient Acceptance, E, VU,NR by CD at 7/14/2021 1525    Comment: BENEFITS OF OOB ACTIVITY, SAFETY WITH MOBILITY, BACK PRECAUTIONS S/P KYPHOPLASTY, D/C PLANNING, PROGRESSION OF POC.    Acceptance, E,TB, VU by  at 7/12/2021 1428    Comment: Pt education and HEP packet provided for prehab/rehab for anticipated surgery on 7/14. Exercises included ankle pumps, quad sets, glute sets, heel slides, ab sets, bent knee fall outs, overhead flexion. Edu on d/c recommendations and spinal precautions.    Eager, E, VU,NR by SC at 7/11/2021 1046    Comment: reviewed HEP in bed   Family Acceptance, E,TB, VU by  at  7/12/2021 1428    Comment: Pt education and HEP packet provided for prehab/rehab for anticipated surgery on 7/14. Exercises included ankle pumps, quad sets, glute sets, heel slides, ab sets, bent knee fall outs, overhead flexion. Edu on d/c recommendations and spinal precautions.                   Point: Precautions (Done)     Learning Progress Summary           Patient Acceptance, E, VU,NR by  at 7/14/2021 1525    Comment: BENEFITS OF OOB ACTIVITY, SAFETY WITH MOBILITY, BACK PRECAUTIONS S/P KYPHOPLASTY, D/C PLANNING, PROGRESSION OF POC.    Acceptance, E,TB, VU by  at 7/12/2021 1428    Comment: Pt education and HEP packet provided for prehab/rehab for anticipated surgery on 7/14. Exercises included ankle pumps, quad sets, glute sets, heel slides, ab sets, bent knee fall outs, overhead flexion. Edu on d/c recommendations and spinal precautions.    Eager, E, VU,NR by SC at 7/11/2021 1046    Comment: reviewed HEP in bed   Family Acceptance, E,TB, VU by  at 7/12/2021 1428    Comment: Pt education and HEP packet provided for prehab/rehab for anticipated surgery on 7/14. Exercises included ankle pumps, quad sets, glute sets, heel slides, ab sets, bent knee fall outs, overhead flexion. Edu on d/c recommendations and spinal precautions.                               User Key     Initials Effective Dates Name Provider Type Discipline    SC 06/16/21 -  Deyvi Neal PT Physical Therapist PT    CD 06/16/21 -  Luz Maria Sims, PT Physical Therapist PT     06/16/21 -  Loyda Delarosa PT Physical Therapist PT     09/22/20 -  Justin Munguia PT Physical Therapist PT              PT Recommendation and Plan  Planned Therapy Interventions (PT): bed mobility training, balance training, patient/family education, postural re-education, strengthening, transfer training, gait training, home exercise program  Plan of Care Reviewed With: patient  Outcome Summary: PT PRESENTS WITH EVOLVING SYMPTOMS S/P COMPRESSION FRACTURES AND  T12/L3 KYPHOPLATSTIES. PRIOR TO SURGERY, PT HAS REFUSED OOB ACTIVITY DUE TO PAIN . TOLERATED SUPINE TO SIT WITH MIN ASSIST, STS WITH MOD ASSIST AND GAIT X 14 FEET WITH R WALKER AND MOD ASSIST OF 2. PT LEANS HEAVILY TO THE R AND POSTERIOR AND HAS ATAXIC GAIT. RECOMMEND IRF AT D/C.     Time Calculation:   PT Charges     Row Name 07/14/21 1527             Time Calculation    Start Time  1359  -CD      PT Received On  07/14/21  -CD      PT Goal Re-Cert Due Date  07/24/21  -CD         Time Calculation- PT    Total Timed Code Minutes- PT  20 minute(s)  -CD         Timed Charges    27015 - PT Therapeutic Exercise Minutes  15  -CD      61743 - Gait Training Minutes   10  -CD         Total Minutes    Timed Charges Total Minutes  25  -CD       Total Minutes  25  -CD        User Key  (r) = Recorded By, (t) = Taken By, (c) = Cosigned By    Initials Name Provider Type    CD Luz Maria Sims, PT Physical Therapist        Therapy Charges for Today     Code Description Service Date Service Provider Modifiers Qty    67974478105 HC PT THER PROC EA 15 MIN 7/14/2021 Luz Maria Sims, PT GP 1    27395542414 HC GAIT TRAINING EA 15 MIN 7/14/2021 Luz Maria Sims, PT GP 1    57543270188 HC PT RE-EVAL ESTABLISHED PLAN 2 7/14/2021 Luz Maria Sims, PT GP 1          PT G-Codes  Outcome Measure Options: AM-PAC 6 Clicks Basic Mobility (PT)  AM-PAC 6 Clicks Score (PT): 13  AM-PAC 6 Clicks Score (OT): 13    Luz Maria Sims, LEIDA  7/14/2021

## 2021-07-14 NOTE — ANESTHESIA PROCEDURE NOTES
Airway  Urgency: elective    Date/Time: 7/14/2021 9:50 AM  Airway not difficult    General Information and Staff    Patient location during procedure: OR  CRNA: Myles Jimenez III, CRNA    Indications and Patient Condition  Indications for airway management: airway protection    Preoxygenated: yes  MILS not maintained throughout  Mask difficulty assessment: 0 - not attempted    Final Airway Details  Final airway type: endotracheal airway      Successful airway: ETT  Cuffed: yes   Successful intubation technique: direct laryngoscopy  Blade: [place]  Blade size: 3  ETT size (mm): 7.5  Cormack-Lehane Classification: grade I - full view of glottis  Placement verified by: chest auscultation and capnometry   Measured from: lips  ETT/EBT  to lips (cm): 21  Number of attempts at approach: 1  Assessment: lips, teeth, and gum same as pre-op and atraumatic intubation    Additional Comments  Negative epigastric sounds, Breath sound equal bilaterally with symmetric chest rise and fall.

## 2021-07-14 NOTE — OP NOTE
NEUROSURGICAL OPERATIVE NOTE        PREOPERATIVE DIAGNOSIS:    T12 and L3 compression fractures  Osteoporosis      POSTOPERATIVE DIAGNOSIS:  Same      PROCEDURE:  T12 and L3 kyphoplasty      SURGEON:  Ruddy Zepeda M.D.      ASSISTANT: Susanna Conley PA-C    PAC assisted with:   Closing      ANESTHESIA:  General      ESTIMATED BLOOD LOSS: Minimal      SPECIMEN: Bone core from L3 and a small core from T12      DRAINS: None      COMPLICATIONS:  None      CLINICAL NOTE:  The patient is a 75-year-old gentleman who has presented with profound back pain.  Studies demonstrate acute to subacute fractures at T12 and L3.  Other studies suggest diminished bone density.  Given his ongoing pain he presents at this time for kyphoplasty at the above-noted levels.  The nature of the procedure as well as the potential risks, complications, limitations, and alternatives to the procedure were discussed at length with the patient and the patient has agreed to proceed with surgery.      TECHNICAL NOTE:  The patient was brought to the operating room and while on his cart general endotracheal anesthesia was achieved. He was then turned prone on to blanket rolls maintained longitudinally under his chest and abdomen. Special care was ensured to protect pressure points. Two C-arms were brought in to use to provide AP and lateral imaging of the L3 fracture site. This was based on counting up from the sacrum. The low and mid back was prepared and draped in usual fashion. Using AP fluoroscopy, points just lateral to the L3 pedicles were marked. Punctate incisions were made and one step cannulae were impacted through the pedicles into the dorsal vertebral body, first on the left and then on the right. A biopsy needle was impacted through a working cannula on the left and a bone core was harvested. This was followed by the hand drill. The hand drill was utilized on the right side through the working cannula as well. Then, 20 mm Kyphon  balloons were then inserted and inflated. The balloons were lowered and approximately 4 mL of methyl methacrylate were instilled on each side in the standard fashion. The cement was well contained safer, just a little bit of extravasation into the disc space at L3-4. The cement was allowed to harden and the working cannulae were then removed. The C-arms were moved cephalad and allowing to provide AP and lateral imaging of the T12 fracture site. Once again, punctate incisions were made just lateral to the pedicles and one step cannulae were impacted through the pedicles on each side in the vertebral body. Biopsy needle was utilized on each side. The bone was softer at this level. The very small fragments of bone from the drill tip were harvested and sent to pathology. A hand drill was utilized on each side thereafter. The Kyphon balloons once again were inserted and inflated and some reduction of the fracture was accomplished. The balloons were lowered and approximately 4.5 mL of methyl methacrylate were instilled on each side in standard fashion. The cement was allowed to harden and the working cannulae were removed. The cement was well contained. Punctate incisions were covered with Steri-Strips and sterile dressing. The patient was rolled onto his cart, extubated and taken to the recovery room in satisfactory condition. There were no overt intraoperative complications.            Ruddy Zepeda M.D.

## 2021-07-14 NOTE — PLAN OF CARE
Goal Outcome Evaluation:      Patient done well overnight, more compliant with care. Still refusing to ambulate or get out of bed. CHG x2 done. NPO since midnight for Kyphoplasty with Dr. Zepeda in AM. Waiting til morning to have consent signed with family consent, due to patient's intermittent confusion. VSS. Will continue to monitor.

## 2021-07-14 NOTE — PROGRESS NOTES
"   LOS: 6 days    Patient Care Team:  Amanda Tate MD as PCP - General (Family Medicine)    Chief Complaint: Back pain    Subjective   Patient admitted with back pain found to have hyponatremia serum sodium 124 which is slowly up to 129 at this time.  Looking at old records patient has a history of triglyceridemia with a serum triglyceride 749 in 2018.  Cortisol level is normal..  Previous sodium has been within acceptable range.  He does not have polyuria polydipsia, no diarrhea, headache, nausea.  Patient is being admitted for back problem.  Renal function has been stable  Interval History:   No acute events overnight. Back pain.   Review of Systems:   No SOA, CP, N/V       Objective     Vital Sign Min/Max for last 24 hours  Temp  Min: 97.3 °F (36.3 °C)  Max: 97.8 °F (36.6 °C)   BP  Min: 135/81  Max: 157/84   Pulse  Min: 68  Max: 94   Resp  Min: 18  Max: 18   SpO2  Min: 88 %  Max: 94 %   Flow (L/min)  Min: 2  Max: 2   Weight  Min: 118 kg (260 lb)  Max: 118 kg (260 lb)     Flowsheet Rows      First Filed Value   Admission Height  185.4 cm (73\") Documented at 07/07/2021 0332   Admission Weight  118 kg (260 lb) Documented at 07/07/2021 0332          No intake/output data recorded.  I/O last 3 completed shifts:  In: 1162 [P.O.:1162]  Out: 4550 [Urine:4550]    Physical Exam:  General Appearance: Alert, oriented, no obvious distress.  Eyes: PER, EOMI.  Neck: Supple no JVD.  Lungs: Clear auscultation, no rales rhonchi's, equal chest movement, nonlabored.  Heart: No gallop, murmur, rub, RRR.  Abdomen: Soft, nontender, positive bowel sounds, no organomegaly.  Extremities: No edema, no cyanosis.  Neuro: No focal deficit, moving all extremities, alert oriented X 3      WBC WBC   Date Value Ref Range Status   07/12/2021 8.86 3.40 - 10.80 10*3/mm3 Final      HGB Hemoglobin   Date Value Ref Range Status   07/12/2021 15.3 13.0 - 17.7 g/dL Final      HCT Hematocrit   Date Value Ref Range Status   07/12/2021 45.6 37.5 - 51.0 " % Final      Platlets No results found for: LABPLAT   MCV MCV   Date Value Ref Range Status   07/12/2021 91.8 79.0 - 97.0 fL Final          Sodium Sodium   Date Value Ref Range Status   07/14/2021 127 (L) 136 - 145 mmol/L Final   07/14/2021 130 (L) 136 - 145 mmol/L Final   07/13/2021 130 (L) 136 - 145 mmol/L Final   07/13/2021 128 (L) 136 - 145 mmol/L Final   07/13/2021 129 (L) 136 - 145 mmol/L Final   07/12/2021 126 (L) 136 - 145 mmol/L Final   07/12/2021 130 (L) 136 - 145 mmol/L Final   07/11/2021 132 (L) 136 - 145 mmol/L Final   07/11/2021 135 (L) 136 - 145 mmol/L Final   07/11/2021 131 (L) 136 - 145 mmol/L Final      Potassium Potassium   Date Value Ref Range Status   07/14/2021 3.7 3.5 - 5.2 mmol/L Final   07/12/2021 4.2 3.5 - 5.2 mmol/L Final      Chloride Chloride   Date Value Ref Range Status   07/12/2021 95 (L) 98 - 107 mmol/L Final      CO2 CO2   Date Value Ref Range Status   07/12/2021 23.0 22.0 - 29.0 mmol/L Final      BUN BUN   Date Value Ref Range Status   07/12/2021 5 (L) 8 - 23 mg/dL Final      Creatinine Creatinine   Date Value Ref Range Status   07/12/2021 0.48 (L) 0.76 - 1.27 mg/dL Final      Calcium Calcium   Date Value Ref Range Status   07/12/2021 8.8 8.6 - 10.5 mg/dL Final      PO4 No results found for: CAPO4   Albumin No results found for: ALBUMIN   Magnesium No results found for: MG   Uric Acid No results found for: URICACID        Results Review:     I reviewed the patient's new clinical results.    [MAR Hold] aspirin, 81 mg, Oral, Daily  [MAR Hold] furosemide, 20 mg, Oral, Daily  gabapentin, 100 mg, Oral, Q8H  [MAR Hold] insulin detemir, 10 Units, Subcutaneous, Nightly  [MAR Hold] insulin lispro, 0-7 Units, Subcutaneous, TID AC  losartan, 50 mg, Oral, Daily  [MAR Hold] rosuvastatin, 20 mg, Oral, Daily  [MAR Hold] senna-docusate sodium, 2 tablet, Oral, BID  [MAR Hold] sodium chloride, 10 mL, Intravenous, Q12H  sodium chloride, 10 mL, Intravenous, Q12H  [MAR Hold] sodium chloride, 1 g, Oral,  BID With Meals  [MAR Hold] tamsulosin, 0.4 mg, Oral, Daily  vancomycin, 15 mg/kg, Intravenous, Once      lactated ringers, 9 mL/hr        Medication Review: Reviewed    Assessment/Plan       Intractable back pain    Osteoarthritis    Uncontrolled type 2 diabetes mellitus with hyperglycemia (CMS/HCC)    Hypertension    Mixed hyperlipidemia    Compression fracture of L3 vertebra (CMS/HCC)    Compression fracture of T12 vertebra (CMS/HCC)    Mood disorder (CMS/HCC)    Chronic bilateral low back pain    Acute low back pain with right-sided sciatica    1.  Hyponatremia:  cortisol levels normal 8.44, glucose 212, serum osmolality 274, urine sodium 28, urine osmolarity 605  Triglyceride 137.   2.  Diabetes type 2  3.  Hypertension.  4.  Compression fracture T3, T12    Plan  Fluid restriction   Sodium chloride tablets.   Daily sodium level    Monitor I/o     Discussed with patient      Adam Ac MD  07/14/21  09:54 EDT

## 2021-07-14 NOTE — PLAN OF CARE
Goal Outcome Evaluation:              Outcome Summary: Patient returned from OR atound 1145. Patient denies pain, only c/o minimal discomfort. Dressing over surgical sites remain clean, dry, intact. VSS, 2L-RA, NSR per monitor. Patient required much encouragment to work with PT/OT and to remain in the chair longer than a few minutes. Patient ended up staying in the chair for around 2.5 hours. PRN pain medicine administered once, effective. Greer remains in place. Voiding trial initiated, however, patient was unable to feel the need to void. MD notified, greer to remain in place for now. Fluid restriction orders clairified with nephrology. IV intake is not counted against restriction totals, only PO intake. 700 PO intake during shift. No other needs assessed at this time.

## 2021-07-14 NOTE — PLAN OF CARE
Goal Outcome Evaluation:  Plan of Care Reviewed With: patient           Outcome Summary: PT PRESENTS WITH EVOLVING SYMPTOMS S/P COMPRESSION FRACTURES AND T12/L3 KYPHOPLATSTIES. PRIOR TO SURGERY, PT HAS REFUSED OOB ACTIVITY DUE TO PAIN . TOLERATED SUPINE TO SIT WITH MIN ASSIST, STS WITH MOD ASSIST AND GAIT X 14 FEET WITH R WALKER AND MOD ASSIST OF 2. PT LEANS HEAVILY TO THE R AND POSTERIOR AND HAS ATAXIC GAIT. RECOMMEND IRF AT D/C. PT EDUCATED IN BACK PRECAUTIONS AN VERBALIZED UNDERSTANDING.

## 2021-07-14 NOTE — ANESTHESIA POSTPROCEDURE EVALUATION
Patient: Cristian Watt    Procedure Summary     Date: 07/14/21 Room / Location:  [facility] /  [facility]    Anesthesia Start: 0944 Anesthesia Stop: 1044    Procedure: KYPHOPLASTY L3 AND T12 (N/A Spine Lumbar) Diagnosis:       Compression fracture of T12 vertebra, initial encounter (CMS/[facility])      (Compression fracture of T12 vertebra, initial encounter (CMS/[facility]) [S22.080A])    Surgeons: Ruddy Zepeda MD Provider: Richard Diaz MD    Anesthesia Type: general ASA Status: 3          Anesthesia Type: general    Vitals  No vitals data found for the desired time range.          Post Anesthesia Care and Evaluation    Patient location during evaluation: PACU  Patient participation: complete - patient participated  Level of consciousness: awake and alert  Pain management: adequate  Airway patency: patent  Anesthetic complications: No anesthetic complications  PONV Status: none  Cardiovascular status: hemodynamically stable and acceptable  Respiratory status: nonlabored ventilation, acceptable and nasal cannula  Hydration status: acceptable

## 2021-07-14 NOTE — PROGRESS NOTES
[facility] Medicine Services  PROGRESS NOTE    Patient Name: Cristian Watt  : 1945  MRN: [id number]    Date of Admission: 2021  Primary Care Physician: Amanda Tate MD    Subjective   Subjective     CC: Follow-up intractable back pain    HPI: No acute events overnight, patient rested well, does endorse some back pain, also complaining of some right ear ache and headache, waiting to go to the OR.    ROS:  Gen- No fevers, chills  CV- No chest pain, palpitations  Resp- No cough, dyspnea  GI- No N/V/D, abd pain    All other systems reviewed and are negative  Objective   Objective     Vital Signs:   Temp:  [97.3 °F (36.3 °C)-97.8 °F (36.6 °C)] 97.8 °F (36.6 °C)  Heart Rate:  [68-94] 93  Resp:  [18] 18  BP: (135-157)/(81-97) 135/81        Physical Exam:  Constitutional: Chronically ill-appearing elderly male, in no acute distress, awake, alert  HENT: NCAT, mucous membranes moist  Respiratory: Clear to auscultation bilaterally, respiratory effort normal   Cardiovascular: RRR, no murmurs, rubs, or gallops  Gastrointestinal: Positive bowel sounds, soft, nontender, nondistended  : Meza catheter in place  Musculoskeletal: No bilateral ankle edema  Psychiatric: Appropriate affect, cooperative  Neurologic: Oriented x 3, nonfocal  Skin: No rashes    Results Reviewed:  Results from last 7 days   Lab Units 21  0921  0534   WBC 10*3/mm3 8.86 9.36   HEMOGLOBIN g/dL 15.3 15.5   HEMATOCRIT % 45.6 46.1   PLATELETS 10*3/mm3 341 308     Results from last 7 days   Lab Units 21  0044 21  1547 21  0732 21  0928 21  0601 21  0534   SODIUM mmol/L 130* 130* 128* 130* 127* 126*   POTASSIUM mmol/L  --   --   --  4.2 4.0 3.8   CHLORIDE mmol/L  --   --   --  95* 93* 91*   CO2 mmol/L  --   --   --  23.0 27.0 24.0   BUN mg/dL  --   --   --  5* 5* 7*   CREATININE mg/dL  --   --   --  0.48* 0.54* 0.55*   GLUCOSE mg/dL  --   --   --  157* 185* 193*    CALCIUM mg/dL  --   --   --  8.8 9.1 8.9     Estimated Creatinine Clearance: 107.3 mL/min (A) (by C-G formula based on SCr of 0.48 mg/dL (L)).    Microbiology Results Abnormal     Procedure Component Value - Date/Time    COVID PRE-OP / PRE-PROCEDURE SCREENING ORDER (NO ISOLATION) - Swab, Nasopharynx [[id number]]  (Normal) Collected: 07/07/21 0643    Lab Status: Final result Specimen: Swab from Nasopharynx Updated: 07/07/21 0755    Narrative:      The following orders were created for panel order COVID PRE-OP / PRE-PROCEDURE SCREENING ORDER (NO ISOLATION) - Swab, Nasopharynx.  Procedure                               Abnormality         Status                     ---------                               -----------         ------                     COVID-19,CEPHEID,[facility] IN-...[[id number]]  Normal              Final result                 Please view results for these tests on the individual orders.    COVID-19,CEPHEID,[facility] IN-HOUSE(OR EMERGENT/ADD-ON),NP SWAB IN TRANSPORT MEDIA 3-4 HR TAT - Swab, Nasopharynx [[id number]]  (Normal) Collected: 07/07/21 0643    Lab Status: Final result Specimen: Swab from Nasopharynx Updated: 07/07/21 0755     COVID19 Not Detected    Narrative:      Fact sheet for providers: https://www.fda.gov/media/366701/download     Fact sheet for patients: https://www.fda.gov/media/476905/download  Fact sheet for providers: https://www.fda.gov/media/467888/download     Fact sheet for patients: https://www.fda.gov/media/447140/download          Imaging Results (Last 24 Hours)     ** No results found for the last 24 hours. **              I have reviewed the medications:  Scheduled Meds:aspirin, 81 mg, Oral, Daily  furosemide, 20 mg, Oral, Daily  gabapentin, 100 mg, Oral, Q8H  insulin detemir, 10 Units, Subcutaneous, Nightly  insulin lispro, 0-7 Units, Subcutaneous, TID AC  losartan, 50 mg, Oral, Daily  rosuvastatin, 20 mg, Oral, Daily  senna-docusate sodium, 2 tablet, Oral, BID  sodium chloride, 10 mL,  Intravenous, Q12H  sodium chloride, 1 g, Oral, BID With Meals  tamsulosin, 0.4 mg, Oral, Daily      Continuous Infusions:   PRN Meds:.•  acetaminophen **OR** acetaminophen **OR** acetaminophen  •  senna-docusate sodium **AND** polyethylene glycol **AND** bisacodyl **AND** bisacodyl  •  dextrose  •  dextrose  •  glucagon (human recombinant)  •  Morphine  •  oxyCODONE-acetaminophen  •  sodium chloride    Assessment/Plan   Assessment & Plan     Active Hospital Problems    Diagnosis  POA   • **Intractable back pain [M54.9]  Yes   • Compression fracture of L3 vertebra (CMS/HCC) [S32.030A]  Yes   • Compression fracture of T12 vertebra (CMS/HCC) [S22.080A]  Yes   • Mood disorder (CMS/HCC) [F39]  Yes   • Chronic bilateral low back pain [M54.5, G89.29]  Unknown   • Acute low back pain with right-sided sciatica [M54.41]  Yes   • Mixed hyperlipidemia [E78.2]  Yes   • Hypertension [I10]  Yes   • Uncontrolled type 2 diabetes mellitus with hyperglycemia (CMS/HCC) [E11.65]  Yes   • Osteoarthritis [M19.90]  Yes      Resolved Hospital Problems   No resolved problems to display.        Brief Hospital Course to date:  Cristian Watt is a 75 y.o. male history of hypertension, poorly controlled type 2 diabetes, hyperlipidemia, anxiety depression, osteoarthritis, hyperlipidemia.  Patient presents with progressively worsening chronic low back pain, imaging concerning for T12 and L3 compression fractures.  Plan for kyphoplasty today 7/14     Plan  Intractable back pain with T12 and L3 compression fractures  -Etiology unknown, denies any recent trauma  -Neurosurgery following, plan for T12- L3 kyphoplasty today 7/14 with Dr. Zepeda  -Continue pain control, PT/OT     Poorly controlled type 2 diabetes with A1c 12.4%, neuropathy  -FSBG's have been reviewed and currently appropriate,  -Continue current basal and SSI insulin.  -Continue gabapentin 100 mg TID.     Urinary retention  -Meza catheter in place, continues to have good urinary  output   -Keep Meza until after his procedure, then attempt voiding trials  - continue Flomax for now.     Hyponatremia, improving  -Suspect hypovolemic hyponatremia,  -nephrology following, follow-up aldosterone and renin levels, he has normal cortisol levels  -Started on sodium chloride tablets  -s/p IVF, continue fluid restriction     Hypertension-BP currently well controlled, continue home losartan     Hyperlipidemia-continue statin     Anxiety and depression     GERD     DVT prophylaxis:  Mechanical DVT prophylaxis orders are present.      Disposition: TBD    CODE STATUS:   Code Status and Medical Interventions:   Ordered at: 07/07/21 0600     Level Of Support Discussed With:    Patient     Code Status:    CPR     Medical Interventions (Level of Support Prior to Arrest):    Full       Shana Pardo MD  07/14/21

## 2021-07-14 NOTE — ANESTHESIA PREPROCEDURE EVALUATION
Anesthesia Evaluation     Patient summary reviewed and Nursing notes reviewed   NPO Solid Status: > 8 hours  NPO Liquid Status: > 8 hours           Airway   Mallampati: II  TM distance: >3 FB  Neck ROM: limited  No difficulty expected  Dental          Pulmonary     breath sounds clear to auscultation  Cardiovascular   Exercise tolerance: poor (<4 METS)    Rhythm: regular  Rate: normal        Neuro/Psych  GI/Hepatic/Renal/Endo      Musculoskeletal     Abdominal    Substance History      OB/GYN          Other                      Anesthesia Plan    ASA 3     general     intravenous induction     Anesthetic plan, all risks, benefits, and alternatives have been provided, discussed and informed consent has been obtained with: patient.

## 2021-07-14 NOTE — PROGRESS NOTES
Clinical Nutrition   Reason For Visit: [facility]    Patient Name: Cristian Watt  YOB: 1945  MRN: [id number]  Date of Encounter: 07/14/21 09:45 EDT  Admission date: 7/7/2021    Comments:   - RD ordering Boost Glucose Control TID.    Nutrition Assessment     Admission Problem List:    Intractable back pain    Osteoarthritis    Uncontrolled type 2 diabetes mellitus with hyperglycemia (CMS/HCC)    Hypertension    Mixed hyperlipidemia    Compression fracture of L3 vertebra (CMS/HCC)    Compression fracture of T12 vertebra (CMS/HCC)    Mood disorder (CMS/HCC)    Chronic bilateral low back pain    Acute low back pain with right-sided sciatica     Applicable medical tests/procedures since admission:  7/14: Pt in OR today for kyphoplasty.    PMH: He  has a past medical history of Depression, Diabetes mellitus (CMS/HCC), Disc degeneration, lumbar, Emphysema of lung (CMS/HCC), Generalized anxiety disorder, GERD (gastroesophageal reflux disease), Hyperlipidemia, Hypertension, Hypogonadism male, Osteoarthritis, Type 2 diabetes mellitus, uncontrolled (CMS/HCC), and Vitamin D deficiency.   PSxH: He  has a past surgical history that includes Hernia repair and Knee Arthroplasty.        Reported/Observed/Food/Nutrition Related History     7/14: Pt off floor for OR today.    Anthropometrics   Height: 73 in  Weight: 260 lbs (stated 7/14)  BMI: 34.3  BMI classification: Obese Class I: 30-34.9kg/m2   IBW: 184 lbs  UBW: per -260 lbs  Weight change: Wt relatively stable x 12 months. Noted wt gain may be related to fluid.    Weight Weight (kg) Weight (lbs) Weight Method   7/14/2021 117.935 kg 260 lb Stated   7/7/2021 117.935 kg 260 lb Stated   4/15/2020 113.399 kg 250 lb -   6/25/2018 113.966 kg 251 lb 4 oz -   3/5/2018 115.622 kg 254 lb 14.4 oz -   2/5/2018 114.76 kg 253 lb -   12/4/2017 116.121 kg 256 lb -   9/18/2017 72.122 kg 159 lb -   4/10/2017 118.389 kg 261 lb -   1/9/2017 117.482 kg 259 lb -   9/16/2016 120.657  kg 266 lb -   6/28/2016 123.832 kg 273 lb -   4/25/2016 121.11 kg 267 lb -   2/3/2016 118.84 kg 261 lb 15.9 oz -   10/20/2015 113.85 kg 250 lb 15.9 oz -     Nutrition Focused Physical Exam     Unable to perform exam due to: pt off floor -- RD will perform as able    Labs reviewed   Labs reviewed: Yes    Results from last 7 days   Lab Units 07/14/21  0701 07/14/21  0044 07/13/21  1547 07/12/21  0928 07/11/21  0601 07/09/21  0534   SODIUM mmol/L 127* 130* 130* 130* 127* 126*   POTASSIUM mmol/L 3.7  --   --  4.2 4.0 3.8   CHLORIDE mmol/L  --   --   --  95* 93* 91*   CO2 mmol/L  --   --   --  23.0 27.0 24.0   BUN mg/dL  --   --   --  5* 5* 7*   CREATININE mg/dL  --   --   --  0.48* 0.54* 0.55*   GLUCOSE mg/dL  --   --   --  157* 185* 193*   CALCIUM mg/dL  --   --   --  8.8 9.1 8.9   TRIGLYCERIDES mg/dL  --   --   --   --  137  --      Results from last 7 days   Lab Units 07/12/21  0928 07/11/21  0601 07/09/21  0534   WBC 10*3/mm3 8.86  --  9.36   TRIGLYCERIDES mg/dL  --  137  --      Results from last 7 days   Lab Units 07/14/21  0744 07/13/21  1942 07/13/21  1625 07/13/21  1133 07/13/21  0713 07/12/21  1928   GLUCOSE mg/dL 157* 214* 163* 198* 152* 219*     Lab Results   Lab Value Date/Time    HGBA1C 12.40 (H) 07/07/2021 0449    HGBA1C 11.8 06/25/2018 1147    HGBA1C 11.5 03/05/2018 1254     Medications reviewed   Medications reviewed: Yes  Pertinent: lasix, pericolace, insulin, cozaar    Current Nutrition Prescription   PO: NPO Diet    Average PO intake: 16.7% x 3 meals    Nutrition Diagnosis     7/14:  Problem Inadequate oral intake   Etiology Procedures; pain   Signs/Symptoms NPO; 16.7% average intake x 3 meals     Intervention   Intervention: Follow treatment progress, Care plan reviewed, Supplement provided     - RD ordering Boost Glucose Control TID.    Goal:   General: Nutrition support treatment  PO: Increase intake     Additional goals:  - Elytes WNL  - Dry wt maintenance    Monitoring/Evaluation:    Monitoring/Evaluation: Per protocol, PO intake, Supplement intake, Pertinent labs, Weight, Skin status, Symptoms    Beck Aguirre RD  Time Spent: 20 min

## 2021-07-15 LAB
ALDOST SERPL-MCNC: 2.8 NG/DL (ref 0–30)
ANION GAP SERPL CALCULATED.3IONS-SCNC: 10 MMOL/L (ref 5–15)
BUN SERPL-MCNC: 7 MG/DL (ref 8–23)
BUN/CREAT SERPL: 12.1 (ref 7–25)
CALCIUM SPEC-SCNC: 8.9 MG/DL (ref 8.6–10.5)
CHLORIDE SERPL-SCNC: 96 MMOL/L (ref 98–107)
CO2 SERPL-SCNC: 27 MMOL/L (ref 22–29)
CREAT SERPL-MCNC: 0.58 MG/DL (ref 0.76–1.27)
CYTO UR: NORMAL
GFR SERPL CREATININE-BSD FRML MDRD: 137 ML/MIN/1.73
GLUCOSE BLDC GLUCOMTR-MCNC: 149 MG/DL (ref 70–130)
GLUCOSE BLDC GLUCOMTR-MCNC: 221 MG/DL (ref 70–130)
GLUCOSE BLDC GLUCOMTR-MCNC: 233 MG/DL (ref 70–130)
GLUCOSE BLDC GLUCOMTR-MCNC: 269 MG/DL (ref 70–130)
GLUCOSE SERPL-MCNC: 167 MG/DL (ref 65–99)
LAB AP CASE REPORT: NORMAL
LAB AP CLINICAL INFORMATION: NORMAL
PATH REPORT.FINAL DX SPEC: NORMAL
PATH REPORT.GROSS SPEC: NORMAL
POTASSIUM SERPL-SCNC: 3.9 MMOL/L (ref 3.5–5.2)
SODIUM SERPL-SCNC: 127 MMOL/L (ref 136–145)
SODIUM SERPL-SCNC: 133 MMOL/L (ref 136–145)
SODIUM SERPL-SCNC: 133 MMOL/L (ref 136–145)

## 2021-07-15 PROCEDURE — 99232 SBSQ HOSP IP/OBS MODERATE 35: CPT | Performed by: INTERNAL MEDICINE

## 2021-07-15 PROCEDURE — 80048 BASIC METABOLIC PNL TOTAL CA: CPT | Performed by: INTERNAL MEDICINE

## 2021-07-15 PROCEDURE — 99024 POSTOP FOLLOW-UP VISIT: CPT | Performed by: NEUROLOGICAL SURGERY

## 2021-07-15 PROCEDURE — 63710000001 INSULIN LISPRO (HUMAN) PER 5 UNITS: Performed by: NEUROLOGICAL SURGERY

## 2021-07-15 PROCEDURE — 94799 UNLISTED PULMONARY SVC/PX: CPT

## 2021-07-15 PROCEDURE — 84295 ASSAY OF SERUM SODIUM: CPT | Performed by: NEUROLOGICAL SURGERY

## 2021-07-15 PROCEDURE — 63710000001 INSULIN DETEMIR PER 5 UNITS: Performed by: NEUROLOGICAL SURGERY

## 2021-07-15 PROCEDURE — 82962 GLUCOSE BLOOD TEST: CPT

## 2021-07-15 RX ORDER — MAGNESIUM CARB/ALUMINUM HYDROX 105-160MG
150 TABLET,CHEWABLE ORAL ONCE
Status: COMPLETED | OUTPATIENT
Start: 2021-07-15 | End: 2021-07-15

## 2021-07-15 RX ADMIN — DOCUSATE SODIUM 50MG AND SENNOSIDES 8.6MG 2 TABLET: 8.6; 5 TABLET, FILM COATED ORAL at 20:33

## 2021-07-15 RX ADMIN — ASPIRIN 81 MG: 81 TABLET, COATED ORAL at 08:37

## 2021-07-15 RX ADMIN — ROSUVASTATIN CALCIUM 20 MG: 20 TABLET, COATED ORAL at 08:37

## 2021-07-15 RX ADMIN — Medication 1 G: at 08:37

## 2021-07-15 RX ADMIN — SODIUM CHLORIDE, PRESERVATIVE FREE 10 ML: 5 INJECTION INTRAVENOUS at 20:33

## 2021-07-15 RX ADMIN — GABAPENTIN 100 MG: 100 CAPSULE ORAL at 05:00

## 2021-07-15 RX ADMIN — Medication 1 G: at 17:36

## 2021-07-15 RX ADMIN — GABAPENTIN 100 MG: 100 CAPSULE ORAL at 13:04

## 2021-07-15 RX ADMIN — POLYETHYLENE GLYCOL 3350 17 G: 17 POWDER, FOR SOLUTION ORAL at 08:37

## 2021-07-15 RX ADMIN — LOSARTAN POTASSIUM 50 MG: 50 TABLET, FILM COATED ORAL at 08:37

## 2021-07-15 RX ADMIN — GABAPENTIN 100 MG: 100 CAPSULE ORAL at 20:32

## 2021-07-15 RX ADMIN — DOCUSATE SODIUM 50MG AND SENNOSIDES 8.6MG 2 TABLET: 8.6; 5 TABLET, FILM COATED ORAL at 08:37

## 2021-07-15 RX ADMIN — SODIUM CHLORIDE, PRESERVATIVE FREE 10 ML: 5 INJECTION INTRAVENOUS at 08:37

## 2021-07-15 RX ADMIN — INSULIN DETEMIR 10 UNITS: 100 INJECTION, SOLUTION SUBCUTANEOUS at 20:31

## 2021-07-15 RX ADMIN — Medication 150 ML: at 13:43

## 2021-07-15 RX ADMIN — INSULIN LISPRO 4 UNITS: 100 INJECTION, SOLUTION INTRAVENOUS; SUBCUTANEOUS at 17:36

## 2021-07-15 RX ADMIN — FUROSEMIDE 20 MG: 20 TABLET ORAL at 08:37

## 2021-07-15 RX ADMIN — OXYCODONE HYDROCHLORIDE AND ACETAMINOPHEN 1 TABLET: 7.5; 325 TABLET ORAL at 05:00

## 2021-07-15 RX ADMIN — INSULIN LISPRO 3 UNITS: 100 INJECTION, SOLUTION INTRAVENOUS; SUBCUTANEOUS at 13:04

## 2021-07-15 RX ADMIN — TAMSULOSIN HYDROCHLORIDE 0.4 MG: 0.4 CAPSULE ORAL at 08:37

## 2021-07-15 NOTE — PLAN OF CARE
Goal Outcome Evaluation:   VSS this shift, room air. Pt A/Ox4. Complaints of pain x2, relieved with PRN. Pt denies any needs at this time.

## 2021-07-15 NOTE — CASE MANAGEMENT/SOCIAL WORK
Continued Stay Note  [facility]     Patient Name: Cristian Watt  MRN: [id number]  Today's Date: 7/15/2021    Admit Date: 7/7/2021    Discharge Plan     Row Name 07/15/21 1200       Plan    Plan  skilled nursing    Patient/Family in Agreement with Plan  yes    Plan Comments   I met with Mr. Watt at the bedside today to discuss discharge planning. Mr. Watt is agreeable to skilled nursing as a discharge plan, [facility] and [facility] are interested in offering him a bed. However, Mr. Watt has not participated with physical therapy today. It has been explained that he refuses to participate with physcial therapy and occupational therapy, Mr. Watt will not be accepted at any skilled nursing facility. Case management will continue to follow Mr. Watt's progress and provide for him any anticipated discharge needs.      Final Discharge Disposition Code  30 - still a patient        Discharge Codes    No documentation.       Expected Discharge Date and Time     Expected Discharge Date Expected Discharge Time    Jul 16, 2021       Jessica Driscoll RN

## 2021-07-15 NOTE — PROGRESS NOTES
"NEUROSURGERY PROGRESS NOTE     LOS: 7 days   Patient Care Team:  Amanda Tate MD as PCP - General (Family Medicine)    Chief Complaint: Low back pain.    POD#: 1 Day Post-Op  Procedures:  T12 and L3 kyphoplasty    Interval History:   Patient Complaints: Low back pain, but improved.  Patient Denies: Lower extremity weakness or numbness.    Vital Signs: Blood pressure 139/81, pulse 85, temperature 98.5 °F (36.9 °C), temperature source Axillary, resp. rate 18, height 185.4 cm (73\"), weight 118 kg (260 lb), SpO2 90 %.  Intake/Output:     Intake/Output Summary (Last 24 hours) at 7/15/2021 0915  Last data filed at 7/15/2021 0600  Gross per 24 hour   Intake 2125 ml   Output 1750 ml   Net 375 ml       Physical Exam:  The patient awakens and is pleasant.  He follows all commands.  Motor function is intact in his lower extremities.  Dry dressings are in place on his punctate incisions.     Assessment/Plan:  1.  T12 and L3 compression fractures status post kyphoplasty.  2.  Chronic mechanical low back pain.  3.  Low-grade L5-S1 spondylolisthesis.  4.  Hyponatremia.  Sodium is still running low.  5.  Uncontrolled diabetes mellitus.  6.  Disposition:  Overall the patient is doing better.  Mobilize.  Consider rehabilitation or extended-care facility.    Ruddy Zepeda MD  07/15/21  09:15 EDT    "

## 2021-07-15 NOTE — PLAN OF CARE
Slept majority shift.  Oriented when awake.  Snoring intermittent and sats decreased to 78% on room air.  Currently on 2L cannula.  Monitor SR.  Bladder training initiated and clamped/drained every 3 hours.  PRN Miralax given, but Mag Citrate still remains bedside.  Fluid restrictions enforced.  Irritable with activity encouragement.  Refused recliner.  Glasses lens fell out and friend (Cathy) picked up glasses to have them repaired.

## 2021-07-15 NOTE — PROGRESS NOTES
"   LOS: 7 days    Patient Care Team:  Amanda Tate MD as PCP - General (Family Medicine)    Chief Complaint: Back pain    Subjective   Patient admitted with back pain found to have hyponatremia serum sodium 124 which is slowly up to 129 at this time.  Looking at old records patient has a history of triglyceridemia with a serum triglyceride 749 in 2018.  Cortisol level is normal..  Previous sodium has been within acceptable range.  He does not have polyuria polydipsia, no diarrhea, headache, nausea.  Patient is being admitted for back problem.  Renal function has been stable  Interval History:   No acute events overnight. No new complaints.     Review of Systems:   No SOA, CP, N/V       Objective     Vital Sign Min/Max for last 24 hours  Temp  Min: 97 °F (36.1 °C)  Max: 98.5 °F (36.9 °C)   BP  Min: 88/73  Max: 144/90   Pulse  Min: 75  Max: 111   Resp  Min: 16  Max: 18   SpO2  Min: 88 %  Max: 95 %   Flow (L/min)  Min: 2  Max: 2   Weight  Min: 118 kg (260 lb)  Max: 118 kg (260 lb)     Flowsheet Rows      First Filed Value   Admission Height  185.4 cm (73\") Documented at 07/07/2021 0332   Admission Weight  118 kg (260 lb) Documented at 07/07/2021 0332          No intake/output data recorded.  I/O last 3 completed shifts:  In: 2582 [P.O.:1582; I.V.:1000]  Out: 2650 [Urine:2650]    Physical Exam:    Gen: Alert, NAD   HENT: NC, AT, EOMI   NECK: Supple, no JVD, Trachea midline   LUNGS: CTA bilaterally, non labored respirtation   CVS: S1/S2 audible, RRR, no murmur   Abd: Soft, NT, ND, BS+   Ext: No pedal edema, no cyanosis   CNS: Alert, No focal deficit noted grossly  Psy: Cooperative  Skin: Warm, dry and intact        WBC WBC   Date Value Ref Range Status   07/12/2021 8.86 3.40 - 10.80 10*3/mm3 Final      HGB Hemoglobin   Date Value Ref Range Status   07/12/2021 15.3 13.0 - 17.7 g/dL Final      HCT Hematocrit   Date Value Ref Range Status   07/12/2021 45.6 37.5 - 51.0 % Final      Platlets No results found for: LABPLAT "   MCV MCV   Date Value Ref Range Status   07/12/2021 91.8 79.0 - 97.0 fL Final          Sodium Sodium   Date Value Ref Range Status   07/15/2021 133 (L) 136 - 145 mmol/L Final   07/15/2021 127 (L) 136 - 145 mmol/L Final   07/14/2021 128 (L) 136 - 145 mmol/L Final   07/14/2021 127 (L) 136 - 145 mmol/L Final   07/14/2021 130 (L) 136 - 145 mmol/L Final   07/13/2021 130 (L) 136 - 145 mmol/L Final   07/13/2021 128 (L) 136 - 145 mmol/L Final   07/13/2021 129 (L) 136 - 145 mmol/L Final   07/12/2021 126 (L) 136 - 145 mmol/L Final      Potassium Potassium   Date Value Ref Range Status   07/14/2021 3.7 3.5 - 5.2 mmol/L Final   07/12/2021 4.2 3.5 - 5.2 mmol/L Final      Chloride Chloride   Date Value Ref Range Status   07/12/2021 95 (L) 98 - 107 mmol/L Final      CO2 CO2   Date Value Ref Range Status   07/12/2021 23.0 22.0 - 29.0 mmol/L Final      BUN BUN   Date Value Ref Range Status   07/12/2021 5 (L) 8 - 23 mg/dL Final      Creatinine Creatinine   Date Value Ref Range Status   07/12/2021 0.48 (L) 0.76 - 1.27 mg/dL Final      Calcium Calcium   Date Value Ref Range Status   07/12/2021 8.8 8.6 - 10.5 mg/dL Final      PO4 No results found for: CAPO4   Albumin No results found for: ALBUMIN   Magnesium No results found for: MG   Uric Acid No results found for: URICACID        Results Review:     I reviewed the patient's new clinical results.    aspirin, 81 mg, Oral, Daily  furosemide, 20 mg, Oral, Daily  gabapentin, 100 mg, Oral, Q8H  insulin detemir, 10 Units, Subcutaneous, Nightly  insulin lispro, 0-7 Units, Subcutaneous, TID AC  losartan, 50 mg, Oral, Daily  rosuvastatin, 20 mg, Oral, Daily  senna-docusate sodium, 2 tablet, Oral, BID  sodium chloride, 10 mL, Intravenous, Q12H  sodium chloride, 1 g, Oral, BID With Meals  tamsulosin, 0.4 mg, Oral, Daily      lactated ringers, 75 mL/hr, Last Rate: 75 mL/hr (07/14/21 2446)        Medication Review: Reviewed    Assessment/Plan       Intractable back pain    Osteoarthritis     Uncontrolled type 2 diabetes mellitus with hyperglycemia (CMS/HCC)    Hypertension    Mixed hyperlipidemia    Compression fracture of L3 vertebra (CMS/HCC)    Compression fracture of T12 vertebra (CMS/HCC)    Mood disorder (CMS/HCC)    Chronic bilateral low back pain    Acute low back pain with right-sided sciatica    1.  Hyponatremia:  cortisol levels normal 8.44, glucose 212, serum osmolality 274, urine sodium 28, urine osmolarity 605- SIADH  - improving   Triglyceride 137.   2.  Diabetes type 2  3.  Hypertension.  4.  Compression fracture T3, T12    Plan  Fluid restriction 1.2 lit/day   Sodium chloride tablets and lasix.     Discussed with patient      Follow up with [facility] in 2 weeks with renal function panel.     Adam Ac MD  07/15/21  08:15 EDT

## 2021-07-15 NOTE — PROGRESS NOTES
Clinical Nutrition   Reason For Visit: Follow-up protocol    Patient Name: Cristian Watt  YOB: 1945  MRN: [id number]  Date of Encounter: 07/15/21 12:56 EDT  Admission date: 7/7/2021    Nutrition Assessment     Admission Problem List:    Intractable back pain    Osteoarthritis    Uncontrolled type 2 diabetes mellitus with hyperglycemia (CMS/HCC)    Hypertension    Mixed hyperlipidemia    Compression fracture of L3 vertebra (CMS/HCC)    Compression fracture of T12 vertebra (CMS/HCC)    Mood disorder (CMS/HCC)    Chronic bilateral low back pain    Acute low back pain with right-sided sciatica     Applicable medical tests/procedures since admission:  7/14: OR for kyphoplasty    PMH: He  has a past medical history of Depression, Diabetes mellitus (CMS/HCC), Disc degeneration, lumbar, Emphysema of lung (CMS/HCC), Generalized anxiety disorder, GERD (gastroesophageal reflux disease), Hyperlipidemia, Hypertension, Hypogonadism male, Osteoarthritis, Type 2 diabetes mellitus, uncontrolled (CMS/HCC), and Vitamin D deficiency.   PSxH: He  has a past surgical history that includes Hernia repair; Knee Arthroplasty; and Kyphoplasty (N/A, 7/14/2021).        Reported/Observed/Food/Nutrition Related History     7/15: RD spoke with pt; report of improving appetite. Pt has not yet tried Boost Glucose Control - he stated he received one with lunch and will try to drink it. Pt had no other concerns at this time.    7/14: Pt off floor for OR today.    Anthropometrics   Height: 73 in  Weight: 260 lbs (stated 7/14)  BMI: 34.3  BMI classification: Obese Class I: 30-34.9kg/m2   IBW: 184 lbs  UBW: per -260 lbs  Weight change: Wt relatively stable x 12 months. Noted wt gain may be related to fluid.    Weight Weight (kg) Weight (lbs) Weight Method   7/14/2021 117.935 kg 260 lb Stated   7/7/2021 117.935 kg 260 lb Stated   4/15/2020 113.399 kg 250 lb -   6/25/2018 113.966 kg 251 lb 4 oz -   3/5/2018 115.622 kg 254 lb 14.4  oz -   2/5/2018 114.76 kg 253 lb -   12/4/2017 116.121 kg 256 lb -   9/18/2017 72.122 kg 159 lb -   4/10/2017 118.389 kg 261 lb -   1/9/2017 117.482 kg 259 lb -   9/16/2016 120.657 kg 266 lb -   6/28/2016 123.832 kg 273 lb -   4/25/2016 121.11 kg 267 lb -   2/3/2016 118.84 kg 261 lb 15.9 oz -   10/20/2015 113.85 kg 250 lb 15.9 oz -     Nutrition Focused Physical Exam     Unable to perform exam due to: RD will perform as able.    Labs reviewed   Labs reviewed: Yes    Results from last 7 days   Lab Units 07/15/21  0810 07/15/21  0106 07/14/21  1555 07/14/21  0701 07/12/21  0928 07/11/21  0601   SODIUM mmol/L 133* 127* 128* 127* 130* 127*   POTASSIUM mmol/L 3.9  --   --  3.7 4.2 4.0   CHLORIDE mmol/L 96*  --   --   --  95* 93*   CO2 mmol/L 27.0  --   --   --  23.0 27.0   BUN mg/dL 7*  --   --   --  5* 5*   CREATININE mg/dL 0.58*  --   --   --  0.48* 0.54*   GLUCOSE mg/dL 167*  --   --   --  157* 185*   CALCIUM mg/dL 8.9  --   --   --  8.8 9.1   TRIGLYCERIDES mg/dL  --   --   --   --   --  137     Results from last 7 days   Lab Units 07/12/21  0928 07/11/21  0601 07/09/21  0534   WBC 10*3/mm3 8.86  --  9.36   TRIGLYCERIDES mg/dL  --  137  --      Results from last 7 days   Lab Units 07/15/21  1114 07/15/21  0800 07/14/21  1943 07/14/21  1612 07/14/21  0744 07/13/21  1942   GLUCOSE mg/dL 221* 149* 212* 233* 157* 214*     Lab Results   Lab Value Date/Time    HGBA1C 12.40 (H) 07/07/2021 0449    HGBA1C 11.8 06/25/2018 1147    HGBA1C 11.5 03/05/2018 1254     Medications reviewed   Medications reviewed: Yes  Pertinent: lasix, pericolace, insulin, cozaar    Current Nutrition Prescription   PO: Diet Regular; Daily Fluid Restriction, Consistent Carbohydrate; Other; 1,200; 240 mL Fluid Per Tray    Average PO intake: 19% / 4 meals  Establishing s/p OR procedure on 7/14. 25% x 1 meal on 7/15.    Nutrition Diagnosis     7/14: Updated: 7/15  Problem Inadequate oral intake   Etiology Procedures; pain   Signs/Symptoms NPO; 19% average  intake x 4 meals   Status: ongoing    Intervention   Intervention: Follow treatment progress, Care plan reviewed     Goal:   General: Nutrition support treatment  PO: Increase intake     Additional goals:  - Elytes WNL - not met, continue  - Dry wt maintenance - need updated wt, continue    Monitoring/Evaluation:   Monitoring/Evaluation: Per protocol, PO intake, Supplement intake, Pertinent labs, Weight, Skin status, Symptoms    Beck Aguirre RD  Time Spent: 20 min

## 2021-07-15 NOTE — PROGRESS NOTES
[facility] Services  PROGRESS NOTE    Patient Name: Cristian Watt  : 1945  MRN: [id number]    Date of Admission: 2021  Primary Care Physician: Amanda Tate MD    Subjective   Subjective     CC:Back pain    HPI: No acute events overnight, patient he rested well, denies any more pain, he is just sleepy.    ROS:  Gen- No fevers, chills  CV- No chest pain, palpitations  Resp- No cough, dyspnea  GI- No N/V/D, abd pain    All other systems reviewed and are negative  Objective   Objective     Vital Signs:   Temp:  [97 °F (36.1 °C)-98.5 °F (36.9 °C)] 98.5 °F (36.9 °C)  Heart Rate:  [] 85  Resp:  [16-18] 18  BP: ()/(63-90) 139/81  Flow (L/min):  [2] 2     Physical Exam:  Constitutional: No acute distress, awake, alert  HENT: NCAT, mucous membranes moist  Respiratory: Clear to auscultation bilaterally, respiratory effort normal   Cardiovascular: RRR, no murmurs, rubs, or gallops  Gastrointestinal: Positive bowel sounds, soft, nontender, nondistended  Musculoskeletal: No bilateral ankle edema  Psychiatric: Appropriate affect, cooperative  Neurologic: Oriented x 3,non-focal  Skin: No rashes    Results Reviewed:  LAB RESULTS:      Lab 21  0928 21  0534   WBC 8.86 9.36   HEMOGLOBIN 15.3 15.5   HEMATOCRIT 45.6 46.1   PLATELETS 341 308   MCV 91.8 91.5         Lab 07/15/21  0106 21  1555 21  0701 21  0044 21  1547 21  0928 21  0601 21  0534   SODIUM 127* 128* 127* 130* 130* 130* 127* 126*   POTASSIUM  --   --  3.7  --   --  4.2 4.0 3.8   CHLORIDE  --   --   --   --   --  95* 93* 91*   CO2  --   --   --   --   --  23.0 27.0 24.0   ANION GAP  --   --   --   --   --  12.0 7.0 11.0   BUN  --   --   --   --   --  5* 5* 7*   CREATININE  --   --   --   --   --  0.48* 0.54* 0.55*   GLUCOSE  --   --   --   --   --  157* 185* 193*   CALCIUM  --   --   --   --   --  8.8 9.1 8.9                 Lab 21  06    TRIGLYCERIDES 137             Brief Urine Lab Results  (Last result in the past 365 days)      Color   Clarity   Blood   Leuk Est   Nitrite   Protein   CREAT   Urine HCG        07/07/21 0458 Yellow Clear Negative Negative Negative Negative               Microbiology Results Abnormal     Procedure Component Value - Date/Time    COVID PRE-OP / PRE-PROCEDURE SCREENING ORDER (NO ISOLATION) - Swab, Nasopharynx [[id number]]  (Normal) Collected: 07/07/21 0643    Lab Status: Final result Specimen: Swab from Nasopharynx Updated: 07/07/21 0755    Narrative:      The following orders were created for panel order COVID PRE-OP / PRE-PROCEDURE SCREENING ORDER (NO ISOLATION) - Swab, Nasopharynx.  Procedure                               Abnormality         Status                     ---------                               -----------         ------                     COVID-19,CEPHEID,CYNTHIA IN-...[[id number]]  Normal              Final result                 Please view results for these tests on the individual orders.    COVID-19,CEPHEID,CYNTHIA IN-HOUSE(OR EMERGENT/ADD-ON),NP SWAB IN TRANSPORT MEDIA 3-4 HR TAT - Swab, Nasopharynx [[id number]]  (Normal) Collected: 07/07/21 0643    Lab Status: Final result Specimen: Swab from Nasopharynx Updated: 07/07/21 0755     COVID19 Not Detected    Narrative:      Fact sheet for providers: https://www.fda.gov/media/913605/download     Fact sheet for patients: https://www.fda.gov/media/884928/download  Fact sheet for providers: https://www.fda.gov/media/649967/download     Fact sheet for patients: https://www.fda.gov/media/983995/download          Kyphoplasty Vertebroplasty    Result Date: 7/15/2021  EXAMINATION: IR KYPHOPLASTY VERTEBROPLASTY-  INDICATION: Kyphoplasty L3 and T12; M54.41-Lumbago with sciatica, right side; Z86.39-Personal history of other endocrine, nutritional and metabolic disease; Z74.09-Other reduced mobility; S22.080A-Wedge compression fracture of T11-T12 vertebra, initial  encounter for closed fracture.  TECHNIQUE: Intraoperative fluoroscopy for improved localization and treatment planning.  COMPARISON: None.  FINDINGS: Intraoperative fluoroscopy with total fluoroscopic time usage 2 minutes 58 seconds and twelve images saved during kyphoplasty at the T12 and L3 levels.      Impression: Intraoperative fluoroscopy with total fluoroscopic time usage 2 minutes 58 seconds and twelve images saved during kyphoplasty at the T12 and L3 levels.  D:  07/14/2021 E:  07/15/2021              I have reviewed the medications:  Scheduled Meds:aspirin, 81 mg, Oral, Daily  furosemide, 20 mg, Oral, Daily  gabapentin, 100 mg, Oral, Q8H  insulin detemir, 10 Units, Subcutaneous, Nightly  insulin lispro, 0-7 Units, Subcutaneous, TID AC  losartan, 50 mg, Oral, Daily  rosuvastatin, 20 mg, Oral, Daily  senna-docusate sodium, 2 tablet, Oral, BID  sodium chloride, 10 mL, Intravenous, Q12H  sodium chloride, 1 g, Oral, BID With Meals  tamsulosin, 0.4 mg, Oral, Daily      Continuous Infusions:lactated ringers, 75 mL/hr, Last Rate: 75 mL/hr (07/14/21 1156)      PRN Meds:.•  acetaminophen **OR** acetaminophen **OR** acetaminophen  •  senna-docusate sodium **AND** polyethylene glycol **AND** bisacodyl **AND** bisacodyl  •  dextrose  •  dextrose  •  diphenhydrAMINE  •  glucagon (human recombinant)  •  Morphine  •  oxyCODONE-acetaminophen  •  sodium chloride    Assessment/Plan   Assessment & Plan     Active Hospital Problems    Diagnosis  POA   • **Intractable back pain [M54.9]  Yes   • Compression fracture of L3 vertebra (CMS/HCC) [S32.030A]  Yes   • Compression fracture of T12 vertebra (CMS/HCC) [S22.080A]  Yes   • Mood disorder (CMS/HCC) [F39]  Yes   • Chronic bilateral low back pain [M54.5, G89.29]  Unknown   • Acute low back pain with right-sided sciatica [M54.41]  Yes   • Mixed hyperlipidemia [E78.2]  Yes   • Hypertension [I10]  Yes   • Uncontrolled type 2 diabetes mellitus with hyperglycemia (CMS/HCC) [E11.65]   Yes   • Osteoarthritis [M19.90]  Yes      Resolved Hospital Problems   No resolved problems to display.        Brief Hospital Course to date:  Cristian Watt is a 75 y.o. male history of hypertension, poorly controlled type 2 diabetes, hyperlipidemia, anxiety depression, osteoarthritis, hyperlipidemia.  Patient presents with progressively worsening chronic low back pain, imaging concerning for T12 and L3 compression fractures.  Plan for kyphoplasty today 7/14     Plan  Intractable back pain with T12 and L3 compression fractures  -Etiology unknown, denies any recent trauma  -Neurosurgery following, s/p T12- L3 kyphoplasty 7/14 with Dr. Zepeda  -Continue pain control, PT/OT     Poorly controlled type 2 diabetes with A1c 12.4%, neuropathy  -FSBG's have been reviewed and currently appropriate,  -Continue current basal and SSI insulin.  -Continue gabapentin 100 mg TID.     Urinary retention  -Meza catheter in place, continues to have good urinary output   -Keep Meza until after his procedure, then attempt voiding trials  - continue Flomax for now.     Hyponatremia,   -Na trending down, nephrology following  -Continue sodium chloride tablets and fluid restriction     Hypertension-BP currently well controlled, continue home losartan     Hyperlipidemia-continue statin     Anxiety and depression     GERD     DVT prophylaxis:  Mechanical DVT prophylaxis orders are present.      Disposition: TBD    CODE STATUS:   Code Status and Medical Interventions:   Ordered at: 07/14/21 1037     Code Status:    CPR     Medical Interventions (Level of Support Prior to Arrest):    Full       Shana Pardo MD  07/15/21

## 2021-07-16 ENCOUNTER — APPOINTMENT (OUTPATIENT)
Dept: GENERAL RADIOLOGY | Facility: HOSPITAL | Age: 76
End: 2021-07-16

## 2021-07-16 VITALS
WEIGHT: 260 LBS | RESPIRATION RATE: 18 BRPM | TEMPERATURE: 97.7 F | SYSTOLIC BLOOD PRESSURE: 157 MMHG | HEIGHT: 73 IN | HEART RATE: 98 BPM | BODY MASS INDEX: 34.46 KG/M2 | DIASTOLIC BLOOD PRESSURE: 97 MMHG | OXYGEN SATURATION: 93 %

## 2021-07-16 PROBLEM — M54.41 ACUTE LOW BACK PAIN WITH RIGHT-SIDED SCIATICA: Status: RESOLVED | Noted: 2021-07-07 | Resolved: 2021-07-16

## 2021-07-16 PROBLEM — K59.00 CONSTIPATION: Status: ACTIVE | Noted: 2021-07-16

## 2021-07-16 PROBLEM — M54.9 INTRACTABLE BACK PAIN: Status: RESOLVED | Noted: 2021-07-07 | Resolved: 2021-07-16

## 2021-07-16 LAB
ANION GAP SERPL CALCULATED.3IONS-SCNC: 11 MMOL/L (ref 5–15)
BUN SERPL-MCNC: 8 MG/DL (ref 8–23)
BUN/CREAT SERPL: 15.4 (ref 7–25)
CALCIUM SPEC-SCNC: 8.8 MG/DL (ref 8.6–10.5)
CHLORIDE SERPL-SCNC: 97 MMOL/L (ref 98–107)
CO2 SERPL-SCNC: 26 MMOL/L (ref 22–29)
CREAT SERPL-MCNC: 0.52 MG/DL (ref 0.76–1.27)
GFR SERPL CREATININE-BSD FRML MDRD: >150 ML/MIN/1.73
GLUCOSE BLDC GLUCOMTR-MCNC: 173 MG/DL (ref 70–130)
GLUCOSE BLDC GLUCOMTR-MCNC: 192 MG/DL (ref 70–130)
GLUCOSE SERPL-MCNC: 196 MG/DL (ref 65–99)
POTASSIUM SERPL-SCNC: 3.7 MMOL/L (ref 3.5–5.2)
SODIUM SERPL-SCNC: 134 MMOL/L (ref 136–145)
SODIUM SERPL-SCNC: 135 MMOL/L (ref 136–145)

## 2021-07-16 PROCEDURE — 74018 RADEX ABDOMEN 1 VIEW: CPT

## 2021-07-16 PROCEDURE — 82962 GLUCOSE BLOOD TEST: CPT

## 2021-07-16 PROCEDURE — 63710000001 INSULIN LISPRO (HUMAN) PER 5 UNITS: Performed by: NEUROLOGICAL SURGERY

## 2021-07-16 PROCEDURE — 99024 POSTOP FOLLOW-UP VISIT: CPT | Performed by: NEUROLOGICAL SURGERY

## 2021-07-16 PROCEDURE — 99239 HOSP IP/OBS DSCHRG MGMT >30: CPT | Performed by: INTERNAL MEDICINE

## 2021-07-16 PROCEDURE — 80048 BASIC METABOLIC PNL TOTAL CA: CPT | Performed by: INTERNAL MEDICINE

## 2021-07-16 RX ORDER — ROSUVASTATIN CALCIUM 20 MG/1
20 TABLET, COATED ORAL DAILY
Qty: 30 TABLET | Refills: 0
Start: 2021-07-16 | End: 2022-05-25

## 2021-07-16 RX ORDER — OXYCODONE AND ACETAMINOPHEN 7.5; 325 MG/1; MG/1
1 TABLET ORAL EVERY 4 HOURS PRN
Start: 2021-07-16 | End: 2021-07-21

## 2021-07-16 RX ORDER — LOSARTAN POTASSIUM 50 MG/1
50 TABLET ORAL DAILY
Qty: 30 TABLET | Refills: 0
Start: 2021-07-16 | End: 2022-05-25

## 2021-07-16 RX ORDER — TAMSULOSIN HYDROCHLORIDE 0.4 MG/1
0.4 CAPSULE ORAL DAILY
Qty: 30 CAPSULE
Start: 2021-07-16 | End: 2022-05-25

## 2021-07-16 RX ORDER — FUROSEMIDE 20 MG/1
20 TABLET ORAL DAILY
Start: 2021-07-17 | End: 2022-05-25

## 2021-07-16 RX ORDER — ASPIRIN 81 MG/1
81 TABLET ORAL DAILY
Start: 2021-07-16 | End: 2022-05-25

## 2021-07-16 RX ORDER — LOSARTAN POTASSIUM 50 MG/1
50 TABLET ORAL DAILY
Status: CANCELLED
Start: 2021-07-16

## 2021-07-16 RX ORDER — GABAPENTIN 100 MG/1
100 CAPSULE ORAL EVERY 8 HOURS SCHEDULED
Start: 2021-07-16 | End: 2022-05-25

## 2021-07-16 RX ORDER — ASPIRIN 81 MG/1
81 TABLET ORAL DAILY
Status: CANCELLED
Start: 2021-07-16

## 2021-07-16 RX ADMIN — GABAPENTIN 100 MG: 100 CAPSULE ORAL at 05:01

## 2021-07-16 RX ADMIN — TAMSULOSIN HYDROCHLORIDE 0.4 MG: 0.4 CAPSULE ORAL at 10:41

## 2021-07-16 RX ADMIN — INSULIN LISPRO 2 UNITS: 100 INJECTION, SOLUTION INTRAVENOUS; SUBCUTANEOUS at 10:42

## 2021-07-16 RX ADMIN — POLYETHYLENE GLYCOL 3350 17 G: 17 POWDER, FOR SOLUTION ORAL at 10:40

## 2021-07-16 RX ADMIN — ROSUVASTATIN CALCIUM 20 MG: 20 TABLET, COATED ORAL at 10:41

## 2021-07-16 RX ADMIN — DOCUSATE SODIUM 50MG AND SENNOSIDES 8.6MG 2 TABLET: 8.6; 5 TABLET, FILM COATED ORAL at 10:41

## 2021-07-16 RX ADMIN — OXYCODONE HYDROCHLORIDE AND ACETAMINOPHEN 1 TABLET: 7.5; 325 TABLET ORAL at 02:10

## 2021-07-16 RX ADMIN — ASPIRIN 81 MG: 81 TABLET, COATED ORAL at 10:41

## 2021-07-16 RX ADMIN — LOSARTAN POTASSIUM 50 MG: 50 TABLET, FILM COATED ORAL at 10:41

## 2021-07-16 RX ADMIN — Medication 1 G: at 10:44

## 2021-07-16 RX ADMIN — FUROSEMIDE 20 MG: 20 TABLET ORAL at 10:41

## 2021-07-16 NOTE — DISCHARGE PLACEMENT REQUEST
"Cristian Pena (75 y.o. Male)     Date of Birth Social Security Number Address Home Phone MRN    1945  [address] [place] [address] [phone] [id number]    [employer] Marital Status          None Single       Admission Date Admission Type Admitting Provider Attending Provider Department, Room/Bed    7/7/21 Emergency Shana Pardo MD  [facility] 3F, S327/1    Discharge Date Discharge Disposition Discharge Destination        7/16/2021 Rehab Facility or Unit (DC - External)              Attending Provider: (none)   Allergies: Penicillins, Shellfish-derived Products    Isolation: None   Infection: None   Code Status: CPR    Ht: 185.4 cm (73\")   Wt: 118 kg (260 lb)    Admission Cmt: None   Principal Problem: Intractable back pain [M54.9]                 Active Insurance as of 7/7/2021     Primary Coverage     Payor Plan Insurance Group Employer/Plan Group    MEDICARE MEDICARE A & B      Payor Plan Address Payor Plan Phone Number Payor Plan Fax Number Effective Dates    [address] [phone]  11/1/2010 - None Entered    [facility] [id number]       Subscriber Name Subscriber Birth Date Member ID       CRISTIAN PENA 1945 [id number]           Secondary Coverage     Payor Plan Insurance Group Employer/Plan Group    ANTHEM BLUE CROSS [facility] SUPP [id number]     Payor Plan Address Payor Plan Phone Number Payor Plan Fax Number Effective Dates    [address]   12/1/2016 - None Entered    [facility] [id number]       Subscriber Name Subscriber Birth Date Member ID       CRISTIAN PENA 1945 [id number]                 Emergency Contacts      (Rel.) Home Phone Work Phone Mobile Phone    PETRA PENA (Son) [phone]    Estevan Anaya (Friend) -- -- [phone]               History & Physical      DayYusra MD at 07/07/21 0554              [facility] Medicine Services  HISTORY AND PHYSICAL    Patient " Name: Cristian Watt  : 1945  MRN: [id number]  Primary Care Physician: Provider, No Known  Date of admission: 2021      Subjective   Subjective     Chief Complaint:  Back pain    HPI:  Cristian Watt is a 75 y.o. male with hx of chronic and worsening low back pain for past several months.  Pt lives alone in an apartment and he has been unable to get out of bed for past 3 days to do anything.  Pt states the pain just caught up to him 3 days ago.  Denies any falls over last 6 months but states he has almost fallen.  Pt has home he is selling at [employer] and plans to move near son in [place] but currently unable to take care of self.  Denies any fever or chills.  No change in bowel/bladder habits and states he is chronically constipated.  No abd pain, no shortness of breath or chest pain.  No focal weakness.         COVID Details:    Symptoms:    [] NONE [] Fever []  Cough [] Shortness of breath [] Change in taste/smell      The patient qualifies to receive the vaccine, but they have not yet received it.      Review of Systems      All other systems reviewed and are negative.     Personal History     Past Medical History:   Diagnosis Date   • Depression    • Diabetes mellitus (CMS/HCC)    • Disc degeneration, lumbar    • Emphysema of lung (CMS/HCC)    • Generalized anxiety disorder    • GERD (gastroesophageal reflux disease)    • Hyperlipidemia    • Hypertension    • Hypogonadism male    • Osteoarthritis    • Type 2 diabetes mellitus, uncontrolled (CMS/HCC)    • Vitamin D deficiency        Past Surgical History:   Procedure Laterality Date   • HERNIA REPAIR      Inguinal   • KNEE ARTHROPLASTY         Family History:   family history includes Colon cancer in his father; Other in his father; Tuberculosis in his mother. Otherwise pertinent FHx was reviewed and unremarkable.     Social History:  reports that he has been smoking cigarettes. He has a 90.00 pack-year smoking history. He has never used  smokeless tobacco. He reports current alcohol use of about 4.0 standard drinks of alcohol per week. He reports that he does not use drugs.  Social History     Social History Narrative   • Not on file       Medications:  Available home medication information reviewed.  (Not in a hospital admission)      Allergies   Allergen Reactions   • Penicillins    • Shellfish-Derived Products        Objective   Objective     Vital Signs:   Temp:  [98.4 °F (36.9 °C)] 98.4 °F (36.9 °C)  Heart Rate:  [105] 105  Resp:  [16] 16  BP: (103-123)/(73-86) 103/73       Physical Exam    gen; alert, oriented, nad  Heent; perrla, eomi, mmm  Cv; rr w/ tachycardia, no mrg  L; ctab, no wheeze/crackles  Abd; soft, +bs, ntnd, no r/g  Ext; no cce, palpable pulses  Skin; cdi,warm  Neuro; grossly intact w/ neg SLR, 5/5 motor and sensation   Psych; mood and affect appropriate      Result Review:  I have personally reviewed the results from the time of this admission to 7/7/2021 06:00 EDT and agree with these findings:  [x]  Laboratory  []  Microbiology  [x]  Radiology  []  EKG/Telemetry   []  Cardiology/Vascular   []  Pathology  []  Old records  []  Other:  Most notable findings include:    Wbc 12.8  T12 and L3 compressions fractures        LAB RESULTS:      Lab 07/07/21  0449   WBC 12.84*   HEMOGLOBIN 17.2   HEMATOCRIT 50.0   PLATELETS 357   NEUTROS ABS 9.24*   IMMATURE GRANS (ABS) 0.09*   LYMPHS ABS 2.35   MONOS ABS 1.05*   EOS ABS 0.05   MCV 90.7         Lab 07/07/21  0449   SODIUM 129*   POTASSIUM 4.4   CHLORIDE 92*   CO2 22.0   ANION GAP 15.0   BUN 11   CREATININE 0.71*   GLUCOSE 292*   CALCIUM 9.3         Lab 07/07/21  0449   TOTAL PROTEIN 6.9   ALBUMIN 3.70   GLOBULIN 3.2   ALT (SGPT) 8   AST (SGOT) 11   BILIRUBIN 0.5   ALK PHOS 142*                     UA    Urinalysis 7/7/21   Specific [place], UA 1.026   Ketones, UA 15 mg/dL (1+) (A)   Blood, UA Negative   Leukocytes, UA Negative   Nitrite, UA Negative   (A) Abnormal value               Microbiology Results (last 10 days)     ** No results found for the last 240 hours. **          CT Abdomen Pelvis Without Contrast    Result Date: 7/7/2021  AND PELVIS, NONCONTRAST, 7/7/2021 HISTORY: 75-year-old male in the ED with acute onset back pain, right greater than left. Difficulty urinating. TECHNIQUE: CT imaging of the abdomen and pelvis, noncontrast kidney stone protocol. Radiation dose reduction techniques included automated exposure control. Radiation audit for CT and nuclear cardiology exams in the last 12 months: 0. ABDOMEN FINDINGS: There is no stone material within the ureters or bladder, and there is no evidence of urinary obstruction. Tiny nonobstructing calculus in the right mid kidney and in the lower left kidney. Small right mid renal cyst. No gallbladder distention or bile duct dilatation. Small benign cyst in the left hepatic lobe. Liver, pancreas and spleen are otherwise within normal limits without contrast. Normal caliber abdominal aorta. Mild sigmoid and lower descending colonic diverticulosis. Small bowel and colon are otherwise normal in caliber and appearance, as imaged. The appendix is normal. No gastric distention, hiatal hernia or distal esophageal dilatation. PELVIS FINDINGS: Urinary bladder, prostate and rectum are within normal limits. No free pelvic fluid. Previous left inguinal herniorrhaphy.  Lung base images show mild to moderate chronic interstitial fibrosis in a peripheral distribution. Old left 10th and 11th rib fractures. Superior endplate vertebral compression fracture deformity at T1 may be subacute or chronic. This results and mild loss of central vertebral body height. Chronic appearing inferior endplate vertebral compression fracture at L3 with mild loss of central vertebral body height. Degenerative disc disease at L4-5 and L5-S1. Bilateral L5 spondylolysis with degenerative facet arthropathy. Grade 1 anterolisthesis at L5-S1 and retrolisthesis at L4-5.      Impression: 1.  Subacute or chronic vertebral compression fractures at T12 and L3. 2.  No evidence of urinary obstruction. Single tiny nonobstructing calculus within each kidney. 3.  Mild lower colonic diverticulosis. Normal appendix. Signer Name: Rusty Eller MD  Signed: 7/7/2021 4:19 AM  Workstation Name: [email or web address]  [facility]          Assessment/Plan   Assessment & Plan     Active Hospital Problems    Diagnosis  POA   • Compression fracture of L3 vertebra (CMS/HCC) [S32.030A]  Yes   • Compression fracture of T12 vertebra (CMS/HCC) [S22.080A]  Yes   • Mood disorder (CMS/HCC) [F39]  Yes   • Chronic bilateral low back pain [M54.5, G89.29]  Unknown   • Mixed hyperlipidemia [E78.2]  Yes   • Hypertension [I10]  Yes   • Uncontrolled type 2 diabetes mellitus with hyperglycemia (CMS/HCC) [E11.65]  Yes   • Osteoarthritis [M19.90]  Yes     76 y/o male w/ L3 and T12 compression fractures w/ worsening back pain;   1. L3/T12 compressions fractures with worsening back pain;   -will ask NS to evaluate   -PT/OT and will likely need rehab regardless of NS evaluation as pt's goal is to return to independent living  -pain control w/ percocet as pt states this works best for him  -will check CK as pt has been lying in bed for 3 days  2. Constipation, chronic;  -bowel regimen  3. Leukocytosis;  -?reactive but will need to monitor         DVT prophylaxis:  mechanical      CODE STATUS:  full  There are no questions and answers to display.       Admission Status:  I believe this patient meets  OBSERVATION status, however if further evaluation or treatment plans warrant, status may change.  Based upon current information, I predict patient's care encounter to be less than or equal to 2 midnights.      Yusra Dillard MD  07/07/21  Electronically signed by Yusra Dillard MD, 07/07/21, 6:22 AM EDT.      Electronically signed by Yusra Dillard MD at 07/07/21 0622          Physical Therapy Notes (most  recent note)      Luz Maria Sims, PT at 21 1359  Version 1 of 1         Patient Name: Cristian Watt  : 1945    MRN: [id number]                              Today's Date: 2021       Admit Date: 2021    Visit Dx:     ICD-10-CM ICD-9-CM   1. Acute low back pain with right-sided sciatica, unspecified back pain laterality  M54.41 724.2     724.3   2. History of diabetes mellitus  Z86.39 V12.29   3. Impaired functional mobility, balance, gait, and endurance  Z74.09 V49.89   4. Compression fracture of T12 vertebra, initial encounter (CMS/[facility])  S22.080A 805.2     Patient Active Problem List   Diagnosis   • C. difficile colitis   • Disc degeneration, lumbar   • Osteoarthritis   • Uncontrolled type 2 diabetes mellitus with hyperglycemia (CMS/[facility])   • Hypertension   • Diarrhea   • Mixed hyperlipidemia   • Diabetic autonomic neuropathy associated with type 2 diabetes mellitus (CMS/[facility])   • Episode of recurrent major depressive disorder (CMS/[facility])   • Cigarette nicotine dependence with nicotine-induced disorder   • Compression fracture of L3 vertebra (CMS/[facility])   • Compression fracture of T12 vertebra (CMS/[facility])   • Mood disorder (CMS/[facility])   • Chronic bilateral low back pain   • Acute low back pain with right-sided sciatica   • Intractable back pain     Past Medical History:   Diagnosis Date   • Depression    • Diabetes mellitus (CMS/[facility])    • Disc degeneration, lumbar    • Emphysema of lung (CMS/[facility])    • Generalized anxiety disorder    • GERD (gastroesophageal reflux disease)    • Hyperlipidemia    • Hypertension    • Hypogonadism male    • Osteoarthritis    • Type 2 diabetes mellitus, uncontrolled (CMS/[facility])    • Vitamin D deficiency      Past Surgical History:   Procedure Laterality Date   • HERNIA REPAIR      Inguinal   • KNEE ARTHROPLASTY       General Information     Row Name 21 1448          Physical Therapy Time and Intention    Document Type  re-evaluation  -CD     Mode of Treatment   physical therapy  -CD     Row Name 07/14/21 1448          General Information    Patient Profile Reviewed  yes  -CD     Existing Precautions/Restrictions  fall;spinal REID CATHETER.  -CD     Barriers to Rehab  medically complex;previous functional deficit;cognitive status  -CD     Row Name 07/14/21 1448          Living Environment    Lives With  alone HAS A CAREGIVER WHO COOKS, CLEANS, AND SHOPS. PT REPORTS HE HAS NOT BATHED IN OVER A YEAR BUT CANNOT EXPLAIN WHY.  -CD     Row Name 07/14/21 1448          Cognition    Orientation Status (Cognition)  oriented x 3  -CD     Row Name 07/14/21 1448          Safety Issues, Functional Mobility    Safety Issues Affecting Function (Mobility)  awareness of need for assistance;insight into deficits/self-awareness;positioning of assistive device;safety precaution awareness;safety precautions follow-through/compliance;sequencing abilities  -CD     Impairments Affecting Function (Mobility)  balance;endurance/activity tolerance;pain;postural/trunk control;motor control;coordination  -CD       User Key  (r) = Recorded By, (t) = Taken By, (c) = Cosigned By    Initials Name Provider Type    CD Luz Maria Sims, PT Physical Therapist        Mobility     Row Name 07/14/21 1500          Bed Mobility    Bed Mobility  rolling left;sidelying-sit  -CD     Rolling Left [place] (Bed Mobility)  verbal cues;contact guard  -CD     Sidelying-Sit [place] (Bed Mobility)  minimum assist (75% patient effort);verbal cues  -CD     Assistive Device (Bed Mobility)  bed rails  -CD     Comment (Bed Mobility)  PT CUES TO LOG ROLL FOR SUPINE TO SIT. EDUCATED IN BACK PRECAUTIONS. PT UNSTEADY UPON SITTING EOB WITH LEAN TO THE R AND POSTERIOR.  -CD     Row Name 07/14/21 1500          Transfers    Comment (Transfers)  CUES FOR HAND PLACEMENT. STS FROM EOB AND RECLINER. PT IMPULSIVE INITIALLY AND NOT FOLLOWING COMMANDS FOR SAFETY, IMPROVED ON 2ND REP FROM RECLINER.,  -CD     Row Name 07/14/21 1500           Sit-Stand Transfer    Sit-Stand [place] (Transfers)  minimum assist (75% patient effort);2 person assist IMPROVED TO CGA X2 ON 2ND REP FROM RECLINER.  -CD     Assistive Device (Sit-Stand Transfers)  walker, front-wheeled  -CD     Row Name 07/14/21 1500          Gait/Stairs (Locomotion)    [place] Level (Gait)  moderate assist (50% patient effort);2 person assist  -CD     Assistive Device (Gait)  walker, front-wheeled  -CD     Distance in Feet (Gait)  14 FEET  -CD     Deviations/Abnormal Patterns (Gait)  ataxic;base of support, narrow;gait speed decreased;weight shifting decreased  -CD     Right Sided Gait Deviations  leans right;forward flexed posture  -CD     Comment (Gait/Stairs)  PT LEANS HEAVILY TO THE R AND POSTERIORLY. NEEDS MANUAL ASSIST TO GUIDE R WALKER AND MAINTAIN MIDLINE ORIENTATION. PT ATAXIC.  -CD       User Key  (r) = Recorded By, (t) = Taken By, (c) = Cosigned By    Initials Name Provider Type    CD Luz Maria Sims, PT Physical Therapist        Obj/Interventions     Row Name 07/14/21 1505          Range of Motion Comprehensive    General Range of Motion  bilateral lower extremity ROM WFL  -CD     Row Name 07/14/21 1505          Strength Comprehensive (MMT)    General Manual Muscle Testing (MMT) Assessment  lower extremity strength deficits identified  -CD     Comment, General Manual Muscle Testing (MMT) Assessment  GROSSLY 4/5 B LE'S  -CD     Row Name 07/14/21 1505          Motor Skills    Motor Skills  coordination  -CD     Coordination  gross motor deficit;bilateral;lower extremity;ataxia;moderate impairment  -CD     Row Name 07/14/21 1505          Balance    Balance Assessment  sitting static balance;standing static balance;sitting dynamic balance  -CD     Static Sitting Balance  moderate impairment;supported;sitting, edge of bed IMPROVED FROM MOD ASSIST TO CGA WITH CUES AND POSITIONING.  -CD     Dynamic Sitting Balance  moderate impairment;supported;sitting, edge of bed  -CD     Static  Standing Balance  moderate impairment;supported;standing  -CD     Dynamic Standing Balance  supported;standing;moderate impairment  -CD     Balance Interventions  sitting;standing;sit to stand;supported;static;dynamic;weight shifting activity  -CD     Comment, Balance  PT LEANS HEAVILY TO THE R AND POSTERIOR IN SITTING AND STANDING. WORKED ON MIDLINE ORIENTATION.  -CD     Row Name 07/14/21 1505          Sensory Assessment (Somatosensory)    Sensory Assessment (Somatosensory)  sensation intact B LE'S  -CD       User Key  (r) = Recorded By, (t) = Taken By, (c) = Cosigned By    Initials Name Provider Type    CD Luz Maria Sims, PT Physical Therapist        Goals/Plan     Row Name 07/14/21 1523          Bed Mobility Goal 1 (PT)    Activity/Assistive Device (Bed Mobility Goal 1, PT)  sit to supine/supine to sit VIA LOG ROLL TECHNIQUE.  -CD     Hood Level/Cues Needed (Bed Mobility Goal 1, PT)  independent  -CD     Time Frame (Bed Mobility Goal 1, PT)  long term goal (LTG)  -CD     Row Name 07/14/21 1523          Transfer Goal 1 (PT)    Activity/Assistive Device (Transfer Goal 1, PT)  sit-to-stand/stand-to-sit;bed-to-chair/chair-to-bed;walker, rolling  -CD     Hood Level/Cues Needed (Transfer Goal 1, PT)  contact guard assist  -CD     Time Frame (Transfer Goal 1, PT)  long term goal (LTG);2 weeks  -CD     Row Name 07/14/21 1523          Gait Training Goal 1 (PT)    Activity/Assistive Device (Gait Training Goal 1, PT)  gait (walking locomotion);walker, rolling  -CD     [place] Level (Gait Training Goal 1, PT)  contact guard assist  -CD     Distance (Gait Training Goal 1, PT)  200 FEET  -CD     Time Frame (Gait Training Goal 1, PT)  long term goal (LTG);2 weeks  -CD     Row Name 07/14/21 1523          Patient Education Goal (PT)    Activity (Patient Education Goal, PT)  DEMONSTRATE AND VERBALIZE BACK PRECAUTIONS  -CD     Hood/Cues/Accuracy (Memory Goal 2, PT)  demonstrates adequately;verbalizes  understanding;independent  -CD     Time Frame (Patient Education Goal, PT)  long term goal (LTG);2 weeks  -CD       User Key  (r) = Recorded By, (t) = Taken By, (c) = Cosigned By    Initials Name Provider Type    CD Luz Maria Sims, PT Physical Therapist        Clinical Impression     Row Name 07/14/21 1516          Pain Scale: Numbers Pre/Post-Treatment    Pretreatment Pain Rating  0/10 - no pain  -CD     Posttreatment Pain Rating  4/10  -CD     Pain Location - Orientation  lower  -CD     Pain Location  back  -CD     Pre/Posttreatment Pain Comment  TOLERATED GAIT IN ROOM.  -CD     Pain Intervention(s)  Repositioned;Rest;Other (Comment) EDUCATION IN BACK PRECAUTIONS.  -CD     Row Name 07/14/21 1516          Plan of Care Review    Plan of Care Reviewed With  patient  -CD     Outcome Summary  PT PRESENTS WITH EVOLVING SYMPTOMS S/P COMPRESSION FRACTURES AND T12/L3 KYPHOPLATSTIES. PRIOR TO SURGERY, PT HAS REFUSED OOB ACTIVITY DUE TO PAIN . TOLERATED SUPINE TO SIT WITH MIN ASSIST, STS WITH MOD ASSIST AND GAIT X 14 FEET WITH R WALKER AND MOD ASSIST OF 2. PT LEANS HEAVILY TO THE R AND POSTERIOR AND HAS ATAXIC GAIT. RECOMMEND IRF AT D/C.  -CD     Row Name 07/14/21 1516          Therapy Assessment/Plan (PT)    Patient/Family Therapy Goals Statement (PT)  TO RETURN TO PLOF.  -CD     Rehab Potential (PT)  good, to achieve stated therapy goals  -CD     Criteria for Skilled Interventions Met (PT)  yes;skilled treatment is necessary  -CD     Row Name 07/14/21 1516          Vital Signs    Intra Systolic BP Rehab  127  -CD     Intra Treatment Diastolic BP  80  -CD     Intratreatment Heart Rate (beats/min)  112  -CD     Pre SpO2 (%)  90  -CD     O2 Delivery Pre Treatment  room air  -CD     Intra SpO2 (%)  85  -CD     O2 Delivery Intra Treatment  room air  -CD     Post SpO2 (%)  88  -CD     O2 Delivery Post Treatment  room air  -CD     Pre Patient Position  Supine  -CD     Intra Patient Position  Standing  -CD     Post Patient  Position  Sitting  -CD     Row Name 07/14/21 1516          Positioning and Restraints    Pre-Treatment Position  in bed  -CD     Post Treatment Position  chair  -CD     In Chair  notified nsg;reclined;call light within reach;encouraged to call for assist;legs elevated  -CD       User Key  (r) = Recorded By, (t) = Taken By, (c) = Cosigned By    Initials Name Provider Type    CD Luz Maria Sims PT Physical Therapist        Outcome Measures     Row Name 07/14/21 1525          How much help from another person do you currently need...    Turning from your back to your side while in flat bed without using bedrails?  3  -CD     Moving from lying on back to sitting on the side of a flat bed without bedrails?  2  -CD     Moving to and from a bed to a chair (including a wheelchair)?  2  -CD     Standing up from a chair using your arms (e.g., wheelchair, bedside chair)?  2  -CD     Climbing 3-5 steps with a railing?  2  -CD     To walk in hospital room?  2  -CD     AM-PAC 6 Clicks Score (PT)  13  -CD       User Key  (r) = Recorded By, (t) = Taken By, (c) = Cosigned By    Initials Name Provider Type    CD Luz Maria Sims PT Physical Therapist        Physical Therapy Education                 Title: PT OT SLP Therapies (In Progress)     Topic: Physical Therapy (Done)     Point: Mobility training (Done)     Learning Progress Summary           Patient Acceptance, E, VU,NR by CD at 7/14/2021 1525    Comment: BENEFITS OF OOB ACTIVITY, SAFETY WITH MOBILITY, BACK PRECAUTIONS S/P KYPHOPLASTY, D/C PLANNING, PROGRESSION OF POC.    Acceptance, E,TB, VU by  at 7/12/2021 1428    Comment: Pt education and HEP packet provided for prehab/rehab for anticipated surgery on 7/14. Exercises included ankle pumps, quad sets, glute sets, heel slides, ab sets, bent knee fall outs, overhead flexion. Edu on d/c recommendations and spinal precautions.    Eager, E, VU,NR by SC at 7/11/2021 1046    Comment: reviewed HEP in bed   Family Acceptance, E,TB,  VU by  at 7/12/2021 1428    Comment: Pt education and HEP packet provided for prehab/rehab for anticipated surgery on 7/14. Exercises included ankle pumps, quad sets, glute sets, heel slides, ab sets, bent knee fall outs, overhead flexion. Edu on d/c recommendations and spinal precautions.                   Point: Home exercise program (Done)     Learning Progress Summary           Patient Acceptance, E, VU,NR by CD at 7/14/2021 1525    Comment: BENEFITS OF OOB ACTIVITY, SAFETY WITH MOBILITY, BACK PRECAUTIONS S/P KYPHOPLASTY, D/C PLANNING, PROGRESSION OF POC.    Acceptance, E,TB, VU by  at 7/12/2021 1428    Comment: Pt education and HEP packet provided for prehab/rehab for anticipated surgery on 7/14. Exercises included ankle pumps, quad sets, glute sets, heel slides, ab sets, bent knee fall outs, overhead flexion. Edu on d/c recommendations and spinal precautions.    Eager, E, VU,NR by SC at 7/11/2021 1046    Comment: reviewed HEP in bed    Nonacceptance, E,D, VU,NR by  at 7/8/2021 1522   Family Acceptance, E,TB, VU by  at 7/12/2021 1428    Comment: Pt education and HEP packet provided for prehab/rehab for anticipated surgery on 7/14. Exercises included ankle pumps, quad sets, glute sets, heel slides, ab sets, bent knee fall outs, overhead flexion. Edu on d/c recommendations and spinal precautions.                   Point: Body mechanics (Done)     Learning Progress Summary           Patient Acceptance, E, VU,NR by CD at 7/14/2021 1525    Comment: BENEFITS OF OOB ACTIVITY, SAFETY WITH MOBILITY, BACK PRECAUTIONS S/P KYPHOPLASTY, D/C PLANNING, PROGRESSION OF POC.    Acceptance, E,TB, VU by  at 7/12/2021 1428    Comment: Pt education and HEP packet provided for prehab/rehab for anticipated surgery on 7/14. Exercises included ankle pumps, quad sets, glute sets, heel slides, ab sets, bent knee fall outs, overhead flexion. Edu on d/c recommendations and spinal precautions.    Eager, E, VU,NR by SC at 7/11/2021  1046    Comment: reviewed HEP in bed   Family Acceptance, E,TB, VU by  at 7/12/2021 1428    Comment: Pt education and HEP packet provided for prehab/rehab for anticipated surgery on 7/14. Exercises included ankle pumps, quad sets, glute sets, heel slides, ab sets, bent knee fall outs, overhead flexion. Edu on d/c recommendations and spinal precautions.                   Point: Precautions (Done)     Learning Progress Summary           Patient Acceptance, E, VU,NR by  at 7/14/2021 1525    Comment: BENEFITS OF OOB ACTIVITY, SAFETY WITH MOBILITY, BACK PRECAUTIONS S/P KYPHOPLASTY, D/C PLANNING, PROGRESSION OF POC.    Acceptance, E,TB, VU by  at 7/12/2021 1428    Comment: Pt education and HEP packet provided for prehab/rehab for anticipated surgery on 7/14. Exercises included ankle pumps, quad sets, glute sets, heel slides, ab sets, bent knee fall outs, overhead flexion. Edu on d/c recommendations and spinal precautions.    Eager, E, VU,NR by SC at 7/11/2021 1046    Comment: reviewed HEP in bed   Family Acceptance, E,TB, VU by  at 7/12/2021 1428    Comment: Pt education and HEP packet provided for prehab/rehab for anticipated surgery on 7/14. Exercises included ankle pumps, quad sets, glute sets, heel slides, ab sets, bent knee fall outs, overhead flexion. Edu on d/c recommendations and spinal precautions.                               User Key     Initials Effective Dates Name Provider Type Discipline    SC 06/16/21 -  Deyvi Neal, PT Physical Therapist PT     06/16/21 -  Luz Maria Sims PT Physical Therapist PT     06/16/21 -  Loyda Delarosa PT Physical Therapist PT     09/22/20 -  Justin Munguia PT Physical Therapist PT              PT Recommendation and Plan  Planned Therapy Interventions (PT): bed mobility training, balance training, patient/family education, postural re-education, strengthening, transfer training, gait training, home exercise program  Plan of Care Reviewed With:  patient  Outcome Summary: PT PRESENTS WITH EVOLVING SYMPTOMS S/P COMPRESSION FRACTURES AND T12/L3 KYPHOPLATSTIES. PRIOR TO SURGERY, PT HAS REFUSED OOB ACTIVITY DUE TO PAIN . TOLERATED SUPINE TO SIT WITH MIN ASSIST, STS WITH MOD ASSIST AND GAIT X 14 FEET WITH R WALKER AND MOD ASSIST OF 2. PT LEANS HEAVILY TO THE R AND POSTERIOR AND HAS ATAXIC GAIT. RECOMMEND IRF AT D/C.     Time Calculation:   PT Charges     Row Name 21 1527             Time Calculation    Start Time  1359  -CD      PT Received On  21  -      PT Goal Re-Cert Due Date  21  -CD         Time Calculation- PT    Total Timed Code Minutes- PT  20 minute(s)  -CD         Timed Charges    15475 - PT Therapeutic Exercise Minutes  15  -CD      82709 - Gait Training Minutes   10  -CD         Total Minutes    Timed Charges Total Minutes  25  -CD       Total Minutes  25  -CD        User Key  (r) = Recorded By, (t) = Taken By, (c) = Cosigned By    Initials Name Provider Type    CD Luz Maria Sims, PT Physical Therapist        Therapy Charges for Today     Code Description Service Date Service Provider Modifiers Qty    72527774322 HC PT THER PROC EA 15 MIN 2021 Luz Maria Sims, PT GP 1    85053170610 HC GAIT TRAINING EA 15 MIN 2021 Luz Maria Sims, PT GP 1    00764622373 HC PT RE-EVAL ESTABLISHED PLAN 2 2021 Luz Maria Sims, PT GP 1          PT G-Codes  Outcome Measure Options: AM-PAC 6 Clicks Basic Mobility (PT)  AM-PAC 6 Clicks Score (PT): 13  AM-PAC 6 Clicks Score (OT): 13    Luz Maria Sims PT  2021      Electronically signed by Luz Maria Sims PT at 21 1532          Occupational Therapy Notes (most recent note)      Sharon Diaz, OT at 21 1407          Patient Name: Cristian Watt  : 1945    MRN: [id number]                              Today's Date: 2021       Admit Date: 2021    Visit Dx:     ICD-10-CM ICD-9-CM   1. Acute low back pain with right-sided sciatica, unspecified back pain  laterality  M54.41 724.2     724.3   2. History of diabetes mellitus  Z86.39 V12.29   3. Impaired functional mobility, balance, gait, and endurance  Z74.09 V49.89   4. Compression fracture of T12 vertebra, initial encounter (CMS/[facility])  S22.080A 805.2     Patient Active Problem List   Diagnosis   • C. difficile colitis   • Disc degeneration, lumbar   • Osteoarthritis   • Uncontrolled type 2 diabetes mellitus with hyperglycemia (CMS/[facility])   • Hypertension   • Diarrhea   • Mixed hyperlipidemia   • Diabetic autonomic neuropathy associated with type 2 diabetes mellitus (CMS/[facility])   • Episode of recurrent major depressive disorder (CMS/[facility])   • Cigarette nicotine dependence with nicotine-induced disorder   • Compression fracture of L3 vertebra (CMS/HCC)   • Compression fracture of T12 vertebra (CMS/HCC)   • Mood disorder (CMS/[facility])   • Chronic bilateral low back pain   • Acute low back pain with right-sided sciatica   • Intractable back pain     Past Medical History:   Diagnosis Date   • Depression    • Diabetes mellitus (CMS/[facility])    • Disc degeneration, lumbar    • Emphysema of lung (CMS/[facility])    • Generalized anxiety disorder    • GERD (gastroesophageal reflux disease)    • Hyperlipidemia    • Hypertension    • Hypogonadism male    • Osteoarthritis    • Type 2 diabetes mellitus, uncontrolled (CMS/[facility])    • Vitamin D deficiency      Past Surgical History:   Procedure Laterality Date   • HERNIA REPAIR      Inguinal   • KNEE ARTHROPLASTY       General Information     Row Name 07/14/21 1505          OT Time and Intention    Document Type  re-evaluation  -JY     Mode of Treatment  occupational therapy  -JY     Row Name 07/14/21 1505          General Information    Patient Profile Reviewed  yes  -JY     Prior Level of Function  -- refer to OT IE  -JY     Existing Precautions/Restrictions  fall;spinal;other (see comments) greer catheter, requires increased motivation  -JY     Barriers to Rehab  medically complex;previous functional  deficit;cognitive status  -[facility] Name 07/14/21 1505          Living Environment    Lives With  alone;other (see comments) pt has caregiver A to aid in housekeeping, meal prep, shopping, reports not bathing in > 1 yr including sponge bathing (u/a to explain reasoning), denies need to go out of home thus u/a to state who assists w/ driving  -[facility] Name 07/14/21 1505          Home Main Entrance    Number of Stairs, Main Entrance  other (see comments) pt with difficulty in recall of # of steps yet indicates not using often  -[facility] Name 07/14/21 1505          Stairs Within Home, Primary    Stairs, Within Home, Primary  0  -JY     Number of Stairs, Within Home, Primary  none  -JY     Stairs Comment, Within Home, Primary  pt has walk in shower with seat and average toilet height; DME: has standard wx (did not use), shower chair  -[facility] Name 07/14/21 1505          Cognition    Orientation Status (Cognition)  oriented x 3  -JY     Row Name 07/14/21 1505          Safety Issues, Functional Mobility    Safety Issues Affecting Function (Mobility)  awareness of need for assistance;insight into deficits/self-awareness;positioning of assistive device;safety precaution awareness;safety precautions follow-through/compliance;sequencing abilities  -JY     Impairments Affecting Function (Mobility)  balance;endurance/activity tolerance;pain;postural/trunk control;motor control;coordination;other (see comments) strong R and posterior lean in sitting and standing  -JY     Comment, Safety Issues/Impairments (Mobility)  pt req'd increased motivation for OOB activity however post education on benefits pt was agreeable  -JY       User Jeffrey  (r) = Recorded By, (t) = Taken By, (c) = Cosigned By    Initials Name Provider Type    Sharon Bragg OT Occupational Therapist          Mobility/ADL's     Row Name 07/14/21 1510          Bed Mobility    Bed Mobility  rolling left;sidelying-sit  -JY     Rolling Left [place] (Bed  Mobility)  minimum assist (75% patient effort);verbal cues  -JY     Sidelying-Sit [place] (Bed Mobility)  minimum assist (75% patient effort);verbal cues  -JY     Assistive Device (Bed Mobility)  bed rails  -JY     Comment (Bed Mobility)  pt u/a to recall log roll tech prior to education thus education on tech provided and pt able to demo with variable cues, gross min A req'd, presents with R and posterior lean upon sitting  -JY     Row Name 07/14/21 1510          Transfers    Transfers  sit-stand transfer  -JY     Comment (Transfers)  skilled cues for optimal hand placement for controlled ascend, descend from seated surfaces while adhering to spinal precautions, initial attempt min A x 2 and improved to CGA x 2 on 2nd attempt with improved seq from recliner vs bed surface. Pt presents with R and posterior lean in standing  -JY     Sit-Stand [place] (Transfers)  minimum assist (75% patient effort);contact guard;2 person assist;verbal cues;other (see comments) pt req'd CGA x 2 on second attempt from recliner  -JY     Row Name 07/14/21 1510          Sit-Stand Transfer    Assistive Device (Sit-Stand Transfers)  walker, front-wheeled  -JY     Row Name 07/14/21 1510          Functional Mobility    Functional Mobility-Distance (Feet)  -- in room distances for ADLs  -JY     Functional Mobility- Comment  pt presents with narrow Justice and ataxic movement in standing with FWW used throughout and support x 2 with R and posterior leaning throughout brief fxl mobility  -JY     Row Name 07/14/21 1510          Activities of Daily Living    BADL Assessment/Intervention  upper body dressing;grooming;lower body dressing  -c-crowd     Row Name 07/14/21 1510          Upper Body Dressing Assessment/Training    [place] Level (Upper Body Dressing)  doff;don;pajama/robe;minimum assist (75% patient effort);verbal cues  -JY     Position (Upper Body Dressing)  edge of bed sitting  -JY     Comment (Upper Body Dressing)  pt educated on  optimal tech, position for adherence to spinal precautions, impacted by R and posterior leaning with UEs integrated in task and not providing support  -JY     Row Name 07/14/21 1510          Lower Body Dressing Assessment/Training    [place] Level (Lower Body Dressing)  doff;don;socks;dependent (less than 25% patient effort)  -     Position (Lower Body Dressing)  supine  -JY     Row Name 07/14/21 1510          Grooming Assessment/Training    [place] Level (Grooming)  wash face, hands;supervision;verbal cues  -     Position (Grooming)  supported sitting  -JY     Row Name 07/14/21 1510          Toileting Assessment/Training    [place] Level (Toileting)  dependent (less than 25% patient effort)  -     Comment (Toileting)  currently with Meza catheter  -       User Key  (r) = Recorded By, (t) = Taken By, (c) = Cosigned By    Initials Name Provider Type    Sharon Bragg OT Occupational Therapist        Obj/Interventions     Row Name 07/14/21 1518          Sensory Assessment (Somatosensory)    Sensory Assessment (Somatosensory)  bilateral UE;sensation intact  -     Bilateral UE Sensory Assessment  general sensation;light touch awareness;light touch localization;intact  -JY     Row Name 07/14/21 1518          Sensory Interventions    Comment, Sensory Intervention  pt denies any numbness or tingling and able to recognize all stimuli at BUEs  -[facility] 07/14/21 1518          Vision Assessment/Intervention    Visual Impairment/Limitations  corrective lenses full-time  -     Vision Assessment Comment  pt denies any acute visual deficits  -JY     Row Name 07/14/21 1518          Range of Motion Comprehensive    General Range of Motion  bilateral upper extremity ROM WFL  -     Comment, General Range of Motion  BUE AROM WFL  -JY     Row Name 07/14/21 1518          Strength Comprehensive (MMT)    General Manual Muscle Testing (MMT) Assessment  upper extremity strength deficits identified   -JY     Comment, General Manual Muscle Testing (MMT) Assessment  abbreviated MMT d/t pain at back s/p sx yet grossly 4/5 per observation and initial MMT  -JY     Row Name 07/14/21 1518          Motor Skills    Motor Skills  coordination  -JY     Coordination  bilateral;lower extremity;gross motor deficit;ataxia;moderate impairment  -JY     Row Name 07/14/21 1518          Balance    Balance Assessment  sitting static balance;sitting dynamic balance;standing static balance;standing dynamic balance  -JY     Static Sitting Balance  moderate impairment;supported;sitting, edge of bed  -JY     Dynamic Sitting Balance  moderate impairment;supported;sitting, edge of bed;other (see comments) pre transfer skills  -JY     Static Standing Balance  moderate impairment;supported;standing  -JY     Dynamic Standing Balance  moderate impairment;supported;standing;other (see comments) fxl transfer/mobility  -JY     Balance Interventions  sitting;standing;static;dynamic;sit to stand;supported;occupation based/functional task  -JY     Comment, Balance  pt presents with frequent R and posterior lean both in sitting and standing, pt does not appear to recognize until brought to attention, u/a to state if this was occurring at PLOF, facilitated weight shifting and midline orientation  -JY       User Key  (r) = Recorded By, (t) = Taken By, (c) = Cosigned By    Initials Name Provider Type    Sharon Bragg OT Occupational Therapist        Goals/Plan     Row Name 07/14/21 1533          Bed Mobility Goal 1 (OT)    Activity/Assistive Device (Bed Mobility Goal 1, OT)  sidelying to sit;sit to sidelying;rolling to left;rolling to right;bed rails w/ adherence to spinal precautions and log roll tech  -JY     [place] Level/Cues Needed (Bed Mobility Goal 1, OT)  contact guard assist;verbal cues required  -JY     Time Frame (Bed Mobility Goal 1, OT)  long term goal (LTG);by discharge  -JY     Progress/Outcomes (Bed Mobility Goal 1, OT)  goal  revised this date;goal ongoing  -JACKELIN     Row Name 07/14/21 1533          Transfer Goal 1 (OT)    Activity/Assistive Device (Transfer Goal 1, OT)  sit-to-stand/stand-to-sit;bed-to-chair/chair-to-bed;toilet;commode;walker, rolling;other (see comments) w/ adherence to spinal precautions  -JY     [place] Level/Cues Needed (Transfer Goal 1, OT)  contact guard assist;verbal cues required  -JY     Time Frame (Transfer Goal 1, OT)  long term goal (LTG);by discharge  -JY     Progress/Outcome (Transfer Goal 1, OT)  goal revised this date;goal ongoing  -JACKELIN     Row Name 07/14/21 1533          Dressing Goal 1 (OT)    Activity/Device (Dressing Goal 1, OT)  lower body dressing;long-handled shoe horn;reacher;sock-aid;other (see comments) pt to d/d LB garments with AE and adhering to spinal precautions  -JY     [place]/Cues Needed (Dressing Goal 1, OT)  minimum assist (75% or more patient effort);verbal cues required  -JY     Time Frame (Dressing Goal 1, OT)  long term goal (LTG);by discharge  -JY     Progress/Outcome (Dressing Goal 1, OT)  goal revised this date;other (see comments)  -JACKELIN     Row Name 07/14/21 1533          Toileting Goal 1 (OT)    Activity/Device (Toileting Goal 1, OT)  adjust/manage clothing;perform perineal hygiene;commode;grab bar/safety frame;raised toilet seat  -JY     [place] Level/Cues Needed (Toileting Goal 1, OT)  moderate assist (50-74% patient effort)  -JY     Time Frame (Toileting Goal 1, OT)  long term goal (LTG);by discharge  -JY     Progress/Outcome (Toileting Goal 1, OT)  goal ongoing  -JY       User Key  (r) = Recorded By, (t) = Taken By, (c) = Cosigned By    Initials Name Provider Type    Sharon Bragg OT Occupational Therapist        Clinical Impression     Row Name 07/14/21 1522          Pain Assessment    Additional Documentation  Pain Scale: Numbers Pre/Post-Treatment (Group)  -JACKELIN     Row Name 07/14/21 1522          Pain Scale: Numbers Pre/Post-Treatment    Pretreatment Pain  Rating  0/10 - no pain  -JY     Posttreatment Pain Rating  5/10  -JY     Pain Location - Side  Bilateral  -JY     Pain Location - Orientation  lower  -JY     Pain Location  back  -JY     Pre/Posttreatment Pain Comment  reported increased pain with mobility, upward of 5/10 at B mid to low back, tolerated OT interventions, educated on spinal precautions for safety and comfort  -JY     Pain Intervention(s)  Repositioned;Ambulation/increased activity  -JY     Row Name 07/14/21 9448          Plan of Care Review    Plan of Care Reviewed With  patient  -JY     Progress  improving  -JY     Outcome Summary  OT re eval completed post chart review given pt s/p t12/L3 kyphoplasty. Pt req'd increased motivation for participation OOB. Pt A & O x3, reported no pain initially with increase to upward of 5/10 with mobility. Pt not able to recall spinal precautions or log roll tech prior to OT education and training with some limited recall of prec later in session. Pt req'd min A x 1 for bed mobility with log roll tech facilitated, min A x 2 for initial STS at EOB and improved to CGA x 2 in 2nd stand at recliner level with improved hand placement and more controlled ascend. Pt did present with both R lateral and posterior leaning in sitting EOB and standing, fxl mobility with cues for midline orientation. Pt req'd A x 2 in in room mobility d/t narrow Justice and ataxic movement patterns with FWW support throughout. Pt completed UBD with min A, hand/face hygiene with sup and set up, dependent for LBD d/d socks and dep for toileting currently with Meza catheter. Initiated education regarding AE for LBD, LBB with pt able to demo limited recall of spinal prec from earlier in session. Pt would benefit from IPOT POC to address remaining underlying impairments and recommend IRF at d/c given decreased fxl endurance, impaired balance, muscle weakness, decreased I in ADLs with limited distal reach w/o AE.  -JY     Row Name 07/14/21 0336           Therapy Assessment/Plan (OT)    Patient/Family Therapy Goal Statement (OT)  to increase I in ADLs, related t/f, return to PLOF  -JY     Rehab Potential (OT)  good, to achieve stated therapy goals  -JY     Criteria for Skilled Therapeutic Interventions Met (OT)  yes;skilled treatment is necessary  -JY     Therapy Frequency (OT)  daily  -JY     Row Name 07/14/21 1522          Therapy Plan Review/Discharge Plan (OT)    Equipment Needs Upon Discharge (OT)  dressing equipment;bathing equipment  -JY     Anticipated Discharge Disposition (OT)  inpatient rehabilitation facility  -JY     Row Name 07/14/21 1522          Vital Signs    Pre Systolic BP Rehab  129  -JY     Pre Treatment Diastolic BP  76  -JY     Post Systolic BP Rehab  127  -JY     Post Treatment Diastolic BP  80  -JY     Pretreatment Heart Rate (beats/min)  94  -JY     Posttreatment Heart Rate (beats/min)  109  -JY     Pre SpO2 (%)  92  -JY     O2 Delivery Pre Treatment  room air  -JY     Intra SpO2 (%)  90  -JY     O2 Delivery Intra Treatment  room air  -JY     Post SpO2 (%)  93  -JY     O2 Delivery Post Treatment  room air  -JY     Pre Patient Position  Supine  -JY     Intra Patient Position  Standing  -JY     Post Patient Position  Sitting  -JY     Row Name 07/14/21 1522          Positioning and Restraints    Pre-Treatment Position  in bed  -JY     Post Treatment Position  chair  -JY     In Chair  notified nsg;reclined;call light within reach;encouraged to call for assist;exit alarm on;RUE elevated;waffle cushion;on mechanical lift sling;legs elevated pt with pillow support at R side to assist with leaning  -JY       User Key  (r) = Recorded By, (t) = Taken By, (c) = Cosigned By    Initials Name Provider Type    Sharon Bragg, SUDHAKAR Occupational Therapist        Outcome Measures     Row Name 07/14/21 1536          How much help from another is currently needed...    Putting on and taking off regular lower body clothing?  1  -JY     Bathing (including  washing, rinsing, and drying)  2  -JY     Toileting (which includes using toilet bed pan or urinal)  1  -JY     Putting on and taking off regular upper body clothing  3  -JY     Taking care of personal grooming (such as brushing teeth)  3  -JY     Eating meals  4  -JY     AM-PAC 6 Clicks Score (OT)  14  -JY     Row Name 07/14/21 1525          How much help from another person do you currently need...    Turning from your back to your side while in flat bed without using bedrails?  3  -CD     Moving from lying on back to sitting on the side of a flat bed without bedrails?  2  -CD     Moving to and from a bed to a chair (including a wheelchair)?  2  -CD     Standing up from a chair using your arms (e.g., wheelchair, bedside chair)?  2  -CD     Climbing 3-5 steps with a railing?  2  -CD     To walk in hospital room?  2  -CD     AM-PAC 6 Clicks Score (PT)  13  -CD     Row Name 07/14/21 1536          Functional Assessment    Outcome Measure Options  AM-PAC 6 Clicks Daily Activity (OT)  -JY       User Key  (r) = Recorded By, (t) = Taken By, (c) = Cosigned By    Initials Name Provider Type    CD Luz Maria Sims, PT Physical Therapist    Sharon Bragg, OT Occupational Therapist        Occupational Therapy Education                 Title: PT OT SLP Therapies (In Progress)     Topic: Occupational Therapy (In Progress)     Point: ADL training (In Progress)     Description:   Instruct learner(s) on proper safety adaptation and remediation techniques during self care or transfers.   Instruct in proper use of assistive devices.              Learning Progress Summary           Patient Acceptance, E,D, NR by JACKELIN at 7/14/2021 1407    Acceptance, E, VU by CHANTALE at 7/10/2021 0915                   Point: Home exercise program (Not Started)     Description:   Instruct learner(s) on appropriate technique for monitoring, assisting and/or progressing therapeutic exercises/activities.              Learner Progress:  Not documented in this  visit.          Point: Precautions (In Progress)     Description:   Instruct learner(s) on prescribed precautions during self-care and functional transfers.              Learning Progress Summary           Patient Acceptance, E,D, NR by JJOANNE at 7/14/2021 1407    Acceptance, E,D, VU,NR by TA at 7/11/2021 1457    Acceptance, E, VU by TA at 7/10/2021 0915                   Point: Body mechanics (In Progress)     Description:   Instruct learner(s) on proper positioning and spine alignment during self-care, functional mobility activities and/or exercises.              Learning Progress Summary           Patient Acceptance, E,D, NR by JJOANNE at 7/14/2021 1407    Acceptance, E,D, VU,NR by TA at 7/11/2021 1457                               User Key     Initials Effective Dates Name Provider Type Discipline    TA 06/16/21 -  Richard White, OT Occupational Therapist OT    JACKELIN 06/16/21 -  Sharon Diaz OT Occupational Therapist OT              OT Recommendation and Plan  Therapy Frequency (OT): daily  Plan of Care Review  Plan of Care Reviewed With: patient  Progress: improving  Outcome Summary: OT charlotte nugent completed post chart review given pt s/p t12/L3 kyphoplasty. Pt req'd increased motivation for participation OOB. Pt A & O x3, reported no pain initially with increase to upward of 5/10 with mobility. Pt not able to recall spinal precautions or log roll tech prior to OT education and training with some limited recall of prec later in session. Pt req'd min A x 1 for bed mobility with log roll tech facilitated, min A x 2 for initial STS at EOB and improved to CGA x 2 in 2nd stand at recliner level with improved hand placement and more controlled ascend. Pt did present with both R lateral and posterior leaning in sitting EOB and standing, fxl mobility with cues for midline orientation. Pt req'd A x 2 in in room mobility d/t narrow [place] and ataxic movement patterns with FWW support throughout. Pt completed UBD with min A,  hand/face hygiene with sup and set up, dependent for LBD d/d socks and dep for toileting currently with Meza catheter. Initiated education regarding AE for LBD, LBB with pt able to demo limited recall of spinal prec from earlier in session. Pt would benefit from IPOT POC to address remaining underlying impairments and recommend IRF at d/c given decreased fxl endurance, impaired balance, muscle weakness, decreased I in ADLs with limited distal reach w/o AE.     Time Calculation:   Time Calculation- OT     Row Name 21 1539 21 1527          Time Calculation- OT    OT Start Time  1407  -JY  --     OT Received On  21  -JY  --     OT Goal Re-Cert Due Date  21  -JY  --        Timed Charges    73433 - Gait Training Minutes   --  10  -CD     79164 - OT Therapeutic Activity Minutes  8  -JY  --     74877 - OT Self Care/Mgmt Minutes  16  -JY  --        Untimed Charges    OT Eval/Re-eval Minutes  38  -JY  --        Total Minutes    Timed Charges Total Minutes  24  -JY  10  -CD     Untimed Charges Total Minutes  38  -JY  --      Total Minutes  62  -JY  10  -CD       User Key  (r) = Recorded By, (t) = Taken By, (c) = Cosigned By    Initials Name Provider Type    Luz Maria Palacios PT Physical Therapist    Sharon Bragg OT Occupational Therapist        Therapy Charges for Today     Code Description Service Date Service Provider Modifiers Qty    87643684759 HC OT SELF CARE/MGMT/TRAIN EA 15 MIN 2021 Sharon Diaz OT GO 1    65852959803 HC OT THERAPEUTIC ACT EA 15 MIN 2021 Sharon Diaz OT GO 1    26613289284 HC OT RE-EVAL 2 2021 Sharon Diaz OT GO 1               Sharon Diaz OT  2021    Electronically signed by Sharon Diaz OT at 21 1542            Discharge Summary      Shana Pardo MD at 21 0902              [facility] Medicine Services  DISCHARGE SUMMARY    Patient Name: Cristian Watt  : 1945  MRN: [id number]    Date of  Admission: 7/7/2021  3:37 AM  Date of Discharge: 7/16/2021  Primary Care Physician: Amanda Tate MD    Consults     Date and Time Order Name Status Description    7/10/2021  9:15 AM Inpatient Nephrology Consult Completed     7/7/2021  8:08 AM Inpatient Neurosurgery Consult            Hospital Course     Presenting Problem:   Acute low back pain with right-sided sciatica, unspecified back pain laterality [M54.41]    Active Hospital Problems    Diagnosis  POA   • Constipation [K59.00]  Yes   • Compression fracture of L3 vertebra (CMS/HCC) [S32.030A]  Yes   • Compression fracture of T12 vertebra (CMS/HCC) [S22.080A]  Yes   • Mood disorder (CMS/HCC) [F39]  Yes   • Chronic bilateral low back pain [M54.5, G89.29]  Yes   • Mixed hyperlipidemia [E78.2]  Yes   • Hypertension [I10]  Yes   • Uncontrolled type 2 diabetes mellitus with hyperglycemia (CMS/HCC) [E11.65]  Yes   • Osteoarthritis [M19.90]  Yes      Resolved Hospital Problems    Diagnosis Date Resolved POA   • **Intractable back pain [M54.9] 07/16/2021 Yes   • Acute low back pain with right-sided sciatica [M54.41] 07/16/2021 Yes        Hospital Course:  Cristian Watt is a 75 y.o. male history of hypertension, poorly controlled type 2 diabetes, hyperlipidemia, anxiety depression, osteoarthritis, hyperlipidemia.  Patient presents with progressively worsening chronic low back pain, imaging concerning for T12 and L3 compression fractures.  s/p kyphoplasty 7/14     Intractable back pain with T12 and L3 compression fractures  -Etiology unknown, denies any recent trauma  -He is s/p T12- L3 kyphoplasty 7/14 with Dr. Zepeda, will follow up in clinic in 3 weeks  -Pain is much controlled, PT/OT evaluated recommend rehab     Poorly controlled type 2 diabetes with A1c 12.4%, neuropathy  -FSBG's were  reviewed and were appropriate, he was on basal and SSI insulin.  Okay to resume home regimen at discharge  -Continue gabapentin 100 mg TID.     Urinary retention  -Continue  Flomax, Meza catheter remains in place, attempt voiding trials in the next 1-2 days     Hyponatremia,  improving  -Suspect to be SIADH , nephrology was consulted, recommned to continue  1200 mL fluid restriction, IV Lasix and sodium chloride tablets and follow up with them in 2 weeks     Hypertension-BP currently well controlled, continue home losartan     Hyperlipidemia-continue statin     Constipation  -he is s/p multiple bowel regimens, enema without good response, needs ongoing purging    Anxiety and depression     GERD    Discharge Follow Up Recommendations for outpatient labs/diagnostics:  Follow-up with neurosurgery, Dr. Zepeda in 3 weeks    Day of Discharge     HPI: No acute events overnight, patient rested well, denies any ongoing back pain.    Review of Systems  Gen- No fevers, chills  CV- No chest pain, palpitations  Resp- No cough, dyspnea  GI- No N/V/D, abd pain    All other systems reviewed and are negative    Vital Signs:   Temp:  [97.3 °F (36.3 °C)-98.1 °F (36.7 °C)] 97.7 °F (36.5 °C)  Heart Rate:  [76-99] 99  Resp:  [16-18] 18  BP: (126-157)/(73-97) 157/97     Physical Exam:  Constitutional: No acute distress, awake, alert  HENT: NCAT, mucous membranes moist  Respiratory: Clear to auscultation bilaterally, respiratory effort normal   Cardiovascular: RRR, no murmurs, rubs, or gallops  Gastrointestinal: Positive bowel sounds, soft, nontender, nondistended  Musculoskeletal: No bilateral ankle edema  Psychiatric: Appropriate affect, cooperative  Neurologic: Oriented x 3, nonfocal  Skin: No rashes    Pertinent  and/or Most Recent Results     LAB RESULTS:      Lab 07/12/21  0928   WBC 8.86   HEMOGLOBIN 15.3   HEMATOCRIT 45.6   PLATELETS 341   MCV 91.8         Lab 07/16/21  0606 07/15/21  2340 07/15/21  1600 07/15/21  0810 07/15/21  0106 07/14/21  0701 07/12/21  0928 07/11/21  0601   SODIUM 134* 135* 133* 133* 127* 127* 130* 127*   POTASSIUM 3.7  --   --  3.9  --  3.7 4.2 4.0   CHLORIDE 97*  --   --  96*   --   --  95* 93*   CO2 26.0  --   --  27.0  --   --  23.0 27.0   ANION GAP 11.0  --   --  10.0  --   --  12.0 7.0   BUN 8  --   --  7*  --   --  5* 5*   CREATININE 0.52*  --   --  0.58*  --   --  0.48* 0.54*   GLUCOSE 196*  --   --  167*  --   --  157* 185*   CALCIUM 8.8  --   --  8.9  --   --  8.8 9.1                 Lab 07/11/21  0601   TRIGLYCERIDES 137             Brief Urine Lab Results  (Last result in the past 365 days)      Color   Clarity   Blood   Leuk Est   Nitrite   Protein   CREAT   Urine HCG        07/07/21 0458 Yellow Clear Negative Negative Negative Negative             Microbiology Results (last 10 days)     Procedure Component Value - Date/Time    COVID PRE-OP / PRE-PROCEDURE SCREENING ORDER (NO ISOLATION) - Swab, Nasopharynx [[id number]]  (Normal) Collected: 07/07/21 0643    Lab Status: Final result Specimen: Swab from Nasopharynx Updated: 07/07/21 0755    Narrative:      The following orders were created for panel order COVID PRE-OP / PRE-PROCEDURE SCREENING ORDER (NO ISOLATION) - Swab, Nasopharynx.  Procedure                               Abnormality         Status                     ---------                               -----------         ------                     COVID-19,CEPHEID,CYNTHIA IN-...[[id number]]  Normal              Final result                 Please view results for these tests on the individual orders.    COVID-19,CEPHEID,CYNTHIA IN-HOUSE(OR EMERGENT/ADD-ON),NP SWAB IN TRANSPORT MEDIA 3-4 HR TAT - Swab, Nasopharynx [[id number]]  (Normal) Collected: 07/07/21 0643    Lab Status: Final result Specimen: Swab from Nasopharynx Updated: 07/07/21 0755     COVID19 Not Detected    Narrative:      Fact sheet for providers: https://www.fda.gov/media/317635/download     Fact sheet for patients: https://www.fda.gov/media/863043/download  Fact sheet for providers: https://www.fda.gov/media/152249/download     Fact sheet for patients: https://www.fda.gov/media/723535/download          CT Abdomen  Pelvis Without Contrast    Result Date: 7/7/2021  AND PELVIS, NONCONTRAST, 7/7/2021 HISTORY: 75-year-old male in the ED with acute onset back pain, right greater than left. Difficulty urinating. TECHNIQUE: CT imaging of the abdomen and pelvis, noncontrast kidney stone protocol. Radiation dose reduction techniques included automated exposure control. Radiation audit for CT and nuclear cardiology exams in the last 12 months: 0. ABDOMEN FINDINGS: There is no stone material within the ureters or bladder, and there is no evidence of urinary obstruction. Tiny nonobstructing calculus in the right mid kidney and in the lower left kidney. Small right mid renal cyst. No gallbladder distention or bile duct dilatation. Small benign cyst in the left hepatic lobe. Liver, pancreas and spleen are otherwise within normal limits without contrast. Normal caliber abdominal aorta. Mild sigmoid and lower descending colonic diverticulosis. Small bowel and colon are otherwise normal in caliber and appearance, as imaged. The appendix is normal. No gastric distention, hiatal hernia or distal esophageal dilatation. PELVIS FINDINGS: Urinary bladder, prostate and rectum are within normal limits. No free pelvic fluid. Previous left inguinal herniorrhaphy.  Lung base images show mild to moderate chronic interstitial fibrosis in a peripheral distribution. Old left 10th and 11th rib fractures. Superior endplate vertebral compression fracture deformity at T1 may be subacute or chronic. This results and mild loss of central vertebral body height. Chronic appearing inferior endplate vertebral compression fracture at L3 with mild loss of central vertebral body height. Degenerative disc disease at L4-5 and L5-S1. Bilateral L5 spondylolysis with degenerative facet arthropathy. Grade 1 anterolisthesis at L5-S1 and retrolisthesis at L4-5.     1.  Subacute or chronic vertebral compression fractures at T12 and L3. 2.  No evidence of urinary obstruction.  Single tiny nonobstructing calculus within each kidney. 3.  Mild lower colonic diverticulosis. Normal appendix. Signer Name: Rusty Eller MD  Signed: 7/7/2021 4:19 AM  Workstation Name: [email or web address]  [facility]    MRI Lumbar Spine Without Contrast    Result Date: 7/8/2021  EXAMINATION: MRI LUMBAR SPINE WO CONTRAST-07/07/2021:  INDICATION: Fractures; M54.41-Lumbago with sciatica, right side; Z86.39-Personal history of other endocrine, nutritional and metabolic disease, chronic low back pain, fall.  TECHNIQUE: Routine multiplanar imaging was obtained of the lumbar spine without the administration of Gadolinium contrast.  COMPARISON: 04/22/2013.  FINDINGS: There is abnormal signal intensity involving the superior endplate of T12 and the L3 vertebral body. There is edema seen diffusely throughout the L3 vertebral body with linear abnormality seen along the inferior endplate. There is loss of height centrally. There is also fracture of the superior endplate of T12. Normal signal intensity within the conus. The spinal cord terminates at the T12 level. No abnormal mass or fluid collection seen within the paraspinal muscles. There is a cyst identified on the right kidney.  Axial imaging reveals at the L1/L2 level small broad-based disc bulge creating no significant central spinal canal stenosis or nerve root contact or compromise.  At the L2/L3 level, there is no significant central spinal canal stenosis or nerve root contact or compromise.  At the L3/L4 level, there is a broad-based disc bulge with some lateralization to the left creating narrowing of the left neural foramina. Contact seen of the left nerve root. Compromise cannot be excluded and clinical correlation is needed.  At the L4/L5 level, there is no significant central spinal canal stenosis or nerve root contact or compromise. No neuroforaminal narrowing.  At the L5/S1 level, there is a broad-based disc bulge creating no  significant central spinal canal stenosis or nerve root contact or compromise.      Compression deformity seen of the superior endplate of T12. There is also acute compression deformity seen of the inferior aspect of L3 with edema seen diffusely throughout the L3 vertebral body level and superior endplate of T12. There is no significant retropulsion of the posterior elements into the spinal canal. Broad-based disc bulge at the L3/L4 level with lateralization to the left creating narrowing of the left neural foramina. Possible contact of the left nerve root.   D:  07/08/2021 E:  07/08/2021  This report was finalized on 7/8/2021 5:31 PM by Dr. Myrtle De Souza MD.      Kyphoplasty Vertebroplasty    Result Date: 7/15/2021  EXAMINATION: IR KYPHOPLASTY VERTEBROPLASTY-  INDICATION: Kyphoplasty L3 and T12; M54.41-Lumbago with sciatica, right side; Z86.39-Personal history of other endocrine, nutritional and metabolic disease; Z74.09-Other reduced mobility; S22.080A-Wedge compression fracture of T11-T12 vertebra, initial encounter for closed fracture.  TECHNIQUE: Intraoperative fluoroscopy for improved localization and treatment planning.  COMPARISON: None.  FINDINGS: Intraoperative fluoroscopy with total fluoroscopic time usage 2 minutes 58 seconds and twelve images saved during kyphoplasty at the T12 and L3 levels.      Intraoperative fluoroscopy with total fluoroscopic time usage 2 minutes 58 seconds and twelve images saved during kyphoplasty at the T12 and L3 levels.  D:  07/14/2021 E:  07/15/2021    This report was finalized on 7/15/2021 6:47 PM by Dr. Blu Jeffrey.      XR Spine Lumbar AP & Lateral    Result Date: 7/8/2021  EXAMINATION: XR SPINE, LUMBAR, AP AND LATERAL-07/08/2021:  INDICATION: Lumbar fractures; M54.41-Lumbago with sciatica, right side; Z86.39-Personal history of other endocrine, nutritional and metabolic disease; Z74.09-Other reduced mobility.  COMPARISON: Lumbar spine x-ray 04/19/2013.  FINDINGS: AP  and lateral views of the lumbar spine reveal increased anterior wedging of the T12 level new from prior comparison 2013 concerning for compression deformity, age-indeterminate, with 25-50% height loss anteriorly, along with retrolisthesis of L4 on L5 mild involvement and associated degenerative changes.      Increased anterior wedging of the T12 level new from prior comparison 2013 concerning for compression deformity, age-indeterminate, with 25-50% height loss anteriorly, along with retrolisthesis of L4 on L5 mild involvement and associated degenerative changes. Flexion and extension views may be performed along with MRI lumbar spine for further evaluation or aging of the T12 compression deformity process.  D:  07/08/2021 E:  07/08/2021  This report was finalized on 7/8/2021 7:00 PM by Dr. Blu Jeffrey.                  Plan for Follow-up of Pending Labs/Results:     Discharge Details        Discharge Medications      New Medications      Instructions Start Date   aspirin 81 MG EC tablet  Replaces: aspirin 81 MG tablet   81 mg, Oral, Daily      furosemide 20 MG tablet  Commonly known as: LASIX   20 mg, Oral, Daily   Start Date: July 17, 2021     gabapentin 100 MG capsule  Commonly known as: NEURONTIN   100 mg, Oral, Every 8 Hours Scheduled      oxyCODONE-acetaminophen 7.5-325 MG per tablet  Commonly known as: PERCOCET   1 tablet, Oral, Every 4 Hours PRN      tamsulosin 0.4 MG capsule 24 hr capsule  Commonly known as: FLOMAX   0.4 mg, Oral, Daily         Continue These Medications      Instructions Start Date   losartan 50 MG tablet  Commonly known as: COZAAR   50 mg, Oral, Daily      rosuvastatin 20 MG tablet  Commonly known as: CRESTOR   20 mg, Oral, Daily         Stop These Medications    aspirin 81 MG tablet  Replaced by: aspirin 81 MG EC tablet            Allergies   Allergen Reactions   • Penicillins    • Shellfish-Derived Products        Discharge Disposition: [facility]      Diet:  Hospital:  Diet Order    Procedures   • Diet Regular; Daily Fluid Restriction, Consistent Carbohydrate; Other; 1,200; 240 mL Fluid Per Tray       Activity: As tolerated      Restrictions or Other Recommendations:  Patient may get incisions wet in the shower at any time.    CODE STATUS:    Code Status and Medical Interventions:   Ordered at: 07/14/21 1037     Code Status:    CPR     Medical Interventions (Level of Support Prior to Arrest):    Full       No future appointments.    Shana Pardo MD  07/16/21      Time Spent on Discharge:  I spent  35 minutes on this discharge activity which included: face-to-face encounter with the patient, reviewing the data in the system, coordination of the care with the nursing staff as well as consultants, documentation, and entering orders.          Electronically signed by Shana Pardo MD at 07/16/21 7987

## 2021-07-16 NOTE — PLAN OF CARE
Goal Outcome Evaluation:   VSS. Room air to 2L nasal cannula. Activity encouraged, pt refused. Pt drowsy at beginning of shift, more alert this morning. Oriented x4 but with intermittent confusion. Complain ts of pain x2, relieved with PRN. Pt denies further needs at this time.

## 2021-07-16 NOTE — PROGRESS NOTES
"   LOS: 8 days    Patient Care Team:  Amanda Tate MD as PCP - General (Family Medicine)    Chief Complaint: Back pain    Subjective   Patient admitted with back pain found to have hyponatremia serum sodium 124 which is slowly up to 129 at this time.  Looking at old records patient has a history of triglyceridemia with a serum triglyceride 749 in 2018.  Cortisol level is normal..  Previous sodium has been within acceptable range.  He does not have polyuria polydipsia, no diarrhea, headache, nausea.  Patient is being admitted for back problem.  Renal function has been stable  Interval History:   No acute events overnight. No new complaints.     Review of Systems:   No SOA, CP, N/V       Objective     Vital Sign Min/Max for last 24 hours  Temp  Min: 97.3 °F (36.3 °C)  Max: 98.1 °F (36.7 °C)   BP  Min: 126/74  Max: 153/88   Pulse  Min: 81  Max: 99   Resp  Min: 16  Max: 18   SpO2  Min: 78 %  Max: 98 %   Flow (L/min)  Min: 2  Max: 2   No data recorded     Flowsheet Rows      First Filed Value   Admission Height  185.4 cm (73\") Documented at 07/07/2021 0332   Admission Weight  118 kg (260 lb) Documented at 07/07/2021 0332          No intake/output data recorded.  I/O last 3 completed shifts:  In: 1880 [P.O.:1385; I.V.:495]  Out: 4150 [Urine:4150]    Physical Exam:    Gen: Alert, NAD   HENT: NC, AT, EOMI   NECK: Supple, no JVD, Trachea midline   LUNGS: CTA bilaterally, non labored respirtation   CVS: S1/S2 audible, RRR, no murmur   Abd: Soft, NT, ND, BS+   Ext: No pedal edema, no cyanosis   CNS: Alert, No focal deficit noted grossly  Psy: Cooperative  Skin: Warm, dry and intact        WBC No results found for: WBC   HGB No results found for: HGB   HCT No results found for: HCT   Platlets No results found for: LABPLAT   MCV No results found for: MCV       Sodium Sodium   Date Value Ref Range Status   07/16/2021 134 (L) 136 - 145 mmol/L Final   07/15/2021 135 (L) 136 - 145 mmol/L Final   07/15/2021 133 (L) 136 - 145 " mmol/L Final   07/15/2021 133 (L) 136 - 145 mmol/L Final   07/15/2021 127 (L) 136 - 145 mmol/L Final   07/14/2021 128 (L) 136 - 145 mmol/L Final   07/14/2021 127 (L) 136 - 145 mmol/L Final   07/14/2021 130 (L) 136 - 145 mmol/L Final   07/13/2021 130 (L) 136 - 145 mmol/L Final      Potassium Potassium   Date Value Ref Range Status   07/16/2021 3.7 3.5 - 5.2 mmol/L Final     Comment:     Slight hemolysis detected by analyzer. Results may be affected.   07/15/2021 3.9 3.5 - 5.2 mmol/L Final     Comment:     Slight hemolysis detected by analyzer. Results may be affected.   07/14/2021 3.7 3.5 - 5.2 mmol/L Final      Chloride Chloride   Date Value Ref Range Status   07/16/2021 97 (L) 98 - 107 mmol/L Final   07/15/2021 96 (L) 98 - 107 mmol/L Final      CO2 CO2   Date Value Ref Range Status   07/16/2021 26.0 22.0 - 29.0 mmol/L Final   07/15/2021 27.0 22.0 - 29.0 mmol/L Final      BUN BUN   Date Value Ref Range Status   07/16/2021 8 8 - 23 mg/dL Final   07/15/2021 7 (L) 8 - 23 mg/dL Final      Creatinine Creatinine   Date Value Ref Range Status   07/16/2021 0.52 (L) 0.76 - 1.27 mg/dL Final   07/15/2021 0.58 (L) 0.76 - 1.27 mg/dL Final      Calcium Calcium   Date Value Ref Range Status   07/16/2021 8.8 8.6 - 10.5 mg/dL Final   07/15/2021 8.9 8.6 - 10.5 mg/dL Final      PO4 No results found for: CAPO4   Albumin No results found for: ALBUMIN   Magnesium No results found for: MG   Uric Acid No results found for: URICACID        Results Review:     I reviewed the patient's new clinical results.    aspirin, 81 mg, Oral, Daily  furosemide, 20 mg, Oral, Daily  gabapentin, 100 mg, Oral, Q8H  insulin detemir, 10 Units, Subcutaneous, Nightly  insulin lispro, 0-7 Units, Subcutaneous, TID AC  losartan, 50 mg, Oral, Daily  rosuvastatin, 20 mg, Oral, Daily  senna-docusate sodium, 2 tablet, Oral, BID  sodium chloride, 10 mL, Intravenous, Q12H  sodium chloride, 1 g, Oral, BID With Meals  tamsulosin, 0.4 mg, Oral, Daily      lactated ringers,  75 mL/hr, Last Rate: 75 mL/hr (07/15/21 8658)        Medication Review: Reviewed    Assessment/Plan       Intractable back pain    Osteoarthritis    Uncontrolled type 2 diabetes mellitus with hyperglycemia (CMS/HCC)    Hypertension    Mixed hyperlipidemia    Compression fracture of L3 vertebra (CMS/HCC)    Compression fracture of T12 vertebra (CMS/HCC)    Mood disorder (CMS/HCC)    Chronic bilateral low back pain    Acute low back pain with right-sided sciatica    1.  Hyponatremia:  cortisol levels normal 8.44, glucose 212, serum osmolality 274, urine sodium 28, urine osmolarity 605- SIADH  - improving   Triglyceride 137.   2.  Diabetes type 2  3.  Hypertension.  4.  Compression fracture T3, T12    Plan  Fluid restriction 1.2 lit/day   Sodium chloride tablets and lasix.     Discussed with patient      Follow up with [facility] in 2 weeks with renal function panel.     Adam Ac MD  07/16/21  09:17 EDT

## 2021-07-16 NOTE — PROGRESS NOTES
"NEUROSURGERY PROGRESS NOTE     LOS: 8 days   Patient Care Team:  Amanda Tate MD as PCP - General (Family Medicine)    Chief Complaint: Low back pain.    POD#: 2 Days Post-Op  Procedures:  T12 and L3 kyphoplasty.    Interval History:   Patient Complaints: Ongoing back pain but improved.  Patient Denies: Lower extremity weakness or numbness.    Vital Signs: Blood pressure 135/90, pulse 82, temperature 97.7 °F (36.5 °C), temperature source Oral, resp. rate 16, height 185.4 cm (73\"), weight 118 kg (260 lb), SpO2 93 %.  Intake/Output:     Intake/Output Summary (Last 24 hours) at 7/16/2021 0653  Last data filed at 7/16/2021 0200  Gross per 24 hour   Intake 1215 ml   Output 2000 ml   Net -785 ml       Physical Exam:  The patient is able to roll over in bed relatively easily and this is certainly an improvement.  Motor function is intact in his lower extremities.  Dry dressings are in place on his incision.     Data Review:    Sodium 135    Assessment/Plan:  1.  T12 and L3 compression fractures status post kyphoplasty.  2.  Chronic mechanical low back pain.  3.  Low-grade L5-S1 spondylolisthesis.  4.  Hyponatremia.  Improved.  5.  Uncontrolled diabetes mellitus.  6.  Disposition:  Overall the patient is doing better.  Mobilize.  Rehab or skilled nursing facility pending.  Follow-up with [facility] in my office in approximately 3 weeks.  Patient may get incisions wet in the shower at any time.      Ruddy Zepeda MD  07/16/21  06:53 EDT    "

## 2021-07-16 NOTE — DISCHARGE SUMMARY
[facility] Medicine Services  DISCHARGE SUMMARY    Patient Name: Cristian Watt  : 1945  MRN: [id number]    Date of Admission: 2021  3:37 AM  Date of Discharge: 2021  Primary Care Physician: Amanda Tate MD    Consults     Date and Time Order Name Status Description    7/10/2021  9:15 AM Inpatient Nephrology Consult Completed     2021  8:08 AM Inpatient Neurosurgery Consult            Hospital Course     Presenting Problem:   Acute low back pain with right-sided sciatica, unspecified back pain laterality [M54.41]    Active Hospital Problems    Diagnosis  POA   • Constipation [K59.00]  Yes   • Compression fracture of L3 vertebra (CMS/HCC) [S32.030A]  Yes   • Compression fracture of T12 vertebra (CMS/HCC) [S22.080A]  Yes   • Mood disorder (CMS/HCC) [F39]  Yes   • Chronic bilateral low back pain [M54.5, G89.29]  Yes   • Mixed hyperlipidemia [E78.2]  Yes   • Hypertension [I10]  Yes   • Uncontrolled type 2 diabetes mellitus with hyperglycemia (CMS/HCC) [E11.65]  Yes   • Osteoarthritis [M19.90]  Yes      Resolved Hospital Problems    Diagnosis Date Resolved POA   • **Intractable back pain [M54.9] 2021 Yes   • Acute low back pain with right-sided sciatica [M54.41] 2021 Yes        Hospital Course:  Cristian Watt is a 75 y.o. male history of hypertension, poorly controlled type 2 diabetes, hyperlipidemia, anxiety depression, osteoarthritis, hyperlipidemia.  Patient presents with progressively worsening chronic low back pain, imaging concerning for T12 and L3 compression fractures.  s/p kyphoplasty      Intractable back pain with T12 and L3 compression fractures  -Etiology unknown, denies any recent trauma  -He is s/p T12- L3 kyphoplasty  with Dr. Zepeda, will follow up in clinic in 3 weeks  -Pain is much controlled, PT/OT evaluated recommend rehab     Poorly controlled type 2 diabetes with A1c 12.4%, neuropathy  -FSBG's were  reviewed and were  appropriate, he was on basal and SSI insulin.  Okay to resume home regimen at discharge  -Continue gabapentin 100 mg TID.     Urinary retention  -Continue Flomax, Meza catheter remains in place, attempt voiding trials in the next 1-2 days     Hyponatremia,  improving  -Suspect to be SIADH , nephrology was consulted, recommned to continue  1200 mL fluid restriction, IV Lasix and sodium chloride tablets and follow up with them in 2 weeks     Hypertension-BP currently well controlled, continue home losartan     Hyperlipidemia-continue statin     Constipation  -he is s/p multiple bowel regimens, enema without good response, needs ongoing purging    Anxiety and depression     GERD    Discharge Follow Up Recommendations for outpatient labs/diagnostics:  Follow-up with neurosurgery, Dr. Zepeda in 3 weeks    Day of Discharge     HPI: No acute events overnight, patient rested well, denies any ongoing back pain.    Review of Systems  Gen- No fevers, chills  CV- No chest pain, palpitations  Resp- No cough, dyspnea  GI- No N/V/D, abd pain    All other systems reviewed and are negative    Vital Signs:   Temp:  [97.3 °F (36.3 °C)-98.1 °F (36.7 °C)] 97.7 °F (36.5 °C)  Heart Rate:  [76-99] 99  Resp:  [16-18] 18  BP: (126-157)/(73-97) 157/97     Physical Exam:  Constitutional: No acute distress, awake, alert  HENT: NCAT, mucous membranes moist  Respiratory: Clear to auscultation bilaterally, respiratory effort normal   Cardiovascular: RRR, no murmurs, rubs, or gallops  Gastrointestinal: Positive bowel sounds, soft, nontender, nondistended  Musculoskeletal: No bilateral ankle edema  Psychiatric: Appropriate affect, cooperative  Neurologic: Oriented x 3, nonfocal  Skin: No rashes    Pertinent  and/or Most Recent Results     LAB RESULTS:      Lab 07/12/21  0928   WBC 8.86   HEMOGLOBIN 15.3   HEMATOCRIT 45.6   PLATELETS 341   MCV 91.8         Lab 07/16/21  0606 07/15/21  2340 07/15/21  1600 07/15/21  0810 07/15/21  0106 07/14/21  0701  07/12/21  0928 07/11/21  0601   SODIUM 134* 135* 133* 133* 127* 127* 130* 127*   POTASSIUM 3.7  --   --  3.9  --  3.7 4.2 4.0   CHLORIDE 97*  --   --  96*  --   --  95* 93*   CO2 26.0  --   --  27.0  --   --  23.0 27.0   ANION GAP 11.0  --   --  10.0  --   --  12.0 7.0   BUN 8  --   --  7*  --   --  5* 5*   CREATININE 0.52*  --   --  0.58*  --   --  0.48* 0.54*   GLUCOSE 196*  --   --  167*  --   --  157* 185*   CALCIUM 8.8  --   --  8.9  --   --  8.8 9.1                 Lab 07/11/21  0601   TRIGLYCERIDES 137             Brief Urine Lab Results  (Last result in the past 365 days)      Color   Clarity   Blood   Leuk Est   Nitrite   Protein   CREAT   Urine HCG        07/07/21 0458 Yellow Clear Negative Negative Negative Negative             Microbiology Results (last 10 days)     Procedure Component Value - Date/Time    COVID PRE-OP / PRE-PROCEDURE SCREENING ORDER (NO ISOLATION) - Swab, Nasopharynx [[id number]]  (Normal) Collected: 07/07/21 0643    Lab Status: Final result Specimen: Swab from Nasopharynx Updated: 07/07/21 0755    Narrative:      The following orders were created for panel order COVID PRE-OP / PRE-PROCEDURE SCREENING ORDER (NO ISOLATION) - Swab, Nasopharynx.  Procedure                               Abnormality         Status                     ---------                               -----------         ------                     COVID-19,CEPHEID,CYNTHIA IN-...[[id number]]  Normal              Final result                 Please view results for these tests on the individual orders.    COVID-19,CEPHEID,CYNTHIA IN-HOUSE(OR EMERGENT/ADD-ON),NP SWAB IN TRANSPORT MEDIA 3-4 HR TAT - Swab, Nasopharynx [[id number]]  (Normal) Collected: 07/07/21 0643    Lab Status: Final result Specimen: Swab from Nasopharynx Updated: 07/07/21 0755     COVID19 Not Detected    Narrative:      Fact sheet for providers: https://www.fda.gov/media/321156/download     Fact sheet for patients: https://www.fda.gov/media/109296/download  Fact  sheet for providers: https://www.fda.gov/media/099912/download     Fact sheet for patients: https://www.fda.gov/media/187823/download          CT Abdomen Pelvis Without Contrast    Result Date: 7/7/2021  AND PELVIS, NONCONTRAST, 7/7/2021 HISTORY: 75-year-old male in the ED with acute onset back pain, right greater than left. Difficulty urinating. TECHNIQUE: CT imaging of the abdomen and pelvis, noncontrast kidney stone protocol. Radiation dose reduction techniques included automated exposure control. Radiation audit for CT and nuclear cardiology exams in the last 12 months: 0. ABDOMEN FINDINGS: There is no stone material within the ureters or bladder, and there is no evidence of urinary obstruction. Tiny nonobstructing calculus in the right mid kidney and in the lower left kidney. Small right mid renal cyst. No gallbladder distention or bile duct dilatation. Small benign cyst in the left hepatic lobe. Liver, pancreas and spleen are otherwise within normal limits without contrast. Normal caliber abdominal aorta. Mild sigmoid and lower descending colonic diverticulosis. Small bowel and colon are otherwise normal in caliber and appearance, as imaged. The appendix is normal. No gastric distention, hiatal hernia or distal esophageal dilatation. PELVIS FINDINGS: Urinary bladder, prostate and rectum are within normal limits. No free pelvic fluid. Previous left inguinal herniorrhaphy.  Lung base images show mild to moderate chronic interstitial fibrosis in a peripheral distribution. Old left 10th and 11th rib fractures. Superior endplate vertebral compression fracture deformity at T1 may be subacute or chronic. This results and mild loss of central vertebral body height. Chronic appearing inferior endplate vertebral compression fracture at L3 with mild loss of central vertebral body height. Degenerative disc disease at L4-5 and L5-S1. Bilateral L5 spondylolysis with degenerative facet arthropathy. Grade 1 anterolisthesis at  L5-S1 and retrolisthesis at L4-5.     1.  Subacute or chronic vertebral compression fractures at T12 and L3. 2.  No evidence of urinary obstruction. Single tiny nonobstructing calculus within each kidney. 3.  Mild lower colonic diverticulosis. Normal appendix. Signer Name: Rusty Eller MD  Signed: 7/7/2021 4:19 AM  Workstation Name: [email or web address]  [facility]    MRI Lumbar Spine Without Contrast    Result Date: 7/8/2021  EXAMINATION: MRI LUMBAR SPINE WO CONTRAST-07/07/2021:  INDICATION: Fractures; M54.41-Lumbago with sciatica, right side; Z86.39-Personal history of other endocrine, nutritional and metabolic disease, chronic low back pain, fall.  TECHNIQUE: Routine multiplanar imaging was obtained of the lumbar spine without the administration of Gadolinium contrast.  COMPARISON: 04/22/2013.  FINDINGS: There is abnormal signal intensity involving the superior endplate of T12 and the L3 vertebral body. There is edema seen diffusely throughout the L3 vertebral body with linear abnormality seen along the inferior endplate. There is loss of height centrally. There is also fracture of the superior endplate of T12. Normal signal intensity within the conus. The spinal cord terminates at the T12 level. No abnormal mass or fluid collection seen within the paraspinal muscles. There is a cyst identified on the right kidney.  Axial imaging reveals at the L1/L2 level small broad-based disc bulge creating no significant central spinal canal stenosis or nerve root contact or compromise.  At the L2/L3 level, there is no significant central spinal canal stenosis or nerve root contact or compromise.  At the L3/L4 level, there is a broad-based disc bulge with some lateralization to the left creating narrowing of the left neural foramina. Contact seen of the left nerve root. Compromise cannot be excluded and clinical correlation is needed.  At the L4/L5 level, there is no significant central spinal canal  stenosis or nerve root contact or compromise. No neuroforaminal narrowing.  At the L5/S1 level, there is a broad-based disc bulge creating no significant central spinal canal stenosis or nerve root contact or compromise.      Compression deformity seen of the superior endplate of T12. There is also acute compression deformity seen of the inferior aspect of L3 with edema seen diffusely throughout the L3 vertebral body level and superior endplate of T12. There is no significant retropulsion of the posterior elements into the spinal canal. Broad-based disc bulge at the L3/L4 level with lateralization to the left creating narrowing of the left neural foramina. Possible contact of the left nerve root.   D:  07/08/2021 E:  07/08/2021  This report was finalized on 7/8/2021 5:31 PM by Dr. Myrtle De Souza MD.      Kyphoplasty Vertebroplasty    Result Date: 7/15/2021  EXAMINATION: IR KYPHOPLASTY VERTEBROPLASTY-  INDICATION: Kyphoplasty L3 and T12; M54.41-Lumbago with sciatica, right side; Z86.39-Personal history of other endocrine, nutritional and metabolic disease; Z74.09-Other reduced mobility; S22.080A-Wedge compression fracture of T11-T12 vertebra, initial encounter for closed fracture.  TECHNIQUE: Intraoperative fluoroscopy for improved localization and treatment planning.  COMPARISON: None.  FINDINGS: Intraoperative fluoroscopy with total fluoroscopic time usage 2 minutes 58 seconds and twelve images saved during kyphoplasty at the T12 and L3 levels.      Intraoperative fluoroscopy with total fluoroscopic time usage 2 minutes 58 seconds and twelve images saved during kyphoplasty at the T12 and L3 levels.  D:  07/14/2021 E:  07/15/2021    This report was finalized on 7/15/2021 6:47 PM by Dr. Blu Jeffrey.      XR Spine Lumbar AP & Lateral    Result Date: 7/8/2021  EXAMINATION: XR SPINE, LUMBAR, AP AND LATERAL-07/08/2021:  INDICATION: Lumbar fractures; M54.41-Lumbago with sciatica, right side; Z86.39-Personal history  of other endocrine, nutritional and metabolic disease; Z74.09-Other reduced mobility.  COMPARISON: Lumbar spine x-ray 04/19/2013.  FINDINGS: AP and lateral views of the lumbar spine reveal increased anterior wedging of the T12 level new from prior comparison 2013 concerning for compression deformity, age-indeterminate, with 25-50% height loss anteriorly, along with retrolisthesis of L4 on L5 mild involvement and associated degenerative changes.      Increased anterior wedging of the T12 level new from prior comparison 2013 concerning for compression deformity, age-indeterminate, with 25-50% height loss anteriorly, along with retrolisthesis of L4 on L5 mild involvement and associated degenerative changes. Flexion and extension views may be performed along with MRI lumbar spine for further evaluation or aging of the T12 compression deformity process.  D:  07/08/2021 E:  07/08/2021  This report was finalized on 7/8/2021 7:00 PM by Dr. Blu Jeffrey.                  Plan for Follow-up of Pending Labs/Results:     Discharge Details        Discharge Medications      New Medications      Instructions Start Date   aspirin 81 MG EC tablet  Replaces: aspirin 81 MG tablet   81 mg, Oral, Daily      furosemide 20 MG tablet  Commonly known as: LASIX   20 mg, Oral, Daily   Start Date: July 17, 2021     gabapentin 100 MG capsule  Commonly known as: NEURONTIN   100 mg, Oral, Every 8 Hours Scheduled      oxyCODONE-acetaminophen 7.5-325 MG per tablet  Commonly known as: PERCOCET   1 tablet, Oral, Every 4 Hours PRN      tamsulosin 0.4 MG capsule 24 hr capsule  Commonly known as: FLOMAX   0.4 mg, Oral, Daily         Continue These Medications      Instructions Start Date   losartan 50 MG tablet  Commonly known as: COZAAR   50 mg, Oral, Daily      rosuvastatin 20 MG tablet  Commonly known as: CRESTOR   20 mg, Oral, Daily         Stop These Medications    aspirin 81 MG tablet  Replaced by: aspirin 81 MG EC tablet            Allergies    Allergen Reactions   • Penicillins    • Shellfish-Derived Products        Discharge Disposition: [facility]      Diet:  Hospital:  Diet Order   Procedures   • Diet Regular; Daily Fluid Restriction, Consistent Carbohydrate; Other; 1,200; 240 mL Fluid Per Tray       Activity: As tolerated      Restrictions or Other Recommendations:  Patient may get incisions wet in the shower at any time.    CODE STATUS:    Code Status and Medical Interventions:   Ordered at: 07/14/21 1037     Code Status:    CPR     Medical Interventions (Level of Support Prior to Arrest):    Full       No future appointments.    Shana Pardo MD  07/16/21      Time Spent on Discharge:  I spent  35 minutes on this discharge activity which included: face-to-face encounter with the patient, reviewing the data in the system, coordination of the care with the nursing staff as well as consultants, documentation, and entering orders.

## 2021-07-16 NOTE — CASE MANAGEMENT/SOCIAL WORK
Case Management Discharge Note      Final Note: Mr. Watt is medically ready for discharge today. I spoke to arina Martel for [facility], who advised that insurance has approved Mr. Watt for acute rehab in the spinal cord unit. I have arranged for a [facility] van to transport Mr. Watt to [facility] today at 2:30 PM. Nurse to call report: [phone]. Case management to fax dishcarge summary: [phone].    Provided Post Acute Provider List?: N/A  Provided Post Acute Provider Quality & Resource List?: N/A    Selected Continued Care - Discharged on 7/16/2021 Admission date: 7/7/2021 - Discharge disposition: Rehab Facility or Unit (DC - External)    Destination Coordination complete    Service Provider Selected Services Address Phone Fax Patient Preferred    [facility]  Inpatient Rehabilitation [address] [id number] [phone] --          Durable Medical Equipment    No services have been selected for the patient.              Dialysis/Infusion    No services have been selected for the patient.              Home Medical Care    No services have been selected for the patient.              Therapy    No services have been selected for the patient.              Community Resources    No services have been selected for the patient.              Community & DME    No services have been selected for the patient.                  Transportation Services  W/C Van: Other ([facility] van)    Final Discharge Disposition Code: 62 - inpatient rehab facility

## 2021-07-16 NOTE — PROGRESS NOTES
[facility] Medicine Services  PROGRESS NOTE    Patient Name: Cristian Watt  : 1945  MRN: [id number]    Date of Admission: 2021  Primary Care Physician: Amanda Tate MD    Subjective   Subjective     CC: Follow-up intractable back pain    HPI: No acute events overnight, patient rested well, denies any more back pain.    ROS:  Gen- No fevers, chills  CV- No chest pain, palpitations  Resp- No cough, dyspnea  GI- No N/V/D, abd pain     All other systems reviewed and are negative  Objective   Objective     Vital Signs:   Temp:  [97.3 °F (36.3 °C)-98.1 °F (36.7 °C)] 97.7 °F (36.5 °C)  Heart Rate:  [81-99] 82  Resp:  [16-18] 16  BP: (126-153)/(73-90) 135/90  Flow (L/min):  [2] 2     Physical Exam:  Constitutional: Chronically ill-appearing elderly male, in no acute distress, awake, alert, seated in bed eating breakfast  HENT: NCAT, mucous membranes moist  Respiratory: Clear to auscultation bilaterally, respiratory effort normal   Cardiovascular: RRR, no murmurs, rubs, or gallops  : Meza catheter in place  Gastrointestinal: Positive bowel sounds, soft, nontender, nondistended  Musculoskeletal: No bilateral ankle edema  Psychiatric: Appropriate affect, cooperative  Neurologic: Oriented x 3, nonfocal  Skin: No rashes    Results Reviewed:  LAB RESULTS:      Lab 21  0928   WBC 8.86   HEMOGLOBIN 15.3   HEMATOCRIT 45.6   PLATELETS 341   MCV 91.8         Lab 21  0606 07/15/21  2340 07/15/21  1600 07/15/21  0810 07/15/21  0106 21  0701 21  0928 21  0601   SODIUM 134* 135* 133* 133* 127* 127* 130* 127*   POTASSIUM 3.7  --   --  3.9  --  3.7 4.2 4.0   CHLORIDE 97*  --   --  96*  --   --  95* 93*   CO2 26.0  --   --  27.0  --   --  23.0 27.0   ANION GAP 11.0  --   --  10.0  --   --  12.0 7.0   BUN 8  --   --  7*  --   --  5* 5*   CREATININE 0.52*  --   --  0.58*  --   --  0.48* 0.54*   GLUCOSE 196*  --   --  167*  --   --  157* 185*   CALCIUM 8.8  --   --   8.9  --   --  8.8 9.1                 Lab 07/11/21  0601   TRIGLYCERIDES 137             Brief Urine Lab Results  (Last result in the past 365 days)      Color   Clarity   Blood   Leuk Est   Nitrite   Protein   CREAT   Urine HCG        07/07/21 0458 Yellow Clear Negative Negative Negative Negative               Microbiology Results Abnormal     Procedure Component Value - Date/Time    COVID PRE-OP / PRE-PROCEDURE SCREENING ORDER (NO ISOLATION) - Swab, Nasopharynx [[id number]]  (Normal) Collected: 07/07/21 0643    Lab Status: Final result Specimen: Swab from Nasopharynx Updated: 07/07/21 0755    Narrative:      The following orders were created for panel order COVID PRE-OP / PRE-PROCEDURE SCREENING ORDER (NO ISOLATION) - Swab, Nasopharynx.  Procedure                               Abnormality         Status                     ---------                               -----------         ------                     COVID-19,CEPHEID,CYNTHIA IN-...[[id number]]  Normal              Final result                 Please view results for these tests on the individual orders.    COVID-19,CEPHEID,CYNTHIA IN-HOUSE(OR EMERGENT/ADD-ON),NP SWAB IN TRANSPORT MEDIA 3-4 HR TAT - Swab, Nasopharynx [[id number]]  (Normal) Collected: 07/07/21 0643    Lab Status: Final result Specimen: Swab from Nasopharynx Updated: 07/07/21 0755     COVID19 Not Detected    Narrative:      Fact sheet for providers: https://www.fda.gov/media/577859/download     Fact sheet for patients: https://www.fda.gov/media/687213/download  Fact sheet for providers: https://www.fda.gov/media/803784/download     Fact sheet for patients: https://www.fda.gov/media/133047/download          Kyphoplasty Vertebroplasty    Result Date: 7/15/2021  EXAMINATION: IR KYPHOPLASTY VERTEBROPLASTY-  INDICATION: Kyphoplasty L3 and T12; M54.41-Lumbago with sciatica, right side; Z86.39-Personal history of other endocrine, nutritional and metabolic disease; Z74.09-Other reduced mobility; S22.080A-Wedge  compression fracture of T11-T12 vertebra, initial encounter for closed fracture.  TECHNIQUE: Intraoperative fluoroscopy for improved localization and treatment planning.  COMPARISON: None.  FINDINGS: Intraoperative fluoroscopy with total fluoroscopic time usage 2 minutes 58 seconds and twelve images saved during kyphoplasty at the T12 and L3 levels.      Impression: Intraoperative fluoroscopy with total fluoroscopic time usage 2 minutes 58 seconds and twelve images saved during kyphoplasty at the T12 and L3 levels.  D:  07/14/2021 E:  07/15/2021    This report was finalized on 7/15/2021 6:47 PM by Dr. Blu Jeffrey.            I have reviewed the medications:  Scheduled Meds:aspirin, 81 mg, Oral, Daily  furosemide, 20 mg, Oral, Daily  gabapentin, 100 mg, Oral, Q8H  insulin detemir, 10 Units, Subcutaneous, Nightly  insulin lispro, 0-7 Units, Subcutaneous, TID AC  losartan, 50 mg, Oral, Daily  rosuvastatin, 20 mg, Oral, Daily  senna-docusate sodium, 2 tablet, Oral, BID  sodium chloride, 10 mL, Intravenous, Q12H  sodium chloride, 1 g, Oral, BID With Meals  tamsulosin, 0.4 mg, Oral, Daily      Continuous Infusions:lactated ringers, 75 mL/hr, Last Rate: 75 mL/hr (07/15/21 1736)      PRN Meds:.•  acetaminophen **OR** acetaminophen **OR** acetaminophen  •  senna-docusate sodium **AND** polyethylene glycol **AND** bisacodyl **AND** bisacodyl  •  dextrose  •  dextrose  •  diphenhydrAMINE  •  glucagon (human recombinant)  •  Morphine  •  oxyCODONE-acetaminophen  •  sodium chloride    Assessment/Plan   Assessment & Plan     Active Hospital Problems    Diagnosis  POA   • **Intractable back pain [M54.9]  Yes   • Compression fracture of L3 vertebra (CMS/HCC) [S32.030A]  Yes   • Compression fracture of T12 vertebra (CMS/HCC) [S22.080A]  Yes   • Mood disorder (CMS/HCC) [F39]  Yes   • Chronic bilateral low back pain [M54.5, G89.29]  Unknown   • Acute low back pain with right-sided sciatica [M54.41]  Yes   • Mixed hyperlipidemia  [E78.2]  Yes   • Hypertension [I10]  Yes   • Uncontrolled type 2 diabetes mellitus with hyperglycemia (CMS/[facility]) [E11.65]  Yes   • Osteoarthritis [M19.90]  Yes      Resolved Hospital Problems   No resolved problems to display.        Brief Hospital Course to date:  Cristian Watt is a 75 y.o. male history of hypertension, poorly controlled type 2 diabetes, hyperlipidemia, anxiety depression, osteoarthritis, hyperlipidemia.  Patient presents with progressively worsening chronic low back pain, imaging concerning for T12 and L3 compression fractures.  Plan for kyphoplasty today 7/14     Plan  Intractable back pain with T12 and L3 compression fractures  -Etiology unknown, denies any recent trauma  -Neurosurgery following, s/p T12- L3 kyphoplasty 7/14 with Dr. Zepeda  -Continue pain control, PT/OT     Poorly controlled type 2 diabetes with A1c 12.4%, neuropathy  -FSBG's have been reviewed and currently appropriate,  -Continue current basal and SSI insulin.  -Continue gabapentin 100 mg TID.     Urinary retention  -We will attempt voiding trials and discontinue Meza today.  -Continue Flomax for now.     Hyponatremia,  improving  -Suspect to be SIADH ,renal following  -Continue sodium chloride tablets, Lasix and fluid restriction.     Hypertension-BP currently well controlled, continue home losartan     Hyperlipidemia-continue statin     Anxiety and depression     GERD     DVT prophylaxis:  Mechanical DVT prophylaxis orders are present.      Disposition: TBD, anticipate discharge to rehab,  following    CODE STATUS:   Code Status and Medical Interventions:   Ordered at: 07/14/21 1037     Code Status:    CPR     Medical Interventions (Level of Support Prior to Arrest):    Full       Shana Pardo MD  07/16/21

## 2022-05-25 ENCOUNTER — APPOINTMENT (OUTPATIENT)
Dept: GENERAL RADIOLOGY | Facility: HOSPITAL | Age: 77
End: 2022-05-25

## 2022-05-25 ENCOUNTER — APPOINTMENT (OUTPATIENT)
Dept: CT IMAGING | Facility: HOSPITAL | Age: 77
End: 2022-05-25

## 2022-05-25 ENCOUNTER — HOSPITAL ENCOUNTER (EMERGENCY)
Facility: HOSPITAL | Age: 77
Discharge: HOME | End: 2022-05-25
Attending: EMERGENCY MEDICINE | Admitting: EMERGENCY MEDICINE

## 2022-05-25 VITALS
HEIGHT: 72 IN | SYSTOLIC BLOOD PRESSURE: 127 MMHG | HEART RATE: 86 BPM | TEMPERATURE: 98.3 F | BODY MASS INDEX: 25.73 KG/M2 | RESPIRATION RATE: 16 BRPM | OXYGEN SATURATION: 91 % | DIASTOLIC BLOOD PRESSURE: 86 MMHG | WEIGHT: 190 LBS

## 2022-05-25 DIAGNOSIS — M54.50 ACUTE MIDLINE LOW BACK PAIN WITHOUT SCIATICA: ICD-10-CM

## 2022-05-25 DIAGNOSIS — S32.010A CLOSED COMPRESSION FRACTURE OF BODY OF L1 VERTEBRA: Primary | ICD-10-CM

## 2022-05-25 PROCEDURE — 99283 EMERGENCY DEPT VISIT LOW MDM: CPT

## 2022-05-25 PROCEDURE — 96374 THER/PROPH/DIAG INJ IV PUSH: CPT

## 2022-05-25 PROCEDURE — 25010000002 ONDANSETRON PER 1 MG: Performed by: EMERGENCY MEDICINE

## 2022-05-25 PROCEDURE — 25010000002 HYDROMORPHONE PER 4 MG: Performed by: EMERGENCY MEDICINE

## 2022-05-25 PROCEDURE — 96375 TX/PRO/DX INJ NEW DRUG ADDON: CPT

## 2022-05-25 PROCEDURE — 72131 CT LUMBAR SPINE W/O DYE: CPT

## 2022-05-25 RX ORDER — HYDROMORPHONE HYDROCHLORIDE 1 MG/ML
0.5 INJECTION, SOLUTION INTRAMUSCULAR; INTRAVENOUS; SUBCUTANEOUS ONCE
Status: COMPLETED | OUTPATIENT
Start: 2022-05-25 | End: 2022-05-25

## 2022-05-25 RX ORDER — HYDROCODONE BITARTRATE AND ACETAMINOPHEN 5; 325 MG/1; MG/1
1 TABLET ORAL EVERY 4 HOURS PRN
Qty: 12 TABLET | Refills: 0 | Status: SHIPPED | OUTPATIENT
Start: 2022-05-25 | End: 2022-05-29 | Stop reason: HOSPADM

## 2022-05-25 RX ORDER — HYDROCODONE BITARTRATE AND ACETAMINOPHEN 5; 325 MG/1; MG/1
1 TABLET ORAL ONCE
Status: COMPLETED | OUTPATIENT
Start: 2022-05-25 | End: 2022-05-25

## 2022-05-25 RX ORDER — ONDANSETRON 2 MG/ML
4 INJECTION INTRAMUSCULAR; INTRAVENOUS ONCE
Status: COMPLETED | OUTPATIENT
Start: 2022-05-25 | End: 2022-05-25

## 2022-05-25 RX ADMIN — HYDROMORPHONE HYDROCHLORIDE 0.5 MG: 1 INJECTION, SOLUTION INTRAMUSCULAR; INTRAVENOUS; SUBCUTANEOUS at 09:59

## 2022-05-25 RX ADMIN — ONDANSETRON 4 MG: 2 INJECTION INTRAMUSCULAR; INTRAVENOUS at 09:59

## 2022-05-25 RX ADMIN — HYDROCODONE BITARTRATE AND ACETAMINOPHEN 1 TABLET: 5; 325 TABLET ORAL at 12:14

## 2022-05-26 ENCOUNTER — HOSPITAL ENCOUNTER (INPATIENT)
Facility: HOSPITAL | Age: 77
LOS: 3 days | Discharge: HOME HEALTH | End: 2022-05-29
Attending: EMERGENCY MEDICINE | Admitting: INTERNAL MEDICINE

## 2022-05-26 DIAGNOSIS — S32.050S CLOSED COMPRESSION FRACTURE OF L5 LUMBAR VERTEBRA, SEQUELA: ICD-10-CM

## 2022-05-26 DIAGNOSIS — M54.59 INTRACTABLE LOW BACK PAIN: Primary | ICD-10-CM

## 2022-05-26 DIAGNOSIS — S22.080A COMPRESSION FRACTURE OF T12 VERTEBRA, INITIAL ENCOUNTER: ICD-10-CM

## 2022-05-26 LAB
ALBUMIN SERPL-MCNC: 4 G/DL (ref 3.5–5.2)
ALBUMIN/GLOB SERPL: 1.2 G/DL
ALP SERPL-CCNC: 93 U/L (ref 39–117)
ALT SERPL W P-5'-P-CCNC: 5 U/L (ref 1–41)
ANION GAP SERPL CALCULATED.3IONS-SCNC: 11 MMOL/L (ref 5–15)
AST SERPL-CCNC: 8 U/L (ref 1–40)
BASOPHILS # BLD AUTO: 0.06 10*3/MM3 (ref 0–0.2)
BASOPHILS NFR BLD AUTO: 0.6 % (ref 0–1.5)
BILIRUB SERPL-MCNC: 0.4 MG/DL (ref 0–1.2)
BUN SERPL-MCNC: 6 MG/DL (ref 8–23)
BUN/CREAT SERPL: 8.7 (ref 7–25)
CALCIUM SPEC-SCNC: 9.6 MG/DL (ref 8.6–10.5)
CHLORIDE SERPL-SCNC: 94 MMOL/L (ref 98–107)
CO2 SERPL-SCNC: 27 MMOL/L (ref 22–29)
CREAT SERPL-MCNC: 0.69 MG/DL (ref 0.76–1.27)
DEPRECATED RDW RBC AUTO: 42.6 FL (ref 37–54)
EGFRCR SERPLBLD CKD-EPI 2021: 95.9 ML/MIN/1.73
EOSINOPHIL # BLD AUTO: 0.09 10*3/MM3 (ref 0–0.4)
EOSINOPHIL NFR BLD AUTO: 0.8 % (ref 0.3–6.2)
ERYTHROCYTE [DISTWIDTH] IN BLOOD BY AUTOMATED COUNT: 12.4 % (ref 12.3–15.4)
FLUAV RNA RESP QL NAA+PROBE: NOT DETECTED
FLUBV RNA RESP QL NAA+PROBE: NOT DETECTED
GLOBULIN UR ELPH-MCNC: 3.4 GM/DL
GLUCOSE BLDC GLUCOMTR-MCNC: 214 MG/DL (ref 70–130)
GLUCOSE SERPL-MCNC: 158 MG/DL (ref 65–99)
HCT VFR BLD AUTO: 49.4 % (ref 37.5–51)
HGB BLD-MCNC: 16.8 G/DL (ref 13–17.7)
IMM GRANULOCYTES # BLD AUTO: 0.05 10*3/MM3 (ref 0–0.05)
IMM GRANULOCYTES NFR BLD AUTO: 0.5 % (ref 0–0.5)
LYMPHOCYTES # BLD AUTO: 2.24 10*3/MM3 (ref 0.7–3.1)
LYMPHOCYTES NFR BLD AUTO: 20.6 % (ref 19.6–45.3)
MAGNESIUM SERPL-MCNC: 2.1 MG/DL (ref 1.6–2.4)
MCH RBC QN AUTO: 31.5 PG (ref 26.6–33)
MCHC RBC AUTO-ENTMCNC: 34 G/DL (ref 31.5–35.7)
MCV RBC AUTO: 92.7 FL (ref 79–97)
MONOCYTES # BLD AUTO: 0.86 10*3/MM3 (ref 0.1–0.9)
MONOCYTES NFR BLD AUTO: 7.9 % (ref 5–12)
NEUTROPHILS NFR BLD AUTO: 69.6 % (ref 42.7–76)
NEUTROPHILS NFR BLD AUTO: 7.59 10*3/MM3 (ref 1.7–7)
NRBC BLD AUTO-RTO: 0 /100 WBC (ref 0–0.2)
PHOSPHATE SERPL-MCNC: 3.7 MG/DL (ref 2.5–4.5)
PLATELET # BLD AUTO: 360 10*3/MM3 (ref 140–450)
PMV BLD AUTO: 8.1 FL (ref 6–12)
POTASSIUM SERPL-SCNC: 4.2 MMOL/L (ref 3.5–5.2)
PROT SERPL-MCNC: 7.4 G/DL (ref 6–8.5)
RBC # BLD AUTO: 5.33 10*6/MM3 (ref 4.14–5.8)
SARS-COV-2 RNA RESP QL NAA+PROBE: NOT DETECTED
SODIUM SERPL-SCNC: 132 MMOL/L (ref 136–145)
WBC NRBC COR # BLD: 10.89 10*3/MM3 (ref 3.4–10.8)

## 2022-05-26 PROCEDURE — 82962 GLUCOSE BLOOD TEST: CPT

## 2022-05-26 PROCEDURE — 25010000002 ONDANSETRON PER 1 MG: Performed by: EMERGENCY MEDICINE

## 2022-05-26 PROCEDURE — 80053 COMPREHEN METABOLIC PANEL: CPT | Performed by: INTERNAL MEDICINE

## 2022-05-26 PROCEDURE — 85025 COMPLETE CBC W/AUTO DIFF WBC: CPT | Performed by: INTERNAL MEDICINE

## 2022-05-26 PROCEDURE — 25010000002 HYDROMORPHONE 1 MG/ML SOLUTION: Performed by: EMERGENCY MEDICINE

## 2022-05-26 PROCEDURE — 87636 SARSCOV2 & INF A&B AMP PRB: CPT | Performed by: INTERNAL MEDICINE

## 2022-05-26 PROCEDURE — 99223 1ST HOSP IP/OBS HIGH 75: CPT | Performed by: INTERNAL MEDICINE

## 2022-05-26 PROCEDURE — 84100 ASSAY OF PHOSPHORUS: CPT | Performed by: INTERNAL MEDICINE

## 2022-05-26 PROCEDURE — 83735 ASSAY OF MAGNESIUM: CPT | Performed by: INTERNAL MEDICINE

## 2022-05-26 PROCEDURE — 99284 EMERGENCY DEPT VISIT MOD MDM: CPT

## 2022-05-26 RX ORDER — SODIUM CHLORIDE 0.9 % (FLUSH) 0.9 %
10 SYRINGE (ML) INJECTION EVERY 12 HOURS SCHEDULED
Status: DISCONTINUED | OUTPATIENT
Start: 2022-05-26 | End: 2022-05-29 | Stop reason: HOSPADM

## 2022-05-26 RX ORDER — HYDROMORPHONE HYDROCHLORIDE 1 MG/ML
0.5 INJECTION, SOLUTION INTRAMUSCULAR; INTRAVENOUS; SUBCUTANEOUS ONCE
Status: COMPLETED | OUTPATIENT
Start: 2022-05-26 | End: 2022-05-26

## 2022-05-26 RX ORDER — ACETAMINOPHEN 500 MG
500 TABLET ORAL EVERY 6 HOURS PRN
COMMUNITY

## 2022-05-26 RX ORDER — CALCIUM CARBONATE 200(500)MG
1 TABLET,CHEWABLE ORAL 3 TIMES DAILY PRN
Status: DISCONTINUED | OUTPATIENT
Start: 2022-05-26 | End: 2022-05-29 | Stop reason: HOSPADM

## 2022-05-26 RX ORDER — INSULIN LISPRO 100 [IU]/ML
0-7 INJECTION, SOLUTION INTRAVENOUS; SUBCUTANEOUS
Status: DISCONTINUED | OUTPATIENT
Start: 2022-05-26 | End: 2022-05-29 | Stop reason: HOSPADM

## 2022-05-26 RX ORDER — ONDANSETRON 2 MG/ML
4 INJECTION INTRAMUSCULAR; INTRAVENOUS ONCE
Status: COMPLETED | OUTPATIENT
Start: 2022-05-26 | End: 2022-05-26

## 2022-05-26 RX ORDER — ACETAMINOPHEN 160 MG/5ML
650 SOLUTION ORAL EVERY 4 HOURS PRN
Status: DISCONTINUED | OUTPATIENT
Start: 2022-05-26 | End: 2022-05-29 | Stop reason: HOSPADM

## 2022-05-26 RX ORDER — HEPARIN SODIUM 5000 [USP'U]/ML
5000 INJECTION, SOLUTION INTRAVENOUS; SUBCUTANEOUS EVERY 8 HOURS SCHEDULED
Status: DISCONTINUED | OUTPATIENT
Start: 2022-05-26 | End: 2022-05-29 | Stop reason: HOSPADM

## 2022-05-26 RX ORDER — ACETAMINOPHEN 650 MG/1
650 SUPPOSITORY RECTAL EVERY 4 HOURS PRN
Status: DISCONTINUED | OUTPATIENT
Start: 2022-05-26 | End: 2022-05-29 | Stop reason: HOSPADM

## 2022-05-26 RX ORDER — DEXTROSE MONOHYDRATE 25 G/50ML
25 INJECTION, SOLUTION INTRAVENOUS
Status: DISCONTINUED | OUTPATIENT
Start: 2022-05-26 | End: 2022-05-29 | Stop reason: HOSPADM

## 2022-05-26 RX ORDER — ACETAMINOPHEN 325 MG/1
650 TABLET ORAL EVERY 4 HOURS PRN
Status: DISCONTINUED | OUTPATIENT
Start: 2022-05-26 | End: 2022-05-29 | Stop reason: HOSPADM

## 2022-05-26 RX ORDER — ONDANSETRON 2 MG/ML
4 INJECTION INTRAMUSCULAR; INTRAVENOUS EVERY 6 HOURS PRN
Status: DISCONTINUED | OUTPATIENT
Start: 2022-05-26 | End: 2022-05-29 | Stop reason: HOSPADM

## 2022-05-26 RX ORDER — MORPHINE SULFATE 2 MG/ML
1 INJECTION, SOLUTION INTRAMUSCULAR; INTRAVENOUS EVERY 4 HOURS PRN
Status: DISCONTINUED | OUTPATIENT
Start: 2022-05-26 | End: 2022-05-29 | Stop reason: HOSPADM

## 2022-05-26 RX ORDER — SODIUM CHLORIDE 0.9 % (FLUSH) 0.9 %
10 SYRINGE (ML) INJECTION AS NEEDED
Status: DISCONTINUED | OUTPATIENT
Start: 2022-05-26 | End: 2022-05-29 | Stop reason: HOSPADM

## 2022-05-26 RX ORDER — IPRATROPIUM BROMIDE AND ALBUTEROL SULFATE 2.5; .5 MG/3ML; MG/3ML
3 SOLUTION RESPIRATORY (INHALATION) EVERY 6 HOURS PRN
Status: DISCONTINUED | OUTPATIENT
Start: 2022-05-26 | End: 2022-05-29 | Stop reason: HOSPADM

## 2022-05-26 RX ORDER — ONDANSETRON 4 MG/1
4 TABLET, FILM COATED ORAL EVERY 6 HOURS PRN
Status: DISCONTINUED | OUTPATIENT
Start: 2022-05-26 | End: 2022-05-29 | Stop reason: HOSPADM

## 2022-05-26 RX ORDER — NICOTINE POLACRILEX 4 MG
15 LOZENGE BUCCAL
Status: DISCONTINUED | OUTPATIENT
Start: 2022-05-26 | End: 2022-05-29 | Stop reason: HOSPADM

## 2022-05-26 RX ORDER — NALOXONE HCL 0.4 MG/ML
0.4 VIAL (ML) INJECTION
Status: DISCONTINUED | OUTPATIENT
Start: 2022-05-26 | End: 2022-05-29 | Stop reason: HOSPADM

## 2022-05-26 RX ORDER — OMEGA-3/DHA/EPA/FISH OIL 300-1000MG
1 CAPSULE ORAL DAILY
COMMUNITY

## 2022-05-26 RX ORDER — TRAMADOL HYDROCHLORIDE 50 MG/1
50 TABLET ORAL EVERY 6 HOURS PRN
Status: DISCONTINUED | OUTPATIENT
Start: 2022-05-26 | End: 2022-05-29 | Stop reason: HOSPADM

## 2022-05-26 RX ADMIN — ONDANSETRON 4 MG: 2 INJECTION INTRAMUSCULAR; INTRAVENOUS at 12:58

## 2022-05-26 RX ADMIN — TRAMADOL HYDROCHLORIDE 50 MG: 50 TABLET, COATED ORAL at 16:37

## 2022-05-26 RX ADMIN — Medication 10 ML: at 22:32

## 2022-05-26 RX ADMIN — TRAMADOL HYDROCHLORIDE 50 MG: 50 TABLET, COATED ORAL at 22:31

## 2022-05-26 RX ADMIN — HYDROMORPHONE HYDROCHLORIDE 0.5 MG: 1 INJECTION, SOLUTION INTRAMUSCULAR; INTRAVENOUS; SUBCUTANEOUS at 12:58

## 2022-05-27 ENCOUNTER — APPOINTMENT (OUTPATIENT)
Dept: MRI IMAGING | Facility: HOSPITAL | Age: 77
End: 2022-05-27

## 2022-05-27 LAB
25(OH)D3 SERPL-MCNC: 5.4 NG/ML (ref 30–100)
ANION GAP SERPL CALCULATED.3IONS-SCNC: 12 MMOL/L (ref 5–15)
BASOPHILS # BLD AUTO: 0.05 10*3/MM3 (ref 0–0.2)
BASOPHILS NFR BLD AUTO: 0.5 % (ref 0–1.5)
BILIRUB UR QL STRIP: NEGATIVE
BUN SERPL-MCNC: 8 MG/DL (ref 8–23)
BUN/CREAT SERPL: 14 (ref 7–25)
CALCIUM SPEC-SCNC: 9 MG/DL (ref 8.6–10.5)
CHLORIDE SERPL-SCNC: 93 MMOL/L (ref 98–107)
CHOLEST SERPL-MCNC: 199 MG/DL (ref 0–200)
CLARITY UR: CLEAR
CO2 SERPL-SCNC: 25 MMOL/L (ref 22–29)
COLOR UR: YELLOW
CREAT SERPL-MCNC: 0.57 MG/DL (ref 0.76–1.27)
DEPRECATED RDW RBC AUTO: 39.8 FL (ref 37–54)
EGFRCR SERPLBLD CKD-EPI 2021: 101.6 ML/MIN/1.73
EOSINOPHIL # BLD AUTO: 0.12 10*3/MM3 (ref 0–0.4)
EOSINOPHIL NFR BLD AUTO: 1.3 % (ref 0.3–6.2)
ERYTHROCYTE [DISTWIDTH] IN BLOOD BY AUTOMATED COUNT: 12.1 % (ref 12.3–15.4)
GLUCOSE BLDC GLUCOMTR-MCNC: 151 MG/DL (ref 70–130)
GLUCOSE BLDC GLUCOMTR-MCNC: 155 MG/DL (ref 70–130)
GLUCOSE BLDC GLUCOMTR-MCNC: 202 MG/DL (ref 70–130)
GLUCOSE SERPL-MCNC: 138 MG/DL (ref 65–99)
GLUCOSE UR STRIP-MCNC: NEGATIVE MG/DL
HBA1C MFR BLD: 8.8 % (ref 4.8–5.6)
HCT VFR BLD AUTO: 45.9 % (ref 37.5–51)
HDLC SERPL-MCNC: 37 MG/DL (ref 40–60)
HGB BLD-MCNC: 15.8 G/DL (ref 13–17.7)
HGB UR QL STRIP.AUTO: NEGATIVE
IMM GRANULOCYTES # BLD AUTO: 0.04 10*3/MM3 (ref 0–0.05)
IMM GRANULOCYTES NFR BLD AUTO: 0.4 % (ref 0–0.5)
KETONES UR QL STRIP: ABNORMAL
LDLC SERPL CALC-MCNC: 136 MG/DL (ref 0–100)
LDLC/HDLC SERPL: 3.62 {RATIO}
LEUKOCYTE ESTERASE UR QL STRIP.AUTO: NEGATIVE
LYMPHOCYTES # BLD AUTO: 3.09 10*3/MM3 (ref 0.7–3.1)
LYMPHOCYTES NFR BLD AUTO: 33.6 % (ref 19.6–45.3)
MCH RBC QN AUTO: 31 PG (ref 26.6–33)
MCHC RBC AUTO-ENTMCNC: 34.4 G/DL (ref 31.5–35.7)
MCV RBC AUTO: 90 FL (ref 79–97)
MONOCYTES # BLD AUTO: 0.9 10*3/MM3 (ref 0.1–0.9)
MONOCYTES NFR BLD AUTO: 9.8 % (ref 5–12)
NEUTROPHILS NFR BLD AUTO: 5.01 10*3/MM3 (ref 1.7–7)
NEUTROPHILS NFR BLD AUTO: 54.4 % (ref 42.7–76)
NITRITE UR QL STRIP: NEGATIVE
NRBC BLD AUTO-RTO: 0 /100 WBC (ref 0–0.2)
PH UR STRIP.AUTO: 5.5 [PH] (ref 5–8)
PLATELET # BLD AUTO: 338 10*3/MM3 (ref 140–450)
PMV BLD AUTO: 8.2 FL (ref 6–12)
POTASSIUM SERPL-SCNC: 4.4 MMOL/L (ref 3.5–5.2)
PROT UR QL STRIP: NEGATIVE
RBC # BLD AUTO: 5.1 10*6/MM3 (ref 4.14–5.8)
SODIUM SERPL-SCNC: 130 MMOL/L (ref 136–145)
SP GR UR STRIP: 1.01 (ref 1–1.03)
TRIGL SERPL-MCNC: 141 MG/DL (ref 0–150)
UROBILINOGEN UR QL STRIP: ABNORMAL
VIT B12 BLD-MCNC: 198 PG/ML (ref 211–946)
VLDLC SERPL-MCNC: 26 MG/DL (ref 5–40)
WBC NRBC COR # BLD: 9.21 10*3/MM3 (ref 3.4–10.8)

## 2022-05-27 PROCEDURE — 25010000002 CYANOCOBALAMIN PER 1000 MCG: Performed by: INTERNAL MEDICINE

## 2022-05-27 PROCEDURE — 25010000002 HEPARIN (PORCINE) PER 1000 UNITS: Performed by: INTERNAL MEDICINE

## 2022-05-27 PROCEDURE — 80061 LIPID PANEL: CPT | Performed by: INTERNAL MEDICINE

## 2022-05-27 PROCEDURE — 85025 COMPLETE CBC W/AUTO DIFF WBC: CPT | Performed by: INTERNAL MEDICINE

## 2022-05-27 PROCEDURE — 99221 1ST HOSP IP/OBS SF/LOW 40: CPT | Performed by: PHYSICIAN ASSISTANT

## 2022-05-27 PROCEDURE — 82607 VITAMIN B-12: CPT | Performed by: INTERNAL MEDICINE

## 2022-05-27 PROCEDURE — 83036 HEMOGLOBIN GLYCOSYLATED A1C: CPT | Performed by: INTERNAL MEDICINE

## 2022-05-27 PROCEDURE — 25010000002 MORPHINE PER 10 MG: Performed by: INTERNAL MEDICINE

## 2022-05-27 PROCEDURE — 82306 VITAMIN D 25 HYDROXY: CPT | Performed by: INTERNAL MEDICINE

## 2022-05-27 PROCEDURE — 72148 MRI LUMBAR SPINE W/O DYE: CPT

## 2022-05-27 PROCEDURE — 63710000001 INSULIN LISPRO (HUMAN) PER 5 UNITS: Performed by: INTERNAL MEDICINE

## 2022-05-27 PROCEDURE — 99233 SBSQ HOSP IP/OBS HIGH 50: CPT | Performed by: INTERNAL MEDICINE

## 2022-05-27 PROCEDURE — 80048 BASIC METABOLIC PNL TOTAL CA: CPT | Performed by: INTERNAL MEDICINE

## 2022-05-27 PROCEDURE — 82962 GLUCOSE BLOOD TEST: CPT

## 2022-05-27 PROCEDURE — 81003 URINALYSIS AUTO W/O SCOPE: CPT | Performed by: NURSE PRACTITIONER

## 2022-05-27 RX ORDER — CYANOCOBALAMIN 1000 UG/ML
1000 INJECTION, SOLUTION INTRAMUSCULAR; SUBCUTANEOUS
Status: DISCONTINUED | OUTPATIENT
Start: 2022-05-27 | End: 2022-05-29 | Stop reason: HOSPADM

## 2022-05-27 RX ADMIN — HEPARIN SODIUM 5000 UNITS: 5000 INJECTION, SOLUTION INTRAVENOUS; SUBCUTANEOUS at 20:03

## 2022-05-27 RX ADMIN — MORPHINE SULFATE 1 MG: 2 INJECTION, SOLUTION INTRAMUSCULAR; INTRAVENOUS at 13:58

## 2022-05-27 RX ADMIN — TRAMADOL HYDROCHLORIDE 50 MG: 50 TABLET, COATED ORAL at 20:03

## 2022-05-27 RX ADMIN — Medication 10 ML: at 20:04

## 2022-05-27 RX ADMIN — CYANOCOBALAMIN 1000 MCG: 1000 INJECTION, SOLUTION INTRAMUSCULAR; SUBCUTANEOUS at 18:22

## 2022-05-27 RX ADMIN — TRAMADOL HYDROCHLORIDE 50 MG: 50 TABLET, COATED ORAL at 05:00

## 2022-05-27 RX ADMIN — Medication 5000 UNITS: at 18:22

## 2022-05-27 RX ADMIN — INSULIN LISPRO 2 UNITS: 100 INJECTION, SOLUTION INTRAVENOUS; SUBCUTANEOUS at 12:38

## 2022-05-27 RX ADMIN — INSULIN LISPRO 3 UNITS: 100 INJECTION, SOLUTION INTRAVENOUS; SUBCUTANEOUS at 18:21

## 2022-05-28 LAB
GLUCOSE BLDC GLUCOMTR-MCNC: 145 MG/DL (ref 70–130)
GLUCOSE BLDC GLUCOMTR-MCNC: 171 MG/DL (ref 70–130)
GLUCOSE BLDC GLUCOMTR-MCNC: 182 MG/DL (ref 70–130)

## 2022-05-28 PROCEDURE — 99232 SBSQ HOSP IP/OBS MODERATE 35: CPT | Performed by: INTERNAL MEDICINE

## 2022-05-28 PROCEDURE — 25010000002 MORPHINE PER 10 MG: Performed by: INTERNAL MEDICINE

## 2022-05-28 PROCEDURE — 25010000002 HEPARIN (PORCINE) PER 1000 UNITS: Performed by: INTERNAL MEDICINE

## 2022-05-28 PROCEDURE — 99232 SBSQ HOSP IP/OBS MODERATE 35: CPT | Performed by: PHYSICIAN ASSISTANT

## 2022-05-28 PROCEDURE — 97530 THERAPEUTIC ACTIVITIES: CPT

## 2022-05-28 PROCEDURE — 97116 GAIT TRAINING THERAPY: CPT

## 2022-05-28 PROCEDURE — 82962 GLUCOSE BLOOD TEST: CPT

## 2022-05-28 PROCEDURE — 63710000001 INSULIN LISPRO (HUMAN) PER 5 UNITS: Performed by: INTERNAL MEDICINE

## 2022-05-28 RX ADMIN — THIAMINE HCL TAB 100 MG 100 MG: 100 TAB at 16:57

## 2022-05-28 RX ADMIN — TRAMADOL HYDROCHLORIDE 50 MG: 50 TABLET, COATED ORAL at 11:36

## 2022-05-28 RX ADMIN — TRAMADOL HYDROCHLORIDE 50 MG: 50 TABLET, COATED ORAL at 19:53

## 2022-05-28 RX ADMIN — INSULIN LISPRO 2 UNITS: 100 INJECTION, SOLUTION INTRAVENOUS; SUBCUTANEOUS at 11:56

## 2022-05-28 RX ADMIN — Medication 10 ML: at 19:53

## 2022-05-28 RX ADMIN — Medication 10 ML: at 08:30

## 2022-05-28 RX ADMIN — INSULIN LISPRO 2 UNITS: 100 INJECTION, SOLUTION INTRAVENOUS; SUBCUTANEOUS at 16:57

## 2022-05-28 RX ADMIN — HEPARIN SODIUM 5000 UNITS: 5000 INJECTION, SOLUTION INTRAVENOUS; SUBCUTANEOUS at 04:20

## 2022-05-28 RX ADMIN — HEPARIN SODIUM 5000 UNITS: 5000 INJECTION, SOLUTION INTRAVENOUS; SUBCUTANEOUS at 21:36

## 2022-05-28 RX ADMIN — Medication 5000 UNITS: at 08:29

## 2022-05-28 RX ADMIN — TRAMADOL HYDROCHLORIDE 50 MG: 50 TABLET, COATED ORAL at 04:20

## 2022-05-28 RX ADMIN — Medication 1 TABLET: at 16:57

## 2022-05-28 RX ADMIN — MORPHINE SULFATE 1 MG: 2 INJECTION, SOLUTION INTRAMUSCULAR; INTRAVENOUS at 08:30

## 2022-05-29 ENCOUNTER — HOME HEALTH ADMISSION (OUTPATIENT)
Dept: HOME HEALTH SERVICES | Facility: HOME HEALTHCARE | Age: 77
End: 2022-05-29

## 2022-05-29 ENCOUNTER — READMISSION MANAGEMENT (OUTPATIENT)
Dept: CALL CENTER | Facility: HOSPITAL | Age: 77
End: 2022-05-29

## 2022-05-29 VITALS
TEMPERATURE: 97.7 F | BODY MASS INDEX: 25.73 KG/M2 | OXYGEN SATURATION: 93 % | RESPIRATION RATE: 16 BRPM | WEIGHT: 190 LBS | DIASTOLIC BLOOD PRESSURE: 83 MMHG | HEIGHT: 72 IN | SYSTOLIC BLOOD PRESSURE: 135 MMHG | HEART RATE: 88 BPM

## 2022-05-29 LAB — GLUCOSE BLDC GLUCOMTR-MCNC: 139 MG/DL (ref 70–130)

## 2022-05-29 PROCEDURE — 97116 GAIT TRAINING THERAPY: CPT

## 2022-05-29 PROCEDURE — 99239 HOSP IP/OBS DSCHRG MGMT >30: CPT | Performed by: NURSE PRACTITIONER

## 2022-05-29 PROCEDURE — 97530 THERAPEUTIC ACTIVITIES: CPT

## 2022-05-29 PROCEDURE — 82962 GLUCOSE BLOOD TEST: CPT

## 2022-05-29 PROCEDURE — 25010000002 HEPARIN (PORCINE) PER 1000 UNITS: Performed by: INTERNAL MEDICINE

## 2022-05-29 PROCEDURE — 97165 OT EVAL LOW COMPLEX 30 MIN: CPT

## 2022-05-29 RX ORDER — TRAMADOL HYDROCHLORIDE 50 MG/1
50 TABLET ORAL EVERY 6 HOURS PRN
Qty: 12 TABLET | Refills: 0 | Status: SHIPPED | OUTPATIENT
Start: 2022-05-29 | End: 2022-06-02

## 2022-05-29 RX ADMIN — TRAMADOL HYDROCHLORIDE 50 MG: 50 TABLET, COATED ORAL at 02:54

## 2022-05-29 RX ADMIN — Medication 10 ML: at 08:44

## 2022-05-29 RX ADMIN — Medication 5000 UNITS: at 08:43

## 2022-05-29 RX ADMIN — THIAMINE HCL TAB 100 MG 100 MG: 100 TAB at 08:43

## 2022-05-29 RX ADMIN — HEPARIN SODIUM 5000 UNITS: 5000 INJECTION, SOLUTION INTRAVENOUS; SUBCUTANEOUS at 05:39

## 2022-05-29 RX ADMIN — Medication 1 TABLET: at 08:43

## 2022-05-29 NOTE — OUTREACH NOTE
Prep Survey    Flowsheet Row Responses   [facility] patient discharged from? [place]   Is LACE score < 7 ? No   Emergency Room discharge w/ pulse ox? No   Eligibility [facility]   Date of Admission 05/26/22   Date of Discharge 05/29/22   Discharge Disposition Home or Self Care   Discharge diagnosis Intractable low back pain    Does the patient have one of the following disease processes/diagnoses(primary or secondary)? Other   Does the patient have Home health ordered? Yes   What is the Home health agency?  [facility]    Is there a DME ordered? No   Prep survey completed? Yes          ROSAMARIA VEGA - Registered Nurse

## 2022-05-30 ENCOUNTER — TRANSITIONAL CARE MANAGEMENT TELEPHONE ENCOUNTER (OUTPATIENT)
Dept: CALL CENTER | Facility: HOSPITAL | Age: 77
End: 2022-05-30

## 2022-05-30 NOTE — OUTREACH NOTE
Call Center TCM Note    Flowsheet Row Responses   [facility] patient discharged from? [place]   Does the patient have one of the following disease processes/diagnoses(primary or secondary)? Other   TCM attempt successful? Yes   Call start time 1328   Meds reviewed with patient/caregiver? Yes   Is the patient having any side effects they believe may be caused by any medication additions or changes? No   Does the patient have all medications ordered at discharge? Yes   Is the patient taking all medications as directed (includes completed medication regime)? Yes   Does the patient have a primary care provider?  Yes   Does the patient have an appointment with their PCP within 7 days of discharge? No   What is preventing the patient from scheduling follow up appointments within 7 days of discharge? Waiting on return call   Nursing Interventions Advised patient to make appointment   Has the patient kept scheduled appointments due by today? N/A   Psychosocial issues? No   Did the patient receive a copy of their discharge instructions? Yes   Nursing interventions Reviewed instructions with patient   What is the patient's perception of their health status since discharge? Same   Is the patient/caregiver able to teach back signs and symptoms related to disease process for when to call 911? Yes   TCM call completed? Yes          Hayley Elizabeth RN    5/30/2022, 13:33 EDT

## 2022-05-31 NOTE — PROGRESS NOTES
I called patient, he said he is having a lot of back pain that he cant seem to get controlled, I suggested appt and he was agreeable so I put him through to  to get scheduled

## 2022-06-01 ENCOUNTER — HOME CARE VISIT (OUTPATIENT)
Dept: HOME HEALTH SERVICES | Facility: HOME HEALTHCARE | Age: 77
End: 2022-06-01

## 2022-06-01 ENCOUNTER — TELEPHONE (OUTPATIENT)
Dept: NEUROSURGERY | Facility: CLINIC | Age: 77
End: 2022-06-01

## 2022-06-01 ENCOUNTER — TELEPHONE (OUTPATIENT)
Dept: FAMILY MEDICINE CLINIC | Facility: CLINIC | Age: 77
End: 2022-06-01

## 2022-06-01 PROCEDURE — G0151 HHCP-SERV OF PT,EA 15 MIN: HCPCS

## 2022-06-01 NOTE — TELEPHONE ENCOUNTER
Provider:  Dr. Zepeda  Surgery:  KYPHOPLASTY L3 AND T12  Surgery Date:  07/14/21  Last visit:   N/A  Next visit: N/A    TIM:         Reason for call:       Patient called stating he has had the nurse over helping him due to his chronic pain. He states he currently cant leave his house due to the pain being so bad. He is requesting some kind of pain medication that will help him because the medications he is currently on is not helping. He has never felt this bad due to pain.     Dr. Zepeda done his procedure but the patient has not been seen in the office.

## 2022-06-01 NOTE — TELEPHONE ENCOUNTER
Caller: MELONIE     Relationship: [facility]     Best call back number: [phone]    What orders are you requesting (i.e. lab or imaging):  MEDICAL SOCIAL WORK FOR ASSISTANT AND OT ORDER     In what timeframe would the patient need to come in: AS SOON AS POSSIBLE     Where will you receive your lab/imaging services:     Additional notes:  PLEASE CALL

## 2022-06-01 NOTE — TELEPHONE ENCOUNTER
"Patient was supposed to be set up with a telemedicine visit with Nicky Centeno as stated in her last note.    \"After discharge patient can have a telemedicine visit with me in 2 weeks or sooner if he has increased pain.\" DC    Will not be giving pain medicine prior to any procedures.  Patient will need to discuss this with PCP  "

## 2022-06-02 ENCOUNTER — HOME CARE VISIT (OUTPATIENT)
Dept: HOME HEALTH SERVICES | Facility: HOME HEALTHCARE | Age: 77
End: 2022-06-02

## 2022-06-02 VITALS — HEART RATE: 86 BPM | SYSTOLIC BLOOD PRESSURE: 150 MMHG | DIASTOLIC BLOOD PRESSURE: 88 MMHG | OXYGEN SATURATION: 96 %

## 2022-06-02 VITALS
TEMPERATURE: 97.3 F | RESPIRATION RATE: 18 BRPM | SYSTOLIC BLOOD PRESSURE: 132 MMHG | OXYGEN SATURATION: 97 % | HEART RATE: 71 BPM | DIASTOLIC BLOOD PRESSURE: 70 MMHG

## 2022-06-02 PROCEDURE — G0152 HHCP-SERV OF OT,EA 15 MIN: HCPCS

## 2022-06-02 NOTE — HOME HEALTH
Patient currently residing at his home/apt alone, but does have a caregiver to help with food and laundry. He amb limited distances with RW and has been sent home with a TLSO brace for which he has not been able to put on.  He declines therapist offer to assist at this time.

## 2022-06-06 ENCOUNTER — HOME CARE VISIT (OUTPATIENT)
Dept: HOME HEALTH SERVICES | Facility: HOME HEALTHCARE | Age: 77
End: 2022-06-06

## 2022-06-06 VITALS — HEART RATE: 100 BPM | OXYGEN SATURATION: 95 % | DIASTOLIC BLOOD PRESSURE: 67 MMHG | SYSTOLIC BLOOD PRESSURE: 123 MMHG

## 2022-06-06 PROCEDURE — G0152 HHCP-SERV OF OT,EA 15 MIN: HCPCS

## 2022-06-06 NOTE — TELEPHONE ENCOUNTER
PATIENT CALLED BACK REQUESTING PAIN MEDICATION; STATES HE DOESN'T REMEMBER CALL LAST Wednesday FROM ANYONE IN OUR OFFICE.    PATIENT IS EXPERIENCING BACK PAIN, STATES HE HAD SURGERY WITH DR. SHANNON; ADVISED HIM WE HADN'T SEEN HIM SINCE 8/11/2021 AND THAT HE RECEIVED A CALL FROM OUR OFFICE TO CONTACT HIS PCP TO DISCUSS PAIN MEDICATION.    PATIENT STATES HE DOESN'T KNOW WHO HIS PCP IS, PROVIDED HIM HIS PCP NAME FROM HIS CHART; PATIENT STATES HE HASN'T SEEN HER IN YEARS.    STATES HE NEEDS MEDICATION; DOESN'T KNOW WHO HIS PCP IS.     PATIENT C/B #: [phone]    THANK YOU VERY MUCH.

## 2022-06-06 NOTE — HOME HEALTH
Pt. tolerated OT session fair-poor. Session limited to back pain. Pt. participated in 40 mins of ADLs/actvities/exercises: pt. was educated on importance of log rolling in/out of bed and wearing his TLSO brace, however, pt. refused to wear his brace today. OT educated pt. on donning /doffing brace correctly/safely and use of AE for LBD, etc. Continue OT per POC.

## 2022-06-06 NOTE — TELEPHONE ENCOUNTER
Per Giovanny's last note pt needs to have visit with Nicky. -Medication can be discussed at that time.

## 2022-06-08 ENCOUNTER — HOME CARE VISIT (OUTPATIENT)
Dept: HOME HEALTH SERVICES | Facility: HOME HEALTHCARE | Age: 77
End: 2022-06-08

## 2022-06-08 ENCOUNTER — READMISSION MANAGEMENT (OUTPATIENT)
Dept: CALL CENTER | Facility: HOSPITAL | Age: 77
End: 2022-06-08

## 2022-06-08 NOTE — CASE COMMUNICATION
"Patient missed a PT visit from [facility] on 6/8/2022.     Reason: Patient states he has a cold and is too ill to participate this date.  He is requesting deferral of visits until \"sometimes next week\".      For your records only.   As per home health protocol, MD must be notified of missed/cancelled visits; therefore the prescribed frequency was not met. "

## 2022-06-08 NOTE — OUTREACH NOTE
Medical Week 2 Survey    Flowsheet Row Responses   [facility] patient discharged from? [place]   Does the patient have one of the following disease processes/diagnoses(primary or secondary)? Other   Week 2 attempt successful? No   Unsuccessful attempts Attempt 1          PENG FRANKS - Licensed Nurse

## 2022-06-08 NOTE — TELEPHONE ENCOUNTER
Spoke to patient to schedule televisit. He said he wasn't feeling well; has the flu. I gave him the appointment date/time. He asked that I let Jacobo GODFREY know he wasn't feeling well.

## 2022-06-16 ENCOUNTER — HOME CARE VISIT (OUTPATIENT)
Dept: HOME HEALTH SERVICES | Facility: HOME HEALTHCARE | Age: 77
End: 2022-06-16

## 2022-06-16 NOTE — TELEPHONE ENCOUNTER
Patient was scheduled for televisit ninfa/ Jacobo JAUREGUI-C today. She would like patient rescheduled to see a Dr. Zepeda or a PA-C in office. Patient rescheduled to see Apro PA-C next available.     I tried to call patient to make him aware of this appointment change. Not able to leave VM. Box is full.

## 2022-06-17 ENCOUNTER — HOME CARE VISIT (OUTPATIENT)
Dept: HOME HEALTH SERVICES | Facility: HOME HEALTHCARE | Age: 77
End: 2022-06-17

## 2022-06-18 NOTE — CASE COMMUNICATION
Patient missed a PT visit from [facility] on 6/17/2022.    Reason: Patient states he continues to not feel well and would like to wait until next week to attempt any physical therapy.       For your records only.   As per home health protocol, MD must be notified of missed/cancelled visits; therefore the prescribed frequency was not met.

## 2022-06-21 ENCOUNTER — READMISSION MANAGEMENT (OUTPATIENT)
Dept: CALL CENTER | Facility: HOSPITAL | Age: 77
End: 2022-06-21

## 2022-06-21 ENCOUNTER — HOME CARE VISIT (OUTPATIENT)
Dept: HOME HEALTH SERVICES | Facility: HOME HEALTHCARE | Age: 77
End: 2022-06-21

## 2022-06-21 NOTE — OUTREACH NOTE
Medical Week 4 Survey    Flowsheet Row Responses   [facility] patient discharged from? [place]   Does the patient have one of the following disease processes/diagnoses(primary or secondary)? Other   Week 4 attempt successful? No          KATIE VEGA - Registered Nurse

## 2022-06-23 ENCOUNTER — HOME CARE VISIT (OUTPATIENT)
Dept: HOME HEALTH SERVICES | Facility: HOME HEALTHCARE | Age: 77
End: 2022-06-23

## 2022-06-25 NOTE — CASE COMMUNICATION
Patient missed a PT visit from [facility] on 6/23/2022.    Reason: Patient canceled all visits this week seconday to feeling unwell. .       For your records only.   As per home health protocol, MD must be notified of missed/cancelled visits; therefore the prescribed frequency was not met.

## 2022-06-26 NOTE — HOME HEALTH
Pt discharged from skilled OT services without a visit effective 06/23/22 due to pt refusing further OT visits at this time.  Pt in agreement with D/C.

## 2022-06-29 ENCOUNTER — HOME CARE VISIT (OUTPATIENT)
Dept: HOME HEALTH SERVICES | Facility: HOME HEALTHCARE | Age: 77
End: 2022-06-29

## 2022-06-29 PROCEDURE — G0180 MD CERTIFICATION HHA PATIENT: HCPCS | Performed by: FAMILY MEDICINE

## 2022-06-30 NOTE — CASE COMMUNICATION
Visit from [facility] was cancelled on 6/29/2022 due to patient declined visit secondary to not feeling well and family/friends are able to provide any necessary care.     Patient contacted by phone and the following items were addressed:  -Caregiver available to provide care as needed.  -Adequate supplies in the home (e.g. food, meds)  -Patient has heat/electricity in the home    For your records only.  As per home health protoc ol, MD must be notified of missed/cancelled visits; therefore the prescribed frequency was not met.

## 2022-07-30 ENCOUNTER — HOSPITAL ENCOUNTER (EMERGENCY)
Facility: HOSPITAL | Age: 77
Discharge: HOME | End: 2022-07-30
Attending: EMERGENCY MEDICINE | Admitting: EMERGENCY MEDICINE

## 2022-07-30 VITALS
TEMPERATURE: 97.9 F | DIASTOLIC BLOOD PRESSURE: 69 MMHG | OXYGEN SATURATION: 90 % | RESPIRATION RATE: 18 BRPM | HEIGHT: 72 IN | HEART RATE: 77 BPM | SYSTOLIC BLOOD PRESSURE: 107 MMHG | WEIGHT: 190 LBS | BODY MASS INDEX: 25.73 KG/M2

## 2022-07-30 DIAGNOSIS — M54.50 ACUTE LOW BACK PAIN WITHOUT SCIATICA, UNSPECIFIED BACK PAIN LATERALITY: Primary | ICD-10-CM

## 2022-07-30 PROCEDURE — 96374 THER/PROPH/DIAG INJ IV PUSH: CPT

## 2022-07-30 PROCEDURE — 99283 EMERGENCY DEPT VISIT LOW MDM: CPT

## 2022-07-30 PROCEDURE — 25010000002 ONDANSETRON PER 1 MG: Performed by: EMERGENCY MEDICINE

## 2022-07-30 PROCEDURE — 25010000002 HYDROMORPHONE PER 4 MG: Performed by: EMERGENCY MEDICINE

## 2022-07-30 PROCEDURE — 96375 TX/PRO/DX INJ NEW DRUG ADDON: CPT

## 2022-07-30 RX ORDER — ONDANSETRON 2 MG/ML
4 INJECTION INTRAMUSCULAR; INTRAVENOUS ONCE
Status: COMPLETED | OUTPATIENT
Start: 2022-07-30 | End: 2022-07-30

## 2022-07-30 RX ORDER — HYDROCODONE BITARTRATE AND ACETAMINOPHEN 10; 325 MG/1; MG/1
1 TABLET ORAL EVERY 6 HOURS PRN
Qty: 5 TABLET | Refills: 0 | Status: ON HOLD | OUTPATIENT
Start: 2022-07-30 | End: 2022-08-01

## 2022-07-30 RX ORDER — HYDROMORPHONE HYDROCHLORIDE 1 MG/ML
0.5 INJECTION, SOLUTION INTRAMUSCULAR; INTRAVENOUS; SUBCUTANEOUS ONCE
Status: COMPLETED | OUTPATIENT
Start: 2022-07-30 | End: 2022-07-30

## 2022-07-30 RX ADMIN — HYDROMORPHONE HYDROCHLORIDE 0.5 MG: 1 INJECTION, SOLUTION INTRAMUSCULAR; INTRAVENOUS; SUBCUTANEOUS at 04:33

## 2022-07-30 RX ADMIN — ONDANSETRON 4 MG: 2 INJECTION INTRAMUSCULAR; INTRAVENOUS at 04:33

## 2022-08-01 ENCOUNTER — APPOINTMENT (OUTPATIENT)
Dept: MRI IMAGING | Facility: HOSPITAL | Age: 77
End: 2022-08-01

## 2022-08-01 ENCOUNTER — APPOINTMENT (OUTPATIENT)
Dept: CT IMAGING | Facility: HOSPITAL | Age: 77
End: 2022-08-01

## 2022-08-01 ENCOUNTER — HOSPITAL ENCOUNTER (INPATIENT)
Facility: HOSPITAL | Age: 77
LOS: 3 days | Discharge: NURSING, REHAB OR LONG-TERM CARE | End: 2022-08-04
Attending: EMERGENCY MEDICINE | Admitting: INTERNAL MEDICINE

## 2022-08-01 DIAGNOSIS — R73.9 HYPERGLYCEMIA: ICD-10-CM

## 2022-08-01 DIAGNOSIS — N39.0 ACUTE UTI: Primary | ICD-10-CM
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
## 2022-08-01 DIAGNOSIS — E11.21 DIABETES MELLITUS WITH NEPHROPATHY: ICD-10-CM

## 2022-08-01 DIAGNOSIS — E87.1 HYPONATREMIA: ICD-10-CM

## 2022-08-01 DIAGNOSIS — S32.000A COMPRESSION FRACTURE OF LUMBAR VERTEBRA, UNSPECIFIED LUMBAR VERTEBRAL LEVEL, INITIAL ENCOUNTER: ICD-10-CM

## 2022-08-01 LAB
ALBUMIN SERPL-MCNC: 3.4 G/DL (ref 3.5–5.2)
ALBUMIN/GLOB SERPL: 1 G/DL
ALP SERPL-CCNC: 111 U/L (ref 39–117)
ALT SERPL W P-5'-P-CCNC: 7 U/L (ref 1–41)
ANION GAP SERPL CALCULATED.3IONS-SCNC: 8 MMOL/L (ref 5–15)
AST SERPL-CCNC: 10 U/L (ref 1–40)
BACTERIA UR QL AUTO: ABNORMAL /HPF
BASOPHILS # BLD AUTO: 0.06 10*3/MM3 (ref 0–0.2)
BASOPHILS NFR BLD AUTO: 0.6 % (ref 0–1.5)
BILIRUB SERPL-MCNC: 0.5 MG/DL (ref 0–1.2)
BILIRUB UR QL STRIP: NEGATIVE
BUN SERPL-MCNC: 6 MG/DL (ref 8–23)
BUN/CREAT SERPL: 10.2 (ref 7–25)
CALCIUM SPEC-SCNC: 8.9 MG/DL (ref 8.6–10.5)
CHLORIDE SERPL-SCNC: 94 MMOL/L (ref 98–107)
CLARITY UR: CLEAR
CO2 SERPL-SCNC: 27 MMOL/L (ref 22–29)
COLOR UR: YELLOW
CREAT SERPL-MCNC: 0.59 MG/DL (ref 0.76–1.27)
DEPRECATED RDW RBC AUTO: 44.3 FL (ref 37–54)
EGFRCR SERPLBLD CKD-EPI 2021: 100.6 ML/MIN/1.73
EOSINOPHIL # BLD AUTO: 0.06 10*3/MM3 (ref 0–0.4)
EOSINOPHIL NFR BLD AUTO: 0.6 % (ref 0.3–6.2)
ERYTHROCYTE [DISTWIDTH] IN BLOOD BY AUTOMATED COUNT: 13 % (ref 12.3–15.4)
GLOBULIN UR ELPH-MCNC: 3.5 GM/DL
GLUCOSE BLDC GLUCOMTR-MCNC: 168 MG/DL (ref 70–130)
GLUCOSE BLDC GLUCOMTR-MCNC: 249 MG/DL (ref 70–130)
GLUCOSE SERPL-MCNC: 280 MG/DL (ref 65–99)
GLUCOSE UR STRIP-MCNC: ABNORMAL MG/DL
HBA1C MFR BLD: 9.9 % (ref 4.8–5.6)
HCT VFR BLD AUTO: 48.2 % (ref 37.5–51)
HGB BLD-MCNC: 16.4 G/DL (ref 13–17.7)
HGB UR QL STRIP.AUTO: ABNORMAL
HYALINE CASTS UR QL AUTO: ABNORMAL /LPF
IMM GRANULOCYTES # BLD AUTO: 0.06 10*3/MM3 (ref 0–0.05)
IMM GRANULOCYTES NFR BLD AUTO: 0.6 % (ref 0–0.5)
KETONES UR QL STRIP: NEGATIVE
LEUKOCYTE ESTERASE UR QL STRIP.AUTO: ABNORMAL
LYMPHOCYTES # BLD AUTO: 2.04 10*3/MM3 (ref 0.7–3.1)
LYMPHOCYTES NFR BLD AUTO: 20 % (ref 19.6–45.3)
MCH RBC QN AUTO: 31.3 PG (ref 26.6–33)
MCHC RBC AUTO-ENTMCNC: 34 G/DL (ref 31.5–35.7)
MCV RBC AUTO: 92 FL (ref 79–97)
MONOCYTES # BLD AUTO: 0.9 10*3/MM3 (ref 0.1–0.9)
MONOCYTES NFR BLD AUTO: 8.8 % (ref 5–12)
NEUTROPHILS NFR BLD AUTO: 69.4 % (ref 42.7–76)
NEUTROPHILS NFR BLD AUTO: 7.06 10*3/MM3 (ref 1.7–7)
NITRITE UR QL STRIP: NEGATIVE
NRBC BLD AUTO-RTO: 0 /100 WBC (ref 0–0.2)
PH UR STRIP.AUTO: 5.5 [PH] (ref 5–8)
PLATELET # BLD AUTO: 324 10*3/MM3 (ref 140–450)
PMV BLD AUTO: 8.6 FL (ref 6–12)
POTASSIUM SERPL-SCNC: 4.2 MMOL/L (ref 3.5–5.2)
PROT SERPL-MCNC: 6.9 G/DL (ref 6–8.5)
PROT UR QL STRIP: NEGATIVE
RBC # BLD AUTO: 5.24 10*6/MM3 (ref 4.14–5.8)
RBC # UR STRIP: ABNORMAL /HPF
REF LAB TEST METHOD: ABNORMAL
SARS-COV-2 RDRP RESP QL NAA+PROBE: NORMAL
SODIUM SERPL-SCNC: 129 MMOL/L (ref 136–145)
SP GR UR STRIP: 1.01 (ref 1–1.03)
SQUAMOUS #/AREA URNS HPF: ABNORMAL /HPF
UROBILINOGEN UR QL STRIP: ABNORMAL
WBC # UR STRIP: ABNORMAL /HPF
WBC NRBC COR # BLD: 10.18 10*3/MM3 (ref 3.4–10.8)

## 2022-08-01 PROCEDURE — 72128 CT CHEST SPINE W/O DYE: CPT

## 2022-08-01 PROCEDURE — 99284 EMERGENCY DEPT VISIT MOD MDM: CPT

## 2022-08-01 PROCEDURE — 72192 CT PELVIS W/O DYE: CPT

## 2022-08-01 PROCEDURE — 99223 1ST HOSP IP/OBS HIGH 75: CPT | Performed by: INTERNAL MEDICINE

## 2022-08-01 PROCEDURE — 83036 HEMOGLOBIN GLYCOSYLATED A1C: CPT | Performed by: INTERNAL MEDICINE

## 2022-08-01 PROCEDURE — 82962 GLUCOSE BLOOD TEST: CPT

## 2022-08-01 PROCEDURE — 25010000002 HYDROMORPHONE PER 4 MG: Performed by: EMERGENCY MEDICINE

## 2022-08-01 PROCEDURE — 87086 URINE CULTURE/COLONY COUNT: CPT | Performed by: NURSE PRACTITIONER

## 2022-08-01 PROCEDURE — 72148 MRI LUMBAR SPINE W/O DYE: CPT

## 2022-08-01 PROCEDURE — 72131 CT LUMBAR SPINE W/O DYE: CPT

## 2022-08-01 PROCEDURE — 72146 MRI CHEST SPINE W/O DYE: CPT

## 2022-08-01 PROCEDURE — 25010000002 ONDANSETRON PER 1 MG: Performed by: EMERGENCY MEDICINE

## 2022-08-01 PROCEDURE — 81001 URINALYSIS AUTO W/SCOPE: CPT | Performed by: EMERGENCY MEDICINE

## 2022-08-01 PROCEDURE — 87635 SARS-COV-2 COVID-19 AMP PRB: CPT | Performed by: INTERNAL MEDICINE

## 2022-08-01 PROCEDURE — 63710000001 INSULIN LISPRO (HUMAN) PER 5 UNITS: Performed by: INTERNAL MEDICINE

## 2022-08-01 PROCEDURE — 85025 COMPLETE CBC W/AUTO DIFF WBC: CPT | Performed by: EMERGENCY MEDICINE

## 2022-08-01 PROCEDURE — 25010000002 KETOROLAC TROMETHAMINE PER 15 MG: Performed by: INTERNAL MEDICINE

## 2022-08-01 PROCEDURE — 25010000002 CEFTRIAXONE PER 250 MG: Performed by: NURSE PRACTITIONER

## 2022-08-01 PROCEDURE — 80053 COMPREHEN METABOLIC PANEL: CPT | Performed by: EMERGENCY MEDICINE

## 2022-08-01 RX ORDER — SODIUM CHLORIDE 0.9 % (FLUSH) 0.9 %
10 SYRINGE (ML) INJECTION AS NEEDED
Status: DISCONTINUED | OUTPATIENT
Start: 2022-08-01 | End: 2022-08-04 | Stop reason: HOSPADM

## 2022-08-01 RX ORDER — NICOTINE POLACRILEX 4 MG
15 LOZENGE BUCCAL
Status: DISCONTINUED | OUTPATIENT
Start: 2022-08-01 | End: 2022-08-04 | Stop reason: HOSPADM

## 2022-08-01 RX ORDER — INSULIN LISPRO 100 [IU]/ML
0-9 INJECTION, SOLUTION INTRAVENOUS; SUBCUTANEOUS
Status: DISCONTINUED | OUTPATIENT
Start: 2022-08-01 | End: 2022-08-04 | Stop reason: HOSPADM

## 2022-08-01 RX ORDER — ACETAMINOPHEN 500 MG
500 TABLET ORAL EVERY 6 HOURS PRN
Status: DISCONTINUED | OUTPATIENT
Start: 2022-08-01 | End: 2022-08-04 | Stop reason: HOSPADM

## 2022-08-01 RX ORDER — SODIUM CHLORIDE 9 MG/ML
100 INJECTION, SOLUTION INTRAVENOUS CONTINUOUS
Status: DISCONTINUED | OUTPATIENT
Start: 2022-08-01 | End: 2022-08-02

## 2022-08-01 RX ORDER — HYDROMORPHONE HYDROCHLORIDE 1 MG/ML
0.5 INJECTION, SOLUTION INTRAMUSCULAR; INTRAVENOUS; SUBCUTANEOUS ONCE
Status: COMPLETED | OUTPATIENT
Start: 2022-08-01 | End: 2022-08-01

## 2022-08-01 RX ORDER — ENOXAPARIN SODIUM 100 MG/ML
40 INJECTION SUBCUTANEOUS DAILY
Status: DISCONTINUED | OUTPATIENT
Start: 2022-08-01 | End: 2022-08-04 | Stop reason: HOSPADM

## 2022-08-01 RX ORDER — SODIUM CHLORIDE 0.9 % (FLUSH) 0.9 %
10 SYRINGE (ML) INJECTION EVERY 12 HOURS SCHEDULED
Status: DISCONTINUED | OUTPATIENT
Start: 2022-08-01 | End: 2022-08-04 | Stop reason: HOSPADM

## 2022-08-01 RX ORDER — KETOROLAC TROMETHAMINE 30 MG/ML
15 INJECTION, SOLUTION INTRAMUSCULAR; INTRAVENOUS EVERY 6 HOURS PRN
Status: DISCONTINUED | OUTPATIENT
Start: 2022-08-01 | End: 2022-08-04 | Stop reason: HOSPADM

## 2022-08-01 RX ORDER — DEXTROSE MONOHYDRATE 25 G/50ML
25 INJECTION, SOLUTION INTRAVENOUS
Status: DISCONTINUED | OUTPATIENT
Start: 2022-08-01 | End: 2022-08-04 | Stop reason: HOSPADM

## 2022-08-01 RX ORDER — ONDANSETRON 2 MG/ML
4 INJECTION INTRAMUSCULAR; INTRAVENOUS ONCE
Status: COMPLETED | OUTPATIENT
Start: 2022-08-01 | End: 2022-08-01

## 2022-08-01 RX ADMIN — THIAMINE HCL TAB 100 MG 100 MG: 100 TAB at 12:57

## 2022-08-01 RX ADMIN — INSULIN LISPRO 4 UNITS: 100 INJECTION, SOLUTION INTRAVENOUS; SUBCUTANEOUS at 16:34

## 2022-08-01 RX ADMIN — HYDROMORPHONE HYDROCHLORIDE 0.5 MG: 1 INJECTION, SOLUTION INTRAMUSCULAR; INTRAVENOUS; SUBCUTANEOUS at 08:05

## 2022-08-01 RX ADMIN — Medication 1 TABLET: at 13:01

## 2022-08-01 RX ADMIN — Medication 5000 UNITS: at 12:57

## 2022-08-01 RX ADMIN — SODIUM CHLORIDE 2 G: 900 INJECTION INTRAVENOUS at 11:06

## 2022-08-01 RX ADMIN — ONDANSETRON 4 MG: 2 INJECTION INTRAMUSCULAR; INTRAVENOUS at 08:05

## 2022-08-01 RX ADMIN — KETOROLAC TROMETHAMINE 15 MG: 30 INJECTION, SOLUTION INTRAMUSCULAR; INTRAVENOUS at 15:36

## 2022-08-01 RX ADMIN — SODIUM CHLORIDE 100 ML/HR: 9 INJECTION, SOLUTION INTRAVENOUS at 12:57

## 2022-08-01 RX ADMIN — SODIUM CHLORIDE 100 ML/HR: 9 INJECTION, SOLUTION INTRAVENOUS at 23:21

## 2022-08-01 RX ADMIN — SODIUM CHLORIDE 1000 ML: 9 INJECTION, SOLUTION INTRAVENOUS at 12:21

## 2022-08-01 NOTE — CONSULTS
NEUROSURGERY CONSULTATION    Referring Provider: No ref. provider found    Patient Care Team:  Provider, No Known as PCP - General    Chief Complaint: upper back pain    History of Present Illness: Mr Watt is a 77 yo M well known to the neurosurgery service. He had  T12 and L3 compression fractures last year and underwent kyphoplasty at these levels on 7/14/21.   He was most recently seen in May of this year when he presented to the emergency room for new back pain.  MRI of the lumbar spine showed a new acute compression fracture at L1 as well as spondylitic changes.  He was treated conservatively with bracing and pain medication.  He presented back to the emergency department today with new mid back pain x3 days.  He denies any recent falls or head injury.  He denies any upper extremity pain or neck pain or stiffness.  He denies any radiating pain from his back into his chest wall.  He denies any lower extremity radicular pain, saddle anesthesia, or bowel/bladder dysfunction in the emergency department he was found to have UTI and is currently being treated for this as well  He was admitted for intractable back pain and has been treated with Toradol and pain medication.  He says that oral pain medication has been helpful for his pain.  He has been up to the bathroom with assistance and has not had any weakness or falls.  He has not yet worked with physical therapy  He is hoping to be able to be discharged home tomorrow and would very much like to avoid having any surgery.  He said he had a bad experience at [facility] last year and does not want to return there    ROS.  Pertinent positives and history of present illness above.   Constitutional: Negative for chills, diaphoresis and fever.   HENT: Negative for congestion and sore throat.    Respiratory: Negative for cough, choking, chest tightness, shortness of breath and wheezing.    Cardiovascular: Negative for chest pain and leg swelling.   Gastrointestinal:  Negative for abdominal distention, abdominal pain, anal bleeding, blood in stool, bowel incontinence, constipation, diarrhea, nausea and vomiting.   Genitourinary: Negative for bladder incontinence, difficulty urinating, dysuria, flank pain, frequency, hematuria and urgency.   Musculoskeletal: Positive for back pain.   All other systems reviewed and are negative    History:  Past Medical History:   Diagnosis Date   • Depression    • Diabetes mellitus (HCC)    • Disc degeneration, lumbar    • Emphysema of lung (HCC)    • Generalized anxiety disorder    • GERD (gastroesophageal reflux disease)    • Hyperlipidemia    • Hypertension    • Hypogonadism male    • Osteoarthritis    • Type 2 diabetes mellitus, uncontrolled    • Vitamin D deficiency    ,   Past Surgical History:   Procedure Laterality Date   • HERNIA REPAIR      Inguinal   • KNEE ARTHROPLASTY     • KYPHOPLASTY N/A 7/14/2021    Procedure: KYPHOPLASTY L3 AND T12;  Surgeon: Ruddy Zepeda MD;  Location: [facility];  Service: Neurosurgery;  Laterality: N/A;   ,   Family History   Problem Relation Age of Onset   • Tuberculosis Mother    • Colon cancer Father    • Other Father         polyps of the sigmoid colon   ,   Social History     Tobacco Use   • Smoking status: Current Every Day Smoker     Packs/day: 1.50     Years: 45.00     Pack years: 67.50     Types: Cigarettes   • Smokeless tobacco: Never Used   Substance Use Topics   • Alcohol use: Not Currently     Comment: HAS NOT DRANK IN A YEAR   • Drug use: No   ,   Medications Prior to Admission   Medication Sig Dispense Refill Last Dose   • acetaminophen (TYLENOL) 500 MG tablet Take 500 mg by mouth Every 6 (Six) Hours As Needed for Mild Pain , Fever or Headache. OTC   8/1/2022 at Unknown time   • fish oil-omega-3 fatty acids 1000 MG capsule Take 1 capsule by mouth Daily. OTC   7/31/2022 at Unknown time   • folic acid-vit B6-vit B12 (FOLGARD) 2.2-25-1 MG tablet tablet Take 1 tablet by mouth Daily. (Patient  "taking differently: Take 1 tablet by mouth Daily. OTC)   7/31/2022 at Unknown time   • thiamine (VITAMIN B-1) 100 MG tablet  tablet Take 1 tablet by mouth Daily. (Patient taking differently: Take 100 mg by mouth Daily. OTC) 30 tablet 0 7/31/2022 at Unknown time   • vitamin D3 125 MCG (5000 UT) capsule capsule Take 1 capsule by mouth Daily. (Patient taking differently: Take 5,000 Units by mouth Daily. OTC) 30 capsule  7/31/2022 at Unknown time    Allergies:  Penicillins and Shellfish-derived products      Physical Exam:  Vital Signs: Blood pressure 145/94, pulse 85, temperature 98.3 °F (36.8 °C), temperature source Oral, resp. rate 18, height 182.9 cm (72\"), weight 85.9 kg (189 lb 6 oz), SpO2 93 %.    CONSTITUTIONAL:   - Patient is well-nourished  - Pleasant and appears stated age.  -conversation is clear, fluent, and appropriate. Remote and recent memory intact  PSYCHIATRIC:  - Normal mood and affect  - Behavior is normal.  HENT:   Head: Normocephalic and Atraumatic.   Eyes:     - Pupils are equal, round, and reactive to light.     - EOM are normal.   CV:   - Regular rate and rhythm on palpable radial pulse   PULMONARY:   - Speaking in full sentences, breathing non labored  - No wheezing   SKIN:  - Clean, dry and intact   MUSCULOSKELETAL:  - Neck/Back tenderness to palpation in mid-spine  - Strength is intact in the upper and lower extremities to direct testing.  - Muscle tone is normal throughout.  - Station and gait not tested. Patient resting comfortably in bed  - ROM in neck/back is minimally reduced.  - Straight leg raise is negative   NEUROLOGICAL:  - A&Ox3  - Attention span, language function, and cognition are intact.  - Sensation is intact to light touch testing throughout.  - Deep tendon reflexes are 1+ and symmetrical.    - Ruvalcaba's Sign is negative bilaterally.  - No clonus is elicited.  - Coordination is intact.     Patient's Body mass index is 25.68kg/m². indicating that he is overweight    Data " Review:  New CT of the thoracic and lumbar spine was reviewed today.  There is some discrepancy in the numbering system compared to prior studies but there is a worsened and chronic T9 compression fracture with approximately 80% loss of height.  According to this numbering system, there is a worsening of the L2 compression fracture.  The superior endplate of T12 also has some height loss consistent with a possible new fracture. See study for numbering system.    Diagnosis:  Back pain  Osteoporosis  Multiple compression fractures     Treatment Recommendations:  Recommend new MRI of the thoracic and lumbar spine to demonstrate chronicity of above fractures. Patient has indicated that he has been doing better with pain medicine and would prefer to avoid surgery if at all possible.  He will likely need at minimum home health physical therapy if not rehab placement.  Orders for MRI thoracic and lumbar placed  Treatment with oral pain medication  Consult physical therapy occupational therapy for evaluation and recommendations  Further neurosurgery recommendations pending imaging results      Toshia Hendrickson PA-C  08/01/22  17:55 EDT

## 2022-08-01 NOTE — ED PROVIDER NOTES
Subjective   Patient presents with low back and mid back pain.  Worse over the last several days.  Patient tells me he fell recently.  Denies any head injury or neck pain.  He tells me he lives at home alone and has had difficulty getting around and he would like to getting to a rehab facility and mention tanbarcecile.  Tells me he has 2 sons periodontist that lives in [place] and an  here in [place].  Denies any fever chest pain.  Reports being in the ER recently and being prescribed hydrocodone which he tells me helped him some.  He denies any radiation to his legs loss of bowel bladder or any numbness or tingling.      Back Pain  Location:  Lumbar spine  Quality:  Aching  Radiates to:  Does not radiate  Pain severity:  Moderate  Pain is:  Same all the time  Timing:  Constant  Progression:  Worsening  Chronicity:  New  Context: falling    Relieved by: rest.  Worsened by:  Ambulation  Associated symptoms: no abdominal pain, no abdominal swelling, no bladder incontinence, no bowel incontinence, no chest pain, no dysuria, no fever and no perianal numbness        Review of Systems   Constitutional: Negative for chills, diaphoresis and fever.   HENT: Negative for congestion and sore throat.    Respiratory: Negative for cough, choking, chest tightness, shortness of breath and wheezing.    Cardiovascular: Negative for chest pain and leg swelling.   Gastrointestinal: Negative for abdominal distention, abdominal pain, anal bleeding, blood in stool, bowel incontinence, constipation, diarrhea, nausea and vomiting.   Genitourinary: Negative for bladder incontinence, difficulty urinating, dysuria, flank pain, frequency, hematuria and urgency.   Musculoskeletal: Positive for back pain.   All other systems reviewed and are negative.      Past Medical History:   Diagnosis Date   • Depression    • Diabetes mellitus (HCC)    • Disc degeneration, lumbar    • Emphysema of lung (HCC)    • Generalized anxiety disorder     • GERD (gastroesophageal reflux disease)    • Hyperlipidemia    • Hypertension    • Hypogonadism male    • Osteoarthritis    • Type 2 diabetes mellitus, uncontrolled    • Vitamin D deficiency        Allergies   Allergen Reactions   • Penicillins Rash   • Shellfish-Derived Products Hives     Pt says only applies to oysters       Past Surgical History:   Procedure Laterality Date   • HERNIA REPAIR      Inguinal   • KNEE ARTHROPLASTY     • KYPHOPLASTY N/A 7/14/2021    Procedure: KYPHOPLASTY L3 AND T12;  Surgeon: Ruddy Zepeda MD;  [facility]: [facility];  Service: Neurosurgery;  Laterality: N/A;       Family History   Problem Relation Age of Onset   • Tuberculosis Mother    • Colon cancer Father    • Other Father         polyps of the sigmoid colon       Social History     Socioeconomic History   • Marital status:    Tobacco Use   • Smoking status: Current Every Day Smoker     Packs/day: 2.00     Years: 45.00     Pack years: 90.00     Types: Cigarettes   • Smokeless tobacco: Never Used   Substance and Sexual Activity   • Alcohol use: Yes     Alcohol/week: 4.0 standard drinks     Types: 2 Cans of beer, 2 Shots of liquor per week   • Drug use: No   • Sexual activity: Defer           Objective   Physical Exam  Constitutional:       Appearance: He is well-developed.   HENT:      Head: Normocephalic and atraumatic.      Right Ear: External ear normal.      Left Ear: External ear normal.      Nose: Nose normal.   Eyes:      Conjunctiva/sclera: Conjunctivae normal.      Pupils: Pupils are equal, round, and reactive to light.   Cardiovascular:      Rate and Rhythm: Normal rate and regular rhythm.      Heart sounds: Normal heart sounds.   Pulmonary:      Effort: Pulmonary effort is normal.      Breath sounds: Normal breath sounds.   Abdominal:      General: Bowel sounds are normal.      Palpations: Abdomen is soft.   Musculoskeletal:         General: Normal range of motion.      Cervical back: Normal range of motion  and neck supple.      Comments: Paravertebral lumbar and thoracic spinal tenderness.  Worse with movement.  No step-offs.  Good movement and strength in legs albeit painful.  Mild pain bilateral hips.   Skin:     General: Skin is warm and dry.   Neurological:      Mental Status: He is alert and oriented to person, place, and time.   Psychiatric:         Behavior: Behavior normal.         Judgment: Judgment normal.         Procedures           ED Course  ED Course as of 08/01/22 1053   Mon Aug 01, 2022   1023 Awaiting UA [JM]   1049 Daughter in law Marge [phone] [JM]   1052 I just got off the phone with his daughter-in-law.  She advised that he is not doing too well at home and she is also interested in rehab for the patient.  He does not take care of his blood sugar and has been following. [JM]      ED Course User Index  [JM] Gerber Montero, ASHA           CT Thoracic Spine Without Contrast   Final Result   There is evidence of transitional lumbosacral anatomy with lumbarization   of S1, with the last well-formed disc space seen at S1-S2. There are   hypoplastic ribs at L1, in addition to the usual 12 rib-bearing thoracic   vertebral bodies. Please note that this numbering convention differs   from prior imaging reports, as well as operative report of prior   kyphoplasty. Given this numbering convention, there is redemonstration   of prior kyphoplasty of L1 and L4 (previously labeled T12 and L3 on   prior imaging reports and operative report).       Interval worsening of compression deformity of the L2 vertebral body   compression fracture (previously L1 on prior reports) now with moderate   height loss and fracture extension to the superior endplate. No   significant bony retropulsion.       Mild superior central endplate height loss of T12 which appears likely   new from most recent MRI from 5/27/2022, compatible with   age-indeterminate Schmorl's node/central endplate compression fracture.       Grossly  unchanged appearance of chronic compression deformities and   kyphoplasty change of L1 and L4 (previously designated T12 and L3).       There is a chronic severe compression deformity of T9.       No acute osseous findings in the pelvis.       Degenerative changes as above.       This report was finalized on 8/1/2022 10:02 AM by Garrett Borrero MD.          CT Lumbar Spine Without Contrast   Final Result   There is evidence of transitional lumbosacral anatomy with lumbarization   of S1, with the last well-formed disc space seen at S1-S2. There are   hypoplastic ribs at L1, in addition to the usual 12 rib-bearing thoracic   vertebral bodies. Please note that this numbering convention differs   from prior imaging reports, as well as operative report of prior   kyphoplasty. Given this numbering convention, there is redemonstration   of prior kyphoplasty of L1 and L4 (previously labeled T12 and L3 on   prior imaging reports and operative report).       Interval worsening of compression deformity of the L2 vertebral body   compression fracture (previously L1 on prior reports) now with moderate   height loss and fracture extension to the superior endplate. No   significant bony retropulsion.       Mild superior central endplate height loss of T12 which appears likely   new from most recent MRI from 5/27/2022, compatible with   age-indeterminate Schmorl's node/central endplate compression fracture.       Grossly unchanged appearance of chronic compression deformities and   kyphoplasty change of L1 and L4 (previously designated T12 and L3).       There is a chronic severe compression deformity of T9.       No acute osseous findings in the pelvis.       Degenerative changes as above.       This report was finalized on 8/1/2022 10:02 AM by Garrett Borrero MD.          CT Pelvis Without Contrast   Final Result   There is evidence of transitional lumbosacral anatomy with lumbarization   of S1, with the last well-formed disc  space seen at S1-S2. There are   hypoplastic ribs at L1, in addition to the usual 12 rib-bearing thoracic   vertebral bodies. Please note that this numbering convention differs   from prior imaging reports, as well as operative report of prior   kyphoplasty. Given this numbering convention, there is redemonstration   of prior kyphoplasty of L1 and L4 (previously labeled T12 and L3 on   prior imaging reports and operative report).       Interval worsening of compression deformity of the L2 vertebral body   compression fracture (previously L1 on prior reports) now with moderate   height loss and fracture extension to the superior endplate. No   significant bony retropulsion.       Mild superior central endplate height loss of T12 which appears likely   new from most recent MRI from 5/27/2022, compatible with   age-indeterminate Schmorl's node/central endplate compression fracture.       Grossly unchanged appearance of chronic compression deformities and   kyphoplasty change of L1 and L4 (previously designated T12 and L3).       There is a chronic severe compression deformity of T9.       No acute osseous findings in the pelvis.       Degenerative changes as above.       This report was finalized on 8/1/2022 10:02 AM by Garrett Borrero MD.                                            [facility]    Final diagnoses:   Acute UTI   Fall, initial encounter   Hyperglycemia   Hyponatremia   Compression fracture of lumbar vertebra, unspecified lumbar vertebral level, initial encounter ([facility])       ED Disposition  ED Disposition     ED Disposition   Decision to Admit    Condition   --    Comment   --             No follow-up provider specified.       Medication List      No changes were made to your prescriptions during this visit.          Gerber Montero, APRN  08/01/22 1052

## 2022-08-01 NOTE — H&P
[facility] Medicine Services  HISTORY AND PHYSICAL    Patient Name: Cristian Watt  : 1945  MRN: [id number]  Primary Care Physician: Provider, No Known  Date of admission: 2022      Subjective   Subjective     Chief Complaint:  Back pain    HPI:  Cristian Watt is a 76 y.o. 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  Presents to the emergency department today in representation from just a couple of days ago.  He reports that when he was discharged from the ER couple days ago he was given some pain medicine that helped but when that ran out he came back to the emergency department.  In review of the records from his admission in May looks like it was very similar at that time as well with intractable back pain.  He denies any falls.  Repeat imaging has been ordered and reviewed in the emergency department.  Patient has had decreased mobility increased back pain over his entire spine.  Inability to shower walking with a walker but had that ability to walk reduced.  Was also found to have a urinary tract infection in the emergency department.  Therefore hospitalist admission was sought.      In regards to his diabetes mellitus.  He denies history of diabetes mellitus to me.  Per review of his discharge summary from May he had been discharged on metformin.  However he denies knowledge of this.  I discussed with him that he had that history and also he had glucose in his urine currently.  He expressed understanding.    In regards to his hyponatremia he has had a history of hyponatremia.  However he denies any knowledge of this.    In regards to his tobacco abuse he reports persistent tobacco abuse and is not interested in a nicotine patch.  Per nursing he has a cigarette pack in his shoe.    In regards to his remote use of illicit substances he reports that he used cocaine more than 50 years ago.    In regards to his alcohol abuse he  reports that he had 4 drinks per day for the majority of his life but that he quit spontaneously 6 months ago.  He also reports that he was in AA after getting clean from the cocaine about 50 years ago.      Review of Systems   10 point review of systems completed and otherwise negative unless as stated in the HPI  Of note he denies decreased oral intake and also to cough but he looks very dry on exam and he is coughing continually while I examine him.  All other systems reviewed and are negative.     Personal History     Past Medical History:   Diagnosis Date   • Depression    • Diabetes mellitus (HCC)    • Disc degeneration, lumbar    • Emphysema of lung (HCC)    • Generalized anxiety disorder    • GERD (gastroesophageal reflux disease)    • Hyperlipidemia    • Hypertension    • Hypogonadism male    • Osteoarthritis    • Type 2 diabetes mellitus, uncontrolled    • Vitamin D deficiency              Past Surgical History:   Procedure Laterality Date   • HERNIA REPAIR      Inguinal   • KNEE ARTHROPLASTY     • KYPHOPLASTY N/A 7/14/2021    Procedure: KYPHOPLASTY L3 AND T12;  Surgeon: Ruddy Zepeda MD;  Location: [facility];  Service: Neurosurgery;  Laterality: N/A;       Family History:  family history includes Colon cancer in his father; Other in his father; Tuberculosis in his mother. Otherwise pertinent FHx was reviewed and unremarkable.     Social History:  reports that he has been smoking cigarettes. He has a 67.50 pack-year smoking history. He has never used smokeless tobacco. He reports previous alcohol use. He reports that he does not use drugs.  Social History     Social History Narrative   • Not on file       Medications:  Available home medication information reviewed.  Medications Prior to Admission   Medication Sig Dispense Refill Last Dose   • acetaminophen (TYLENOL) 500 MG tablet Take 500 mg by mouth Every 6 (Six) Hours As Needed for Mild Pain , Fever or Headache. OTC   8/1/2022 at Unknown time   •  fish oil-omega-3 fatty acids 1000 MG capsule Take 1 capsule by mouth Daily. OTC   7/31/2022 at Unknown time   • folic acid-vit B6-vit B12 (FOLGARD) 2.2-25-1 MG tablet tablet Take 1 tablet by mouth Daily. (Patient taking differently: Take 1 tablet by mouth Daily. OTC)   7/31/2022 at Unknown time   • thiamine (VITAMIN B-1) 100 MG tablet  tablet Take 1 tablet by mouth Daily. (Patient taking differently: Take 100 mg by mouth Daily. OTC) 30 tablet 0 7/31/2022 at Unknown time   • vitamin D3 125 MCG (5000 UT) capsule capsule Take 1 capsule by mouth Daily. (Patient taking differently: Take 5,000 Units by mouth Daily. OTC) 30 capsule  7/31/2022 at Unknown time       Allergies   Allergen Reactions   • Penicillins Rash   • Shellfish-Derived Products Hives     Pt says only applies to oysters       Objective   Objective     Vital Signs:   Temp:  [97.3 °F (36.3 °C)-98.3 °F (36.8 °C)] 98.3 °F (36.8 °C)  Heart Rate:  [91-98] 98  Resp:  [18] 18  BP: ()/() 136/88       Physical Exam   Constitutional: Awake, alert  Eyes: PERRLA, sclerae anicteric, no conjunctival injection  HENT: NCAT, mucous membranes moist  Neck: Supple, no thyromegaly, no lymphadenopathy, trachea midline  Respiratory: Coarse bilateral breath sounds, nonlabored respirations   Cardiovascular: Tachycardic but regular, no murmurs, rubs, or gallops, palpable pedal pulses bilaterally  Gastrointestinal: Positive bowel sounds, soft, nontender, nondistended  Musculoskeletal: No bilateral ankle edema, no clubbing or cyanosis to extremities  Psychiatric: Appropriate affect, cooperative  Neurologic: Oriented x 3, strength symmetric in all extremities, Cranial Nerves grossly intact to confrontation, speech clear  Skin: No rashes    Result Review:  I have personally reviewed the results from the time of this admission to 8/1/2022 12:29 EDT and agree with these findings:  [x]  Laboratory list / accordion  [x]  Microbiology  [x]  Radiology  [x]  EKG/Telemetry   []   Cardiology/Vascular   []  Pathology  [x]  Old records  []  Other:  Most notable findings include: Hyponatremia tachycardic regular rhythm recurrent episodes of back pain multilevel spinal disease pending results urine culture        LAB RESULTS:      Lab 08/01/22  0803   WBC 10.18   HEMOGLOBIN 16.4   HEMATOCRIT 48.2   PLATELETS 324   NEUTROS ABS 7.06*   IMMATURE GRANS (ABS) 0.06*   LYMPHS ABS 2.04   MONOS ABS 0.90   EOS ABS 0.06   MCV 92.0         Lab 08/01/22  0803   SODIUM 129*   POTASSIUM 4.2   CHLORIDE 94*   CO2 27.0   ANION GAP 8.0   BUN 6*   CREATININE 0.59*   EGFR 100.6   GLUCOSE 280*   CALCIUM 8.9         Lab 08/01/22  0803   TOTAL PROTEIN 6.9   ALBUMIN 3.40*   GLOBULIN 3.5   ALT (SGPT) 7   AST (SGOT) 10   BILIRUBIN 0.5   ALK PHOS 111                     UA    Urinalysis 5/27/22 8/1/22 8/1/22     1016 1016   Squamous Epithelial Cells, UA   0-2   Specific Gravity, UA 1.013 1.014    Ketones, UA Trace (A) Negative    Blood, UA Negative Trace (A)    Leukocytes, UA Negative Moderate (2+) (A)    Nitrite, UA Negative Negative    RBC, UA   7-12 (A)   WBC, UA   31-50 (A)   Bacteria, UA   None Seen   (A) Abnormal value              Microbiology Results (last 10 days)     ** No results found for the last 240 hours. **          CT Thoracic Spine Without Contrast    Result Date: 8/1/2022  DATE OF EXAM: 8/1/2022 7:47 AM  PROCEDURE: CT PELVIS WO CONTRAST-, CT THORACIC SPINE WO CONTRAST-, CT LUMBAR SPINE WO CONTRAST-  INDICATIONS: fall. bilateral hip pain.  COMPARISON: Thoracic spine CT 5/15/2013, lumbar spine MRI 5/27/2022, CT lumbar spine 5/25/2022  TECHNIQUE: Routine transaxial slices were obtained through the thoracic spine, lumbar spine, and pelvis without the administration of intravenous contrast. Reconstructed coronal and sagittal images were also obtained. Automated exposure control and iterative construction methods were used.  The radiation dose reduction device was turned on for each scan per the ALARA (As Low  as Reasonably Achievable) protocol.  FINDINGS:  The bones are diffusely demineralized, limiting assessment for occult nondisplaced fractures.  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 There are hypoplastic ribs at L1, in addition to the usual 12 rib-bearing thoracic vertebral bodies. Please note that this numbering convention differs from prior imaging reports, as well as operative report of prior kyphoplasty. Given this numbering convention, there is redemonstration of prior kyphoplasty of L1 and L4 (previously labeled T12 and L3 on prior imaging reports and operative report).  Regarding the thoracic spine, there is normal spinal alignment without significant spondylolisthesis or evidence of traumatic subluxation. No abnormal interfacet widening. There is a chronic, severe compression deformity of the T9 vertebral body (series 5 image 44) with approximately 80% height loss with mild protrusion of the posterior vertebral body into the ventral epidural space resulting in at least mild spinal canal stenosis, without evidence of severe bony retropulsion or high-grade/severe spinal canal narrowing at this level. This compression deformity was present on localizer images of prior lumbar spine MRI from 5/27/2022. There is mild superior central endplate height loss of T12 (series 5 image 45) compatible with age indeterminant Schmorl's node; this appears likely new from lumbar spine MRI from 5/27/2022 although the superior endplate of T12 is only partially imaged on that exam, excluded from the field-of-view on prior CT of the lumbar spine from 5/25/2022. Otherwise no evidence of acute fracture in the thoracic spine. Remaining vertebral body heights are preserved. There are chronic deformities of the posterior left 9th, 10th, and 11th ribs. There is no CT evidence of high-grade/severe spinal canal stenosis.  There is mostly mild multilevel degenerative disc disease throughout the thoracic spine. There is likely moderate bilateral neuroforaminal narrowing at T9-T10. The paravertebral soft tissues are unremarkable. There are chronic interstitial lung changes with subpleural cystic change/fibrosis. Mildly patulous thoracic esophagus. There is a punctate nonobstructing stone in the right kidney with otherwise unremarkable appearance of the partially imaged upper abdomen.  Regarding the lumbar spine, there is redemonstration of kyphoplasty change at L1 and L4 (previously designated T12 and L3; please see above description of numbering convention/lumbosacral transitional anatomy). Similar spinal alignment with minimal smooth rightward curvature of the lumbar spine centered on L4. 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 There has been interval worsening of L2 compression deformity, previously seen as isolated inferior endplate concavity and now appearing as new fracture involvement and height loss of the superior endplate (series 5 image 45) with interval worsening of L2 vertebral height loss, now with approximately 60% central height loss, previously 20-30%. There is no appreciable bony retropulsion or posterior cortex protrusion; no evidence of high-grade/severe spinal canal stenosis at this level. Otherwise the vertebral bodies are unchanged from prior CT, without additional findings to suggest acute or worsening fractures. There is similar moderate height loss of L1 with kyphoplasty change. There is similar mild height loss of L4 with kyphoplasty change. There is similar multilevel degenerative changes of the lumbar spine characterized by moderate to severe degenerative disc disease in the lower lumbar spine, and mild facet arthropathy in the lower lumbar spine. There is mild ligament flavum hypertrophy in  the lower lumbar spine. There is no high-grade/severe spinal canal stenosis per CT, noting that this is better assessed with MRI. The paravertebral soft tissues appear unremarkable. Partially imaged portions of the posterior abdomen and pelvis demonstrate atherosclerosis of the abdominal aorta without aneurysm, as well as sigmoid colonic diverticulosis.  Pelvis: No acute fracture or traumatic malalignment. 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 There are mild-to-moderate osteoarthritic changes of the hip joints which appear congruent. The pubic symphysis is congruent with mild degenerative change. The sacroiliac joints are congruent with mild osteoarthritic change. There is no evidence of sacral fracture. Pelvic soft tissues appear unremarkable without conspicuous subcutaneous fat stranding or superficial or intrapelvic hematoma. No acute findings within the pelvis. There is evidence of sigmoid colonic diverticulosis without diverticulitis. There are a few surgical clips in the region of the left inguinal canal.      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 There are hypoplastic ribs at L1, in addition to the usual 12 rib-bearing thoracic vertebral bodies. Please note that this numbering convention differs from prior imaging reports, as well as operative report of prior kyphoplasty. Given this numbering convention, there is redemonstration of prior kyphoplasty of L1 and L4 (previously labeled T12 and L3 on prior imaging reports and operative report).  Interval worsening of compression deformity of the L2 vertebral body compression fracture (previously L1 on prior reports) now with moderate height loss and fracture extension to the superior  endplate. No significant bony retropulsion.  Mild superior central endplate height loss of T12 which appears likely new from most recent MRI from 5/27/2022, compatible with age-indeterminate Schmorl's node/central endplate compression fracture.  Grossly unchanged appearance of chronic compression deformities and kyphoplasty change of L1 and L4 (previously designated T12 and L3).  There is a chronic severe compression deformity of T9.  No acute osseous findings in the pelvis.  Degenerative changes as above.  This report was finalized on 8/1/2022 10:02 AM by Garrett Borrero MD.      CT Lumbar Spine Without Contrast    Result Date: 8/1/2022  DATE OF EXAM: 8/1/2022 7:47 AM  PROCEDURE: CT PELVIS WO CONTRAST-, CT THORACIC SPINE WO CONTRAST-, CT LUMBAR SPINE WO CONTRAST-  INDICATIONS: fall. bilateral hip pain.  COMPARISON: Thoracic spine CT 5/15/2013, lumbar spine MRI 5/27/2022, CT lumbar spine 5/25/2022  TECHNIQUE: Routine transaxial slices were obtained through the thoracic spine, lumbar spine, and pelvis without the administration of intravenous contrast. Reconstructed coronal and sagittal images were also obtained. Automated exposure control and iterative construction methods were used.  The radiation dose reduction device was turned on for each scan per the ALARA (As Low as Reasonably Achievable) protocol.  FINDINGS:  The bones are diffusely demineralized, limiting assessment for occult nondisplaced fractures.  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 There are hypoplastic ribs at L1, in addition to the usual 12 rib-bearing thoracic vertebral bodies. Please note that this numbering convention differs from prior imaging reports, as well as operative report of prior kyphoplasty. Given this numbering convention, there is redemonstration of prior kyphoplasty of L1 and L4 (previously labeled T12 and L3 on  prior imaging reports and operative report).  Regarding the thoracic spine, there is normal spinal alignment without significant spondylolisthesis or evidence of traumatic subluxation. No abnormal interfacet widening. There is a chronic, severe compression deformity of the T9 vertebral body (series 5 image 44) with approximately 80% height loss with mild protrusion of the posterior vertebral body into the ventral epidural space resulting in at least mild spinal canal stenosis, without evidence of severe bony retropulsion or high-grade/severe spinal canal narrowing at this level. This compression deformity was present on localizer images of prior lumbar spine MRI from 5/27/2022. There is mild superior central endplate height loss of T12 (series 5 image 45) compatible with age indeterminant Schmorl's node; this appears likely new from lumbar spine MRI from 5/27/2022 although the superior endplate of T12 is only partially imaged on that exam, excluded from the field-of-view on prior CT of the lumbar spine from 5/25/2022. Otherwise no evidence of acute fracture in the thoracic spine. Remaining vertebral body heights are preserved. There are chronic deformities of the posterior left 9th, 10th, and 11th ribs. There is no CT evidence of high-grade/severe spinal canal stenosis. There is mostly mild multilevel degenerative disc disease throughout the thoracic spine. There is likely moderate bilateral neuroforaminal narrowing at T9-T10. The paravertebral soft tissues are unremarkable. There are chronic interstitial lung changes with subpleural cystic change/fibrosis. Mildly patulous thoracic esophagus. There is a punctate nonobstructing stone in the right kidney with otherwise unremarkable appearance of the partially imaged upper abdomen.  Regarding the lumbar spine, there is redemonstration of kyphoplasty change at L1 and L4 (previously designated T12 and L3; please see above description of numbering convention/lumbosacral  transitional anatomy). Similar spinal alignment with minimal smooth rightward curvature of the lumbar spine centered on L4. 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 There has been interval worsening of L2 compression deformity, previously seen as isolated inferior endplate concavity and now appearing as new fracture involvement and height loss of the superior endplate (series 5 image 45) with interval worsening of L2 vertebral height loss, now with approximately 60% central height loss, previously 20-30%. There is no appreciable bony retropulsion or posterior cortex protrusion; no evidence of high-grade/severe spinal canal stenosis at this level. Otherwise the vertebral bodies are unchanged from prior CT, without additional findings to suggest acute or worsening fractures. There is similar moderate height loss of L1 with kyphoplasty change. There is similar mild height loss of L4 with kyphoplasty change. There is similar multilevel degenerative changes of the lumbar spine characterized by moderate to severe degenerative disc disease in the lower lumbar spine, and mild facet arthropathy in the lower lumbar spine. There is mild ligament flavum hypertrophy in the lower lumbar spine. There is no high-grade/severe spinal canal stenosis per CT, noting that this is better assessed with MRI. The paravertebral soft tissues appear unremarkable. Partially imaged portions of the posterior abdomen and pelvis demonstrate atherosclerosis of the abdominal aorta without aneurysm, as well as sigmoid colonic diverticulosis.  Pelvis: No acute fracture or traumatic malalignment. 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 There are mild-to-moderate  osteoarthritic changes of the hip joints which appear congruent. The pubic symphysis is congruent with mild degenerative change. The sacroiliac joints are congruent with mild osteoarthritic change. There is no evidence of sacral fracture. Pelvic soft tissues appear unremarkable without conspicuous subcutaneous fat stranding or superficial or intrapelvic hematoma. No acute findings within the pelvis. There is evidence of sigmoid colonic diverticulosis without diverticulitis. There are a few surgical clips in the region of the left inguinal canal.      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 There are hypoplastic ribs at L1, in addition to the usual 12 rib-bearing thoracic vertebral bodies. Please note that this numbering convention differs from prior imaging reports, as well as operative report of prior kyphoplasty. Given this numbering convention, there is redemonstration of prior kyphoplasty of L1 and L4 (previously labeled T12 and L3 on prior imaging reports and operative report).  Interval worsening of compression deformity of the L2 vertebral body compression fracture (previously L1 on prior reports) now with moderate height loss and fracture extension to the superior endplate. No significant bony retropulsion.  Mild superior central endplate height loss of T12 which appears likely new from most recent MRI from 5/27/2022, compatible with age-indeterminate Schmorl's node/central endplate compression fracture.  Grossly unchanged appearance of chronic compression deformities and kyphoplasty change of L1 and L4 (previously designated T12 and L3).  There is a chronic severe compression deformity of T9.  No acute osseous findings in the pelvis.  Degenerative changes as above.  This report was finalized on 8/1/2022 10:02 AM by Garrett Borrero MD.      CT Pelvis Without Contrast    Result Date: 8/1/2022  DATE OF EXAM:  8/1/2022 7:47 AM  PROCEDURE: CT PELVIS WO CONTRAST-, CT THORACIC SPINE WO CONTRAST-, CT LUMBAR SPINE WO CONTRAST-  INDICATIONS: fall. bilateral hip pain.  COMPARISON: Thoracic spine CT 5/15/2013, lumbar spine MRI 5/27/2022, CT lumbar spine 5/25/2022  TECHNIQUE: Routine transaxial slices were obtained through the thoracic spine, lumbar spine, and pelvis without the administration of intravenous contrast. Reconstructed coronal and sagittal images were also obtained. Automated exposure control and iterative construction methods were used.  The radiation dose reduction device was turned on for each scan per the ALARA (As Low as Reasonably Achievable) protocol.  FINDINGS:  The bones are diffusely demineralized, limiting assessment for occult nondisplaced fractures.  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 There are hypoplastic ribs at L1, in addition to the usual 12 rib-bearing thoracic vertebral bodies. Please note that this numbering convention differs from prior imaging reports, as well as operative report of prior kyphoplasty. Given this numbering convention, there is redemonstration of prior kyphoplasty of L1 and L4 (previously labeled T12 and L3 on prior imaging reports and operative report).  Regarding the thoracic spine, there is normal spinal alignment without significant spondylolisthesis or evidence of traumatic subluxation. No abnormal interfacet widening. There is a chronic, severe compression deformity of the T9 vertebral body (series 5 image 44) with approximately 80% height loss with mild protrusion of the posterior vertebral body into the ventral epidural space resulting in at least mild spinal canal stenosis, without evidence of severe bony retropulsion or high-grade/severe spinal canal narrowing at this level. This compression deformity was present on localizer images of prior lumbar  spine MRI from 5/27/2022. There is mild superior central endplate height loss of T12 (series 5 image 45) compatible with age indeterminant Schmorl's node; this appears likely new from lumbar spine MRI from 5/27/2022 although the superior endplate of T12 is only partially imaged on that exam, excluded from the field-of-view on prior CT of the lumbar spine from 5/25/2022. Otherwise no evidence of acute fracture in the thoracic spine. Remaining vertebral body heights are preserved. There are chronic deformities of the posterior left 9th, 10th, and 11th ribs. There is no CT evidence of high-grade/severe spinal canal stenosis. There is mostly mild multilevel degenerative disc disease throughout the thoracic spine. There is likely moderate bilateral neuroforaminal narrowing at T9-T10. The paravertebral soft tissues are unremarkable. There are chronic interstitial lung changes with subpleural cystic change/fibrosis. Mildly patulous thoracic esophagus. There is a punctate nonobstructing stone in the right kidney with otherwise unremarkable appearance of the partially imaged upper abdomen.  Regarding the lumbar spine, there is redemonstration of kyphoplasty change at L1 and L4 (previously designated T12 and L3; please see above description of numbering convention/lumbosacral transitional anatomy). Similar spinal alignment with minimal smooth rightward curvature of the lumbar spine centered on L4. 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 There has been interval worsening of L2 compression deformity, previously seen as isolated inferior endplate concavity and now appearing as new fracture involvement and height loss of the superior endplate (series 5 image 45) with interval worsening of L2 vertebral height loss, now with approximately 60% central height loss, previously 20-30%. There is no appreciable bony  retropulsion or posterior cortex protrusion; no evidence of high-grade/severe spinal canal stenosis at this level. Otherwise the vertebral bodies are unchanged from prior CT, without additional findings to suggest acute or worsening fractures. There is similar moderate height loss of L1 with kyphoplasty change. There is similar mild height loss of L4 with kyphoplasty change. There is similar multilevel degenerative changes of the lumbar spine characterized by moderate to severe degenerative disc disease in the lower lumbar spine, and mild facet arthropathy in the lower lumbar spine. There is mild ligament flavum hypertrophy in the lower lumbar spine. There is no high-grade/severe spinal canal stenosis per CT, noting that this is better assessed with MRI. The paravertebral soft tissues appear unremarkable. Partially imaged portions of the posterior abdomen and pelvis demonstrate atherosclerosis of the abdominal aorta without aneurysm, as well as sigmoid colonic diverticulosis.  Pelvis: No acute fracture or traumatic malalignment. 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 There are mild-to-moderate osteoarthritic changes of the hip joints which appear congruent. The pubic symphysis is congruent with mild degenerative change. The sacroiliac joints are congruent with mild osteoarthritic change. There is no evidence of sacral fracture. Pelvic soft tissues appear unremarkable without conspicuous subcutaneous fat stranding or superficial or intrapelvic hematoma. No acute findings within the pelvis. There is evidence of sigmoid colonic diverticulosis without diverticulitis. There are a few surgical clips in the region of the left inguinal canal.      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 There are hypoplastic ribs at L1, in addition to the usual 12 rib-bearing thoracic vertebral bodies. Please note that this numbering convention differs from prior imaging reports, as well as operative report of prior kyphoplasty. Given this numbering convention, there is redemonstration of prior kyphoplasty of L1 and L4 (previously labeled T12 and L3 on prior imaging reports and operative report).  Interval worsening of compression deformity of the L2 vertebral body compression fracture (previously L1 on prior reports) now with moderate height loss and fracture extension to the superior endplate. No significant bony retropulsion.  Mild superior central endplate height loss of T12 which appears likely new from most recent MRI from 5/27/2022, compatible with age-indeterminate Schmorl's node/central endplate compression fracture.  Grossly unchanged appearance of chronic compression deformities and kyphoplasty change of L1 and L4 (previously designated T12 and L3).  There is a chronic severe compression deformity of T9.  No acute osseous findings in the pelvis.  Degenerative changes as above.  This report was finalized on 8/1/2022 10:02 AM by Garrett Borrero MD.            Assessment & Plan   Assessment & Plan     Active Hospital Problems    Diagnosis  POA   • Acute UTI [N39.0]  Yes       Acute UTI  -Continue Rocephin follow-up culture    Multilevel spinal disease  -We will consult neurosurgery to assess for any need for further surgical management  -We will consult palliative care for pain control  -Physical therapy occupational therapy will likely need placement if he is agreeable    Hyponatremia  -Unclear as to why I am concerned with his history of smoking and with his coarse breath sounds that he could have a component of congestive heart failure so ordered an echo otherwise he looks very dry and he is getting fluids.  However the chronic nature of his hyponatremia speaks  against dehydration as he had it when he was here last time as well.    Diabetes mellitus type 2  -Continue sliding scale    Tobacco abuse  -Declines patch encourage cessation    Alcohol abuse  -In remission    DVT prophylaxis: Lovenox      CODE STATUS: Full  Code Status and Medical Interventions:   Ordered at: 08/01/22 1229     Level Of Support Discussed With:    Patient     Code Status (Patient has no pulse and is not breathing):    CPR (Attempt to Resuscitate)     Medical Interventions (Patient has pulse or is breathing):    Full Support         Alberto Martinez DO  08/01/22

## 2022-08-01 NOTE — CONSULTS
Provider, No Known  Consulting physician: Michelle     Chief Complaint   Patient presents with   • Back Pain       Reason for consult: Sx mgmtn    HPI: Patient is a 76-year man brought in hospital due to increasing back pain.  Patient states that he has a long history of back pain.  Has apparently had kyphoplasties performed in the past.  Patient states that over the last week his pain has gotten worse.  States that normally his pain is mostly in his lower back area, however over the last week it is gotten to where his entire back is hurting.  Patient denies any type of radiation of his pain.  Also denies any type of inciting incident that caused his pain to get worse.  States that the pain is constant in nature.    Pain assesment: See above    Dyspnea: Denies    N/V: Denies    PPS: 50      Past Medical History:   Diagnosis Date   • Depression    • Diabetes mellitus (HCC)    • Disc degeneration, lumbar    • Emphysema of lung (HCC)    • Generalized anxiety disorder    • GERD (gastroesophageal reflux disease)    • Hyperlipidemia    • Hypertension    • Hypogonadism male    • Osteoarthritis    • Type 2 diabetes mellitus, uncontrolled    • Vitamin D deficiency      Past Surgical History:   Procedure Laterality Date   • HERNIA REPAIR      Inguinal   • KNEE ARTHROPLASTY     • KYPHOPLASTY N/A 7/14/2021    Procedure: KYPHOPLASTY L3 AND T12;  Surgeon: Ruddy Zepeda MD;  Location: [facility];  Service: Neurosurgery;  Laterality: N/A;     Current Code Status     Date Active Code Status Order ID Comments User Context       8/1/2022 1229 CPR (Attempt to Resuscitate) [id number]  Alberto Martinez, DO Inpatient     Advance Care Planning Activity      Questions for Current Code Status     Question Answer    Code Status (Patient has no pulse and is not breathing) CPR (Attempt to Resuscitate)    Medical Interventions (Patient has pulse or is breathing) Full Support    Level Of Support Discussed With Patient        Current  Facility-Administered Medications   Medication Dose Route Frequency Provider Last Rate Last Admin   • acetaminophen (TYLENOL) tablet 500 mg  500 mg Oral Q6H PRN Alberto Martinez, DO       • dextrose (D50W) (25 g/50 mL) IV injection 25 g  25 g Intravenous Q15 Min PRN Alberto Martinez DO       • dextrose (GLUTOSE) oral gel 15 g  15 g Oral Q15 Min PRN Alberto Martinez, DO       • Enoxaparin Sodium (LOVENOX) syringe 40 mg  40 mg Subcutaneous Daily Alberto Martinez DO       • folic acid-vit B6-vit B12 (FOLGARD) tablet 1 tablet  1 tablet Oral Daily Alberto Martinez DO   1 tablet at 08/01/22 1301   • glucagon (human recombinant) (GLUCAGEN DIAGNOSTIC) injection 1 mg  1 mg Intramuscular Q15 Min PRN Alberto Martinez DO       • Insulin Lispro (humaLOG) injection 0-9 Units  0-9 Units Subcutaneous TID AC Alberto Martinez DO       • sodium chloride 0.9 % flush 10 mL  10 mL Intravenous PRN Al Joseph MD       • sodium chloride 0.9 % flush 10 mL  10 mL Intravenous Q12H Alberto Martinez DO       • sodium chloride 0.9 % flush 10 mL  10 mL Intravenous PRN Alberto Martinez DO       • sodium chloride 0.9 % infusion  100 mL/hr Intravenous Continuous Alberto Martinez  mL/hr at 08/01/22 1257 100 mL/hr at 08/01/22 1257   • thiamine (VITAMIN B-1) tablet 100 mg  100 mg Oral Daily Alberto Martinez DO   100 mg at 08/01/22 1257   • vitamin D3 capsule 5,000 Units  5,000 Units Oral Daily Alberto Martinez DO   5,000 Units at 08/01/22 1257     sodium chloride, 100 mL/hr, Last Rate: 100 mL/hr (08/01/22 1257)      •  acetaminophen  •  dextrose  •  dextrose  •  glucagon (human recombinant)  •  [COMPLETED] Insert peripheral IV **AND** sodium chloride  •  sodium chloride  Allergies   Allergen Reactions   • Penicillins Rash   • Shellfish-Derived Products Hives     Pt says only applies to oysters     Family History   Problem Relation Age of Onset   • Tuberculosis Mother    • Colon cancer Father    • Other Father         polyps of the sigmoid colon     Social History  "    Socioeconomic History   • Marital status:    Tobacco Use   • Smoking status: Current Every Day Smoker     Packs/day: 1.50     Years: 45.00     Pack years: 67.50     Types: Cigarettes   • Smokeless tobacco: Never Used   Substance and Sexual Activity   • Alcohol use: Not Currently     Comment: HAS NOT DRANK IN A YEAR   • Drug use: No   • Sexual activity: Defer     Review of Systems -all others reviewed and found negative that mentioned in the HPI      /88 (BP Location: Left arm, Patient Position: Lying)   Pulse 92   Temp 98.3 °F (36.8 °C) (Oral)   Resp 18   Ht 182.9 cm (72\")   Wt 85.9 kg (189 lb 6 oz)   SpO2 92%   BMI 25.68 kg/m²     Intake/Output Summary (Last 24 hours) at 8/1/2022 1411  Last data filed at 8/1/2022 1258  Gross per 24 hour   Intake 1100 ml   Output --   Net 1100 ml     Physical Exam:      General Appearance:    Alert, cooperative, in no acute distress   Head:    Normocephalic, without obvious abnormality, atraumatic   Eyes:            Lids and lashes normal, conjunctivae and sclerae normal, no   icterus, no pallor, corneas clear, PERRLA   Ears:    Ears appear intact with no abnormalities noted   Throat:   No oral lesions, no thrush, oral mucosa moist   Neck:   No adenopathy, supple, trachea midline, no thyromegaly, no   carotid bruit, no JVD   Back:     No kyphosis present, no scoliosis present, no skin lesions,      erythema or scars, no tenderness to percussion or                   palpation,   range of motion normal   Lungs:     Clear to auscultation,respirations regular, even and                  unlabored    Heart:    Regular rhythm and normal rate, normal S1 and S2, no            murmur, no gallop, no rub, no click   Chest Wall:    No abnormalities observed   Abdomen:     Normal bowel sounds, no masses, no organomegaly, soft        non-tender, non-distended, no guarding, no rebound                tenderness   Rectal:     Deferred   Extremities:   Moves all extremities " well, no edema, no cyanosis, no             redness   Pulses:   Pulses palpable and equal bilaterally   Skin:   No bleeding, bruising or rash   Lymph nodes:   No palpable adenopathy   Neurologic:   Cranial nerves 2 - 12 grossly intact, sensation intact, DTR       present and equal bilaterally       Results from last 7 days   Lab Units 08/01/22  0803   WBC 10*3/mm3 10.18   HEMOGLOBIN g/dL 16.4   HEMATOCRIT % 48.2   PLATELETS 10*3/mm3 324     Results from last 7 days   Lab Units 08/01/22  0803   SODIUM mmol/L 129*   POTASSIUM mmol/L 4.2   CHLORIDE mmol/L 94*   CO2 mmol/L 27.0   BUN mg/dL 6*   CREATININE mg/dL 0.59*   CALCIUM mg/dL 8.9   BILIRUBIN mg/dL 0.5   ALK PHOS U/L 111   ALT (SGPT) U/L 7   AST (SGOT) U/L 10   GLUCOSE mg/dL 280*     Results from last 7 days   Lab Units 08/01/22  0803   SODIUM mmol/L 129*   POTASSIUM mmol/L 4.2   CHLORIDE mmol/L 94*   CO2 mmol/L 27.0   BUN mg/dL 6*   CREATININE mg/dL 0.59*   GLUCOSE mg/dL 280*   CALCIUM mg/dL 8.9     Imaging Results (Last 72 Hours)     Procedure Component Value Units Date/Time    CT Thoracic Spine Without Contrast [[id number]] Collected: 08/01/22 0858     Updated: 08/01/22 1005    Narrative:      DATE OF EXAM: 8/1/2022 7:47 AM     PROCEDURE: CT PELVIS WO CONTRAST-, CT THORACIC SPINE WO CONTRAST-, CT  LUMBAR SPINE WO CONTRAST-     INDICATIONS: fall. bilateral hip pain.     COMPARISON: Thoracic spine CT 5/15/2013, lumbar spine MRI 5/27/2022, CT  lumbar spine 5/25/2022     TECHNIQUE: Routine transaxial slices were obtained through the thoracic  spine, lumbar spine, and pelvis without the administration of  intravenous contrast. Reconstructed coronal and sagittal images were  also obtained. Automated exposure control and iterative construction  methods were used.     The radiation dose reduction device was turned on for each scan per the  ALARA (As Low as Reasonably Achievable) protocol.     FINDINGS:     The bones are diffusely demineralized, limiting assessment for  occult  nondisplaced fractures.     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 There are  hypoplastic ribs at L1, in addition to the usual 12 rib-bearing thoracic  vertebral bodies. Please note that this numbering convention differs  from prior imaging reports, as well as operative report of prior  kyphoplasty. Given this numbering convention, there is redemonstration  of prior kyphoplasty of L1 and L4 (previously labeled T12 and L3 on  prior imaging reports and operative report).     Regarding the thoracic spine, there is normal spinal alignment without  significant spondylolisthesis or evidence of traumatic subluxation. No  abnormal interfacet widening. There is a chronic, severe compression  deformity of the T9 vertebral body (series 5 image 44) with  approximately 80% height loss with mild protrusion of the posterior  vertebral body into the ventral epidural space resulting in at least  mild spinal canal stenosis, without evidence of severe bony retropulsion  or high-grade/severe spinal canal narrowing at this level. This  compression deformity was present on localizer images of prior lumbar  spine MRI from 5/27/2022. There is mild superior central endplate height  loss of T12 (series 5 image 45) compatible with age indeterminant  Schmorl's node; this appears likely new from lumbar spine MRI from  5/27/2022 although the superior endplate of T12 is only partially imaged  on that exam, excluded from the field-of-view on prior CT of the lumbar  spine from 5/25/2022. Otherwise no evidence of acute fracture in the  thoracic spine. Remaining vertebral body heights are preserved. There  are chronic deformities of the posterior left 9th, 10th, and 11th ribs.  There is no CT evidence of high-grade/severe spinal canal stenosis.  There is mostly mild multilevel degenerative disc disease throughout  the  thoracic spine. There is likely moderate bilateral neuroforaminal  narrowing at T9-T10. The paravertebral soft tissues are unremarkable.  There are chronic interstitial lung changes with subpleural cystic  change/fibrosis. Mildly patulous thoracic esophagus. There is a punctate  nonobstructing stone in the right kidney with otherwise unremarkable  appearance of the partially imaged upper abdomen.     Regarding the lumbar spine, there is redemonstration of kyphoplasty  change at L1 and L4 (previously designated T12 and L3; please see above  description of numbering convention/lumbosacral transitional anatomy).  Similar spinal alignment with minimal smooth rightward curvature of the  lumbar spine centered on L4. 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 There has been  interval worsening of L2 compression deformity, previously seen as  isolated inferior endplate concavity and now appearing as new fracture  involvement and height loss of the superior endplate (series 5 image 45)  with interval worsening of L2 vertebral height loss, now with  approximately 60% central height loss, previously 20-30%. There is no  appreciable bony retropulsion or posterior cortex protrusion; no  evidence of high-grade/severe spinal canal stenosis at this level.  Otherwise the vertebral bodies are unchanged from prior CT, without  additional findings to suggest acute or worsening fractures. There is  similar moderate height loss of L1 with kyphoplasty change. There is  similar mild height loss of L4 with kyphoplasty change. There is similar  multilevel degenerative changes of the lumbar spine characterized by  moderate to severe degenerative disc disease in the lower lumbar spine,  and mild facet arthropathy in the lower lumbar spine. There is mild  ligament flavum hypertrophy in the lower lumbar spine. There is  no  high-grade/severe spinal canal stenosis per CT, noting that this is  better assessed with MRI. The paravertebral soft tissues appear  unremarkable. Partially imaged portions of the posterior abdomen and  pelvis demonstrate atherosclerosis of the abdominal aorta without  aneurysm, as well as sigmoid colonic diverticulosis.     Pelvis:  No acute fracture or traumatic malalignment. 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 There are mild-to-moderate  osteoarthritic changes of the hip joints which appear congruent. The  pubic symphysis is congruent with mild degenerative change. The  sacroiliac joints are congruent with mild osteoarthritic change. There  is no evidence of sacral fracture. Pelvic soft tissues appear  unremarkable without conspicuous subcutaneous fat stranding or  superficial or intrapelvic hematoma. No acute findings within the  pelvis. There is evidence of sigmoid colonic diverticulosis without  diverticulitis. There are a few surgical clips in the region of the left  inguinal canal.       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 There are  hypoplastic ribs at L1, in addition to the usual 12 rib-bearing thoracic  vertebral bodies. Please note that this numbering convention differs  from prior imaging reports, as well as operative report of prior  kyphoplasty. Given this numbering convention, there is redemonstration  of prior kyphoplasty of L1 and L4 (previously labeled T12 and L3 on  prior imaging reports and operative report).     Interval worsening of compression deformity of the L2 vertebral body  compression fracture (previously L1 on prior reports) now with moderate  height loss and fracture extension to the superior  endplate. No  significant bony retropulsion.     Mild superior central endplate height loss of T12 which appears likely  new from most recent MRI from 5/27/2022, compatible with  age-indeterminate Schmorl's node/central endplate compression fracture.     Grossly unchanged appearance of chronic compression deformities and  kyphoplasty change of L1 and L4 (previously designated T12 and L3).     There is a chronic severe compression deformity of T9.     No acute osseous findings in the pelvis.     Degenerative changes as above.     This report was finalized on 8/1/2022 10:02 AM by Garrett Borrero MD.       CT Lumbar Spine Without Contrast [[id number]] Collected: 08/01/22 0858     Updated: 08/01/22 1005    Narrative:      DATE OF EXAM: 8/1/2022 7:47 AM     PROCEDURE: CT PELVIS WO CONTRAST-, CT THORACIC SPINE WO CONTRAST-, CT  LUMBAR SPINE WO CONTRAST-     INDICATIONS: fall. bilateral hip pain.     COMPARISON: Thoracic spine CT 5/15/2013, lumbar spine MRI 5/27/2022, CT  lumbar spine 5/25/2022     TECHNIQUE: Routine transaxial slices were obtained through the thoracic  spine, lumbar spine, and pelvis without the administration of  intravenous contrast. Reconstructed coronal and sagittal images were  also obtained. Automated exposure control and iterative construction  methods were used.     The radiation dose reduction device was turned on for each scan per the  ALARA (As Low as Reasonably Achievable) protocol.     FINDINGS:     The bones are diffusely demineralized, limiting assessment for occult  nondisplaced fractures.     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 There are  hypoplastic ribs at L1, in addition to the usual 12 rib-bearing thoracic  vertebral bodies. Please note that this numbering convention differs  from prior imaging reports, as well as operative report of prior  kyphoplasty. Given  this numbering convention, there is redemonstration  of prior kyphoplasty of L1 and L4 (previously labeled T12 and L3 on  prior imaging reports and operative report).     Regarding the thoracic spine, there is normal spinal alignment without  significant spondylolisthesis or evidence of traumatic subluxation. No  abnormal interfacet widening. There is a chronic, severe compression  deformity of the T9 vertebral body (series 5 image 44) with  approximately 80% height loss with mild protrusion of the posterior  vertebral body into the ventral epidural space resulting in at least  mild spinal canal stenosis, without evidence of severe bony retropulsion  or high-grade/severe spinal canal narrowing at this level. This  compression deformity was present on localizer images of prior lumbar  spine MRI from 5/27/2022. There is mild superior central endplate height  loss of T12 (series 5 image 45) compatible with age indeterminant  Schmorl's node; this appears likely new from lumbar spine MRI from  5/27/2022 although the superior endplate of T12 is only partially imaged  on that exam, excluded from the field-of-view on prior CT of the lumbar  spine from 5/25/2022. Otherwise no evidence of acute fracture in the  thoracic spine. Remaining vertebral body heights are preserved. There  are chronic deformities of the posterior left 9th, 10th, and 11th ribs.  There is no CT evidence of high-grade/severe spinal canal stenosis.  There is mostly mild multilevel degenerative disc disease throughout the  thoracic spine. There is likely moderate bilateral neuroforaminal  narrowing at T9-T10. The paravertebral soft tissues are unremarkable.  There are chronic interstitial lung changes with subpleural cystic  change/fibrosis. Mildly patulous thoracic esophagus. There is a punctate  nonobstructing stone in the right kidney with otherwise unremarkable  appearance of the partially imaged upper abdomen.     Regarding the lumbar spine, there is  redemonstration of kyphoplasty  change at L1 and L4 (previously designated T12 and L3; please see above  description of numbering convention/lumbosacral transitional anatomy).  Similar spinal alignment with minimal smooth rightward curvature of the  lumbar spine centered on L4. 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 There has been  interval worsening of L2 compression deformity, previously seen as  isolated inferior endplate concavity and now appearing as new fracture  involvement and height loss of the superior endplate (series 5 image 45)  with interval worsening of L2 vertebral height loss, now with  approximately 60% central height loss, previously 20-30%. There is no  appreciable bony retropulsion or posterior cortex protrusion; no  evidence of high-grade/severe spinal canal stenosis at this level.  Otherwise the vertebral bodies are unchanged from prior CT, without  additional findings to suggest acute or worsening fractures. There is  similar moderate height loss of L1 with kyphoplasty change. There is  similar mild height loss of L4 with kyphoplasty change. There is similar  multilevel degenerative changes of the lumbar spine characterized by  moderate to severe degenerative disc disease in the lower lumbar spine,  and mild facet arthropathy in the lower lumbar spine. There is mild  ligament flavum hypertrophy in the lower lumbar spine. There is no  high-grade/severe spinal canal stenosis per CT, noting that this is  better assessed with MRI. The paravertebral soft tissues appear  unremarkable. Partially imaged portions of the posterior abdomen and  pelvis demonstrate atherosclerosis of the abdominal aorta without  aneurysm, as well as sigmoid colonic diverticulosis.     Pelvis:  No acute fracture or traumatic malalignment. Irregular and somewhat  serpiginous appearing sclerosis of the  anterior articular surface of the  right femoral head is suspicious for small area of avascular necrosis,  without evidence of subchondral collapse. There are mild-to-moderate  osteoarthritic changes of the hip joints which appear congruent. The  pubic symphysis is congruent with mild degenerative change. The  sacroiliac joints are congruent with mild osteoarthritic change. There  is no evidence of sacral fracture. Pelvic soft tissues appear  unremarkable without conspicuous subcutaneous fat stranding or  superficial or intrapelvic hematoma. No acute findings within the  pelvis. There is evidence of sigmoid colonic diverticulosis without  diverticulitis. There are a few surgical clips in the region of the left  inguinal canal.       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 There are  hypoplastic ribs at L1, in addition to the usual 12 rib-bearing thoracic  vertebral bodies. Please note that this numbering convention differs  from prior imaging reports, as well as operative report of prior  kyphoplasty. Given this numbering convention, there is redemonstration  of prior kyphoplasty of L1 and L4 (previously labeled T12 and L3 on  prior imaging reports and operative report).     Interval worsening of compression deformity of the L2 vertebral body  compression fracture (previously L1 on prior reports) now with moderate  height loss and fracture extension to the superior endplate. No  significant bony retropulsion.     Mild superior central endplate height loss of T12 which appears likely  new from most recent MRI from 5/27/2022, compatible with  age-indeterminate Schmorl's node/central endplate compression fracture.     Grossly unchanged appearance of chronic compression deformities and  kyphoplasty change of L1 and L4 (previously designated T12 and L3).     There is a chronic severe compression deformity of  T9.     No acute osseous findings in the pelvis.     Degenerative changes as above.     This report was finalized on 8/1/2022 10:02 AM by Garrett Borrero MD.       CT Pelvis Without Contrast [[id number]] Collected: 08/01/22 0858     Updated: 08/01/22 1005    Narrative:      DATE OF EXAM: 8/1/2022 7:47 AM     PROCEDURE: CT PELVIS WO CONTRAST-, CT THORACIC SPINE WO CONTRAST-, CT  LUMBAR SPINE WO CONTRAST-     INDICATIONS: fall. bilateral hip pain.     COMPARISON: Thoracic spine CT 5/15/2013, lumbar spine MRI 5/27/2022, CT  lumbar spine 5/25/2022     TECHNIQUE: Routine transaxial slices were obtained through the thoracic  spine, lumbar spine, and pelvis without the administration of  intravenous contrast. Reconstructed coronal and sagittal images were  also obtained. Automated exposure control and iterative construction  methods were used.     The radiation dose reduction device was turned on for each scan per the  ALARA (As Low as Reasonably Achievable) protocol.     FINDINGS:     The bones are diffusely demineralized, limiting assessment for occult  nondisplaced fractures.     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 There are  hypoplastic ribs at L1, in addition to the usual 12 rib-bearing thoracic  vertebral bodies. Please note that this numbering convention differs  from prior imaging reports, as well as operative report of prior  kyphoplasty. Given this numbering convention, there is redemonstration  of prior kyphoplasty of L1 and L4 (previously labeled T12 and L3 on  prior imaging reports and operative report).     Regarding the thoracic spine, there is normal spinal alignment without  significant spondylolisthesis or evidence of traumatic subluxation. No  abnormal interfacet widening. There is a chronic, severe compression  deformity of the T9 vertebral body (series 5 image 44)  with  approximately 80% height loss with mild protrusion of the posterior  vertebral body into the ventral epidural space resulting in at least  mild spinal canal stenosis, without evidence of severe bony retropulsion  or high-grade/severe spinal canal narrowing at this level. This  compression deformity was present on localizer images of prior lumbar  spine MRI from 5/27/2022. There is mild superior central endplate height  loss of T12 (series 5 image 45) compatible with age indeterminant  Schmorl's node; this appears likely new from lumbar spine MRI from  5/27/2022 although the superior endplate of T12 is only partially imaged  on that exam, excluded from the field-of-view on prior CT of the lumbar  spine from 5/25/2022. Otherwise no evidence of acute fracture in the  thoracic spine. Remaining vertebral body heights are preserved. There  are chronic deformities of the posterior left 9th, 10th, and 11th ribs.  There is no CT evidence of high-grade/severe spinal canal stenosis.  There is mostly mild multilevel degenerative disc disease throughout the  thoracic spine. There is likely moderate bilateral neuroforaminal  narrowing at T9-T10. The paravertebral soft tissues are unremarkable.  There are chronic interstitial lung changes with subpleural cystic  change/fibrosis. Mildly patulous thoracic esophagus. There is a punctate  nonobstructing stone in the right kidney with otherwise unremarkable  appearance of the partially imaged upper abdomen.     Regarding the lumbar spine, there is redemonstration of kyphoplasty  change at L1 and L4 (previously designated T12 and L3; please see above  description of numbering convention/lumbosacral transitional anatomy).  Similar spinal alignment with minimal smooth rightward curvature of the  lumbar spine centered on L4. 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  There has been  interval worsening of L2 compression deformity, previously seen as  isolated inferior endplate concavity and now appearing as new fracture  involvement and height loss of the superior endplate (series 5 image 45)  with interval worsening of L2 vertebral height loss, now with  approximately 60% central height loss, previously 20-30%. There is no  appreciable bony retropulsion or posterior cortex protrusion; no  evidence of high-grade/severe spinal canal stenosis at this level.  Otherwise the vertebral bodies are unchanged from prior CT, without  additional findings to suggest acute or worsening fractures. There is  similar moderate height loss of L1 with kyphoplasty change. There is  similar mild height loss of L4 with kyphoplasty change. There is similar  multilevel degenerative changes of the lumbar spine characterized by  moderate to severe degenerative disc disease in the lower lumbar spine,  and mild facet arthropathy in the lower lumbar spine. There is mild  ligament flavum hypertrophy in the lower lumbar spine. There is no  high-grade/severe spinal canal stenosis per CT, noting that this is  better assessed with MRI. The paravertebral soft tissues appear  unremarkable. Partially imaged portions of the posterior abdomen and  pelvis demonstrate atherosclerosis of the abdominal aorta without  aneurysm, as well as sigmoid colonic diverticulosis.     Pelvis:  No acute fracture or traumatic malalignment. 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 There are mild-to-moderate  osteoarthritic changes of the hip joints which appear congruent. The  pubic symphysis is congruent with mild degenerative change. The  sacroiliac joints are congruent with mild osteoarthritic change. There  is no evidence of sacral fracture. Pelvic soft tissues  appear  unremarkable without conspicuous subcutaneous fat stranding or  superficial or intrapelvic hematoma. No acute findings within the  pelvis. There is evidence of sigmoid colonic diverticulosis without  diverticulitis. There are a few surgical clips in the region of the left  inguinal canal.       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 There are  hypoplastic ribs at L1, in addition to the usual 12 rib-bearing thoracic  vertebral bodies. Please note that this numbering convention differs  from prior imaging reports, as well as operative report of prior  kyphoplasty. Given this numbering convention, there is redemonstration  of prior kyphoplasty of L1 and L4 (previously labeled T12 and L3 on  prior imaging reports and operative report).     Interval worsening of compression deformity of the L2 vertebral body  compression fracture (previously L1 on prior reports) now with moderate  height loss and fracture extension to the superior endplate. No  significant bony retropulsion.     Mild superior central endplate height loss of T12 which appears likely  new from most recent MRI from 5/27/2022, compatible with  age-indeterminate Schmorl's node/central endplate compression fracture.     Grossly unchanged appearance of chronic compression deformities and  kyphoplasty change of L1 and L4 (previously designated T12 and L3).     There is a chronic severe compression deformity of T9.     No acute osseous findings in the pelvis.     Degenerative changes as above.     This report was finalized on 8/1/2022 10:02 AM by Garrett Borrero MD.           Impression: LBP  UTI  Spinal disease    Plan: After reviewing the patient's chart as well as talking to the patient at the patient himself does not have a end-stage disease.  Most of his problem centers around chronic on top of acute back pain.  With this in mind this is  something that is better managed with a pain management doctor as opposed to palliative medicine as there are modalities of treatment that pain management would have available to them.  With this in mind I will leave recommendations.  During this acute phase it would be reasonable to put the patient on some type of opioid pain management on an as-needed basis.  Starting with a low-dose of Lortab or oxycodone would be reasonable, would avoid scheduling pain medicine and would also avoid IV pain medicine if at all possible.  Could also look at adding lidocaine gel to see if this would help the patient's back pain.    Again all the above are only recommendations.  We will leave it to the attending doctor if they feel that any of these should be implemented.  Palliative medicine will sign off.        Ramakrishna Vega DO  08/01/22  14:11 EDT

## 2022-08-01 NOTE — PLAN OF CARE
Problem: Adult Inpatient Plan of Care  Goal: Plan of Care Review  Outcome: Ongoing, Progressing  Flowsheets (Taken 8/1/2022 1235)  Progress: no change  Plan of Care Reviewed With: patient  Outcome Evaluation: patient admitted from ER, VSS, on room air, fall and skin precautions in place, will continue with plan of care   Goal Outcome Evaluation:  Plan of Care Reviewed With: patient        Progress: no change  Outcome Evaluation: patient admitted from ER, VSS, on room air, fall and skin precautions in place, will continue with plan of care

## 2022-08-02 ENCOUNTER — APPOINTMENT (OUTPATIENT)
Dept: CARDIOLOGY | Facility: HOSPITAL | Age: 77
End: 2022-08-02

## 2022-08-02 LAB
ALBUMIN SERPL-MCNC: 3.3 G/DL (ref 3.5–5.2)
ALBUMIN/GLOB SERPL: 1.1 G/DL
ALP SERPL-CCNC: 117 U/L (ref 39–117)
ALT SERPL W P-5'-P-CCNC: 6 U/L (ref 1–41)
ANION GAP SERPL CALCULATED.3IONS-SCNC: 8 MMOL/L (ref 5–15)
ASCENDING AORTA: 2.8 CM
AST SERPL-CCNC: 10 U/L (ref 1–40)
BACTERIA SPEC AEROBE CULT: NORMAL
BH CV ECHO MEAS - AO MAX PG: 6 MMHG
BH CV ECHO MEAS - AO MEAN PG: 3 MMHG
BH CV ECHO MEAS - AO ROOT DIAM: 2.8 CM
BH CV ECHO MEAS - AO V2 MAX: 122 CM/SEC
BH CV ECHO MEAS - AO V2 VTI: 23.2 CM
BH CV ECHO MEAS - AVA(I,D): 3.1 CM2
BH CV ECHO MEAS - EDV(CUBED): 110.6 ML
BH CV ECHO MEAS - EDV(MOD-SP2): 85.3 ML
BH CV ECHO MEAS - EDV(MOD-SP4): 82.7 ML
BH CV ECHO MEAS - EF(MOD-BP): 72.1 %
BH CV ECHO MEAS - EF(MOD-SP2): 68.3 %
BH CV ECHO MEAS - EF(MOD-SP4): 73.4 %
BH CV ECHO MEAS - ESV(CUBED): 24.4 ML
BH CV ECHO MEAS - ESV(MOD-SP2): 27 ML
BH CV ECHO MEAS - ESV(MOD-SP4): 22 ML
BH CV ECHO MEAS - FS: 39.6 %
BH CV ECHO MEAS - IVS/LVPW: 1 CM
BH CV ECHO MEAS - IVSD: 1.1 CM
BH CV ECHO MEAS - LA DIMENSION: 3.1 CM
BH CV ECHO MEAS - LAT PEAK E' VEL: 8.3 CM/SEC
BH CV ECHO MEAS - LV DIASTOLIC VOL/BSA (35-75): 39.9 CM2
BH CV ECHO MEAS - LV MASS(C)D: 194 GRAMS
BH CV ECHO MEAS - LV MAX PG: 4.2 MMHG
BH CV ECHO MEAS - LV MEAN PG: 2 MMHG
BH CV ECHO MEAS - LV SYSTOLIC VOL/BSA (12-30): 10.6 CM2
BH CV ECHO MEAS - LV V1 MAX: 102 CM/SEC
BH CV ECHO MEAS - LV V1 VTI: 20.5 CM
BH CV ECHO MEAS - LVIDD: 4.8 CM
BH CV ECHO MEAS - LVIDS: 2.9 CM
BH CV ECHO MEAS - LVOT AREA: 3.5 CM2
BH CV ECHO MEAS - LVOT DIAM: 2.1 CM
BH CV ECHO MEAS - LVPWD: 1.1 CM
BH CV ECHO MEAS - MED PEAK E' VEL: 7.3 CM/SEC
BH CV ECHO MEAS - MV A MAX VEL: 119 CM/SEC
BH CV ECHO MEAS - MV DEC SLOPE: 357 CM/SEC2
BH CV ECHO MEAS - MV DEC TIME: 0.17 MSEC
BH CV ECHO MEAS - MV E MAX VEL: 60.3 CM/SEC
BH CV ECHO MEAS - MV E/A: 0.51
BH CV ECHO MEAS - MV MAX PG: 6.9 MMHG
BH CV ECHO MEAS - MV MEAN PG: 3 MMHG
BH CV ECHO MEAS - MV V2 VTI: 25.1 CM
BH CV ECHO MEAS - MVA(VTI): 2.8 CM2
BH CV ECHO MEAS - PA ACC TIME: 0.1 SEC
BH CV ECHO MEAS - PA PR(ACCEL): 34.4 MMHG
BH CV ECHO MEAS - PA V2 MAX: 88.3 CM/SEC
BH CV ECHO MEAS - SI(MOD-SP2): 28.1 ML/M2
BH CV ECHO MEAS - SI(MOD-SP4): 29.3 ML/M2
BH CV ECHO MEAS - SV(LVOT): 71 ML
BH CV ECHO MEAS - SV(MOD-SP2): 58.3 ML
BH CV ECHO MEAS - SV(MOD-SP4): 60.7 ML
BH CV ECHO MEAS - TAPSE (>1.6): 2.19 CM
BH CV ECHO MEASUREMENTS AVERAGE E/E' RATIO: 7.73
BH CV VAS BP LEFT ARM: NORMAL MMHG
BH CV XLRA - RV BASE: 4 CM
BH CV XLRA - RV LENGTH: 7 CM
BH CV XLRA - RV MID: 2.9 CM
BH CV XLRA - TDI S': 12.6 CM/SEC
BILIRUB SERPL-MCNC: 0.5 MG/DL (ref 0–1.2)
BUN SERPL-MCNC: 7 MG/DL (ref 8–23)
BUN/CREAT SERPL: 11.1 (ref 7–25)
CALCIUM SPEC-SCNC: 8.7 MG/DL (ref 8.6–10.5)
CHLORIDE SERPL-SCNC: 96 MMOL/L (ref 98–107)
CO2 SERPL-SCNC: 27 MMOL/L (ref 22–29)
CREAT SERPL-MCNC: 0.63 MG/DL (ref 0.76–1.27)
DEPRECATED RDW RBC AUTO: 43.1 FL (ref 37–54)
EGFRCR SERPLBLD CKD-EPI 2021: 98.6 ML/MIN/1.73
ERYTHROCYTE [DISTWIDTH] IN BLOOD BY AUTOMATED COUNT: 12.8 % (ref 12.3–15.4)
GLOBULIN UR ELPH-MCNC: 2.9 GM/DL
GLUCOSE BLDC GLUCOMTR-MCNC: 139 MG/DL (ref 70–130)
GLUCOSE BLDC GLUCOMTR-MCNC: 164 MG/DL (ref 70–130)
GLUCOSE BLDC GLUCOMTR-MCNC: 354 MG/DL (ref 70–130)
GLUCOSE SERPL-MCNC: 194 MG/DL (ref 65–99)
HCT VFR BLD AUTO: 44.4 % (ref 37.5–51)
HGB BLD-MCNC: 15.2 G/DL (ref 13–17.7)
IVRT: 109 MSEC
LEFT ATRIUM VOLUME INDEX: 21.9 ML/M2
LV EF 2D ECHO EST: 65 %
MAXIMAL PREDICTED HEART RATE: 144 BPM
MCH RBC QN AUTO: 31.4 PG (ref 26.6–33)
MCHC RBC AUTO-ENTMCNC: 34.2 G/DL (ref 31.5–35.7)
MCV RBC AUTO: 91.7 FL (ref 79–97)
PLATELET # BLD AUTO: 327 10*3/MM3 (ref 140–450)
PMV BLD AUTO: 8.3 FL (ref 6–12)
POTASSIUM SERPL-SCNC: 4.2 MMOL/L (ref 3.5–5.2)
PROT SERPL-MCNC: 6.2 G/DL (ref 6–8.5)
RBC # BLD AUTO: 4.84 10*6/MM3 (ref 4.14–5.8)
SODIUM SERPL-SCNC: 131 MMOL/L (ref 136–145)
STRESS TARGET HR: 122 BPM
WBC NRBC COR # BLD: 10.45 10*3/MM3 (ref 3.4–10.8)

## 2022-08-02 PROCEDURE — 93306 TTE W/DOPPLER COMPLETE: CPT | Performed by: INTERNAL MEDICINE

## 2022-08-02 PROCEDURE — 63710000001 INSULIN LISPRO (HUMAN) PER 5 UNITS: Performed by: INTERNAL MEDICINE

## 2022-08-02 PROCEDURE — 80053 COMPREHEN METABOLIC PANEL: CPT | Performed by: INTERNAL MEDICINE

## 2022-08-02 PROCEDURE — 97110 THERAPEUTIC EXERCISES: CPT

## 2022-08-02 PROCEDURE — 97161 PT EVAL LOW COMPLEX 20 MIN: CPT

## 2022-08-02 PROCEDURE — 25010000002 CEFTRIAXONE PER 250 MG: Performed by: INTERNAL MEDICINE

## 2022-08-02 PROCEDURE — 99232 SBSQ HOSP IP/OBS MODERATE 35: CPT | Performed by: NEUROLOGICAL SURGERY

## 2022-08-02 PROCEDURE — 99232 SBSQ HOSP IP/OBS MODERATE 35: CPT | Performed by: INTERNAL MEDICINE

## 2022-08-02 PROCEDURE — 82962 GLUCOSE BLOOD TEST: CPT

## 2022-08-02 PROCEDURE — 85027 COMPLETE CBC AUTOMATED: CPT | Performed by: INTERNAL MEDICINE

## 2022-08-02 PROCEDURE — 25010000002 KETOROLAC TROMETHAMINE PER 15 MG: Performed by: INTERNAL MEDICINE

## 2022-08-02 PROCEDURE — 25010000002 ENOXAPARIN PER 10 MG: Performed by: INTERNAL MEDICINE

## 2022-08-02 PROCEDURE — 93306 TTE W/DOPPLER COMPLETE: CPT

## 2022-08-02 RX ORDER — LOSARTAN POTASSIUM 25 MG/1
25 TABLET ORAL
Status: DISCONTINUED | OUTPATIENT
Start: 2022-08-02 | End: 2022-08-03

## 2022-08-02 RX ORDER — PHENAZOPYRIDINE HYDROCHLORIDE 100 MG/1
100 TABLET, FILM COATED ORAL 3 TIMES DAILY PRN
Status: DISCONTINUED | OUTPATIENT
Start: 2022-08-02 | End: 2022-08-04 | Stop reason: HOSPADM

## 2022-08-02 RX ORDER — CHOLECALCIFEROL (VITAMIN D3) 125 MCG
5 CAPSULE ORAL NIGHTLY PRN
Status: DISCONTINUED | OUTPATIENT
Start: 2022-08-02 | End: 2022-08-03

## 2022-08-02 RX ADMIN — SODIUM CHLORIDE 1 G: 900 INJECTION INTRAVENOUS at 11:42

## 2022-08-02 RX ADMIN — KETOROLAC TROMETHAMINE 15 MG: 30 INJECTION, SOLUTION INTRAMUSCULAR; INTRAVENOUS at 02:15

## 2022-08-02 RX ADMIN — PHENAZOPYRIDINE HYDROCHLORIDE 100 MG: 100 TABLET ORAL at 01:15

## 2022-08-02 RX ADMIN — Medication 5000 UNITS: at 08:00

## 2022-08-02 RX ADMIN — INSULIN LISPRO 8 UNITS: 100 INJECTION, SOLUTION INTRAVENOUS; SUBCUTANEOUS at 16:34

## 2022-08-02 RX ADMIN — LOSARTAN POTASSIUM 25 MG: 25 TABLET, FILM COATED ORAL at 08:00

## 2022-08-02 RX ADMIN — ENOXAPARIN SODIUM 40 MG: 40 INJECTION SUBCUTANEOUS at 08:00

## 2022-08-02 RX ADMIN — Medication 5 MG: at 01:15

## 2022-08-02 RX ADMIN — Medication 1 TABLET: at 08:00

## 2022-08-02 RX ADMIN — THIAMINE HCL TAB 100 MG 100 MG: 100 TAB at 08:00

## 2022-08-02 RX ADMIN — Medication 10 ML: at 20:15

## 2022-08-02 RX ADMIN — INSULIN LISPRO 2 UNITS: 100 INJECTION, SOLUTION INTRAVENOUS; SUBCUTANEOUS at 08:00

## 2022-08-02 NOTE — CASE MANAGEMENT/SOCIAL WORK
Discharge Planning Assessment  [facility]     Patient Name: Crsitian Watt  MRN: [id number]  Today's Date: 8/2/2022    Admit Date: 8/1/2022     Discharge Needs Assessment     Row Name 08/02/22 1433       Living Environment    People in Home alone    Unique Family Situation Lives alone in [place]    Current Living Arrangements condominium    Primary Care Provided by self    Provides Primary Care For no one    Caregiving Concerns Reports has  that assists with dressing, meals, shopping and household duties.    Family Caregiver if Needed other (see comments)-unknown    Quality of Family Relationships unable to assess-no family currently at bedside    Able to Return to Prior Arrangements other (see comments)    Living Arrangement Comments PT/OT recommending short term rehab prior to returning home       Resource/Environmental Concerns    Resource/Environmental Concerns none    Transportation Concerns none       Transition Planning    Patient/Family Anticipates Transition to inpatient rehabilitation facility    Patient/Family Anticipated Services at Transition ;skilled nursing    Transportation Anticipated other (see comments)-TBD       Discharge Needs Assessment    Readmission Within the Last 30 Days no previous admission in last 30 days    Current Outpatient/Agency/Support Group homecare agency    Equipment Currently Used at Home walker, rolling    Concerns to be Addressed discharge planning    Concerns Comments Case Management following for all needs, will facilitate transfer to SNF for rehab    Outpatient/Agency/Support Group Needs homecare agency;skilled nursing facility    Discharge Facility/Level of Care Needs nursing facility, skilled    Patient's Choice of Community Agency(s) [facility]    Current Discharge Risk lives alone    Discharge Coordination/Progress Anticipate transfer to SNF once bed secured               Discharge Plan     Row Name 08/02/22 1439       Plan     Plan SNF    Patient/Family in Agreement with Plan yes    Plan Comments Met with patient at bedside to discuss discharge planning-aware PT/OT recommending short term rehab prior to returning home and patient agreeable.  Would like transfer to [facility].  Referral made to Gabo/Dominique Urias, facility Liaison.    Final Discharge Disposition Code 03 - skilled nursing facility (SNF)              Continued Care and Services - Admitted Since 8/1/2022    Coordination has not been started for this encounter.     Selected Continued Care - Prior Encounters Includes selections from prior encounters from 5/3/2022 to 8/2/2022    Discharged on 5/29/2022 Admission date: 5/26/2022 - Discharge disposition: Home-Health Care c    Home Medical Care     Service Provider Selected Services Address Phone Fax Patient Preferred    [facility]  Home Rehabilitation [address] [address] [phone] --                       Demographic Summary     Row Name 08/02/22 2897       General Information    Referral Source admission list    Reason for Consult discharge planning    Preferred Language English               Functional Status    No documentation.                Psychosocial    No documentation.                Abuse/Neglect    No documentation.                Legal    No documentation.                Substance Abuse    No documentation.                Patient Forms    No documentation.                   JASMINE Lerma

## 2022-08-02 NOTE — PLAN OF CARE
Goal Outcome Evaluation:           Progress: no change  Outcome Evaluation: VSS on room air, patient did not sleep except for maybe 30 minutes, multiple trips to toilet, (15 trips), patient is 1-2 assist with a walker, unsteady gait. Episodes of stool incontinence noted, pt unaware that he was going. IV fluids infusing.

## 2022-08-02 NOTE — PROGRESS NOTES
[facility] Medicine Services  PROGRESS NOTE    Patient Name: Cristian Watt  : 1945  MRN: [id number]    Date of Admission: 2022  Primary Care Physician: Provider, No Known    Subjective   Subjective     CC:  Back pain    HPI:  Mr. Watt 76-year-old gentleman reports his back pain is improving today.  I talked with his nurse who states that he is getting up frequently to go to the restroom.  His sodium is dropping with the normal saline so I talked to him stopping the fluids he was resistant to this but when I explained to him the decreasing sodium and his frequency of urination and high blood pressure may all be complicated by the fluids that he was in agreement.  Otherwise he is inconsistent as far as his goals.  He wants to go home but he also wants to go to a skilled nursing facility.  I encouraged him to stay for physical therapy and Occupational Therapy assessment.  Also offered him a nicotine patch to try to make him more comfortable which he declined.  Also inquired about COPD history as his lungs sound dysfunctional.  He is also a smoker.  However he reports he has been checked for this in the past and that has been negative.    ROS:  Gen- No fevers, chills  CV- No chest pain, palpitations  Resp- No cough, dyspnea  GI- No N/V/D, abd pain      Objective   Objective     Vital Signs:   Temp:  [98 °F (36.7 °C)-98.3 °F (36.8 °C)] 98 °F (36.7 °C)  Heart Rate:  [85-98] 96  Resp:  [16-18] 16  BP: ()/() 150/104  Flow (L/min):  [2] 2     Physical Exam:  Constitutional: No acute distress, awake, alert  HENT: NCAT, mucous membranes moist  Respiratory: Coarse bilateral breath sounds bibasilar Rales, respiratory effort normal   Cardiovascular: RRR, no murmurs, rubs, or gallops  Gastrointestinal: Positive bowel sounds, soft, nontender, nondistended  Musculoskeletal: No bilateral ankle edema  Psychiatric: Appropriate affect, cooperative  Neurologic: Oriented x 3,  speech clear  Skin: No rashes      Results Reviewed:  LAB RESULTS:      Lab 08/02/22  0709 08/01/22  0803   WBC 10.45 10.18   HEMOGLOBIN 15.2 16.4   HEMATOCRIT 44.4 48.2   PLATELETS 327 324   NEUTROS ABS  --  7.06*   IMMATURE GRANS (ABS)  --  0.06*   LYMPHS ABS  --  2.04   MONOS ABS  --  0.90   EOS ABS  --  0.06   MCV 91.7 92.0         Lab 08/01/22  1508 08/01/22  0803   SODIUM  --  129*   POTASSIUM  --  4.2   CHLORIDE  --  94*   CO2  --  27.0   ANION GAP  --  8.0   BUN  --  6*   CREATININE  --  0.59*   EGFR  --  100.6   GLUCOSE  --  280*   CALCIUM  --  8.9   HEMOGLOBIN A1C 9.90*  --          Lab 08/01/22  0803   TOTAL PROTEIN 6.9   ALBUMIN 3.40*   GLOBULIN 3.5   ALT (SGPT) 7   AST (SGOT) 10   BILIRUBIN 0.5   ALK PHOS 111                     Brief Urine Lab Results  (Last result in the past 365 days)      Color   Clarity   Blood   Leuk Est   Nitrite   Protein   CREAT   Urine HCG        08/01/22 1016 Yellow   Clear   Trace   Moderate (2+)   Negative   Negative                 Microbiology Results Abnormal     Procedure Component Value - Date/Time    COVID PRE-OP / PRE-PROCEDURE SCREENING ORDER (NO ISOLATION) - Swab, Nasopharynx [[id number]]  (Normal) Collected: 08/01/22 1307    Lab Status: Final result Specimen: Swab from Nasopharynx Updated: 08/01/22 1408    Narrative:      The following orders were created for panel order COVID PRE-OP / PRE-PROCEDURE SCREENING ORDER (NO ISOLATION) - Swab, Nasopharynx.  Procedure                               Abnormality         Status                     ---------                               -----------         ------                     COVID-19, ABBOTT IN-HOUS...[[id number]]  Normal              Final result                 Please view results for these tests on the individual orders.    COVID-19, ABBOTT IN-HOUSE,NASAL Swab (NO TRANSPORT MEDIA) 2 HR TAT - Swab, Nasopharynx [[id number]]  (Normal) Collected: 08/01/22 1307    Lab Status: Final result Specimen: Swab from Nasopharynx  Updated: 08/01/22 1408     COVID19 Presumptive Negative    Narrative:      Fact sheet for providers: https://www.fda.gov/media/814533/download     Fact sheet for patients: https://www.fda.gov/media/516339/download    Test performed by PCR.  Fact sheet for providers: https://www.fda.gov/media/044076/download     Fact sheet for patients: https://www.[employer].gov/media/945064/download    Test performed by PCR.  If inconsistent with clinical signs and symptoms patient should be tested with different authorized molecular test.          CT Thoracic Spine Without Contrast    Result Date: 8/1/2022  DATE OF EXAM: 8/1/2022 7:47 AM  PROCEDURE: CT PELVIS WO CONTRAST-, CT THORACIC SPINE WO CONTRAST-, CT LUMBAR SPINE WO CONTRAST-  INDICATIONS: fall. bilateral hip pain.  COMPARISON: Thoracic spine CT 5/15/2013, lumbar spine MRI 5/27/2022, CT lumbar spine 5/25/2022  TECHNIQUE: Routine transaxial slices were obtained through the thoracic spine, lumbar spine, and pelvis without the administration of intravenous contrast. Reconstructed coronal and sagittal images were also obtained. Automated exposure control and iterative construction methods were used.  The radiation dose reduction device was turned on for each scan per the ALARA (As Low as Reasonably Achievable) protocol.  FINDINGS:  The bones are diffusely demineralized, limiting assessment for occult nondisplaced fractures.  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 There are hypoplastic ribs at L1, in addition to the usual 12 rib-bearing thoracic vertebral bodies. Please note that this numbering convention differs from prior imaging reports, as well as operative report of prior kyphoplasty. Given this numbering convention, there is redemonstration of prior kyphoplasty of L1 and L4 (previously labeled T12 and L3 on prior imaging reports and operative report).  Regarding  the thoracic spine, there is normal spinal alignment without significant spondylolisthesis or evidence of traumatic subluxation. No abnormal interfacet widening. There is a chronic, severe compression deformity of the T9 vertebral body (series 5 image 44) with approximately 80% height loss with mild protrusion of the posterior vertebral body into the ventral epidural space resulting in at least mild spinal canal stenosis, without evidence of severe bony retropulsion or high-grade/severe spinal canal narrowing at this level. This compression deformity was present on localizer images of prior lumbar spine MRI from 5/27/2022. There is mild superior central endplate height loss of T12 (series 5 image 45) compatible with age indeterminant Schmorl's node; this appears likely new from lumbar spine MRI from 5/27/2022 although the superior endplate of T12 is only partially imaged on that exam, excluded from the field-of-view on prior CT of the lumbar spine from 5/25/2022. Otherwise no evidence of acute fracture in the thoracic spine. Remaining vertebral body heights are preserved. There are chronic deformities of the posterior left 9th, 10th, and 11th ribs. There is no CT evidence of high-grade/severe spinal canal stenosis. There is mostly mild multilevel degenerative disc disease throughout the thoracic spine. There is likely moderate bilateral neuroforaminal narrowing at T9-T10. The paravertebral soft tissues are unremarkable. There are chronic interstitial lung changes with subpleural cystic change/fibrosis. Mildly patulous thoracic esophagus. There is a punctate nonobstructing stone in the right kidney with otherwise unremarkable appearance of the partially imaged upper abdomen.  Regarding the lumbar spine, there is redemonstration of kyphoplasty change at L1 and L4 (previously designated T12 and L3; please see above description of numbering convention/lumbosacral transitional anatomy). Similar spinal alignment with  minimal smooth rightward curvature of the lumbar spine centered on L4. 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 There has been interval worsening of L2 compression deformity, previously seen as isolated inferior endplate concavity and now appearing as new fracture involvement and height loss of the superior endplate (series 5 image 45) with interval worsening of L2 vertebral height loss, now with approximately 60% central height loss, previously 20-30%. There is no appreciable bony retropulsion or posterior cortex protrusion; no evidence of high-grade/severe spinal canal stenosis at this level. Otherwise the vertebral bodies are unchanged from prior CT, without additional findings to suggest acute or worsening fractures. There is similar moderate height loss of L1 with kyphoplasty change. There is similar mild height loss of L4 with kyphoplasty change. There is similar multilevel degenerative changes of the lumbar spine characterized by moderate to severe degenerative disc disease in the lower lumbar spine, and mild facet arthropathy in the lower lumbar spine. There is mild ligament flavum hypertrophy in the lower lumbar spine. There is no high-grade/severe spinal canal stenosis per CT, noting that this is better assessed with MRI. The paravertebral soft tissues appear unremarkable. Partially imaged portions of the posterior abdomen and pelvis demonstrate atherosclerosis of the abdominal aorta without aneurysm, as well as sigmoid colonic diverticulosis.  Pelvis: No acute fracture or traumatic malalignment. 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 There are mild-to-moderate osteoarthritic changes of the hip joints which appear congruent.  The pubic symphysis is congruent with mild degenerative change. The sacroiliac joints are congruent with mild osteoarthritic change. There is no evidence of sacral fracture. Pelvic soft tissues appear unremarkable without conspicuous subcutaneous fat stranding or superficial or intrapelvic hematoma. No acute findings within the pelvis. There is evidence of sigmoid colonic diverticulosis without diverticulitis. There are a few surgical clips in the region of the left inguinal canal.      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 There are hypoplastic ribs at L1, in addition to the usual 12 rib-bearing thoracic vertebral bodies. Please note that this numbering convention differs from prior imaging reports, as well as operative report of prior kyphoplasty. Given this numbering convention, there is redemonstration of prior kyphoplasty of L1 and L4 (previously labeled T12 and L3 on prior imaging reports and operative report).  Interval worsening of compression deformity of the L2 vertebral body compression fracture (previously L1 on prior reports) now with moderate height loss and fracture extension to the superior endplate. No significant bony retropulsion.  Mild superior central endplate height loss of T12 which appears likely new from most recent MRI from 5/27/2022, compatible with age-indeterminate Schmorl's node/central endplate compression fracture.  Grossly unchanged appearance of chronic compression deformities and kyphoplasty change of L1 and L4 (previously designated T12 and L3).  There is a chronic severe compression deformity of T9.  No acute osseous findings in the pelvis.  Degenerative changes as above.  This report was finalized on 8/1/2022 10:02 AM by Garrett Borrero MD.      CT Lumbar Spine Without Contrast    Result Date: 8/1/2022  DATE OF EXAM: 8/1/2022 7:47 AM  PROCEDURE: CT PELVIS WO CONTRAST-, CT  THORACIC SPINE WO CONTRAST-, CT LUMBAR SPINE WO CONTRAST-  INDICATIONS: fall. bilateral hip pain.  COMPARISON: Thoracic spine CT 5/15/2013, lumbar spine MRI 5/27/2022, CT lumbar spine 5/25/2022  TECHNIQUE: Routine transaxial slices were obtained through the thoracic spine, lumbar spine, and pelvis without the administration of intravenous contrast. Reconstructed coronal and sagittal images were also obtained. Automated exposure control and iterative construction methods were used.  The radiation dose reduction device was turned on for each scan per the ALARA (As Low as Reasonably Achievable) protocol.  FINDINGS:  The bones are diffusely demineralized, limiting assessment for occult nondisplaced fractures.  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 There are hypoplastic ribs at L1, in addition to the usual 12 rib-bearing thoracic vertebral bodies. Please note that this numbering convention differs from prior imaging reports, as well as operative report of prior kyphoplasty. Given this numbering convention, there is redemonstration of prior kyphoplasty of L1 and L4 (previously labeled T12 and L3 on prior imaging reports and operative report).  Regarding the thoracic spine, there is normal spinal alignment without significant spondylolisthesis or evidence of traumatic subluxation. No abnormal interfacet widening. There is a chronic, severe compression deformity of the T9 vertebral body (series 5 image 44) with approximately 80% height loss with mild protrusion of the posterior vertebral body into the ventral epidural space resulting in at least mild spinal canal stenosis, without evidence of severe bony retropulsion or high-grade/severe spinal canal narrowing at this level. This compression deformity was present on localizer images of prior lumbar spine MRI from 5/27/2022. There is mild superior central  endplate height loss of T12 (series 5 image 45) compatible with age indeterminant Schmorl's node; this appears likely new from lumbar spine MRI from 5/27/2022 although the superior endplate of T12 is only partially imaged on that exam, excluded from the field-of-view on prior CT of the lumbar spine from 5/25/2022. Otherwise no evidence of acute fracture in the thoracic spine. Remaining vertebral body heights are preserved. There are chronic deformities of the posterior left 9th, 10th, and 11th ribs. There is no CT evidence of high-grade/severe spinal canal stenosis. There is mostly mild multilevel degenerative disc disease throughout the thoracic spine. There is likely moderate bilateral neuroforaminal narrowing at T9-T10. The paravertebral soft tissues are unremarkable. There are chronic interstitial lung changes with subpleural cystic change/fibrosis. Mildly patulous thoracic esophagus. There is a punctate nonobstructing stone in the right kidney with otherwise unremarkable appearance of the partially imaged upper abdomen.  Regarding the lumbar spine, there is redemonstration of kyphoplasty change at L1 and L4 (previously designated T12 and L3; please see above description of numbering convention/lumbosacral transitional anatomy). Similar spinal alignment with minimal smooth rightward curvature of the lumbar spine centered on L4. 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 There has been interval worsening of L2 compression deformity, previously seen as isolated inferior endplate concavity and now appearing as new fracture involvement and height loss of the superior endplate (series 5 image 45) with interval worsening of L2 vertebral height loss, now with approximately 60% central height loss, previously 20-30%. There is no appreciable bony retropulsion or posterior cortex protrusion; no evidence of  high-grade/severe spinal canal stenosis at this level. Otherwise the vertebral bodies are unchanged from prior CT, without additional findings to suggest acute or worsening fractures. There is similar moderate height loss of L1 with kyphoplasty change. There is similar mild height loss of L4 with kyphoplasty change. There is similar multilevel degenerative changes of the lumbar spine characterized by moderate to severe degenerative disc disease in the lower lumbar spine, and mild facet arthropathy in the lower lumbar spine. There is mild ligament flavum hypertrophy in the lower lumbar spine. There is no high-grade/severe spinal canal stenosis per CT, noting that this is better assessed with MRI. The paravertebral soft tissues appear unremarkable. Partially imaged portions of the posterior abdomen and pelvis demonstrate atherosclerosis of the abdominal aorta without aneurysm, as well as sigmoid colonic diverticulosis.  Pelvis: No acute fracture or traumatic malalignment. 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 There are mild-to-moderate osteoarthritic changes of the hip joints which appear congruent. The pubic symphysis is congruent with mild degenerative change. The sacroiliac joints are congruent with mild osteoarthritic change. There is no evidence of sacral fracture. Pelvic soft tissues appear unremarkable without conspicuous subcutaneous fat stranding or superficial or intrapelvic hematoma. No acute findings within the pelvis. There is evidence of sigmoid colonic diverticulosis without diverticulitis. There are a few surgical clips in the region of the left inguinal canal.      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 There are hypoplastic ribs  at L1, in addition to the usual 12 rib-bearing thoracic vertebral bodies. Please note that this numbering convention differs from prior imaging reports, as well as operative report of prior kyphoplasty. Given this numbering convention, there is redemonstration of prior kyphoplasty of L1 and L4 (previously labeled T12 and L3 on prior imaging reports and operative report).  Interval worsening of compression deformity of the L2 vertebral body compression fracture (previously L1 on prior reports) now with moderate height loss and fracture extension to the superior endplate. No significant bony retropulsion.  Mild superior central endplate height loss of T12 which appears likely new from most recent MRI from 5/27/2022, compatible with age-indeterminate Schmorl's node/central endplate compression fracture.  Grossly unchanged appearance of chronic compression deformities and kyphoplasty change of L1 and L4 (previously designated T12 and L3).  There is a chronic severe compression deformity of T9.  No acute osseous findings in the pelvis.  Degenerative changes as above.  This report was finalized on 8/1/2022 10:02 AM by Garrett Borrero MD.      CT Pelvis Without Contrast    Result Date: 8/1/2022  DATE OF EXAM: 8/1/2022 7:47 AM  PROCEDURE: CT PELVIS WO CONTRAST-, CT THORACIC SPINE WO CONTRAST-, CT LUMBAR SPINE WO CONTRAST-  INDICATIONS: fall. bilateral hip pain.  COMPARISON: Thoracic spine CT 5/15/2013, lumbar spine MRI 5/27/2022, CT lumbar spine 5/25/2022  TECHNIQUE: Routine transaxial slices were obtained through the thoracic spine, lumbar spine, and pelvis without the administration of intravenous contrast. Reconstructed coronal and sagittal images were also obtained. Automated exposure control and iterative construction methods were used.  The radiation dose reduction device was turned on for each scan per the ALARA (As Low as Reasonably Achievable) protocol.  FINDINGS:  The bones are diffusely demineralized, limiting  assessment for occult nondisplaced fractures.  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 There are hypoplastic ribs at L1, in addition to the usual 12 rib-bearing thoracic vertebral bodies. Please note that this numbering convention differs from prior imaging reports, as well as operative report of prior kyphoplasty. Given this numbering convention, there is redemonstration of prior kyphoplasty of L1 and L4 (previously labeled T12 and L3 on prior imaging reports and operative report).  Regarding the thoracic spine, there is normal spinal alignment without significant spondylolisthesis or evidence of traumatic subluxation. No abnormal interfacet widening. There is a chronic, severe compression deformity of the T9 vertebral body (series 5 image 44) with approximately 80% height loss with mild protrusion of the posterior vertebral body into the ventral epidural space resulting in at least mild spinal canal stenosis, without evidence of severe bony retropulsion or high-grade/severe spinal canal narrowing at this level. This compression deformity was present on localizer images of prior lumbar spine MRI from 5/27/2022. There is mild superior central endplate height loss of T12 (series 5 image 45) compatible with age indeterminant Schmorl's node; this appears likely new from lumbar spine MRI from 5/27/2022 although the superior endplate of T12 is only partially imaged on that exam, excluded from the field-of-view on prior CT of the lumbar spine from 5/25/2022. Otherwise no evidence of acute fracture in the thoracic spine. Remaining vertebral body heights are preserved. There are chronic deformities of the posterior left 9th, 10th, and 11th ribs. There is no CT evidence of high-grade/severe spinal canal stenosis. There is mostly mild multilevel degenerative disc disease throughout the thoracic spine. There  is likely moderate bilateral neuroforaminal narrowing at T9-T10. The paravertebral soft tissues are unremarkable. There are chronic interstitial lung changes with subpleural cystic change/fibrosis. Mildly patulous thoracic esophagus. There is a punctate nonobstructing stone in the right kidney with otherwise unremarkable appearance of the partially imaged upper abdomen.  Regarding the lumbar spine, there is redemonstration of kyphoplasty change at L1 and L4 (previously designated T12 and L3; please see above description of numbering convention/lumbosacral transitional anatomy). Similar spinal alignment with minimal smooth rightward curvature of the lumbar spine centered on L4. 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 There has been interval worsening of L2 compression deformity, previously seen as isolated inferior endplate concavity and now appearing as new fracture involvement and height loss of the superior endplate (series 5 image 45) with interval worsening of L2 vertebral height loss, now with approximately 60% central height loss, previously 20-30%. There is no appreciable bony retropulsion or posterior cortex protrusion; no evidence of high-grade/severe spinal canal stenosis at this level. Otherwise the vertebral bodies are unchanged from prior CT, without additional findings to suggest acute or worsening fractures. There is similar moderate height loss of L1 with kyphoplasty change. There is similar mild height loss of L4 with kyphoplasty change. There is similar multilevel degenerative changes of the lumbar spine characterized by moderate to severe degenerative disc disease in the lower lumbar spine, and mild facet arthropathy in the lower lumbar spine. There is mild ligament flavum hypertrophy in the lower lumbar spine. There is no high-grade/severe spinal canal stenosis per CT, noting that  this is better assessed with MRI. The paravertebral soft tissues appear unremarkable. Partially imaged portions of the posterior abdomen and pelvis demonstrate atherosclerosis of the abdominal aorta without aneurysm, as well as sigmoid colonic diverticulosis.  Pelvis: No acute fracture or traumatic malalignment. 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 There are mild-to-moderate osteoarthritic changes of the hip joints which appear congruent. The pubic symphysis is congruent with mild degenerative change. The sacroiliac joints are congruent with mild osteoarthritic change. There is no evidence of sacral fracture. Pelvic soft tissues appear unremarkable without conspicuous subcutaneous fat stranding or superficial or intrapelvic hematoma. No acute findings within the pelvis. There is evidence of sigmoid colonic diverticulosis without diverticulitis. There are a few surgical clips in the region of the left inguinal canal.      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 There are hypoplastic ribs at L1, in addition to the usual 12 rib-bearing thoracic vertebral bodies. Please note that this numbering convention differs from prior imaging reports, as well as operative report of prior kyphoplasty. Given this numbering convention, there is redemonstration of prior kyphoplasty of L1 and L4 (previously labeled T12 and L3 on prior imaging reports and operative report).  Interval worsening of compression deformity of the L2 vertebral body compression fracture (previously L1 on prior reports) now with moderate height loss and fracture extension to the superior endplate. No significant bony retropulsion.  Mild superior central endplate height loss of T12 which  appears likely new from most recent MRI from 5/27/2022, compatible with age-indeterminate Schmorl's node/central endplate compression fracture.  Grossly unchanged appearance of chronic compression deformities and kyphoplasty change of L1 and L4 (previously designated T12 and L3).  There is a chronic severe compression deformity of T9.  No acute osseous findings in the pelvis.  Degenerative changes as above.  This report was finalized on 8/1/2022 10:02 AM by Garrett Borrero MD.      MRI Thoracic Spine Without Contrast    Result Date: 8/1/2022  DATE OF EXAM: 8/1/2022 9:08 PM  PROCEDURE: MRI THORACIC SPINE WO CONTRAST-  INDICATIONS: Compression fracture, thoracic; N39.0-Urinary tract infection, site not specified; W19.XXXA-Unspecified fall, initial encounter; R73.9-Hyperglycemia, unspecified; E87.8-Sscu-nrvelugbcv and hyponatremia; S32.000A-Wedge compression fracture of unspecified lumbar vertebra, initial encounter for closed fracture  COMPARISON: CT thoracic spine without contrast 08/01/[phone]  TECHNIQUE: Routine magnetic resonance imaging of the thoracic spine was performed without the administration of contrast.  FINDINGS: There is redemonstration of a severe compression fracture at the T9 level. There is mild retropulsion of the posterior vertebral body by 5 mm into the right paracentral ventral epidural space contributing to mild central canal stenosis. There is 80% central height loss.  There is a superior endplate mild compression fracture at T12 with 20% central height loss. No retropulsed fracture fragment at T12. Kyphoplasty noted at L1. Negative for paraspinal fluid collection. Within limits of technique there is no cord signal abnormality or myelomalacia. There is no significant canal or foraminal stenosis at T1-T8.  At the T9-10 level there is mild retropulsion of the T9 posterior vertebral body into the canal asymmetric to the right paracentral region narrowing the right paracentral canal with  mild central canal stenosis. There is moderate right foraminal stenosis and mild to moderate left foraminal stenosis. Negative for canal stenosis or high-grade foraminal stenosis at T10-11, T11-12 or T12-L1.      Impression: 1. Severe compression fracture at T9 with 80% central height loss. Mild retropulsion of the superior endplate into the canal contributing to mild central canal stenosis most pronounced in the right paracentral region. 2. Mild 20% superior plate compression fracture at T12. 3. No thoracic cord myelomalacia.  This report was finalized on 8/1/2022 11:33 PM by Jono Teresa MD.      MRI Lumbar Spine Without Contrast    Result Date: 8/1/2022  Examination: MRI LUMBAR SPINE WO CONTRAST-  Date of Exam: 8/1/2022 9:09 PM  Indication: Compression fracture, lumbar; N39.0-Urinary tract infection, site not specified; W19.XXXA-Unspecified fall, initial encounter; R73.9-Hyperglycemia, unspecified; E87.1-Gady-tguqtylbku and hyponatremia; S32.000A-Wedge compression fracture of unspecified lumbar vertebra, initial encounter for closed fracture.  Comparison: CT lumbar spine 08/01/2022  Technique: Standard noncontrast MR pulse sequences of the lumbar spine were obtained.  Findings: There is transitional anatomy as noted on recent CT exam with lumbarized S1 segment. For the purposes of this dictation the last disc space is labeled as S1-S2 in keeping with numbering from recent CT. Prior kyphoplasty at the L1 and L4 levels. There is a moderate 60% compression fracture at L2 . There is no retropulsed fracture fragment. No additional new fracture involving the lumbar spine.  There is mild grade 1 anterolisthesis of S1 on S2 related to pars defects at S1 which measures 5 mm. The posterior spinous processes are intact. The conus medullaris terminates at the L1-2 level. There is a cyst at the right kidney measuring 19 mm. Negative for hydronephrosis. Schmorl's node at the superior plate of L5. Mild superior endplate height  loss at S1 related to chronic height loss. No paraspinal fluid collection.  L1-2: There is no disc bulge or retropulsed fracture fragment. Negative for canal or foraminal stenosis.  L2-3: There is a minimal posterior central disc osteophyte complex. Mild asymmetric left facet arthritis. Negative for canal stenosis. Mild bilateral foraminal stenosis.  L3-4: There is no significant disc bulge. Mild facet arthritis. Negative for canal or foraminal stenosis.  L4-5: There is a mild broad-based disc bulge. Mild facet arthritis. Negative for canal stenosis. Mild right foraminal stenosis. Moderate left foraminal stenosis.  L5-S1: There is a mild disc bulge. Mild bilateral facet arthritis. Negative for canal stenosis. Moderate right foraminal stenosis. Mild left foraminal stenosis.  S1-2: There is anterolisthesis of S1 on S2 with uncovering of the disc. Facet joints are normally aligned. Moderate bilateral foraminal stenosis.      Impression: 1. Transitional lumbosacral anatomy, recommend confirmation of vertebral body numbering if operative intervention planned in the future. 2. Moderate 60% central compression fracture at L2. No retropulsed fracture fragment. 3. Chronic compression fractures at L1 and L4 status post kyphoplasty. 4. Chronic pars defects at S1 with mild grade 1 anterolisthesis of S1 on S2 unchanged. 5. No high-grade central canal stenosis. Multilevel mild to moderate foraminal stenosis as described in detail above.  This report was finalized on 8/1/2022 11:01 PM by Jono Teresa MD.            I have reviewed the medications:  Scheduled Meds:cefTRIAXone, 1 g, Intravenous, Q24H  enoxaparin, 40 mg, Subcutaneous, Daily  folic acid-vit B6-vit B12, 1 tablet, Oral, Daily  insulin lispro, 0-9 Units, Subcutaneous, TID AC  sodium chloride, 10 mL, Intravenous, Q12H  thiamine, 100 mg, Oral, Daily  vitamin D3, 5,000 Units, Oral, Daily      Continuous Infusions:   PRN Meds:.•  acetaminophen  •  dextrose  •  dextrose  •   glucagon (human recombinant)  •  ketorolac  •  melatonin  •  phenazopyridine  •  [COMPLETED] Insert peripheral IV **AND** sodium chloride  •  sodium chloride    Assessment & Plan   Assessment & Plan     Active Hospital Problems    Diagnosis  POA   • Acute UTI [N39.0]  Yes   • Intractable low back pain [M54.59]  Yes   • Compression fracture of L3 vertebra ([facility]) [S32.030A]  Yes   • Compression fracture of T12 vertebra ([facility]) [S22.080A]  Yes   • Cigarette nicotine dependence with nicotine-induced disorder [F17.219]  Yes   • Disc degeneration, lumbar [M51.36]  Yes   • Uncontrolled type 2 diabetes mellitus with hyperglycemia ([facility]) [E11.65]  Yes      Resolved Hospital Problems   No resolved problems to display.        Brief Hospital Course to date:  Cristian Watt is a 76 y.o. male admitted with back pain found to have new T11-and L2 fractures.  Offered neurosurgery which he has declined.  Awaiting PT OT recommendations.    T11 compression fracture  L2 compression fracture  -Per neurosurgery okay to follow-up with them outpatient and if necessary they can do kyphoplasty  Hypertension  -This is currently uncontrolled with his hypertension and his diabetes I think he benefits from losartan I will start that.  Tobacco abuse  -Declines a nicotine patch  Diabetes mellitus type 2 uncontrolled  -Previously he was started on metformin at the time of discharge which he has no recollection of.  I could try this again I suppose.  Urinary tract infection acute uncomplicated  -Is on ceftriaxone will transition to Keflex if discharged    Expected Discharge Location and Transportation: Skilled nursing facility versus home Realistically I think that Mr. Watt has poor insight into his medical conditions.  Probably he would benefit from being in some assisted living facility that would help him with his appointments, medications, and also to with his cleanliness.  When I spoke with him at admission he reported he had not been showered  in several days and that he is unable to shower.  I feel this is not only a hygiene issue but also a patient's safety issue and that assisted living if not skilled nursing will be necessary for the patient    Expected Discharge Date: 8/3/2022    DVT prophylaxis:  Medical DVT prophylaxis orders are present.     AM-PAC 6 Clicks Score (PT): 17 (08/01/22 1216)    CODE STATUS:   Code Status and Medical Interventions:   Ordered at: 08/01/22 1229     Level Of Support Discussed With:    Patient     Code Status (Patient has no pulse and is not breathing):    CPR (Attempt to Resuscitate)     Medical Interventions (Patient has pulse or is breathing):    Full Support       Alberto Martinez,   08/02/22

## 2022-08-02 NOTE — PLAN OF CARE
Goal Outcome Evaluation:  Plan of Care Reviewed With: patient        Progress: no change  Outcome Evaluation: PT eval performed: Patient presenting with pain T11-L2 fxs, decline in functional mobility.  min-A for bed mobility with education on log roll.  Min-A for transfers due to pain.  Pt able to ambulate 55', CGA, FWW with slow, antalgic gait, cues for posture and 1 seat rest break needed.  Pt lives alone and has 12 steps to enter.  Recommend SNF at d/c

## 2022-08-02 NOTE — CASE MANAGEMENT/SOCIAL WORK
Continued Stay Note  [facility]     Patient Name: Cristian Watt  MRN: [id number]  Today's Date: 8/2/2022    Admit Date: 8/1/2022     Discharge Plan     Row Name 08/02/22 1545       Plan    Plan SNF    Patient/Family in Agreement with Plan yes    Plan Comments [facility] has accepted Mr. Watt and will have a bed for him on Thursday.  Discussed with patient at bedside and he is agreeable.  Anticipate transfer to SNF on Thursday, transportation to be determined.  Mitra Clark, Ext. [phone]    Final Discharge Disposition Code 03 - skilled nursing facility (SNF)    Row Name 08/02/22 1439       Plan    Plan SNF    Patient/Family in Agreement with Plan yes    Plan Comments Met with patient at bedside to discuss discharge planning-aware PT/OT recommending short term rehab prior to returning home and patient agreeable.  Would like transfer to [facility].  Referral made to Gabo/Dominique Urias, facility Liaison.    Final Discharge Disposition Code 03 - skilled nursing facility (SNF)               Discharge Codes    No documentation.                     JASMINE Lerma

## 2022-08-02 NOTE — PLAN OF CARE
Problem: Adult Inpatient Plan of Care  Goal: Plan of Care Review  Outcome: Ongoing, Progressing  Flowsheets (Taken 8/2/2022 1570)  Outcome Evaluation: VSS, RA. Pt up to bathroom multiple times, multiple episodes of bowel incontinence as well. Diabetes education consult placed. Plan for DC to SNF. Continue to monitor   Goal Outcome Evaluation:              Outcome Evaluation: VSS, RA. Pt up to bathroom multiple times, multiple episodes of bowel incontinence as well. Diabetes education consult placed. Plan for DC to SNF. Continue to monitor

## 2022-08-02 NOTE — PROGRESS NOTES
"NEUROSURGERY PROGRESS NOTE     LOS: 1 day   Patient Care Team:  Provider, No Known as PCP - General    Chief Complaint: Diffuse back pain.    Interval History:   Patient Complaints: Ongoing back pain that is worse with walking.  Patient Denies: Leg pain, weakness, numbness.    Review of Systems:   Musculoskeletal and Neurological systems were reviewed and are negative except for:  Musculoskeletal: positive for back pain.    Vital Signs: Blood pressure (!) 150/104, pulse 96, temperature 98 °F (36.7 °C), temperature source Oral, resp. rate 16, height 182.9 cm (72\"), weight 85.9 kg (189 lb 6 oz), SpO2 94 %.  Intake/Output:     Intake/Output Summary (Last 24 hours) at 8/2/2022 0704  Last data filed at 8/1/2022 1902  Gross per 24 hour   Intake 1580 ml   Output --   Net 1580 ml       Physical Exam:  The patient awakens easily.  He follows all commands.  His speech is fluent.  There is no tenderness to palpation in his mid back or neck.  There is thoracolumbar and lumbar tenderness to palpation.  Sensation is intact to light touch testing in his legs.  Strength and tone are normal in his lower extremities.     Data Review:    MRI of the lumbar spine demonstrates changes related to prior kyphoplasty at T12 and L3.  There is a new fracture of L2.  There is a low-grade listhesis of L5 on S1.    MRI of the thoracic spine demonstrates a small upper endplate fracture of T11.  There is a more chronic appearing severe compression fracture of T8.  Of note the patient has transitional vertebral anatomy and the radiologist has labeled these fractures somewhat differently.  My labeling is consistent with his prior labeling at the time of his kyphoplasty.    Assessment/Plan:  1.  New T11 and L2 compression fractures.  2.  Diminished bone density with a history of vertebral compression fractures status post prior kyphoplasty.  3.  Disposition: The patient is very adamant that he wants to go home.  That is reasonable if his pain is " controlled.  He will follow-up with [facility] in my office in approximately 3 weeks.  If he is not doing better then we will need to consider kyphoplasty at T11 and L2.      Ruddy Zepeda MD  08/02/22  07:04 EDT

## 2022-08-02 NOTE — THERAPY EVALUATION
Patient Name: Cristian Watt  : 1945    MRN: [id number]                              Today's Date: 2022       Admit Date: 2022    Visit Dx:     ICD-10-CM ICD-9-CM   1. Acute UTI  N39.0 599.0   2. Fall, initial encounter  W19.XXXA E888.9   3. Hyperglycemia  R73.9 790.29   4. Hyponatremia  E87.1 276.1   5. Compression fracture of lumbar vertebra, unspecified lumbar vertebral level, initial encounter ([facility])  S32.000A 805.4     Patient Active Problem List   Diagnosis   • C. difficile colitis   • Disc degeneration, lumbar   • Osteoarthritis   • Uncontrolled type 2 diabetes mellitus with hyperglycemia ([facility])   • Hypertension   • Diarrhea   • Mixed hyperlipidemia   • Diabetic autonomic neuropathy associated with type 2 diabetes mellitus ([facility])   • Episode of recurrent major depressive disorder ([facility])   • Cigarette nicotine dependence with nicotine-induced disorder   • Compression fracture of L3 vertebra ([facility])   • Compression fracture of T12 vertebra ([facility])   • Mood disorder ([facility])   • Chronic bilateral low back pain   • Constipation   • Intractable low back pain   • Acute UTI     Past Medical History:   Diagnosis Date   • Depression    • Diabetes mellitus ([facility])    • Disc degeneration, lumbar    • Emphysema of lung ([facility])    • Generalized anxiety disorder    • GERD (gastroesophageal reflux disease)    • Hyperlipidemia    • Hypertension    • Hypogonadism male    • Osteoarthritis    • Type 2 diabetes mellitus, uncontrolled    • Vitamin D deficiency      Past Surgical History:   Procedure Laterality Date   • HERNIA REPAIR      Inguinal   • KNEE ARTHROPLASTY     • KYPHOPLASTY N/A 2021    Procedure: KYPHOPLASTY L3 AND T12;  Surgeon: Ruddy Zepeda MD;  Location: [facility];  Service: Neurosurgery;  Laterality: N/A;      General Information     Row Name 22 1132          Physical Therapy Time and Intention    Document Type evaluation  -KG     Mode of Treatment physical therapy  -KG     Row Name 22  1132          General Information    Patient Profile Reviewed yes  -KG     Prior Level of Function independent:;all household mobility;transfer;min assist:;ADL's;dressing;dependent:;home management;cooking;cleaning;driving  pt reports he ambulates with a walker indep at home, has a caregiver that comes for a short period daily to assist with dressing, meals, household management.   he has not asked her to assist with bathing,  2 months since any attempts at bathing  -KG     Existing Precautions/Restrictions spinal;other (see comments)  T11-L2 fx  -KG     Barriers to Rehab previous functional deficit  -KG     Row Name 08/02/22 1132          Living Environment    People in Home alone  -KG     Row Name 08/02/22 1132          Home Main Entrance    Number of Stairs, Main Entrance twelve  -KG     Stair Railings, Main Entrance railing on left side (ascending)  -KG     Row Name 08/02/22 1132          Stairs Within Home, Primary    Number of Stairs, Within Home, Primary none  -KG     Row Name 08/02/22 1132          Cognition    Orientation Status (Cognition) oriented x 3  -KG     Row Name 08/02/22 1132          Safety Issues, Functional Mobility    Safety Issues Affecting Function (Mobility) insight into deficits/self-awareness;positioning of assistive device;safety precaution awareness;safety precautions follow-through/compliance  -KG     Impairments Affecting Function (Mobility) balance;endurance/activity tolerance;pain;strength  -KG           User Key  (r) = Recorded By, (t) = Taken By, (c) = Cosigned By    Initials Name Provider Type    KG Myrtle Mccray Physical Therapist               Mobility     Row Name 08/02/22 1138          Bed Mobility    Bed Mobility sidelying-sit-sidelying  -KG     Sidelying-Sit-Sidelying [place] (Bed Mobility) minimum assist (75% patient effort);1 person assist  -KG     Assistive Device (Bed Mobility) head of bed elevated;bed rails  -KG     Comment, (Bed Mobility) min-A for bed  mobility, painful, cues for log roll technique  -KG     Row Name 08/02/22 1138          Transfers    Comment, (Transfers) cues safe HP, min-A STS  -KG     Row Name 08/02/22 1138          Sit-Stand Transfer    Sit-Stand [place] (Transfers) minimum assist (75% patient effort);1 person assist  -KG     Assistive Device (Sit-Stand Transfers) walker, front-wheeled  -KG     Row Name 08/02/22 1138          Gait/Stairs (Locomotion)    [place] Level (Gait) contact guard  -KG     Assistive Device (Gait) walker, front-wheeled  -KG     Distance in Feet (Gait) 55  -KG     Deviations/Abnormal Patterns (Gait) gait speed decreased;antalgic  -KG     Bilateral Gait Deviations forward flexed posture;heel strike decreased  -KG     Comment, (Gait/Stairs) Pt able to ambulate 55', CGA, FWW with slow, antalgic gait, cues for posture and 1 seat rest break needed  -KG           User Key  (r) = Recorded By, (t) = Taken By, (c) = Cosigned By    Initials Name Provider Type    KG Myrtle Mccray Physical Therapist               Obj/Interventions     Row Name 08/02/22 1141          Strength Comprehensive (MMT)    General Manual Muscle Testing (MMT) Assessment lower extremity strength deficits identified  -KG     Comment, General Manual Muscle Testing (MMT) Assessment 4-/5 BLE  -KG     Row Name 08/02/22 1141          Motor Skills    Therapeutic Exercise knee;ankle  -KG     Row Name 08/02/22 1141          Knee (Therapeutic Exercise)    Knee (Therapeutic Exercise) strengthening exercise;isometric exercises  -KG     Knee Isometrics (Therapeutic Exercise) bilateral;quad sets;10 repetitions  -KG     Knee Strengthening (Therapeutic Exercise) bilateral;heel slides;LAQ (long arc quad);10 repetitions  -KG     Row Name 08/02/22 1141          Ankle (Therapeutic Exercise)    Ankle (Therapeutic Exercise) AROM (active range of motion)  -KG     Ankle AROM (Therapeutic Exercise) bilateral;dorsiflexion;plantarflexion;10 repetitions  -KG     [facility] Name  08/02/22 1141          Balance    Balance Assessment standing dynamic balance  -KG     Dynamic Standing Balance contact guard  -KG     Position/Device Used, Standing Balance supported;walker, front-wheeled  -KG     Balance Interventions standing;sit to stand;weight shifting activity  -KG           User Key  (r) = Recorded By, (t) = Taken By, (c) = Cosigned By    Initials Name Provider Type    BRANDO Myrtle Mccray Physical Therapist               Goals/Plan     Row Name 08/02/22 1146          Bed Mobility Goal 1 (PT)    Activity/Assistive Device (Bed Mobility Goal 1, PT) sidelying to sit/sit to sidelying  -KG     [place] Level/Cues Needed (Bed Mobility Goal 1, PT) contact guard required  -KG     Time Frame (Bed Mobility Goal 1, PT) short term goal (STG);3 days  -KG     Progress/Outcomes (Bed Mobility Goal 1, PT) continuing progress toward goal  -KG     Row Name 08/02/22 1146          Transfer Goal 1 (PT)    Activity/Assistive Device (Transfer Goal 1, PT) sit-to-stand/stand-to-sit;bed-to-chair/chair-to-bed  -KG     [place] Level/Cues Needed (Transfer Goal 1, PT) contact guard required  -KG     Time Frame (Transfer Goal 1, PT) short term goal (STG);3 days  -KG     Progress/Outcome (Transfer Goal 1, PT) continuing progress toward goal  -KG     Row Name 08/02/22 1146          Gait Training Goal 1 (PT)    Activity/Assistive Device (Gait Training Goal 1, PT) gait (walking locomotion);walker, rolling  -KG     [place] Level (Gait Training Goal 1, PT) supervision required  -KG     Distance (Gait Training Goal 1, PT) 130  -KG     Time Frame (Gait Training Goal 1, PT) long term goal (LTG);10 days  -KG     Progress/Outcome (Gait Training Goal 1, PT) continuing progress toward goal  -KG           User Key  (r) = Recorded By, (t) = Taken By, (c) = Cosigned By    Initials Name Provider Type    BRANDO Myrtle Mccray Physical Therapist               Clinical Impression     Row Name 08/02/22 1144          Pain     Pretreatment Pain Rating 9/10  -KG     Posttreatment Pain Rating 9/10  -KG     Pain Location - back  -KG     Pain Intervention(s) Repositioned;Ambulation/increased activity  -KG     Row Name 08/02/22 1144          Plan of Care Review    Plan of Care Reviewed With patient  -KG     Progress no change  -KG     Outcome Evaluation PT eval performed: Patient presenting with pain T11-L2 fxs, decline in functional mobility.  min-A for bed mobility with education on log roll.  Min-A for transfers due to pain.  Pt able to ambulate 55', CGA, FWW with slow, antalgic gait, cues for posture and 1 seat rest break needed.  Pt lives alone and has 12 steps to enter.  Recommend SNF at d/c  -KG     Row Name 08/02/22 1144          Therapy Assessment/Plan (PT)    Rehab Potential (PT) good, to achieve stated therapy goals  -KG     Criteria for Skilled Interventions Met (PT) yes;meets criteria;skilled treatment is necessary  -KG     Therapy Frequency (PT) daily  -KG     Row Name 08/02/22 1144          Positioning and Restraints    Pre-Treatment Position in bed  -KG     Post Treatment Position bed  -KG     In Bed notified nsg;fowlers;call light within reach;encouraged to call for assist;exit alarm on  -KG           User Key  (r) = Recorded By, (t) = Taken By, (c) = Cosigned By    Initials Name Provider Type    KG Myrtle Mccray Physical Therapist               Outcome Measures     Row Name 08/02/22 1149 08/02/22 0800       How much help from another person do you currently need...    Turning from your back to your side while in flat bed without using bedrails? 3  -KG 3  -EN    Moving from lying on back to sitting on the side of a flat bed without bedrails? 3  -KG 3  -EN    Moving to and from a bed to a chair (including a wheelchair)? 3  -KG 3  -EN    Standing up from a chair using your arms (e.g., wheelchair, bedside chair)? 3  -KG 3  -EN    Climbing 3-5 steps with a railing? 2  -KG 2  -EN    To walk in hospital room? 3  -KG 3  -EN     -[facility] 6 Clicks Score (PT) 17  -KG 17  -EN    Highest level of mobility 5 --> Static standing  -KG 5 --> Static standing  -EN    Row Name 08/02/22 1149          Functional Assessment    Outcome Measure Options AM-PAC 6 Clicks Basic Mobility (PT)  -KG           User Key  (r) = Recorded By, (t) = Taken By, (c) = Cosigned By    Initials Name Provider Type    KG Myrtle Mccray Physical Therapist    EN Kary Harris RN Registered Nurse                             Physical Therapy Education                 Title: PT OT SLP Therapies (In Progress)     Topic: Physical Therapy (Done)     Point: Mobility training (Done)     Learning Progress Summary           Patient Acceptance, E,TB, VU,NR by KG at 8/2/2022 1150                   Point: Home exercise program (Done)     Learning Progress Summary           Patient Acceptance, E,TB, VU,NR by KG at 8/2/2022 1150                   Point: Body mechanics (Done)     Learning Progress Summary           Patient Acceptance, E,TB, VU,NR by KG at 8/2/2022 1150                   Point: Precautions (Done)     Learning Progress Summary           Patient Acceptance, E,TB, VU,NR by KG at 8/2/2022 1150                               User Key     Initials Effective Dates Name Provider Type Discipline    KG 06/16/21 -  Myrtle Mccray Physical Therapist PT              PT Recommendation and Plan     Plan of Care Reviewed With: patient  Progress: no change  Outcome Evaluation: PT eval performed: Patient presenting with pain T11-L2 fxs, decline in functional mobility.  min-A for bed mobility with education on log roll.  Min-A for transfers due to pain.  Pt able to ambulate 55', CGA, FWW with slow, antalgic gait, cues for posture and 1 seat rest break needed.  Pt lives alone and has 12 steps to enter.  Recommend SNF at d/c     Time Calculation:    PT Charges     Row Name 08/02/22 1152             Time Calculation    Start Time 0950  -KG      PT Received On 08/02/22  -KG      PT Goal Re-Cert  Due Date 08/12/22  -KG              Time Calculation- PT    Total Timed Code Minutes- PT 10 minute(s)  -KG              Timed Charges    99875 - PT Therapeutic Exercise Minutes 10  -KG              Untimed Charges    PT Eval/Re-eval Minutes 60  -KG              Total Minutes    Timed Charges Total Minutes 10  -KG      Untimed Charges Total Minutes 60  -KG       Total Minutes 70  -KG            User Key  (r) = Recorded By, (t) = Taken By, (c) = Cosigned By    Initials Name Provider Type    KG Myrtle Mccray Physical Therapist              Therapy Charges for Today     Code Description Service Date Service Provider Modifiers Qty    96575855483 HC PT THER PROC EA 15 MIN 8/2/2022 Myrtle Mccray GP 1    71705520091 HC PT EVAL LOW COMPLEXITY 4 8/2/2022 Myrtle Mccray GP 1          PT G-Codes  Outcome Measure Options: AM-PAC 6 Clicks Basic Mobility (PT)  AM-PAC 6 Clicks Score (PT): 17    Myrtle Mccray  8/2/2022

## 2022-08-03 ENCOUNTER — APPOINTMENT (OUTPATIENT)
Dept: GENERAL RADIOLOGY | Facility: HOSPITAL | Age: 77
End: 2022-08-03

## 2022-08-03 LAB
ANION GAP SERPL CALCULATED.3IONS-SCNC: 9 MMOL/L (ref 5–15)
BUN SERPL-MCNC: 6 MG/DL (ref 8–23)
BUN/CREAT SERPL: 13.6 (ref 7–25)
CALCIUM SPEC-SCNC: 9 MG/DL (ref 8.6–10.5)
CHLORIDE SERPL-SCNC: 96 MMOL/L (ref 98–107)
CO2 SERPL-SCNC: 27 MMOL/L (ref 22–29)
CREAT SERPL-MCNC: 0.44 MG/DL (ref 0.76–1.27)
EGFRCR SERPLBLD CKD-EPI 2021: 109.9 ML/MIN/1.73
GLUCOSE BLDC GLUCOMTR-MCNC: 144 MG/DL (ref 70–130)
GLUCOSE BLDC GLUCOMTR-MCNC: 162 MG/DL (ref 70–130)
GLUCOSE BLDC GLUCOMTR-MCNC: 244 MG/DL (ref 70–130)
GLUCOSE SERPL-MCNC: 138 MG/DL (ref 65–99)
POTASSIUM SERPL-SCNC: 4.1 MMOL/L (ref 3.5–5.2)
SODIUM SERPL-SCNC: 132 MMOL/L (ref 136–145)

## 2022-08-03 PROCEDURE — 80048 BASIC METABOLIC PNL TOTAL CA: CPT | Performed by: INTERNAL MEDICINE

## 2022-08-03 PROCEDURE — 99232 SBSQ HOSP IP/OBS MODERATE 35: CPT | Performed by: INTERNAL MEDICINE

## 2022-08-03 PROCEDURE — 25010000002 CEFTRIAXONE PER 250 MG: Performed by: INTERNAL MEDICINE

## 2022-08-03 PROCEDURE — 97165 OT EVAL LOW COMPLEX 30 MIN: CPT

## 2022-08-03 PROCEDURE — 25010000002 ENOXAPARIN PER 10 MG: Performed by: INTERNAL MEDICINE

## 2022-08-03 PROCEDURE — 82962 GLUCOSE BLOOD TEST: CPT

## 2022-08-03 PROCEDURE — 63710000001 INSULIN LISPRO (HUMAN) PER 5 UNITS: Performed by: INTERNAL MEDICINE

## 2022-08-03 PROCEDURE — 71045 X-RAY EXAM CHEST 1 VIEW: CPT

## 2022-08-03 RX ORDER — CHOLECALCIFEROL (VITAMIN D3) 125 MCG
5 CAPSULE ORAL NIGHTLY
Status: DISCONTINUED | OUTPATIENT
Start: 2022-08-03 | End: 2022-08-04 | Stop reason: HOSPADM

## 2022-08-03 RX ORDER — LOSARTAN POTASSIUM 50 MG/1
50 TABLET ORAL
Status: DISCONTINUED | OUTPATIENT
Start: 2022-08-03 | End: 2022-08-04 | Stop reason: HOSPADM

## 2022-08-03 RX ADMIN — PHENAZOPYRIDINE HYDROCHLORIDE 100 MG: 100 TABLET ORAL at 14:52

## 2022-08-03 RX ADMIN — SODIUM CHLORIDE 1 G: 900 INJECTION INTRAVENOUS at 12:41

## 2022-08-03 RX ADMIN — INSULIN LISPRO 2 UNITS: 100 INJECTION, SOLUTION INTRAVENOUS; SUBCUTANEOUS at 09:16

## 2022-08-03 RX ADMIN — Medication 5000 UNITS: at 09:15

## 2022-08-03 RX ADMIN — Medication 10 ML: at 20:07

## 2022-08-03 RX ADMIN — INSULIN LISPRO 4 UNITS: 100 INJECTION, SOLUTION INTRAVENOUS; SUBCUTANEOUS at 17:48

## 2022-08-03 RX ADMIN — LOSARTAN POTASSIUM 50 MG: 50 TABLET, FILM COATED ORAL at 09:15

## 2022-08-03 RX ADMIN — Medication 5 MG: at 20:06

## 2022-08-03 RX ADMIN — Medication 1 TABLET: at 12:40

## 2022-08-03 RX ADMIN — ENOXAPARIN SODIUM 40 MG: 40 INJECTION SUBCUTANEOUS at 09:15

## 2022-08-03 RX ADMIN — THIAMINE HCL TAB 100 MG 100 MG: 100 TAB at 09:15

## 2022-08-03 NOTE — THERAPY EVALUATION
Patient Name: Cristian Watt  : 1945    MRN: [id number]                              Today's Date: 8/3/2022       Admit Date: 2022    Visit Dx:     ICD-10-CM ICD-9-CM   1. Acute UTI  N39.0 599.0   2. Fall, initial encounter  W19.XXXA E888.9   3. Hyperglycemia  R73.9 790.29   4. Hyponatremia  E87.1 276.1   5. Compression fracture of lumbar vertebra, unspecified lumbar vertebral level, initial encounter (Formerly [facility])  S32.000A 805.4     Patient Active Problem List   Diagnosis   • C. difficile colitis   • Disc degeneration, lumbar   • Osteoarthritis   • Uncontrolled type 2 diabetes mellitus with hyperglycemia (Formerly [facility])   • Hypertension   • Diarrhea   • Mixed hyperlipidemia   • Diabetic autonomic neuropathy associated with type 2 diabetes mellitus (Formerly [facility])   • Episode of recurrent major depressive disorder (Formerly [facility])   • Cigarette nicotine dependence with nicotine-induced disorder   • Compression fracture of L3 vertebra (Formerly [facility])   • Compression fracture of T12 vertebra (Formerly [facility])   • Mood disorder (Formerly [facility])   • Chronic bilateral low back pain   • Constipation   • Intractable low back pain   • Acute UTI     Past Medical History:   Diagnosis Date   • Depression    • Diabetes mellitus (Formerly [facility])    • Disc degeneration, lumbar    • Emphysema of lung (Formerly [facility])    • Generalized anxiety disorder    • GERD (gastroesophageal reflux disease)    • Hyperlipidemia    • Hypertension    • Hypogonadism male    • Osteoarthritis    • Type 2 diabetes mellitus, uncontrolled    • Vitamin D deficiency      Past Surgical History:   Procedure Laterality Date   • HERNIA REPAIR      Inguinal   • KNEE ARTHROPLASTY     • KYPHOPLASTY N/A 2021    Procedure: KYPHOPLASTY L3 AND T12;  Surgeon: Ruddy Zepeda MD;  Location[facility];  Service: Neurosurgery;  Laterality: N/A;      General Information     Row Name 22 0941          OT Time and Intention    Document Type evaluation  -KF     Mode of Treatment occupational therapy  -KF     Row Name 22 0941           General Information    Patient Profile Reviewed yes  -KF     Prior Level of Function independent:;transfer;bed mobility;all household mobility;min assist:;ADL's;dressing;bathing;max assist:;home management  with FWW; caregiver daily for ADL/home management needs PRN  -KF     Existing Precautions/Restrictions fall;spinal  -KF     Barriers to Rehab medically complex;previous functional deficit  -KF     Row Name 08/03/22 0941          Occupational Profile    Environmental Supports and Barriers (Occupational Profile) WI shower with shower chair  -     Row Name 08/03/22 0941          Living Environment    People in Home alone  -     Row Name 08/03/22 0941          Home Main Entrance    Number of Stairs, Main Entrance twelve  -KF     Stair Railings, Main Entrance railings safe and in good condition  -     Row Name 08/03/22 0941          Stairs Within Home, Primary    Number of Stairs, Within Home, Primary none  -     Row Name 08/03/22 0941          Cognition    Orientation Status (Cognition) oriented x 3;verbal cues/prompts needed for orientation;time  -     Row Name 08/03/22 0941          Safety Issues, Functional Mobility    Safety Issues Affecting Function (Mobility) awareness of need for assistance;insight into deficits/self-awareness;safety precaution awareness  -KF     Impairments Affecting Function (Mobility) balance;endurance/activity tolerance;pain;strength;range of motion (ROM)  -KF           User Key  (r) = Recorded By, (t) = Taken By, (c) = Cosigned By    Initials Name Provider Type    KF Karis Haq OT Occupational Therapist                 Mobility/ADL's     Row Name 08/03/22 0942          Bed Mobility    Bed Mobility sidelying-sit-sidelying  -KF     Sidelying-Sit-Sidelying [place] (Bed Mobility) contact guard;verbal cues  -KF     Bed Mobility, Safety Issues impaired trunk control for bed mobility  -KF     Assistive Device (Bed Mobility) bed rails  -KF     Comment, (Bed Mobility)  min Vc's for sequencing for logroll technique  -     Row Name 08/03/22 0942          Transfers    Transfers sit-stand transfer;stand-sit transfer  -     Comment, (Transfers) cues for hand placement minimally  -KF     Sit-Stand [place] (Transfers) contact guard  -     Stand-Sit [place] (Transfers) contact guard;verbal cues  -KF     [facility] Name 08/03/22 0942          Sit-Stand Transfer    Assistive Device (Sit-Stand Transfers) walker, front-wheeled  -KF     Row Name 08/03/22 0942          Stand-Sit Transfer    Assistive Device (Stand-Sit Transfers) walker, front-wheeled  -KF     [facility] Name 08/03/22 0942          Functional Mobility    Functional Mobility- Ind. Level contact guard assist  -     Functional Mobility- Device walker, front-wheeled  -     Functional Mobility-Distance (Feet) 16  -     Functional Mobility- Safety Issues step length decreased;weight-shifting ability decreased  -     Functional Mobility- Comment no pain reported throughout however demonstrates fatigue post  -[facility] Name 08/03/22 0942          Activities of Daily Living    BADL Assessment/Intervention lower body dressing;feeding;upper body dressing  -KF     Row Name 08/03/22 0942          Lower Body Dressing Assessment/Training    [place] Level (Lower Body Dressing) don;doff;socks;minimum assist (75% patient effort)  -     Position (Lower Body Dressing) edge of bed sitting  -     Comment, (Lower Body Dressing) cues for sequencing using crossing LEs technique with good ability and AROM  -KF     Row Name 08/03/22 0942          Self-Feeding Assessment/Training    [place] Level (Feeding) liquids to mouth;set up  -     Position (Self-Feeding) edge of bed sitting  -[facility] Name 08/03/22 0942          Upper Body Dressing Assessment/Training    [place] Level (Upper Body Dressing) don;front opening garment;set up  -KF     Position (Upper Body Dressing) edge of bed sitting  -           User Key  (r) =  Recorded By, (t) = Taken By, (c) = Cosigned By    Initials Name Provider Type    Karis Cisneros OT Occupational Therapist               Obj/Interventions     Row Name 08/03/22 0945          Sensory Assessment (Somatosensory)    Sensory Assessment (Somatosensory) UE sensation intact  -[facility] Name 08/03/22 0945          Vision Assessment/Intervention    Visual Impairment/Limitations WFL;corrective lenses full-time  -[facility] Name 08/03/22 0945          Range of Motion Comprehensive    General Range of Motion bilateral upper extremity ROM WFL  -[facility] Name 08/03/22 0945          Strength Comprehensive (MMT)    General Manual Muscle Testing (MMT) Assessment upper extremity strength deficits identified  -KF     Comment, General Manual Muscle Testing (MMT) Assessment BUE  grossly 4+/5, demonstrates WFL for ADL completion BUE deferred MMT 2/2 spinal precautions at this time  -[facility] Name 08/03/22 0945          Motor Skills    Motor Skills coordination  -     Coordination WFL;upper extremity;bilateral;bimanual skills  -[facility] Name 08/03/22 0945          Balance    Balance Assessment sitting static balance;sitting dynamic balance;standing static balance;standing dynamic balance  -KF     Static Sitting Balance supervision  -KF     Dynamic Sitting Balance supervision  -KF     Position, Sitting Balance sitting edge of bed  -KF     Static Standing Balance contact guard  -KF     Dynamic Standing Balance contact guard  -KF     Position/Device Used, Standing Balance walker, rolling  -KF     Balance Interventions standing;weight shifting activity;minimal challenge;sit to stand;supported  -KF     Comment, Balance overall no LOB with FWW for support throughout in standing  -KF           User Key  (r) = Recorded By, (t) = Taken By, (c) = Cosigned By    Initials Name Provider Type    Karis Cisneros OT Occupational Therapist               Goals/Plan     Row Name 08/03/22 0949          Bed Mobility  Goal 1 (OT)    Activity/Assistive Device (Bed Mobility Goal 1, OT) sidelying to sit/sit to sidelying  -KF     [place] Level/Cues Needed (Bed Mobility Goal 1, OT) standby assist  -KF     Time Frame (Bed Mobility Goal 1, OT) long term goal (LTG);10 days  -KF     Progress/Outcomes (Bed Mobility Goal 1, OT) new goal  -KF     Row Name 08/03/22 0949          Transfer Goal 1 (OT)    Activity/Assistive Device (Transfer Goal 1, OT) toilet;walker, rolling  -KF     [place] Level/Cues Needed (Transfer Goal 1, OT) supervision required  -KF     Time Frame (Transfer Goal 1, OT) long term goal (LTG);10 days  -KF     Progress/Outcome (Transfer Goal 1, OT) new goal  -KF     Row Name 08/03/22 0949          Bathing Goal 1 (OT)    Activity/Device (Bathing Goal 1, OT) lower body bathing  AE PRN  -KF     [place] Level/Cues Needed (Bathing Goal 1, OT) set-up required  -KF     Time Frame (Bathing Goal 1, OT) long term goal (LTG);10 days  -KF     Progress/Outcomes (Bathing Goal 1, OT) new goal  -KF     Row Name 08/03/22 0949          Grooming Goal 1 (OT)    Activity/Device (Grooming Goal 1, OT) hair care;oral care;wash face, hands  standing supported sink side  -KF     [place] (Grooming Goal 1, OT) supervision required  -KF     Time Frame (Grooming Goal 1, OT) long term goal (LTG);10 days  -KF     Progress/Outcome (Grooming Goal 1, OT) new goal  -KF     Row Name 08/03/22 0949          Therapy Assessment/Plan (OT)    Planned Therapy Interventions (OT) activity tolerance training;patient/caregiver education/training;BADL retraining;adaptive equipment training;cognitive/visual perception retraining;occupation/activity based interventions;strengthening exercise;transfer/mobility retraining;functional balance retraining;ROM/therapeutic exercise  -KF           User Key  (r) = Recorded By, (t) = Taken By, (c) = Cosigned By    Initials Name Provider Type    KF Karis Haq, OT Occupational Therapist               Clinical  Impression     Row Name 08/03/22 0946          Pain Assessment    Pretreatment Pain Rating 0/10 - no pain  -KF     Posttreatment Pain Rating 0/10 - no pain  -KF     Pre/Posttreatment Pain Comment tolerated  -KF     Pain Intervention(s) Repositioned;Ambulation/increased activity  -     Row Name 08/03/22 0946          Plan of Care Review    Plan of Care Reviewed With patient  -KF     Progress improving  -KF     Outcome Evaluation OT eval completed Pt presents with deficits in AROM and activity tolerance compared to baseline completion of ADLs, demonstrates setup UBD, setup self-feeding EOB seated, Bret cue to required cues for sequencing for spinal precautions for bed mobility, recom IPOT d/c IRF  -KF     Row Name 08/03/22 0946          Therapy Assessment/Plan (OT)    Rehab Potential (OT) good, to achieve stated therapy goals  -     Criteria for Skilled Therapeutic Interventions Met (OT) yes;meets criteria;skilled treatment is necessary  -KF     Therapy Frequency (OT) daily  -KF     Row Name 08/03/22 0946          Therapy Plan Review/Discharge Plan (OT)    Anticipated Discharge Disposition (OT) inpatient rehabilitation facility  -     Row Name 08/03/22 0946          Vital Signs    Pre Systolic BP Rehab 156  RN cleared VSS no dizziness throughout  -KF     Pre Treatment Diastolic BP 97  -KF     Pre SpO2 (%) 95  -KF     O2 Delivery Pre Treatment room air  -KF     Post SpO2 (%) 95  -KF     O2 Delivery Post Treatment room air  -KF     Pre Patient Position Supine  -KF     Post Patient Position Supine  -KF     Rest Breaks  1  -KF     Row Name 08/03/22 0946          Positioning and Restraints    Pre-Treatment Position in bed  -KF     Post Treatment Position bed  -KF     In Bed notified nsg;supine;fowlers;call light within reach;encouraged to call for assist;exit alarm on;patient within staff view;legs elevated  -KF           User Key  (r) = Recorded By, (t) = Taken By, (c) = Cosigned By    Initials Name Provider Type     Karis Cisneros OT Occupational Therapist               Outcome Measures     Row Name 08/03/22 0951          How much help from another is currently needed...    Putting on and taking off regular lower body clothing? 3  -KF     Bathing (including washing, rinsing, and drying) 3  -KF     Toileting (which includes using toilet bed pan or urinal) 3  -KF     Putting on and taking off regular upper body clothing 4  -KF     Taking care of personal grooming (such as brushing teeth) 3  -KF     Eating meals 4  -KF     AM-PAC 6 Clicks Score (OT) 20  -KF     Row Name 08/03/22 0951          Functional Assessment    Outcome Measure Options AM-PAC 6 Clicks Daily Activity (OT)  -KF           User Key  (r) = Recorded By, (t) = Taken By, (c) = Cosigned By    Initials Name Provider Type    Karis Cisneros OT Occupational Therapist                Occupational Therapy Education                 Title: PT OT SLP Therapies (In Progress)     Topic: Occupational Therapy (In Progress)     Point: ADL training (Done)     Description:   Instruct learner(s) on proper safety adaptation and remediation techniques during self care or transfers.   Instruct in proper use of assistive devices.              Learning Progress Summary           Patient Acceptance, E,TB,D, VU,DU,NR by  at 8/3/2022 0951                   Point: Home exercise program (Not Started)     Description:   Instruct learner(s) on appropriate technique for monitoring, assisting and/or progressing therapeutic exercises/activities.              Learner Progress:  Not documented in this visit.          Point: Precautions (Done)     Description:   Instruct learner(s) on prescribed precautions during self-care and functional transfers.              Learning Progress Summary           Patient Acceptance, E,TB,D, VU,DU,NR by  at 8/3/2022 0951                   Point: Body mechanics (Done)     Description:   Instruct learner(s) on proper positioning and spine alignment  during self-care, functional mobility activities and/or exercises.              Learning Progress Summary           Patient Acceptance, E,TB,D, VU,DU,NR by  at 8/3/2022 0951                               User Key     Initials Effective Dates Name Provider Type Discipline     06/16/21 -  Karis Haq OT Occupational Therapist OT              OT Recommendation and Plan  Planned Therapy Interventions (OT): activity tolerance training, patient/caregiver education/training, BADL retraining, adaptive equipment training, cognitive/visual perception retraining, occupation/activity based interventions, strengthening exercise, transfer/mobility retraining, functional balance retraining, ROM/therapeutic exercise  Therapy Frequency (OT): daily  Plan of Care Review  Plan of Care Reviewed With: patient  Progress: improving  Outcome Evaluation: OT eval completed Pt presents with deficits in AROM and activity tolerance compared to baseline completion of ADLs, demonstrates setup UBD, setup self-feeding EOB seated, Bret cue to required cues for sequencing for spinal precautions for bed mobility, recom IPOT d/c IRF     Time Calculation:    Time Calculation- OT     Row Name 08/03/22 0910             Time Calculation- OT    OT Start Time 0910  -KF      OT Received On 08/03/22  -KF      OT Goal Re-Cert Due Date 08/13/22  -KF              Untimed Charges    OT Eval/Re-eval Minutes 48  -KF              Total Minutes    Untimed Charges Total Minutes 48  -KF       Total Minutes 48  -KF            User Key  (r) = Recorded By, (t) = Taken By, (c) = Cosigned By    Initials Name Provider Type     Karis Haq OT Occupational Therapist              Therapy Charges for Today     Code Description Service Date Service Provider Modifiers Qty    [id number]  OT EVAL LOW COMPLEXITY 4 8/3/2022 Karis Haq OT GO 1               Karis Haq OT  8/3/2022

## 2022-08-03 NOTE — PLAN OF CARE
Goal Outcome Evaluation:  Plan of Care Reviewed With: patient        Progress: no change    Pt has not c/o pain thus far. No reports of N/V. Very frequent urination (every hour or more). Some stool incontinence. Alert & oriented; Forgetful at times. Ambulates to bathroom with x1 assist and walker. No requests at this time.

## 2022-08-03 NOTE — CASE MANAGEMENT/SOCIAL WORK
Continued Stay Note  [facility]     Patient Name: Cristian Watt  MRN: [id number]  Today's Date: 8/3/2022    Admit Date: 8/1/2022     Discharge Plan     Row Name 08/03/22 1325       Plan    Plan SNF    Patient/Family in Agreement with Plan yes    Plan Comments Met with patient at bedside, remains agreeable to transfer to [facility] tomorrow for rehab.  Arrangements made for [facility] to transport at 3:30pm.  Mitra Clark, Ext. [phone]    Final Discharge Disposition Code 03 - skilled nursing facility (SNF)               Discharge Codes    No documentation.                     JASMINE Lerma

## 2022-08-03 NOTE — PLAN OF CARE
Problem: Adult Inpatient Plan of Care  Goal: Plan of Care Review  Outcome: Ongoing, Progressing  Goal: Patient-Specific Goal (Individualized)  Outcome: Ongoing, Progressing  Goal: Absence of Hospital-Acquired Illness or Injury  Outcome: Ongoing, Progressing  Intervention: Identify and Manage Fall Risk  Recent Flowsheet Documentation  Taken 8/3/2022 0800 by Ryan Copeland RN  Safety Promotion/Fall Prevention:   safety round/check completed   toileting scheduled   room organization consistent   nonskid shoes/slippers when out of bed   muscle strengthening facilitated   lighting adjusted   fall prevention program maintained   assistive device/personal items within reach  Intervention: Prevent Skin Injury  Recent Flowsheet Documentation  Taken 8/3/2022 0800 by Ryan Copeland RN  Body Position: position changed independently  Intervention: Prevent and Manage VTE (Venous Thromboembolism) Risk  Recent Flowsheet Documentation  Taken 8/3/2022 0800 by Ryan Copeland RN  Activity Management:   dorsiflexion/plantar flexion performed   activity adjusted per tolerance  Intervention: Prevent Infection  Recent Flowsheet Documentation  Taken 8/3/2022 0800 by Ryan Copeland RN  Infection Prevention:   visitors restricted/screened   single patient room provided   rest/sleep promoted   personal protective equipment utilized   hand hygiene promoted   equipment surfaces disinfected   environmental surveillance performed  Goal: Optimal Comfort and Wellbeing  Outcome: Ongoing, Progressing  Intervention: Monitor Pain and Promote Comfort  Recent Flowsheet Documentation  Taken 8/3/2022 0800 by Ryan Copeland RN  Pain Management Interventions: quiet environment facilitated  Goal: Readiness for Transition of Care  Outcome: Ongoing, Progressing   Goal Outcome Evaluation:

## 2022-08-03 NOTE — PLAN OF CARE
Goal Outcome Evaluation:  Plan of Care Reviewed With: patient        Progress: improving  Outcome Evaluation: OT eval completed Pt presents with deficits in AROM and activity tolerance compared to baseline completion of ADLs, demonstrates setup UBD, setup self-feeding EOB seated, Bret with required cues for sequencing for spinal precautions for bed mobility, recom IPOT d/c IRF

## 2022-08-03 NOTE — PROGRESS NOTES
[facility]  PROGRESS NOTE    Patient Name: Cristian Watt  : 1945  MRN: [id number]    Date of Admission: 2022  Primary Care Physician: Provider, No Known    Subjective   Subjective     CC:  Back pain    HPI:  Mr. Watt 76-year-old gentleman incontinence of stool and frequency of urination per nursing notes. [place] Thursday per CM note. PT rec SNF. Nocturnal hypoxia.    ROS:  Gen- No fevers, chills  CV- No chest pain, palpitations  Resp- No cough, dyspnea  GI- No N/V/D, abd pain      Objective   Objective     Vital Signs:   Temp:  [97.6 °F (36.4 °C)-98.6 °F (37 °C)] 98 °F (36.7 °C)  Heart Rate:  [] 84  Resp:  [18] 18  BP: (132-170)/(88-98) 162/98     Physical Exam:  Constitutional: No acute distress, awake, alert  HENT: NCAT, mucous membranes moist  Respiratory: Coarse bilateral breath sounds bibasilar Rales, respiratory effort normal   Cardiovascular: RRR, no murmurs, rubs, or gallops  Gastrointestinal: Positive bowel sounds, soft, nontender, nondistended  Musculoskeletal: No bilateral ankle edema  Psychiatric: Appropriate affect, cooperative  Neurologic: speech clear  Skin: No rashes      Results Reviewed:  LAB RESULTS:      Lab 22  0709 22  0803   WBC 10.45 10.18   HEMOGLOBIN 15.2 16.4   HEMATOCRIT 44.4 48.2   PLATELETS 327 324   NEUTROS ABS  --  7.06*   IMMATURE GRANS (ABS)  --  0.06*   LYMPHS ABS  --  2.04   MONOS ABS  --  0.90   EOS ABS  --  0.06   MCV 91.7 92.0         Lab 22  0709 22  1508 22  0803   SODIUM 131*  --  129*   POTASSIUM 4.2  --  4.2   CHLORIDE 96*  --  94*   CO2 27.0  --  27.0   ANION GAP 8.0  --  8.0   BUN 7*  --  6*   CREATININE 0.63*  --  0.59*   EGFR 98.6  --  100.6   GLUCOSE 194*  --  280*   CALCIUM 8.7  --  8.9   HEMOGLOBIN A1C  --  9.90*  --          Lab 22  0709 22  0803   TOTAL PROTEIN 6.2 6.9   ALBUMIN 3.30* 3.40*   GLOBULIN 2.9 3.5   ALT (SGPT) 6 7   AST (SGOT) 10 10    BILIRUBIN 0.5 0.5   ALK PHOS 117 111                     Brief Urine Lab Results  (Last result in the past 365 days)      Color   Clarity   Blood   Leuk Est   Nitrite   Protein   CREAT   Urine HCG        08/01/22 1016 Yellow   Clear   Trace   Moderate (2+)   Negative   Negative                 Microbiology Results Abnormal     Procedure Component Value - Date/Time    Urine Culture - Urine, Urine, Clean Catch [[id number]] Collected: 08/01/22 1016    Lab Status: Final result Specimen: Urine, Clean Catch Updated: 08/02/22 1048     Urine Culture >100,000 CFU/mL Normal Urogenital Maureen    Narrative:      Colonization of the urinary tract without infection is common. Treatment is discouraged unless the patient is symptomatic, pregnant, or undergoing an invasive urologic procedure.    COVID PRE-OP / PRE-PROCEDURE SCREENING ORDER (NO ISOLATION) - Swab, Nasopharynx [[id number]]  (Normal) Collected: 08/01/22 1307    Lab Status: Final result Specimen: Swab from Nasopharynx Updated: 08/01/22 1408    Narrative:      The following orders were created for panel order COVID PRE-OP / PRE-PROCEDURE SCREENING ORDER (NO ISOLATION) - Swab, Nasopharynx.  Procedure                               Abnormality         Status                     ---------                               -----------         ------                     COVID-19, [facility]BBOTT IN-HOUS...[[id number]]  Normal              Final result                 Please view results for these tests on the individual orders.    COVID-19, ABBOTT IN-HOUSE,NASAL Swab (NO TRANSPORT MEDIA) 2 HR TAT - Swab, Nasopharynx [[id number]]  (Normal) Collected: 08/01/22 1307    Lab Status: Final result Specimen: Swab from Nasopharynx Updated: 08/01/22 1408     COVID19 Presumptive Negative    Narrative:      Fact sheet for providers: https://www.fda.gov/media/292448/download     Fact sheet for patients: https://www.fda.gov/media/940024/download    Test performed by PCR.  Fact sheet for providers:  https://www.fda.gov/media/550247/download     Fact sheet for patients: https://www.fda.gov/media/138312/download    Test performed by PCR.  If inconsistent with clinical signs and symptoms patient should be tested with different authorized molecular test.          Adult Transthoracic Echo Complete W/ Cont if Necessary Per Protocol    Result Date: 8/2/2022  · Estimated left ventricular EF = 65% Left ventricular systolic function is normal. · Left ventricular diastolic function is consistent with (grade I) impaired relaxation.      CT Thoracic Spine Without Contrast    Result Date: 8/1/2022  DATE OF EXAM: 8/1/2022 7:47 AM  PROCEDURE: CT PELVIS WO CONTRAST-, CT THORACIC SPINE WO CONTRAST-, CT LUMBAR SPINE WO CONTRAST-  INDICATIONS: fall. bilateral hip pain.  COMPARISON: Thoracic spine CT 5/15/2013, lumbar spine MRI 5/27/2022, CT lumbar spine 5/25/2022  TECHNIQUE: Routine transaxial slices were obtained through the thoracic spine, lumbar spine, and pelvis without the administration of intravenous contrast. Reconstructed coronal and sagittal images were also obtained. Automated exposure control and iterative construction methods were used.  The radiation dose reduction device was turned on for each scan per the ALARA (As Low as Reasonably Achievable) protocol.  FINDINGS:  The bones are diffusely demineralized, limiting assessment for occult nondisplaced fractures.  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 There are hypoplastic ribs at L1, in addition to the usual 12 rib-bearing thoracic vertebral bodies. Please note that this numbering convention differs from prior imaging reports, as well as operative report of prior kyphoplasty. Given this numbering convention, there is redemonstration of prior kyphoplasty of L1 and L4 (previously labeled T12 and L3 on prior imaging reports and operative report).   Regarding the thoracic spine, there is normal spinal alignment without significant spondylolisthesis or evidence of traumatic subluxation. No abnormal interfacet widening. There is a chronic, severe compression deformity of the T9 vertebral body (series 5 image 44) with approximately 80% height loss with mild protrusion of the posterior vertebral body into the ventral epidural space resulting in at least mild spinal canal stenosis, without evidence of severe bony retropulsion or high-grade/severe spinal canal narrowing at this level. This compression deformity was present on localizer images of prior lumbar spine MRI from 5/27/2022. There is mild superior central endplate height loss of T12 (series 5 image 45) compatible with age indeterminant Schmorl's node; this appears likely new from lumbar spine MRI from 5/27/2022 although the superior endplate of T12 is only partially imaged on that exam, excluded from the field-of-view on prior CT of the lumbar spine from 5/25/2022. Otherwise no evidence of acute fracture in the thoracic spine. Remaining vertebral body heights are preserved. There are chronic deformities of the posterior left 9th, 10th, and 11th ribs. There is no CT evidence of high-grade/severe spinal canal stenosis. There is mostly mild multilevel degenerative disc disease throughout the thoracic spine. There is likely moderate bilateral neuroforaminal narrowing at T9-T10. The paravertebral soft tissues are unremarkable. There are chronic interstitial lung changes with subpleural cystic change/fibrosis. Mildly patulous thoracic esophagus. There is a punctate nonobstructing stone in the right kidney with otherwise unremarkable appearance of the partially imaged upper abdomen.  Regarding the lumbar spine, there is redemonstration of kyphoplasty change at L1 and L4 (previously designated T12 and L3; please see above description of numbering convention/lumbosacral transitional anatomy). Similar spinal alignment  with minimal smooth rightward curvature of the lumbar spine centered on L4. 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 There has been interval worsening of L2 compression deformity, previously seen as isolated inferior endplate concavity and now appearing as new fracture involvement and height loss of the superior endplate (series 5 image 45) with interval worsening of L2 vertebral height loss, now with approximately 60% central height loss, previously 20-30%. There is no appreciable bony retropulsion or posterior cortex protrusion; no evidence of high-grade/severe spinal canal stenosis at this level. Otherwise the vertebral bodies are unchanged from prior CT, without additional findings to suggest acute or worsening fractures. There is similar moderate height loss of L1 with kyphoplasty change. There is similar mild height loss of L4 with kyphoplasty change. There is similar multilevel degenerative changes of the lumbar spine characterized by moderate to severe degenerative disc disease in the lower lumbar spine, and mild facet arthropathy in the lower lumbar spine. There is mild ligament flavum hypertrophy in the lower lumbar spine. There is no high-grade/severe spinal canal stenosis per CT, noting that this is better assessed with MRI. The paravertebral soft tissues appear unremarkable. Partially imaged portions of the posterior abdomen and pelvis demonstrate atherosclerosis of the abdominal aorta without aneurysm, as well as sigmoid colonic diverticulosis.  Pelvis: No acute fracture or traumatic malalignment. 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 There are mild-to-moderate osteoarthritic changes of the hip joints which appear  congruent. The pubic symphysis is congruent with mild degenerative change. The sacroiliac joints are congruent with mild osteoarthritic change. There is no evidence of sacral fracture. Pelvic soft tissues appear unremarkable without conspicuous subcutaneous fat stranding or superficial or intrapelvic hematoma. No acute findings within the pelvis. There is evidence of sigmoid colonic diverticulosis without diverticulitis. There are a few surgical clips in the region of the left inguinal canal.      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 There are hypoplastic ribs at L1, in addition to the usual 12 rib-bearing thoracic vertebral bodies. Please note that this numbering convention differs from prior imaging reports, as well as operative report of prior kyphoplasty. Given this numbering convention, there is redemonstration of prior kyphoplasty of L1 and L4 (previously labeled T12 and L3 on prior imaging reports and operative report).  Interval worsening of compression deformity of the L2 vertebral body compression fracture (previously L1 on prior reports) now with moderate height loss and fracture extension to the superior endplate. No significant bony retropulsion.  Mild superior central endplate height loss of T12 which appears likely new from most recent MRI from 5/27/2022, compatible with age-indeterminate Schmorl's node/central endplate compression fracture.  Grossly unchanged appearance of chronic compression deformities and kyphoplasty change of L1 and L4 (previously designated T12 and L3).  There is a chronic severe compression deformity of T9.  No acute osseous findings in the pelvis.  Degenerative changes as above.  This report was finalized on 8/1/2022 10:02 AM by Garrett Borrero MD.      CT Lumbar Spine Without Contrast    Result Date: 8/1/2022  DATE OF EXAM: 8/1/2022 7:47 AM  PROCEDURE: CT PELVIS WO  CONTRAST-, CT THORACIC SPINE WO CONTRAST-, CT LUMBAR SPINE WO CONTRAST-  INDICATIONS: fall. bilateral hip pain.  COMPARISON: Thoracic spine CT 5/15/2013, lumbar spine MRI 5/27/2022, CT lumbar spine 5/25/2022  TECHNIQUE: Routine transaxial slices were obtained through the thoracic spine, lumbar spine, and pelvis without the administration of intravenous contrast. Reconstructed coronal and sagittal images were also obtained. Automated exposure control and iterative construction methods were used.  The radiation dose reduction device was turned on for each scan per the ALARA (As Low as Reasonably Achievable) protocol.  FINDINGS:  The bones are diffusely demineralized, limiting assessment for occult nondisplaced fractures.  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 There are hypoplastic ribs at L1, in addition to the usual 12 rib-bearing thoracic vertebral bodies. Please note that this numbering convention differs from prior imaging reports, as well as operative report of prior kyphoplasty. Given this numbering convention, there is redemonstration of prior kyphoplasty of L1 and L4 (previously labeled T12 and L3 on prior imaging reports and operative report).  Regarding the thoracic spine, there is normal spinal alignment without significant spondylolisthesis or evidence of traumatic subluxation. No abnormal interfacet widening. There is a chronic, severe compression deformity of the T9 vertebral body (series 5 image 44) with approximately 80% height loss with mild protrusion of the posterior vertebral body into the ventral epidural space resulting in at least mild spinal canal stenosis, without evidence of severe bony retropulsion or high-grade/severe spinal canal narrowing at this level. This compression deformity was present on localizer images of prior lumbar spine MRI from 5/27/2022. There is mild  superior central endplate height loss of T12 (series 5 image 45) compatible with age indeterminant Schmorl's node; this appears likely new from lumbar spine MRI from 5/27/2022 although the superior endplate of T12 is only partially imaged on that exam, excluded from the field-of-view on prior CT of the lumbar spine from 5/25/2022. Otherwise no evidence of acute fracture in the thoracic spine. Remaining vertebral body heights are preserved. There are chronic deformities of the posterior left 9th, 10th, and 11th ribs. There is no CT evidence of high-grade/severe spinal canal stenosis. There is mostly mild multilevel degenerative disc disease throughout the thoracic spine. There is likely moderate bilateral neuroforaminal narrowing at T9-T10. The paravertebral soft tissues are unremarkable. There are chronic interstitial lung changes with subpleural cystic change/fibrosis. Mildly patulous thoracic esophagus. There is a punctate nonobstructing stone in the right kidney with otherwise unremarkable appearance of the partially imaged upper abdomen.  Regarding the lumbar spine, there is redemonstration of kyphoplasty change at L1 and L4 (previously designated T12 and L3; please see above description of numbering convention/lumbosacral transitional anatomy). Similar spinal alignment with minimal smooth rightward curvature of the lumbar spine centered on L4. 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 There has been interval worsening of L2 compression deformity, previously seen as isolated inferior endplate concavity and now appearing as new fracture involvement and height loss of the superior endplate (series 5 image 45) with interval worsening of L2 vertebral height loss, now with approximately 60% central height loss, previously 20-30%. There is no appreciable bony retropulsion or posterior cortex protrusion;  no evidence of high-grade/severe spinal canal stenosis at this level. Otherwise the vertebral bodies are unchanged from prior CT, without additional findings to suggest acute or worsening fractures. There is similar moderate height loss of L1 with kyphoplasty change. There is similar mild height loss of L4 with kyphoplasty change. There is similar multilevel degenerative changes of the lumbar spine characterized by moderate to severe degenerative disc disease in the lower lumbar spine, and mild facet arthropathy in the lower lumbar spine. There is mild ligament flavum hypertrophy in the lower lumbar spine. There is no high-grade/severe spinal canal stenosis per CT, noting that this is better assessed with MRI. The paravertebral soft tissues appear unremarkable. Partially imaged portions of the posterior abdomen and pelvis demonstrate atherosclerosis of the abdominal aorta without aneurysm, as well as sigmoid colonic diverticulosis.  Pelvis: No acute fracture or traumatic malalignment. 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 There are mild-to-moderate osteoarthritic changes of the hip joints which appear congruent. The pubic symphysis is congruent with mild degenerative change. The sacroiliac joints are congruent with mild osteoarthritic change. There is no evidence of sacral fracture. Pelvic soft tissues appear unremarkable without conspicuous subcutaneous fat stranding or superficial or intrapelvic hematoma. No acute findings within the pelvis. There is evidence of sigmoid colonic diverticulosis without diverticulitis. There are a few surgical clips in the region of the left inguinal canal.      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 There are  hypoplastic ribs at L1, in addition to the usual 12 rib-bearing thoracic vertebral bodies. Please note that this numbering convention differs from prior imaging reports, as well as operative report of prior kyphoplasty. Given this numbering convention, there is redemonstration of prior kyphoplasty of L1 and L4 (previously labeled T12 and L3 on prior imaging reports and operative report).  Interval worsening of compression deformity of the L2 vertebral body compression fracture (previously L1 on prior reports) now with moderate height loss and fracture extension to the superior endplate. No significant bony retropulsion.  Mild superior central endplate height loss of T12 which appears likely new from most recent MRI from 5/27/2022, compatible with age-indeterminate Schmorl's node/central endplate compression fracture.  Grossly unchanged appearance of chronic compression deformities and kyphoplasty change of L1 and L4 (previously designated T12 and L3).  There is a chronic severe compression deformity of T9.  No acute osseous findings in the pelvis.  Degenerative changes as above.  This report was finalized on 8/1/2022 10:02 AM by Garrett Borrero MD.      CT Pelvis Without Contrast    Result Date: 8/1/2022  DATE OF EXAM: 8/1/2022 7:47 AM  PROCEDURE: CT PELVIS WO CONTRAST-, CT THORACIC SPINE WO CONTRAST-, CT LUMBAR SPINE WO CONTRAST-  INDICATIONS: fall. bilateral hip pain.  COMPARISON: Thoracic spine CT 5/15/2013, lumbar spine MRI 5/27/2022, CT lumbar spine 5/25/2022  TECHNIQUE: Routine transaxial slices were obtained through the thoracic spine, lumbar spine, and pelvis without the administration of intravenous contrast. Reconstructed coronal and sagittal images were also obtained. Automated exposure control and iterative construction methods were used.  The radiation dose reduction device was turned on for each scan per the ALARA (As Low as Reasonably Achievable) protocol.  FINDINGS:  The bones are diffusely  demineralized, limiting assessment for occult nondisplaced fractures.  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 There are hypoplastic ribs at L1, in addition to the usual 12 rib-bearing thoracic vertebral bodies. Please note that this numbering convention differs from prior imaging reports, as well as operative report of prior kyphoplasty. Given this numbering convention, there is redemonstration of prior kyphoplasty of L1 and L4 (previously labeled T12 and L3 on prior imaging reports and operative report).  Regarding the thoracic spine, there is normal spinal alignment without significant spondylolisthesis or evidence of traumatic subluxation. No abnormal interfacet widening. There is a chronic, severe compression deformity of the T9 vertebral body (series 5 image 44) with approximately 80% height loss with mild protrusion of the posterior vertebral body into the ventral epidural space resulting in at least mild spinal canal stenosis, without evidence of severe bony retropulsion or high-grade/severe spinal canal narrowing at this level. This compression deformity was present on localizer images of prior lumbar spine MRI from 5/27/2022. There is mild superior central endplate height loss of T12 (series 5 image 45) compatible with age indeterminant Schmorl's node; this appears likely new from lumbar spine MRI from 5/27/2022 although the superior endplate of T12 is only partially imaged on that exam, excluded from the field-of-view on prior CT of the lumbar spine from 5/25/2022. Otherwise no evidence of acute fracture in the thoracic spine. Remaining vertebral body heights are preserved. There are chronic deformities of the posterior left 9th, 10th, and 11th ribs. There is no CT evidence of high-grade/severe spinal canal stenosis. There is mostly mild multilevel degenerative disc disease throughout the  thoracic spine. There is likely moderate bilateral neuroforaminal narrowing at T9-T10. The paravertebral soft tissues are unremarkable. There are chronic interstitial lung changes with subpleural cystic change/fibrosis. Mildly patulous thoracic esophagus. There is a punctate nonobstructing stone in the right kidney with otherwise unremarkable appearance of the partially imaged upper abdomen.  Regarding the lumbar spine, there is redemonstration of kyphoplasty change at L1 and L4 (previously designated T12 and L3; please see above description of numbering convention/lumbosacral transitional anatomy). Similar spinal alignment with minimal smooth rightward curvature of the lumbar spine centered on L4. 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 There has been interval worsening of L2 compression deformity, previously seen as isolated inferior endplate concavity and now appearing as new fracture involvement and height loss of the superior endplate (series 5 image 45) with interval worsening of L2 vertebral height loss, now with approximately 60% central height loss, previously 20-30%. There is no appreciable bony retropulsion or posterior cortex protrusion; no evidence of high-grade/severe spinal canal stenosis at this level. Otherwise the vertebral bodies are unchanged from prior CT, without additional findings to suggest acute or worsening fractures. There is similar moderate height loss of L1 with kyphoplasty change. There is similar mild height loss of L4 with kyphoplasty change. There is similar multilevel degenerative changes of the lumbar spine characterized by moderate to severe degenerative disc disease in the lower lumbar spine, and mild facet arthropathy in the lower lumbar spine. There is mild ligament flavum hypertrophy in the lower lumbar spine. There is no high-grade/severe spinal canal  stenosis per CT, noting that this is better assessed with MRI. The paravertebral soft tissues appear unremarkable. Partially imaged portions of the posterior abdomen and pelvis demonstrate atherosclerosis of the abdominal aorta without aneurysm, as well as sigmoid colonic diverticulosis.  Pelvis: No acute fracture or traumatic malalignment. 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 There are mild-to-moderate osteoarthritic changes of the hip joints which appear congruent. The pubic symphysis is congruent with mild degenerative change. The sacroiliac joints are congruent with mild osteoarthritic change. There is no evidence of sacral fracture. Pelvic soft tissues appear unremarkable without conspicuous subcutaneous fat stranding or superficial or intrapelvic hematoma. No acute findings within the pelvis. There is evidence of sigmoid colonic diverticulosis without diverticulitis. There are a few surgical clips in the region of the left inguinal canal.      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 There are hypoplastic ribs at L1, in addition to the usual 12 rib-bearing thoracic vertebral bodies. Please note that this numbering convention differs from prior imaging reports, as well as operative report of prior kyphoplasty. Given this numbering convention, there is redemonstration of prior kyphoplasty of L1 and L4 (previously labeled T12 and L3 on prior imaging reports and operative report).  Interval worsening of compression deformity of the L2 vertebral body compression fracture (previously L1 on prior reports) now with moderate height loss and fracture extension to the superior endplate. No significant bony retropulsion.  Mild superior central endplate  height loss of T12 which appears likely new from most recent MRI from 5/27/2022, compatible with age-indeterminate Schmorl's node/central endplate compression fracture.  Grossly unchanged appearance of chronic compression deformities and kyphoplasty change of L1 and L4 (previously designated T12 and L3).  There is a chronic severe compression deformity of T9.  No acute osseous findings in the pelvis.  Degenerative changes as above.  This report was finalized on 8/1/2022 10:02 AM by Garrett Borrero MD.      MRI Thoracic Spine Without Contrast    Result Date: 8/1/2022  DATE OF EXAM: 8/1/2022 9:08 PM  PROCEDURE: MRI THORACIC SPINE WO CONTRAST-  INDICATIONS: Compression fracture, thoracic; N39.0-Urinary tract infection, site not specified; W19.XXXA-Unspecified fall, initial encounter; R73.9-Hyperglycemia, unspecified; E87.0-Zgsn-lqieybhojp and hyponatremia; S32.000A-Wedge compression fracture of unspecified lumbar vertebra, initial encounter for closed fracture  COMPARISON: CT thoracic spine without contrast 08/01/2022 [phone]  TECHNIQUE: Routine magnetic resonance imaging of the thoracic spine was performed without the administration of contrast.  FINDINGS: There is redemonstration of a severe compression fracture at the T9 level. There is mild retropulsion of the posterior vertebral body by 5 mm into the right paracentral ventral epidural space contributing to mild central canal stenosis. There is 80% central height loss.  There is a superior endplate mild compression fracture at T12 with 20% central height loss. No retropulsed fracture fragment at T12. Kyphoplasty noted at L1. Negative for paraspinal fluid collection. Within limits of technique there is no cord signal abnormality or myelomalacia. There is no significant canal or foraminal stenosis at T1-T8.  At the T9-10 level there is mild retropulsion of the T9 posterior vertebral body into the canal asymmetric to the right paracentral region narrowing the  right paracentral canal with mild central canal stenosis. There is moderate right foraminal stenosis and mild to moderate left foraminal stenosis. Negative for canal stenosis or high-grade foraminal stenosis at T10-11, T11-12 or T12-L1.      Impression: 1. Severe compression fracture at T9 with 80% central height loss. Mild retropulsion of the superior endplate into the canal contributing to mild central canal stenosis most pronounced in the right paracentral region. 2. Mild 20% superior plate compression fracture at T12. 3. No thoracic cord myelomalacia.  This report was finalized on 8/1/2022 11:33 PM by Jono Teresa MD.      MRI Lumbar Spine Without Contrast    Result Date: 8/1/2022  Examination: MRI LUMBAR SPINE WO CONTRAST-  Date of Exam: 8/1/2022 9:09 PM  Indication: Compression fracture, lumbar; N39.0-Urinary tract infection, site not specified; W19.XXXA-Unspecified fall, initial encounter; R73.9-Hyperglycemia, unspecified; E87.0-Wmhq-ltckapsset and hyponatremia; S32.000A-Wedge compression fracture of unspecified lumbar vertebra, initial encounter for closed fracture.  Comparison: CT lumbar spine 08/01/2022  Technique: Standard noncontrast MR pulse sequences of the lumbar spine were obtained.  Findings: There is transitional anatomy as noted on recent CT exam with lumbarized S1 segment. For the purposes of this dictation the last disc space is labeled as S1-S2 in keeping with numbering from recent CT. Prior kyphoplasty at the L1 and L4 levels. There is a moderate 60% compression fracture at L2 . There is no retropulsed fracture fragment. No additional new fracture involving the lumbar spine.  There is mild grade 1 anterolisthesis of S1 on S2 related to pars defects at S1 which measures 5 mm. The posterior spinous processes are intact. The conus medullaris terminates at the L1-2 level. There is a cyst at the right kidney measuring 19 mm. Negative for hydronephrosis. Schmorl's node at the superior plate of L5.  Mild superior endplate height loss at S1 related to chronic height loss. No paraspinal fluid collection.  L1-2: There is no disc bulge or retropulsed fracture fragment. Negative for canal or foraminal stenosis.  L2-3: There is a minimal posterior central disc osteophyte complex. Mild asymmetric left facet arthritis. Negative for canal stenosis. Mild bilateral foraminal stenosis.  L3-4: There is no significant disc bulge. Mild facet arthritis. Negative for canal or foraminal stenosis.  L4-5: There is a mild broad-based disc bulge. Mild facet arthritis. Negative for canal stenosis. Mild right foraminal stenosis. Moderate left foraminal stenosis.  L5-S1: There is a mild disc bulge. Mild bilateral facet arthritis. Negative for canal stenosis. Moderate right foraminal stenosis. Mild left foraminal stenosis.  S1-2: There is anterolisthesis of S1 on S2 with uncovering of the disc. Facet joints are normally aligned. Moderate bilateral foraminal stenosis.      Impression: 1. Transitional lumbosacral anatomy, recommend confirmation of vertebral body numbering if operative intervention planned in the future. 2. Moderate 60% central compression fracture at L2. No retropulsed fracture fragment. 3. Chronic compression fractures at L1 and L4 status post kyphoplasty. 4. Chronic pars defects at S1 with mild grade 1 anterolisthesis of S1 on S2 unchanged. 5. No high-grade central canal stenosis. Multilevel mild to moderate foraminal stenosis as described in detail above.  This report was finalized on 8/1/2022 11:01 PM by Jono Teresa MD.        Results for orders placed during the hospital encounter of 08/01/22    Adult Transthoracic Echo Complete W/ Cont if Necessary Per Protocol    Interpretation Summary  · Estimated left ventricular EF = 65% Left ventricular systolic function is normal.  · Left ventricular diastolic function is consistent with (grade I) impaired relaxation.      I have reviewed the medications:  Scheduled  Meds:cefTRIAXone, 1 g, Intravenous, Q24H  enoxaparin, 40 mg, Subcutaneous, Daily  folic acid-vit B6-vit B12, 1 tablet, Oral, Daily  insulin lispro, 0-9 Units, Subcutaneous, TID AC  losartan, 25 mg, Oral, Q24H  sodium chloride, 10 mL, Intravenous, Q12H  thiamine, 100 mg, Oral, Daily  vitamin D3, 5,000 Units, Oral, Daily      Continuous Infusions:   PRN Meds:.•  acetaminophen  •  dextrose  •  dextrose  •  glucagon (human recombinant)  •  ketorolac  •  melatonin  •  phenazopyridine  •  [COMPLETED] Insert peripheral IV **AND** sodium chloride  •  sodium chloride    Assessment & Plan   Assessment & Plan     Active Hospital Problems    Diagnosis  POA   • Acute UTI [N39.0]  Yes   • Intractable low back pain [M54.59]  Yes   • Compression fracture of L3 vertebra (HCC) [S32.030A]  Yes   • Compression fracture of T12 vertebra (HCC) [S22.080A]  Yes   • Cigarette nicotine dependence with nicotine-induced disorder [F17.219]  Yes   • Disc degeneration, lumbar [M51.36]  Yes   • Uncontrolled type 2 diabetes mellitus with hyperglycemia (HCC) [E11.65]  Yes      Resolved Hospital Problems   No resolved problems to display.        Brief Hospital Course to date:  Cristian Watt is a 76 y.o. male admitted with back pain found to have new T11-and L2 fractures.  Offered neurosurgery which he has declined.  Awaiting PT OT recommendations.    T11 compression fracture  L2 compression fracture  -Per neurosurgery okay to follow-up with them outpatient and if necessary they can do kyphoplasty  Hypertension  -This is currently uncontrolled with his hypertension and his diabetes I think he benefits from losartan I will start that.  8/3/2023 continued on losartan 25 mg blood pressure still elevated will increase to 50 mg.  Tobacco abuse  -Declines a nicotine patch  Diabetes mellitus type 2 uncontrolled  -Previously he was started on metformin at the time of discharge which he has no recollection of.  Will reattempt at that time of  discharge.    Insomnia  -Will make melatonin scheduled    Urinary tract infection acute uncomplicated  -Is on ceftriaxone will transition to Keflex if discharged    Nocturnal hypoxia  -Ordered ABG for the morning and overnight pulse ox to assess for NIPPV for the patient versus nocturnal oxygen.    Expected Discharge Location and Transportation: Transfer to [facility] tomorrow.  I still feel that he benefits from long-term assisted living.    Expected Discharge Date: 8/4/2022    DVT prophylaxis:  Medical DVT prophylaxis orders are present.     AM-PAC 6 Clicks Score (PT): 17 (08/02/22 1149)    CODE STATUS:   Code Status and Medical Interventions:   Ordered at: 08/01/22 1229     Level Of Support Discussed With:    Patient     Code Status (Patient has no pulse and is not breathing):    CPR (Attempt to Resuscitate)     Medical Interventions (Patient has pulse or is breathing):    Full Support       Alberto Martinez DO  08/03/22

## 2022-08-04 VITALS
WEIGHT: 190.4 LBS | BODY MASS INDEX: 25.79 KG/M2 | HEIGHT: 72 IN | RESPIRATION RATE: 18 BRPM | OXYGEN SATURATION: 90 % | DIASTOLIC BLOOD PRESSURE: 99 MMHG | HEART RATE: 82 BPM | TEMPERATURE: 98.3 F | SYSTOLIC BLOOD PRESSURE: 156 MMHG

## 2022-08-04 LAB
ARTERIAL PATENCY WRIST A: ABNORMAL
ATMOSPHERIC PRESS: ABNORMAL MM[HG]
BASE EXCESS BLDA CALC-SCNC: 3.7 MMOL/L (ref 0–2)
BDY SITE: ABNORMAL
BODY TEMPERATURE: 37 C
CO2 BLDA-SCNC: 29.1 MMOL/L (ref 22–33)
COHGB MFR BLD: 1.2 % (ref 0–2)
EPAP: 0
GLUCOSE BLDC GLUCOMTR-MCNC: 225 MG/DL (ref 70–130)
GLUCOSE BLDC GLUCOMTR-MCNC: 238 MG/DL (ref 70–130)
HCO3 BLDA-SCNC: 27.9 MMOL/L (ref 20–26)
HCT VFR BLD CALC: 48.2 % (ref 38–51)
HGB BLDA-MCNC: 15.7 G/DL (ref 13.5–17.5)
INHALED O2 CONCENTRATION: 21 %
IPAP: 0
METHGB BLD QL: 0.3 % (ref 0–1.5)
MODALITY: ABNORMAL
NOTE: ABNORMAL
OXYHGB MFR BLDV: 93.8 % (ref 94–99)
PAW @ PEAK INSP FLOW SETTING VENT: 0 CMH2O
PCO2 BLDA: 39.7 MM HG (ref 35–45)
PCO2 TEMP ADJ BLD: 39.7 MM HG (ref 35–48)
PH BLDA: 7.46 PH UNITS (ref 7.35–7.45)
PH, TEMP CORRECTED: 7.46 PH UNITS
PO2 BLDA: 72.1 MM HG (ref 83–108)
PO2 TEMP ADJ BLD: 72.1 MM HG (ref 83–108)
TOTAL RATE: 0 BREATHS/MINUTE

## 2022-08-04 PROCEDURE — 63710000001 INSULIN LISPRO (HUMAN) PER 5 UNITS: Performed by: INTERNAL MEDICINE

## 2022-08-04 PROCEDURE — 83050 HGB METHEMOGLOBIN QUAN: CPT

## 2022-08-04 PROCEDURE — 25010000002 CEFTRIAXONE PER 250 MG: Performed by: INTERNAL MEDICINE

## 2022-08-04 PROCEDURE — 36600 WITHDRAWAL OF ARTERIAL BLOOD: CPT

## 2022-08-04 PROCEDURE — 99239 HOSP IP/OBS DSCHRG MGMT >30: CPT | Performed by: INTERNAL MEDICINE

## 2022-08-04 PROCEDURE — 82805 BLOOD GASES W/O2 SATURATION: CPT

## 2022-08-04 PROCEDURE — 25010000002 ENOXAPARIN PER 10 MG: Performed by: INTERNAL MEDICINE

## 2022-08-04 PROCEDURE — 82962 GLUCOSE BLOOD TEST: CPT

## 2022-08-04 PROCEDURE — G0108 DIAB MANAGE TRN  PER INDIV: HCPCS

## 2022-08-04 PROCEDURE — 82375 ASSAY CARBOXYHB QUANT: CPT

## 2022-08-04 RX ORDER — GLIPIZIDE AND METFORMIN HCL 5; 500 MG/1; MG/1
1 TABLET, FILM COATED ORAL
Qty: 60 TABLET | Refills: 0 | Status: SHIPPED | OUTPATIENT
Start: 2022-08-04 | End: 2022-08-04 | Stop reason: HOSPADM

## 2022-08-04 RX ORDER — LOSARTAN POTASSIUM 50 MG/1
50 TABLET ORAL
Qty: 30 TABLET | Refills: 0 | Status: SHIPPED | OUTPATIENT
Start: 2022-08-04

## 2022-08-04 RX ORDER — CEPHALEXIN 500 MG/1
500 CAPSULE ORAL 2 TIMES DAILY
Qty: 6 CAPSULE | Refills: 0 | OUTPATIENT
Start: 2022-08-04 | End: 2023-03-17

## 2022-08-04 RX ORDER — CHOLECALCIFEROL (VITAMIN D3) 125 MCG
5 CAPSULE ORAL NIGHTLY
Qty: 30 TABLET | Refills: 0 | Status: SHIPPED | OUTPATIENT
Start: 2022-08-04

## 2022-08-04 RX ORDER — SITAGLIPTIN AND METFORMIN HYDROCHLORIDE 500; 50 MG/1; MG/1
1 TABLET, FILM COATED ORAL 2 TIMES DAILY WITH MEALS
Qty: 60 TABLET | Refills: 0 | Status: SHIPPED | OUTPATIENT
Start: 2022-08-04

## 2022-08-04 RX ADMIN — THIAMINE HCL TAB 100 MG 100 MG: 100 TAB at 10:36

## 2022-08-04 RX ADMIN — INSULIN LISPRO 4 UNITS: 100 INJECTION, SOLUTION INTRAVENOUS; SUBCUTANEOUS at 10:38

## 2022-08-04 RX ADMIN — ENOXAPARIN SODIUM 40 MG: 40 INJECTION SUBCUTANEOUS at 10:36

## 2022-08-04 RX ADMIN — PHENAZOPYRIDINE HYDROCHLORIDE 100 MG: 100 TABLET ORAL at 13:38

## 2022-08-04 RX ADMIN — Medication 1 TABLET: at 10:36

## 2022-08-04 RX ADMIN — LOSARTAN POTASSIUM 50 MG: 50 TABLET, FILM COATED ORAL at 10:36

## 2022-08-04 RX ADMIN — Medication 5000 UNITS: at 10:36

## 2022-08-04 RX ADMIN — SODIUM CHLORIDE 1 G: 900 INJECTION INTRAVENOUS at 12:39

## 2022-08-04 RX ADMIN — INSULIN LISPRO 4 UNITS: 100 INJECTION, SOLUTION INTRAVENOUS; SUBCUTANEOUS at 12:39

## 2022-08-04 NOTE — PLAN OF CARE
Problem: Adult Inpatient Plan of Care  Goal: Plan of Care Review  Outcome: Ongoing, Progressing  Goal: Patient-Specific Goal (Individualized)  Outcome: Ongoing, Progressing  Goal: Absence of Hospital-Acquired Illness or Injury  Outcome: Ongoing, Progressing  Goal: Optimal Comfort and Wellbeing  Outcome: Ongoing, Progressing  Goal: Readiness for Transition of Care  Outcome: Ongoing, Progressing     Problem: Fall Injury Risk  Goal: Absence of Fall and Fall-Related Injury  Outcome: Ongoing, Progressing     Problem: Pain Acute  Goal: Acceptable Pain Control and Functional Ability  Outcome: Ongoing, Progressing     Problem: Skin Injury Risk Increased  Goal: Skin Health and Integrity  Outcome: Ongoing, Progressing   Goal Outcome Evaluation:

## 2022-08-04 NOTE — CASE MANAGEMENT/SOCIAL WORK
Continued Stay Note  [facility]     Patient Name: Cristian Watt  MRN: [id number]  Today's Date: 8/4/2022    Admit Date: 8/1/2022     Discharge Plan     Row Name 08/04/22 0832       Plan    Plan Transfer to SNF    Patient/Family in Agreement with Plan yes    Plan Comments Transfer to [facility] today.  [facility] to  at [address] @ 3:30pm.  Facility will obtain transfer summary from Epic, RN may call report to #[phone].  Mitra Clark, Ext. [phone]    Final Discharge Disposition Code 03 - skilled nursing facility (SNF)               Discharge Codes    No documentation.               Expected Discharge Date and Time     Expected Discharge Date Expected Discharge Time    Aug 4, 2022             JASMINE Lerma

## 2022-08-04 NOTE — PLAN OF CARE
Goal Outcome Evaluation:  Plan of Care Reviewed With: patient        Progress: improving    Pt has had no c/o pain or N/V at time of this note. Pt still urinating frequently; however, no stool noted this shift. Ambulating with x1 assist and walker. Plan for D/C to [facility] in afternoon. No requests at this time.

## 2022-08-04 NOTE — CONSULTS
"Diabetes Education    Patient Name:  Cristian Watt  YOB: 1945  MRN: [id number]  Admit Date:  8/1/2022      Consult for diabetes education received; chart reviewed. Pt was seen at bedside today. Permission given for visit. Noted he was previously prescribed metformin in 2021, but per notes, does not have recollection of it.     Discussed with Mr. Watt type 2 diabetes self-management, risk factors, and importance of blood glucose control to reduce complications. We discussed his current UTI. He states to know knowledge, he does not have a UTI, he is here for his back pain. Discussed the connection between diabetes and infection. Target blood glucose readings and A1c goals per ADA were reviewed. Reviewed with patient current A1c 9.9 and discussed its significance. Signs, symptoms, and treatment of hyperglycemia and hypoglycemia were discussed. He states he drinks a lot of diet soda. Discussed the difference between diet/\"zero\" soda and regular soda and how diet sodas cannot be used to treat low blood sugars. Discussed specific strategies to help manage his diabetes, including drinking more water, physical activity as tolerated/cleared, and following up. He reports he hasn't seen his PCP in a few \"years\". Discussed the importance of ongoing monitoring, not only for his diabetes for his other health issues as well. Discussed how he should have his A1c monitored periodically.    Shared with pt plan for Janumet at d/c. Discussed this medication, MOA, side effects, and importance of taking as prescribed. Discussed how monitoring blood sugar and rechecking A1c will be ways to assess whether or not this medication is working for him. He states he does not have any questions and that he believes he has a meter at home. Noted he will be going to [facility] later today. Pt encouraged to retain the hand-outs I gave him today to use as a reference once he is at home. Encouraged to consider SMBG daily, or a few " "times per week, and logging results. Reinforced symptoms of high and low blood sugar to pt. He is able to teach back appropriate treatment of hypoglycemia at conclusion of visit.     Lifestyle changes such as physical activity with MD approval and healthy eating were encouraged. He states he uses a walker; discussed other options for seated physical activity including resistance bands. Encouraged to discuss with his PCP at follow up. Encouraged pt to monitor blood sugar at home 1x per day and to call PCP if blood sugar is trending high. Encouraged to keep record of blood glucose readings to take to follow up appointment with PCP. Provided patient with copy of [employer]'s \"What is Diabetes\" handout, \"Blood Glucose Goals\" handout, and \"What is A1c\" handout.     Thank you for this consult.     Electronically signed by:  Shauna Jordan RN  08/04/22 10:34 EDT  "

## 2022-08-04 NOTE — DISCHARGE SUMMARY
[facility] Medicine Services  DISCHARGE SUMMARY    Patient Name: Cristian Watt  : 1945  MRN: [id number]    Date of Admission: 2022  7:17 AM  Date of Discharge: 2022  Primary Care Physician: Provider, No Known    Consults     Date and Time Order Name Status Description    2022 12:29 PM Inpatient Palliative Care MD Consult Completed     2022 12:29 PM Inpatient Neurosurgery Consult Completed           Hospital Course     Presenting Problem:   Intractable pain [R52]  Acute UTI [N39.0]    Active Hospital Problems    Diagnosis  POA   • Acute UTI [N39.0]  Yes   • Intractable low back pain [M54.59]  Yes   • Compression fracture of L3 vertebra (HCC) [S32.030A]  Yes   • Compression fracture of T12 vertebra (HCC) [S22.080A]  Yes   • Cigarette nicotine dependence with nicotine-induced disorder [F17.219]  Yes   • Disc degeneration, lumbar [M51.36]  Yes   • Uncontrolled type 2 diabetes mellitus with hyperglycemia (HCC) [E11.65]  Yes      Resolved Hospital Problems   No resolved problems to display.          Hospital Course:    Brief Hospital Course to date:  Cristian Watt is a 76 y.o. male admitted with back pain found to have new T11-and L2 fractures.  Offered neurosurgery which he has declined.    PT OT recommend medical rehab bed arranged at [facility].     T11 compression fracture  L2 compression fracture  -Per neurosurgery okay to follow-up with them outpatient in 3 weeks and if necessary they can do kyphoplasty  Hypertension  -This is currently uncontrolled with his hypertension and his diabetes I think he benefits from losartan I will start that.  8/3/2023 continued on losartan 25 mg blood pressure still elevated will increase to 50 mg.  Reviewed 2022 normotensive.  Tobacco abuse  -Declines a nicotine patch  Diabetes mellitus type 2 uncontrolled  -Previously he was started on metformin at the time of discharge which he has no recollection of.    A1c reviewed was  almost 10.  Could consider basal insulin but given I suspect he may not take this frequently I elected to prescribe him a combo pill with metformin and a sulfonylurea to get him closer to his goal of 7.  -Update I was called by the pharmacy that the combo pill was not available for metformin and sulfonylurea and so I sent him with the Janumet instead.     Insomnia  -Will make melatonin scheduled     Urinary tract infection acute uncomplicated  -Is on ceftriaxone will transition to Keflex.  Urine culture reviewed greater than 100,000 CFU normal christel     Nocturnal hypoxia  -Ordered ABG for the morning 8/4/2022 no hypercapnia and overnight pulse ox did have some nocturnal hypoxia 8/4/2022 please provide him with oxygen at night.      Discharge Follow Up Recommendations for outpatient labs/diagnostics:  Basic metabolic panel in 1 week to monitor creatinine on losartan    Day of Discharge     HPI:   Discussed with the patient as well as his nurse from day shift and night shift.  No events overnight.  Blood gas reviewed no hypercapnia.  He did have some mild hypoxia overnight and required some oxygen.  He may require some oxygen at night at your facility.  I think that probably when he leaves there he would benefit from an assisted living as well if you would like to set that up to.    Review of Systems  Gen- No fevers, chills  CV- No chest pain, palpitations  Resp- No cough, dyspnea  GI- No N/V/D, abd pain    Vital Signs:   Temp:  [97.8 °F (36.6 °C)-98.2 °F (36.8 °C)] 98.1 °F (36.7 °C)  Heart Rate:  [] 92  Resp:  [18] 18  BP: (129-165)/(81-99) 129/86  Flow (L/min):  [2] 2      Physical Exam:  Constitutional: No acute distress, awake, alert  HENT: NCAT, mucous membranes moist  Respiratory: Coarse bilateral breath sounds bibasilar Rales, respiratory effort normal   Cardiovascular: RRR, no murmurs, rubs, or gallops  Gastrointestinal: Positive bowel sounds, soft, nontender, nondistended  Musculoskeletal: No bilateral  ankle edema  Psychiatric: Appropriate affect, cooperative  Neurologic: speech clear  Skin: No rashes    Pertinent  and/or Most Recent Results     LAB RESULTS:      Lab 08/02/22  0709 08/01/22  0803   WBC 10.45 10.18   HEMOGLOBIN 15.2 16.4   HEMATOCRIT 44.4 48.2   PLATELETS 327 324   NEUTROS ABS  --  7.06*   IMMATURE GRANS (ABS)  --  0.06*   LYMPHS ABS  --  2.04   MONOS ABS  --  0.90   EOS ABS  --  0.06   MCV 91.7 92.0         Lab 08/03/22  1342 08/02/22  0709 08/01/22  1508 08/01/22  0803   SODIUM 132* 131*  --  129*   POTASSIUM 4.1 4.2  --  4.2   CHLORIDE 96* 96*  --  94*   CO2 27.0 27.0  --  27.0   ANION GAP 9.0 8.0  --  8.0   BUN 6* 7*  --  6*   CREATININE 0.44* 0.63*  --  0.59*   EGFR 109.9 98.6  --  100.6   GLUCOSE 138* 194*  --  280*   CALCIUM 9.0 8.7  --  8.9   HEMOGLOBIN A1C  --   --  9.90*  --          Lab 08/02/22  0709 08/01/22  0803   TOTAL PROTEIN 6.2 6.9   ALBUMIN 3.30* 3.40*   GLOBULIN 2.9 3.5   ALT (SGPT) 6 7   AST (SGOT) 10 10   BILIRUBIN 0.5 0.5   ALK PHOS 117 111                     Lab 08/04/22  0348   PH, ARTERIAL 7.455*   PCO2, ARTERIAL 39.7   PO2 ART 72.1*   FIO2 21   HCO3 ART 27.9*   BASE EXCESS ART 3.7*   CARBOXYHEMOGLOBIN 1.2     Brief Urine Lab Results  (Last result in the past 365 days)      Color   Clarity   Blood   Leuk Est   Nitrite   Protein   CREAT   Urine HCG        08/01/22 1016 Yellow   Clear   Trace   Moderate (2+)   Negative   Negative               Microbiology Results (last 10 days)     Procedure Component Value - Date/Time    COVID PRE-OP / PRE-PROCEDURE SCREENING ORDER (NO ISOLATION) - Swab, Nasopharynx [[id number]]  (Normal) Collected: 08/01/22 1307    Lab Status: Final result Specimen: Swab from Nasopharynx Updated: 08/01/22 1408    Narrative:      The following orders were created for panel order COVID PRE-OP / PRE-PROCEDURE SCREENING ORDER (NO ISOLATION) - Swab, Nasopharynx.  Procedure                               Abnormality         Status                     ---------                                -----------         ------                     COVID-19, ABBOTT IN-HOUS...[[id number]]  Normal              Final result                 Please view results for these tests on the individual orders.    COVID-19, ABBOTT IN-HOUSE,NASAL Swab (NO TRANSPORT MEDIA) 2 HR TAT - Swab, Nasopharynx [[id number]]  (Normal) Collected: 08/01/22 1307    Lab Status: Final result Specimen: Swab from Nasopharynx Updated: 08/01/22 1408     COVID19 Presumptive Negative    Narrative:      Fact sheet for providers: https://www.fda.gov/media/970921/download     Fact sheet for patients: https://www.fda.gov/media/021771/download    Test performed by PCR.  Fact sheet for providers: https://www.fda.gov/media/589634/download     Fact sheet for patients: https://www.fda.gov/media/568363/download    Test performed by PCR.  If inconsistent with clinical signs and symptoms patient should be tested with different authorized molecular test.    Urine Culture - Urine, Urine, Clean Catch [[id number]] Collected: 08/01/22 1016    Lab Status: Final result Specimen: Urine, Clean Catch Updated: 08/02/22 1048     Urine Culture >100,000 CFU/mL Normal Urogenital Maureen    Narrative:      Colonization of the urinary tract without infection is common. Treatment is discouraged unless the patient is symptomatic, pregnant, or undergoing an invasive urologic procedure.          Adult Transthoracic Echo Complete W/ Cont if Necessary Per Protocol    Result Date: 8/2/2022  · Estimated left ventricular EF = 65% Left ventricular systolic function is normal. · Left ventricular diastolic function is consistent with (grade I) impaired relaxation.      CT Thoracic Spine Without Contrast    Result Date: 8/1/2022  DATE OF EXAM: 8/1/2022 7:47 AM  PROCEDURE: CT PELVIS WO CONTRAST-, CT THORACIC SPINE WO CONTRAST-, CT LUMBAR SPINE WO CONTRAST-  INDICATIONS: fall. bilateral hip pain.  COMPARISON: Thoracic spine CT 5/15/2013, lumbar spine MRI 5/27/2022, CT  lumbar spine 5/25/2022  TECHNIQUE: Routine transaxial slices were obtained through the thoracic spine, lumbar spine, and pelvis without the administration of intravenous contrast. Reconstructed coronal and sagittal images were also obtained. Automated exposure control and iterative construction methods were used.  The radiation dose reduction device was turned on for each scan per the ALARA (As Low as Reasonably Achievable) protocol.  FINDINGS:  The bones are diffusely demineralized, limiting assessment for occult nondisplaced fractures.  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 There are hypoplastic ribs at L1, in addition to the usual 12 rib-bearing thoracic vertebral bodies. Please note that this numbering convention differs from prior imaging reports, as well as operative report of prior kyphoplasty. Given this numbering convention, there is redemonstration of prior kyphoplasty of L1 and L4 (previously labeled T12 and L3 on prior imaging reports and operative report).  Regarding the thoracic spine, there is normal spinal alignment without significant spondylolisthesis or evidence of traumatic subluxation. No abnormal interfacet widening. There is a chronic, severe compression deformity of the T9 vertebral body (series 5 image 44) with approximately 80% height loss with mild protrusion of the posterior vertebral body into the ventral epidural space resulting in at least mild spinal canal stenosis, without evidence of severe bony retropulsion or high-grade/severe spinal canal narrowing at this level. This compression deformity was present on localizer images of prior lumbar spine MRI from 5/27/2022. There is mild superior central endplate height loss of T12 (series 5 image 45) compatible with age indeterminant Schmorl's node; this appears likely new from lumbar spine MRI from 5/27/2022 although the  superior endplate of T12 is only partially imaged on that exam, excluded from the field-of-view on prior CT of the lumbar spine from 5/25/2022. Otherwise no evidence of acute fracture in the thoracic spine. Remaining vertebral body heights are preserved. There are chronic deformities of the posterior left 9th, 10th, and 11th ribs. There is no CT evidence of high-grade/severe spinal canal stenosis. There is mostly mild multilevel degenerative disc disease throughout the thoracic spine. There is likely moderate bilateral neuroforaminal narrowing at T9-T10. The paravertebral soft tissues are unremarkable. There are chronic interstitial lung changes with subpleural cystic change/fibrosis. Mildly patulous thoracic esophagus. There is a punctate nonobstructing stone in the right kidney with otherwise unremarkable appearance of the partially imaged upper abdomen.  Regarding the lumbar spine, there is redemonstration of kyphoplasty change at L1 and L4 (previously designated T12 and L3; please see above description of numbering convention/lumbosacral transitional anatomy). Similar spinal alignment with minimal smooth rightward curvature of the lumbar spine centered on L4. 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 There has been interval worsening of L2 compression deformity, previously seen as isolated inferior endplate concavity and now appearing as new fracture involvement and height loss of the superior endplate (series 5 image 45) with interval worsening of L2 vertebral height loss, now with approximately 60% central height loss, previously 20-30%. There is no appreciable bony retropulsion or posterior cortex protrusion; no evidence of high-grade/severe spinal canal stenosis at this level. Otherwise the vertebral bodies are unchanged from prior CT, without additional findings to suggest acute or worsening  fractures. There is similar moderate height loss of L1 with kyphoplasty change. There is similar mild height loss of L4 with kyphoplasty change. There is similar multilevel degenerative changes of the lumbar spine characterized by moderate to severe degenerative disc disease in the lower lumbar spine, and mild facet arthropathy in the lower lumbar spine. There is mild ligament flavum hypertrophy in the lower lumbar spine. There is no high-grade/severe spinal canal stenosis per CT, noting that this is better assessed with MRI. The paravertebral soft tissues appear unremarkable. Partially imaged portions of the posterior abdomen and pelvis demonstrate atherosclerosis of the abdominal aorta without aneurysm, as well as sigmoid colonic diverticulosis.  Pelvis: No acute fracture or traumatic malalignment. 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 There are mild-to-moderate osteoarthritic changes of the hip joints which appear congruent. The pubic symphysis is congruent with mild degenerative change. The sacroiliac joints are congruent with mild osteoarthritic change. There is no evidence of sacral fracture. Pelvic soft tissues appear unremarkable without conspicuous subcutaneous fat stranding or superficial or intrapelvic hematoma. No acute findings within the pelvis. There is evidence of sigmoid colonic diverticulosis without diverticulitis. There are a few surgical clips in the region of the left inguinal canal.      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 There are hypoplastic ribs at L1, in addition to the usual 12 rib-bearing thoracic vertebral bodies. Please note that this numbering convention differs from prior imaging reports, as well as operative report of  prior kyphoplasty. Given this numbering convention, there is redemonstration of prior kyphoplasty of L1 and L4 (previously labeled T12 and L3 on prior imaging reports and operative report).  Interval worsening of compression deformity of the L2 vertebral body compression fracture (previously L1 on prior reports) now with moderate height loss and fracture extension to the superior endplate. No significant bony retropulsion.  Mild superior central endplate height loss of T12 which appears likely new from most recent MRI from 5/27/2022, compatible with age-indeterminate Schmorl's node/central endplate compression fracture.  Grossly unchanged appearance of chronic compression deformities and kyphoplasty change of L1 and L4 (previously designated T12 and L3).  There is a chronic severe compression deformity of T9.  No acute osseous findings in the pelvis.  Degenerative changes as above.  This report was finalized on 8/1/2022 10:02 AM by Garrett Borrero MD.      CT Lumbar Spine Without Contrast    Result Date: 8/1/2022  DATE OF EXAM: 8/1/2022 7:47 AM  PROCEDURE: CT PELVIS WO CONTRAST-, CT THORACIC SPINE WO CONTRAST-, CT LUMBAR SPINE WO CONTRAST-  INDICATIONS: fall. bilateral hip pain.  COMPARISON: Thoracic spine CT 5/15/2013, lumbar spine MRI 5/27/2022, CT lumbar spine 5/25/2022  TECHNIQUE: Routine transaxial slices were obtained through the thoracic spine, lumbar spine, and pelvis without the administration of intravenous contrast. Reconstructed coronal and sagittal images were also obtained. Automated exposure control and iterative construction methods were used.  The radiation dose reduction device was turned on for each scan per the ALARA (As Low as Reasonably Achievable) protocol.  FINDINGS:  The bones are diffusely demineralized, limiting assessment for occult nondisplaced fractures.  Thoracic and lumbar spine: There is evidence of transitional lumbosacral anatomy with lumbarization of S1, with the last well-formed  disc space seen at S1-S2. There are hypoplastic ribs at L1, in addition to the usual 12 rib-bearing thoracic vertebral bodies. Please note that this numbering convention differs from prior imaging reports, as well as operative report of prior kyphoplasty. Given this numbering convention, there is redemonstration of prior kyphoplasty of L1 and L4 (previously labeled T12 and L3 on prior imaging reports and operative report).  Regarding the thoracic spine, there is normal spinal alignment without significant spondylolisthesis or evidence of traumatic subluxation. No abnormal interfacet widening. There is a chronic, severe compression deformity of the T9 vertebral body (series 5 image 44) with approximately 80% height loss with mild protrusion of the posterior vertebral body into the ventral epidural space resulting in at least mild spinal canal stenosis, without evidence of severe bony retropulsion or high-grade/severe spinal canal narrowing at this level. This compression deformity was present on localizer images of prior lumbar spine MRI from 5/27/2022. There is mild superior central endplate height loss of T12 (series 5 image 45) compatible with age indeterminant Schmorl's node; this appears likely new from lumbar spine MRI from 5/27/2022 although the superior endplate of T12 is only partially imaged on that exam, excluded from the field-of-view on prior CT of the lumbar spine from 5/25/2022. Otherwise no evidence of acute fracture in the thoracic spine. Remaining vertebral body heights are preserved. There are chronic deformities of the posterior left 9th, 10th, and 11th ribs. There is no CT evidence of high-grade/severe spinal canal stenosis. There is mostly mild multilevel degenerative disc disease throughout the thoracic spine. There is likely moderate bilateral neuroforaminal narrowing at T9-T10. The paravertebral soft tissues are unremarkable. There are chronic interstitial lung changes with subpleural cystic  change/fibrosis. Mildly patulous thoracic esophagus. There is a punctate nonobstructing stone in the right kidney with otherwise unremarkable appearance of the partially imaged upper abdomen.  Regarding the lumbar spine, there is redemonstration of kyphoplasty change at L1 and L4 (previously designated T12 and L3; please see above description of numbering convention/lumbosacral transitional anatomy). Similar spinal alignment with minimal smooth rightward curvature of the lumbar spine centered on L4. 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 There has been interval worsening of L2 compression deformity, previously seen as isolated inferior endplate concavity and now appearing as new fracture involvement and height loss of the superior endplate (series 5 image 45) with interval worsening of L2 vertebral height loss, now with approximately 60% central height loss, previously 20-30%. There is no appreciable bony retropulsion or posterior cortex protrusion; no evidence of high-grade/severe spinal canal stenosis at this level. Otherwise the vertebral bodies are unchanged from prior CT, without additional findings to suggest acute or worsening fractures. There is similar moderate height loss of L1 with kyphoplasty change. There is similar mild height loss of L4 with kyphoplasty change. There is similar multilevel degenerative changes of the lumbar spine characterized by moderate to severe degenerative disc disease in the lower lumbar spine, and mild facet arthropathy in the lower lumbar spine. There is mild ligament flavum hypertrophy in the lower lumbar spine. There is no high-grade/severe spinal canal stenosis per CT, noting that this is better assessed with MRI. The paravertebral soft tissues appear unremarkable. Partially imaged portions of the posterior abdomen and pelvis demonstrate atherosclerosis of  the abdominal aorta without aneurysm, as well as sigmoid colonic diverticulosis.  Pelvis: No acute fracture or traumatic malalignment. 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 There are mild-to-moderate osteoarthritic changes of the hip joints which appear congruent. The pubic symphysis is congruent with mild degenerative change. The sacroiliac joints are congruent with mild osteoarthritic change. There is no evidence of sacral fracture. Pelvic soft tissues appear unremarkable without conspicuous subcutaneous fat stranding or superficial or intrapelvic hematoma. No acute findings within the pelvis. There is evidence of sigmoid colonic diverticulosis without diverticulitis. There are a few surgical clips in the region of the left inguinal canal.      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 There are hypoplastic ribs at L1, in addition to the usual 12 rib-bearing thoracic vertebral bodies. Please note that this numbering convention differs from prior imaging reports, as well as operative report of prior kyphoplasty. Given this numbering convention, there is redemonstration of prior kyphoplasty of L1 and L4 (previously labeled T12 and L3 on prior imaging reports and operative report).  Interval worsening of compression deformity of the L2 vertebral body compression fracture (previously L1 on prior reports) now with moderate height loss and fracture extension to the superior endplate. No significant bony retropulsion.  Mild superior central endplate height loss of T12 which appears likely new from most recent MRI from 5/27/2022, compatible with age-indeterminate Schmorl's node/central endplate compression fracture.  Grossly unchanged appearance of chronic  compression deformities and kyphoplasty change of L1 and L4 (previously designated T12 and L3).  There is a chronic severe compression deformity of T9.  No acute osseous findings in the pelvis.  Degenerative changes as above.  This report was finalized on 8/1/2022 10:02 AM by Garrett Borrero MD.      CT Pelvis Without Contrast    Result Date: 8/1/2022  DATE OF EXAM: 8/1/2022 7:47 AM  PROCEDURE: CT PELVIS WO CONTRAST-, CT THORACIC SPINE WO CONTRAST-, CT LUMBAR SPINE WO CONTRAST-  INDICATIONS: fall. bilateral hip pain.  COMPARISON: Thoracic spine CT 5/15/2013, lumbar spine MRI 5/27/2022, CT lumbar spine 5/25/2022  TECHNIQUE: Routine transaxial slices were obtained through the thoracic spine, lumbar spine, and pelvis without the administration of intravenous contrast. Reconstructed coronal and sagittal images were also obtained. Automated exposure control and iterative construction methods were used.  The radiation dose reduction device was turned on for each scan per the ALARA (As Low as Reasonably Achievable) protocol.  FINDINGS:  The bones are diffusely demineralized, limiting assessment for occult nondisplaced fractures.  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 There are hypoplastic ribs at L1, in addition to the usual 12 rib-bearing thoracic vertebral bodies. Please note that this numbering convention differs from prior imaging reports, as well as operative report of prior kyphoplasty. Given this numbering convention, there is redemonstration of prior kyphoplasty of L1 and L4 (previously labeled T12 and L3 on prior imaging reports and operative report).  Regarding the thoracic spine, there is normal spinal alignment without significant spondylolisthesis or evidence of traumatic subluxation. No abnormal interfacet widening. There is a chronic, severe compression deformity of the T9 vertebral body  (series 5 image 44) with approximately 80% height loss with mild protrusion of the posterior vertebral body into the ventral epidural space resulting in at least mild spinal canal stenosis, without evidence of severe bony retropulsion or high-grade/severe spinal canal narrowing at this level. This compression deformity was present on localizer images of prior lumbar spine MRI from 5/27/2022. There is mild superior central endplate height loss of T12 (series 5 image 45) compatible with age indeterminant Schmorl's node; this appears likely new from lumbar spine MRI from 5/27/2022 although the superior endplate of T12 is only partially imaged on that exam, excluded from the field-of-view on prior CT of the lumbar spine from 5/25/2022. Otherwise no evidence of acute fracture in the thoracic spine. Remaining vertebral body heights are preserved. There are chronic deformities of the posterior left 9th, 10th, and 11th ribs. There is no CT evidence of high-grade/severe spinal canal stenosis. There is mostly mild multilevel degenerative disc disease throughout the thoracic spine. There is likely moderate bilateral neuroforaminal narrowing at T9-T10. The paravertebral soft tissues are unremarkable. There are chronic interstitial lung changes with subpleural cystic change/fibrosis. Mildly patulous thoracic esophagus. There is a punctate nonobstructing stone in the right kidney with otherwise unremarkable appearance of the partially imaged upper abdomen.  Regarding the lumbar spine, there is redemonstration of kyphoplasty change at L1 and L4 (previously designated T12 and L3; please see above description of numbering convention/lumbosacral transitional anatomy). Similar spinal alignment with minimal smooth rightward curvature of the lumbar spine centered on L4. 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 There has  been interval worsening of L2 compression deformity, previously seen as isolated inferior endplate concavity and now appearing as new fracture involvement and height loss of the superior endplate (series 5 image 45) with interval worsening of L2 vertebral height loss, now with approximately 60% central height loss, previously 20-30%. There is no appreciable bony retropulsion or posterior cortex protrusion; no evidence of high-grade/severe spinal canal stenosis at this level. Otherwise the vertebral bodies are unchanged from prior CT, without additional findings to suggest acute or worsening fractures. There is similar moderate height loss of L1 with kyphoplasty change. There is similar mild height loss of L4 with kyphoplasty change. There is similar multilevel degenerative changes of the lumbar spine characterized by moderate to severe degenerative disc disease in the lower lumbar spine, and mild facet arthropathy in the lower lumbar spine. There is mild ligament flavum hypertrophy in the lower lumbar spine. There is no high-grade/severe spinal canal stenosis per CT, noting that this is better assessed with MRI. The paravertebral soft tissues appear unremarkable. Partially imaged portions of the posterior abdomen and pelvis demonstrate atherosclerosis of the abdominal aorta without aneurysm, as well as sigmoid colonic diverticulosis.  Pelvis: No acute fracture or traumatic malalignment. 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 There are mild-to-moderate osteoarthritic changes of the hip joints which appear congruent. The pubic symphysis is congruent with mild degenerative change. The sacroiliac joints are congruent with mild osteoarthritic change. There is no evidence of sacral fracture. Pelvic soft tissues appear unremarkable without conspicuous  subcutaneous fat stranding or superficial or intrapelvic hematoma. No acute findings within the pelvis. There is evidence of sigmoid colonic diverticulosis without diverticulitis. There are a few surgical clips in the region of the left inguinal canal.      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 There are hypoplastic ribs at L1, in addition to the usual 12 rib-bearing thoracic vertebral bodies. Please note that this numbering convention differs from prior imaging reports, as well as operative report of prior kyphoplasty. Given this numbering convention, there is redemonstration of prior kyphoplasty of L1 and L4 (previously labeled T12 and L3 on prior imaging reports and operative report).  Interval worsening of compression deformity of the L2 vertebral body compression fracture (previously L1 on prior reports) now with moderate height loss and fracture extension to the superior endplate. No significant bony retropulsion.  Mild superior central endplate height loss of T12 which appears likely new from most recent MRI from 5/27/2022, compatible with age-indeterminate Schmorl's node/central endplate compression fracture.  Grossly unchanged appearance of chronic compression deformities and kyphoplasty change of L1 and L4 (previously designated T12 and L3).  There is a chronic severe compression deformity of T9.  No acute osseous findings in the pelvis.  Degenerative changes as above.  This report was finalized on 8/1/2022 10:02 AM by Garrett Borrero MD.      MRI Thoracic Spine Without Contrast    Result Date: 8/1/2022  DATE OF EXAM: 8/1/2022 9:08 PM  PROCEDURE: MRI THORACIC SPINE WO CONTRAST-  INDICATIONS: Compression fracture, thoracic; N39.0-Urinary tract infection, site not specified; W19.XXXA-Unspecified fall, initial encounter; R73.9-Hyperglycemia, unspecified; E87.5-Bkxm-ljvwqnhxrg and hyponatremia; S32.000A-Wedge  compression fracture of unspecified lumbar vertebra, initial encounter for closed fracture  COMPARISON: CT thoracic spine without contrast 08/01/2022 [phone]  TECHNIQUE: Routine magnetic resonance imaging of the thoracic spine was performed without the administration of contrast.  FINDINGS: There is redemonstration of a severe compression fracture at the T9 level. There is mild retropulsion of the posterior vertebral body by 5 mm into the right paracentral ventral epidural space contributing to mild central canal stenosis. There is 80% central height loss.  There is a superior endplate mild compression fracture at T12 with 20% central height loss. No retropulsed fracture fragment at T12. Kyphoplasty noted at L1. Negative for paraspinal fluid collection. Within limits of technique there is no cord signal abnormality or myelomalacia. There is no significant canal or foraminal stenosis at T1-T8.  At the T9-10 level there is mild retropulsion of the T9 posterior vertebral body into the canal asymmetric to the right paracentral region narrowing the right paracentral canal with mild central canal stenosis. There is moderate right foraminal stenosis and mild to moderate left foraminal stenosis. Negative for canal stenosis or high-grade foraminal stenosis at T10-11, T11-12 or T12-L1.      1. Severe compression fracture at T9 with 80% central height loss. Mild retropulsion of the superior endplate into the canal contributing to mild central canal stenosis most pronounced in the right paracentral region. 2. Mild 20% superior plate compression fracture at T12. 3. No thoracic cord myelomalacia.  This report was finalized on 8/1/2022 11:33 PM by Jono Teresa MD.      MRI Lumbar Spine Without Contrast    Result Date: 8/1/2022  Examination: MRI LUMBAR SPINE WO CONTRAST-  Date of Exam: 8/1/2022 9:09 PM  Indication: Compression fracture, lumbar; N39.0-Urinary tract infection, site not specified; W19.XXXA-Unspecified fall,  initial encounter; R73.9-Hyperglycemia, unspecified; E87.9-Wcdl-kogrzchpss and hyponatremia; S32.000A-Wedge compression fracture of unspecified lumbar vertebra, initial encounter for closed fracture.  Comparison: CT lumbar spine 08/01/2022  Technique: Standard noncontrast MR pulse sequences of the lumbar spine were obtained.  Findings: There is transitional anatomy as noted on recent CT exam with lumbarized S1 segment. For the purposes of this dictation the last disc space is labeled as S1-S2 in keeping with numbering from recent CT. Prior kyphoplasty at the L1 and L4 levels. There is a moderate 60% compression fracture at L2 . There is no retropulsed fracture fragment. No additional new fracture involving the lumbar spine.  There is mild grade 1 anterolisthesis of S1 on S2 related to pars defects at S1 which measures 5 mm. The posterior spinous processes are intact. The conus medullaris terminates at the L1-2 level. There is a cyst at the right kidney measuring 19 mm. Negative for hydronephrosis. Schmorl's node at the superior plate of L5. Mild superior endplate height loss at S1 related to chronic height loss. No paraspinal fluid collection.  L1-2: There is no disc bulge or retropulsed fracture fragment. Negative for canal or foraminal stenosis.  L2-3: There is a minimal posterior central disc osteophyte complex. Mild asymmetric left facet arthritis. Negative for canal stenosis. Mild bilateral foraminal stenosis.  L3-4: There is no significant disc bulge. Mild facet arthritis. Negative for canal or foraminal stenosis.  L4-5: There is a mild broad-based disc bulge. Mild facet arthritis. Negative for canal stenosis. Mild right foraminal stenosis. Moderate left foraminal stenosis.  L5-S1: There is a mild disc bulge. Mild bilateral facet arthritis. Negative for canal stenosis. Moderate right foraminal stenosis. Mild left foraminal stenosis.  S1-2: There is anterolisthesis of S1 on S2 with uncovering of the disc. Facet  joints are normally aligned. Moderate bilateral foraminal stenosis.      1. Transitional lumbosacral anatomy, recommend confirmation of vertebral body numbering if operative intervention planned in the future. 2. Moderate 60% central compression fracture at L2. No retropulsed fracture fragment. 3. Chronic compression fractures at L1 and L4 status post kyphoplasty. 4. Chronic pars defects at S1 with mild grade 1 anterolisthesis of S1 on S2 unchanged. 5. No high-grade central canal stenosis. Multilevel mild to moderate foraminal stenosis as described in detail above.  This report was finalized on 8/1/2022 11:01 PM by Jono Teresa MD.      XR Chest 1 View    Result Date: 8/3/2022  DATE OF EXAM: 8/3/2022 7:45 AM  PROCEDURE: XR CHEST 1 VW-  INDICATIONS: hypoxia, assess for any acute or chronic pathology; N39.0-Urinary tract infection, site not specified; W19.XXXA-Unspecified fall, initial encounter; R73.9-Hyperglycemia, unspecified; E87.3-Wmro-dedtivdowh and hyponatremia; S32.000A-Wedge compression fracture of unspecified lumbar vertebra, initial encounter for closed fracture  COMPARISON: No comparisons available.  TECHNIQUE: Single radiographic AP view of the chest was obtained.  FINDINGS: Heart size and pulmonary vasculature are within normal limits. There are coarse interstitial markings with honeycomb lucencies in the peripheral mid and lower lung zones. No airspace consolidations are present.      Pulmonary fibrosis. No acute infiltrate demonstrated  This report was finalized on 8/3/2022 8:26 AM by Christiano Gamez.                Results for orders placed during the hospital encounter of 08/01/22    Adult Transthoracic Echo Complete W/ Cont if Necessary Per Protocol    Interpretation Summary  · Estimated left ventricular EF = 65% Left ventricular systolic function is normal.  · Left ventricular diastolic function is consistent with (grade I) impaired relaxation.      Plan for Follow-up of Pending Labs/Results: BMP in  1 week to be followed up by provider at rehab    Discharge Details        Discharge Medications      New Medications      Instructions Start Date   cephalexin 500 MG capsule  Commonly known as: Keflex   500 mg, Oral, 2 Times Daily      glipiZIDE-metFORMIN 2.5-500 MG per tablet  Commonly known as: METAGLIP   1 tablet, Oral, 2 Times Daily Before Meals      losartan 50 MG tablet  Commonly known as: COZAAR   50 mg, Oral, Every 24 Hours Scheduled      melatonin 5 MG tablet tablet   5 mg, Oral, Nightly         Changes to Medications      Instructions Start Date   folic acid-vit B6-vit B12 2.2-25-1 MG tablet tablet  Commonly known as: FOLGARD  What changed: additional instructions   1 tablet, Oral, Daily      thiamine 100 MG tablet tablet  Commonly known as: VITAMIN B-1  What changed: additional instructions   100 mg, Oral, Daily      vitamin D3 125 MCG (5000 UT) capsule capsule  What changed: additional instructions   5,000 Units, Oral, Daily         Continue These Medications      Instructions Start Date   acetaminophen 500 MG tablet  Commonly known as: TYLENOL   500 mg, Oral, Every 6 Hours PRN, OTC      fish oil-omega-3 fatty acids 1000 MG capsule   1 capsule, Oral, Daily, OTC             Allergies   Allergen Reactions   • Penicillins Rash     Tolerates ceftriaxone   • Shellfish-Derived Products Hives     Pt says only applies to oysters         Discharge Disposition:  Skilled Nursing Facility (DC - External)    Diet:  Hospital:  Diet Order   Procedures   • Diet Regular; Consistent Carbohydrate       Activity:      Restrictions or Other Recommendations:  None       CODE STATUS:    Code Status and Medical Interventions:   Ordered at: 08/01/22 1225     Level Of Support Discussed With:    Patient     Code Status (Patient has no pulse and is not breathing):    CPR (Attempt to Resuscitate)     Medical Interventions (Patient has pulse or is breathing):    Full Support       No future appointments.    Additional Instructions  for the Follow-ups that You Need to Schedule     Discharge Follow-up with PCP   As directed       Currently Documented PCP:    Provider, No Known    PCP Phone Number:    None     Follow Up Details: 1 week         Discharge Follow-up with Specified Provider: Dr. Zepeda (Neurosurgery); 3 Weeks   As directed      To: Dr. Zepeda (Neurosurgery)    Follow Up: 3 Weeks         Basic Metabolic Panel    Aug 11, 2022 (Approximate)      Release to patient: Immediate                     Alberto Martinez DO  08/04/22      Time Spent on Discharge:  I spent 45 minutes on this discharge activity which included: face-to-face encounter with the patient, reviewing the data in the system, coordination of the care with the nursing staff as well as consultants, documentation, and entering orders.

## 2022-08-11 ENCOUNTER — NURSING HOME (OUTPATIENT)
Dept: INTERNAL MEDICINE | Facility: CLINIC | Age: 77
End: 2022-08-11

## 2022-08-11 VITALS
RESPIRATION RATE: 19 BRPM | TEMPERATURE: 98.4 F | DIASTOLIC BLOOD PRESSURE: 89 MMHG | HEART RATE: 90 BPM | OXYGEN SATURATION: 98 % | BODY MASS INDEX: 26.44 KG/M2 | SYSTOLIC BLOOD PRESSURE: 138 MMHG | WEIGHT: 195 LBS

## 2022-08-11 DIAGNOSIS — I10 PRIMARY HYPERTENSION: ICD-10-CM

## 2022-08-11 DIAGNOSIS — S22.018A OTHER CLOSED FRACTURE OF FIRST THORACIC VERTEBRA, INITIAL ENCOUNTER: Primary | ICD-10-CM

## 2022-08-11 DIAGNOSIS — E11.65 UNCONTROLLED TYPE 2 DIABETES MELLITUS WITH HYPERGLYCEMIA: ICD-10-CM

## 2022-08-11 DIAGNOSIS — F17.219 CIGARETTE NICOTINE DEPENDENCE WITH NICOTINE-INDUCED DISORDER: ICD-10-CM

## 2022-08-11 DIAGNOSIS — E78.2 MIXED HYPERLIPIDEMIA: ICD-10-CM

## 2022-08-11 PROCEDURE — 99306 1ST NF CARE HIGH MDM 50: CPT | Performed by: INTERNAL MEDICINE

## 2022-08-11 RX ORDER — TRAMADOL HYDROCHLORIDE 50 MG/1
50 TABLET ORAL EVERY 6 HOURS PRN
Qty: 30 TABLET | Refills: 0 | Status: SHIPPED | OUTPATIENT
Start: 2022-08-11

## 2022-08-15 NOTE — PROGRESS NOTES
Nursing Home History and Physical       Tristonalice Ann DO []  ASHA Meehan []  [address] [place] [address]  Phone: [phone]  Fax: [phone] Catarino Cartagena MD []  Darius Banegas DO [x]   [address] [place] [address]  Phone: [phone]  Fax: [phone]     PATIENT NAME: Cristian Watt                                                                          YOB: 1945           DATE OF SERVICE: 08/11/2022  FACILITY:  []  [place]   [] [place]    [] [facility]   [] [facility]   []  [facility]   [x]  [place]   ______________________________________________________________________    CHIEF COMPLAINT:  Nursing facility admission      HISTORY OF PRESENT ILLNESS:   Patient is a 77 yo W M with history of T2DM and DDD admitted from [facility] after hospitalization for T11 and L2 acute fractures after suffering from a fall at home.  With regards to the spinal fractures, patient elected to conservative management with follow up with orthopedics to consider kyphoplasty at a later time.  He was noted to have poorly controlled diabetes and was started on oral medications with the hopes of fair compliance upon discharge.  He was also noted to have a UTI and was treated with keflex which he has completed since admission to this facility.  He was also noted to have night time hypoxia without hypercapnia and has been started on oxygen.  BP was elevated and losartan was also started.     Patient was resting comfortably on exam today.  He was only in his underwear moving about his room.  He shared that someone was coming to pick him up and he was becoming frustrated with what he felt was a delayed discharge.  Discussion was held yesterday and patient was adamant about wanting to leave today.  Arrangements for discharge were made yesterday with NP in the facility.      PAST MEDICAL & SURGICAL HISTORY:   Past Medical History:   Diagnosis Date   • Depression    •  Diabetes mellitus (HCC)    • Disc degeneration, lumbar    • Emphysema of lung (HCC)    • Generalized anxiety disorder    • GERD (gastroesophageal reflux disease)    • Hyperlipidemia    • Hypertension    • Hypogonadism male    • Osteoarthritis    • Type 2 diabetes mellitus, uncontrolled    • Vitamin D deficiency       Past Surgical History:   Procedure Laterality Date   • HERNIA REPAIR      Inguinal   • KNEE ARTHROPLASTY     • KYPHOPLASTY N/A 7/14/2021    Procedure: KYPHOPLASTY L3 AND T12;  Surgeon: Ruddy Zepeda MD;  Location: [facility];  Service: Neurosurgery;  Laterality: N/A;         MEDICATIONS:  I have reviewed and reconciled the patients medication list in the patients chart at the skilled nursing facility on 08/11/2022.      ALLERGIES:  Allergies   Allergen Reactions   • Penicillins Rash     Tolerates ceftriaxone   • Shellfish-Derived Products Hives     Pt says only applies to oysters         SOCIAL HISTORY:  Social History     Socioeconomic History   • Marital status:    Tobacco Use   • Smoking status: Current Every Day Smoker     Packs/day: 1.50     Years: 45.00     Pack years: 67.50     Types: Cigarettes   • Smokeless tobacco: Never Used   Substance and Sexual Activity   • Alcohol use: Not Currently     Comment: HAS NOT DRANK IN A YEAR   • Drug use: No   • Sexual activity: Defer       FAMILY HISTORY:  Family History   Problem Relation Age of Onset   • Tuberculosis Mother    • Colon cancer Father    • Other Father         polyps of the sigmoid colon        REVIEW OF SYSTEMS:  Review of Systems   Constitutional: Negative for chills, fatigue and fever.   HENT: Negative for congestion, ear pain, rhinorrhea, sinus pressure and sore throat.    Eyes: Negative for visual disturbance.   Respiratory: Negative for cough, chest tightness, shortness of breath and wheezing.    Cardiovascular: Negative for chest pain, palpitations and leg swelling.   Gastrointestinal: Negative for abdominal pain, blood in  stool, constipation, diarrhea, nausea and vomiting.   Endocrine: Negative for polydipsia and polyuria.   Genitourinary: Negative for dysuria and hematuria.   Musculoskeletal: Negative for arthralgias and back pain.   Skin: Negative for rash.   Neurological: Negative for dizziness, light-headedness, numbness and headaches.   Psychiatric/Behavioral: Negative for dysphoric mood and sleep disturbance. The patient is not nervous/anxious.          PHYSICAL EXAMINATION:   VITAL SIGNS: /89   Pulse 90   Temp 98.4 °F (36.9 °C)   Resp 19   Wt 88.5 kg (195 lb)   SpO2 98%   BMI 26.44 kg/m²     Physical Exam  Vitals and nursing note reviewed.   Constitutional:       Appearance: Normal appearance. He is well-developed. He is obese.   HENT:      Head: Normocephalic and atraumatic.      Nose: Nose normal.      Mouth/Throat:      Mouth: Mucous membranes are moist.      Pharynx: No oropharyngeal exudate.   Eyes:      General: No scleral icterus.     Extraocular Movements: Extraocular movements intact.      Conjunctiva/sclera: Conjunctivae normal.      Pupils: Pupils are equal, round, and reactive to light.   Neck:      Thyroid: No thyromegaly.   Cardiovascular:      Rate and Rhythm: Normal rate and regular rhythm.      Heart sounds: Normal heart sounds. No murmur heard.    No friction rub. No gallop.   Pulmonary:      Effort: Pulmonary effort is normal. No respiratory distress.      Breath sounds: Normal breath sounds. No wheezing.   Abdominal:      General: Bowel sounds are normal. There is no distension.      Palpations: Abdomen is soft.      Tenderness: There is no abdominal tenderness.   Musculoskeletal:         General: No deformity or signs of injury.      Cervical back: Normal range of motion and neck supple.      Comments: Modest kyphosis   Lymphadenopathy:      Cervical: No cervical adenopathy.   Skin:     General: Skin is warm and dry.      Findings: No rash.   Neurological:      General: No focal deficit  present.      Mental Status: He is alert and oriented to person, place, and time.   Psychiatric:         Mood and Affect: Mood normal.         Behavior: Behavior normal.         RECORDS REVIEW:   Discharge Summary from [facility] 8/4/22  Results for orders placed or performed during the hospital encounter of 08/01/22   Urine Culture - Urine, Urine, Clean Catch    Specimen: Urine, Clean Catch   Result Value Ref Range    Urine Culture >100,000 CFU/mL Normal Urogenital Maureen    COVID-19, [facility],NASAL Swab (NO TRANSPORT MEDIA) 2 HR TAT - Swab, Nasopharynx    Specimen: Nasopharynx; Swab   Result Value Ref Range    COVID19 Presumptive Negative Presumptive Negative - Ref. Range   Comprehensive Metabolic Panel    Specimen: Blood   Result Value Ref Range    Glucose 280 (H) 65 - 99 mg/dL    BUN 6 (L) 8 - 23 mg/dL    Creatinine 0.59 (L) 0.76 - 1.27 mg/dL    Sodium 129 (L) 136 - 145 mmol/L    Potassium 4.2 3.5 - 5.2 mmol/L    Chloride 94 (L) 98 - 107 mmol/L    CO2 27.0 22.0 - 29.0 mmol/L    Calcium 8.9 8.6 - 10.5 mg/dL    Total Protein 6.9 6.0 - 8.5 g/dL    Albumin 3.40 (L) 3.50 - 5.20 g/dL    ALT (SGPT) 7 1 - 41 U/L    AST (SGOT) 10 1 - 40 U/L    Alkaline Phosphatase 111 39 - 117 U/L    Total Bilirubin 0.5 0.0 - 1.2 mg/dL    Globulin 3.5 gm/dL    A/G Ratio 1.0 g/dL    BUN/Creatinine Ratio 10.2 7.0 - 25.0    Anion Gap 8.0 5.0 - 15.0 mmol/L    eGFR 100.6 >60.0 mL/min/1.73   Urinalysis With Microscopic If Indicated (No Culture) - Urine, Clean Catch    Specimen: Urine, Clean Catch   Result Value Ref Range    Color, UA Yellow Yellow, Straw    Appearance, UA Clear Clear    pH, UA 5.5 5.0 - 8.0    Specific Gravity, UA 1.014 1.001 - 1.030    Glucose, UA >=1000 mg/dL (3+) (A) Negative    Ketones, UA Negative Negative    Bilirubin, UA Negative Negative    Blood, UA Trace (A) Negative    Protein, UA Negative Negative    Leuk Esterase, UA Moderate (2+) (A) Negative    Nitrite, UA Negative Negative    Urobilinogen, UA 0.2 E.U./dL 0.2 -  1.0 E.U./dL   CBC Auto Differential    Specimen: Blood   Result Value Ref Range    WBC 10.18 3.40 - 10.80 10*3/mm3    RBC 5.24 4.14 - 5.80 10*6/mm3    Hemoglobin 16.4 13.0 - 17.7 g/dL    Hematocrit 48.2 37.5 - 51.0 %    MCV 92.0 79.0 - 97.0 fL    MCH 31.3 26.6 - 33.0 pg    MCHC 34.0 31.5 - 35.7 g/dL    RDW 13.0 12.3 - 15.4 %    RDW-SD 44.3 37.0 - 54.0 fl    MPV 8.6 6.0 - 12.0 fL    Platelets 324 140 - 450 10*3/mm3    Neutrophil % 69.4 42.7 - 76.0 %    Lymphocyte % 20.0 19.6 - 45.3 %    Monocyte % 8.8 5.0 - 12.0 %    Eosinophil % 0.6 0.3 - 6.2 %    Basophil % 0.6 0.0 - 1.5 %    Immature Grans % 0.6 (H) 0.0 - 0.5 %    Neutrophils, Absolute 7.06 (H) 1.70 - 7.00 10*3/mm3    Lymphocytes, Absolute 2.04 0.70 - 3.10 10*3/mm3    Monocytes, Absolute 0.90 0.10 - 0.90 10*3/mm3    Eosinophils, Absolute 0.06 0.00 - 0.40 10*3/mm3    Basophils, Absolute 0.06 0.00 - 0.20 10*3/mm3    Immature Grans, Absolute 0.06 (H) 0.00 - 0.05 10*3/mm3    nRBC 0.0 0.0 - 0.2 /100 WBC   Urinalysis, Microscopic Only - Urine, Clean Catch    Specimen: Urine, Clean Catch   Result Value Ref Range    RBC, UA 7-12 (A) None Seen, 0-2 /HPF    WBC, UA 31-50 (A) None Seen, 0-2 /HPF    Bacteria, UA None Seen None Seen, Trace /HPF    Squamous Epithelial Cells, UA 0-2 None Seen, 0-2 /HPF    Hyaline Casts, UA 0-6 0 - 6 /LPF    Methodology Automated Microscopy    Hemoglobin A1c    Specimen: Blood   Result Value Ref Range    Hemoglobin A1C 9.90 (H) 4.80 - 5.60 %   CBC (No Diff)    Specimen: Blood   Result Value Ref Range    WBC 10.45 3.40 - 10.80 10*3/mm3    RBC 4.84 4.14 - 5.80 10*6/mm3    Hemoglobin 15.2 13.0 - 17.7 g/dL    Hematocrit 44.4 37.5 - 51.0 %    MCV 91.7 79.0 - 97.0 fL    MCH 31.4 26.6 - 33.0 pg    MCHC 34.2 31.5 - 35.7 g/dL    RDW 12.8 12.3 - 15.4 %    RDW-SD 43.1 37.0 - 54.0 fl    MPV 8.3 6.0 - 12.0 fL    Platelets 327 140 - 450 10*3/mm3   Comprehensive Metabolic Panel    Specimen: Blood   Result Value Ref Range    Glucose 194 (H) 65 - 99 mg/dL    BUN  7 (L) 8 - 23 mg/dL    Creatinine 0.63 (L) 0.76 - 1.27 mg/dL    Sodium 131 (L) 136 - 145 mmol/L    Potassium 4.2 3.5 - 5.2 mmol/L    Chloride 96 (L) 98 - 107 mmol/L    CO2 27.0 22.0 - 29.0 mmol/L    Calcium 8.7 8.6 - 10.5 mg/dL    Total Protein 6.2 6.0 - 8.5 g/dL    Albumin 3.30 (L) 3.50 - 5.20 g/dL    ALT (SGPT) 6 1 - 41 U/L    AST (SGOT) 10 1 - 40 U/L    Alkaline Phosphatase 117 39 - 117 U/L    Total Bilirubin 0.5 0.0 - 1.2 mg/dL    Globulin 2.9 gm/dL    A/G Ratio 1.1 g/dL    BUN/Creatinine Ratio 11.1 7.0 - 25.0    Anion Gap 8.0 5.0 - 15.0 mmol/L    eGFR 98.6 >60.0 mL/min/1.73   Basic Metabolic Panel    Specimen: Blood   Result Value Ref Range    Glucose 138 (H) 65 - 99 mg/dL    BUN 6 (L) 8 - 23 mg/dL    Creatinine 0.44 (L) 0.76 - 1.27 mg/dL    Sodium 132 (L) 136 - 145 mmol/L    Potassium 4.1 3.5 - 5.2 mmol/L    Chloride 96 (L) 98 - 107 mmol/L    CO2 27.0 22.0 - 29.0 mmol/L    Calcium 9.0 8.6 - 10.5 mg/dL    BUN/Creatinine Ratio 13.6 7.0 - 25.0    Anion Gap 9.0 5.0 - 15.0 mmol/L    eGFR 109.9 >60.0 mL/min/1.73   Blood Gas, Arterial With Co-Ox    Specimen: Arterial Blood   Result Value Ref Range    Site Right Radial     Reg's Test N/A     pH, Arterial 7.455 (H) 7.350 - 7.450 pH units    pCO2, Arterial 39.7 35.0 - 45.0 mm Hg    pO2, Arterial 72.1 (L) 83.0 - 108.0 mm Hg    HCO3, Arterial 27.9 (H) 20.0 - 26.0 mmol/L    Base Excess, Arterial 3.7 (H) 0.0 - 2.0 mmol/L    Hemoglobin, Blood Gas 15.7 13.5 - 17.5 g/dL    Hematocrit, Blood Gas 48.2 38.0 - 51.0 %    Oxyhemoglobin 93.8 (L) 94 - 99 %    Methemoglobin 0.30 0.00 - 1.50 %    Carboxyhemoglobin 1.2 0 - 2 %    CO2 Content 29.1 22 - 33 mmol/L    Temperature 37.0 C    Barometric Pressure for Blood Gas      Modality Room Air     FIO2 21 %    Rate 0 Breaths/minute    PIP 0 cmH2O    IPAP 0     EPAP 0     Note      pH, Temp Corrected 7.455 pH Units    pCO2, Temperature Corrected 39.7 35 - 48 mm Hg    pO2, Temperature Corrected 72.1 (L) 83 - 108 mm Hg   POC Glucose Once     Specimen: Blood   Result Value Ref Range    Glucose 168 (H) 70 - 130 mg/dL   POC Glucose Once    Specimen: Blood   Result Value Ref Range    Glucose 249 (H) 70 - 130 mg/dL   POC Glucose Once    Specimen: Blood   Result Value Ref Range    Glucose 164 (H) 70 - 130 mg/dL   POC Glucose Once    Specimen: Blood   Result Value Ref Range    Glucose 139 (H) 70 - 130 mg/dL   POC Glucose Once    Specimen: Blood   Result Value Ref Range    Glucose 354 (H) 70 - 130 mg/dL   POC Glucose Once    Specimen: Blood   Result Value Ref Range    Glucose 162 (H) 70 - 130 mg/dL   POC Glucose Once    Specimen: Blood   Result Value Ref Range    Glucose 144 (H) 70 - 130 mg/dL   POC Glucose Once    Specimen: Blood   Result Value Ref Range    Glucose 244 (H) 70 - 130 mg/dL   POC Glucose Once    Specimen: Blood   Result Value Ref Range    Glucose 225 (H) 70 - 130 mg/dL   POC Glucose Once    Specimen: Blood   Result Value Ref Range    Glucose 238 (H) 70 - 130 mg/dL   Adult Transthoracic Echo Complete W/ Cont if Necessary Per Protocol   Result Value Ref Range    Target HR (85%) 122 bpm    Max. Pred. HR (100%) 144 bpm    BH CV VAS BP LEFT /88 mmHg    RV S' 12.6 cm/sec    RV Base 4.0 cm    RV Length 7.0 cm    RV Mid 2.9 cm    Ascending aorta 2.8 cm    IVRT 109.0 msec    LA ESV Index (BP) 21.9 ml/m2    Avg E/e' ratio 7.73     Ao root diam 2.8 cm    Ao pk viral 122.0 cm/sec    Ao V2 VTI 23.2 cm    VESTA(I,D) 3.1 cm2    EDV(cubed) 110.6 ml    EDV(MOD-sp2) 85.3 ml    EDV(MOD-sp4) 82.7 ml    EF(MOD-bp) 72.1 %    EF(MOD-sp2) 68.3 %    EF(MOD-sp4) 73.4 %    ESV(cubed) 24.4 ml    ESV(MOD-sp2) 27.0 ml    ESV(MOD-sp4) 22.0 ml    IVS/LVPW 1.00 cm    Lat Peak E' Viral 8.3 cm/sec    LV mass(C)d 194.0 grams    LV V1 max PG 4.2 mmHg    LV V1 mean PG 2.00 mmHg    LV V1 max 102.0 cm/sec    LVPWd 1.10 cm    Med Peak E' Viral 7.3 cm/sec    MV dec slope 357.0 cm/sec2    MV dec time 0.17 msec    MV V2 VTI 25.1 cm    MVA(VTI) 2.8 cm2    PA acc time 0.10 sec    PA pr(Accel)  34.4 mmHg    PA V2 max 88.3 cm/sec    SI(MOD-sp2) 28.1 ml/m2    SI(MOD-sp4) 29.3 ml/m2    SV(LVOT) 71.0 ml    SV(MOD-sp2) 58.3 ml    SV(MOD-sp4) 60.7 ml    Ao max PG 6.0 mmHg    Ao mean PG 3.0 mmHg    FS 39.6 %    IVSd 1.10 cm    LA dimension (2D)  3.1 cm    LV V1 VTI 20.5 cm    LVIDd 4.8 cm    LVIDs 2.9 cm    LVOT area 3.5 cm2    LVOT diam 2.10 cm    MV E/A 0.51     MV max PG 6.9 mmHg    MV mean PG 3.0 mmHg    MV A max aletha 119.0 cm/sec    MV E max aletha 60.3 cm/sec    LV Lopez Vol (BSA corrected) 39.9 cm2    LV Sys Vol (BSA corrected) 10.6 cm2    TAPSE (>1.6) 2.19 cm    Echo EF Estimated 65 %        ASSESSMENT   Diagnoses and all orders for this visit:    1. Other closed fracture of first thoracic vertebra, initial encounter ([facility]) (Primary)  -     traMADol (ULTRAM) 50 MG tablet; Take 1 tablet by mouth Every 6 (Six) Hours As Needed for Moderate Pain  (back pain).  Dispense: 30 tablet; Refill: 0    2. Uncontrolled type 2 diabetes mellitus with hyperglycemia ([facility])    3. Mixed hyperlipidemia    4. Primary hypertension    5. Cigarette nicotine dependence with nicotine-induced disorder        PLAN    Acute L2 and T11 Spinal fractures  - Tramadol is doing well for pain control, discharge script provided today.   - pt has back brace at home but when moving about in his room was not wearing the brace.   - consider kyphoplasty with orthopedics upon follow up.   - limited benefit from PT as patient has elected to leave earlier than recommended from this facility.     Nicotine abuse  - patient seems to be having a hard time without being able to go out to smoke.  Part of his motivation to leave early is due to his desire to smoke.     Type II Diabetes Mellitus  - currently on Janumet with fair control however closer follow up on diabetes is needed with PCP.     Hypertension   - Losartan started, pt has good control with this current dose.     Low vitamin D  - cont daily supplements.     Nocturnal Hypoxia  - pt may be discharged  with home oxygen for night time use.     [x]  Discussed Patient in detail with nursing/staff, addressed all needs today.     [x]  Plan of Care Reviewed   [x]  PT/OT Reviewed   [x]  Order Changes  []  Discharge Plans Reviewed  [x]  Advance Directive on file with Nursing Home.   [x]  POA on file with Nursing Home.    [x]  Code Status listed and reviewed.       Darius Banegas DO.  8/15/2022      **Part of this note may be an electronic transcription/translation of spoken language to printed text using the Dragon Dictation System.**

## 2022-12-14 ENCOUNTER — TELEPHONE (OUTPATIENT)
Dept: FAMILY MEDICINE CLINIC | Facility: CLINIC | Age: 77
End: 2022-12-14

## 2022-12-14 NOTE — TELEPHONE ENCOUNTER
Caller: Minh Watt    Relationship: Self    Best call back number: [phone]    What is the best time to reach you: ANY TIME    Who are you requesting to speak with (clinical staff, provider,  specific staff member): KIERAN CARTER    Do you know the name of the person who called: MINH    What was the call regarding: PATIENT ASKED FOR A CALL BACK FROM KIERAN CARTER TO DISCUSS A REFERRAL FOR BACK SURGERY.    Do you require a callback: YES

## 2023-03-10 ENCOUNTER — APPOINTMENT (OUTPATIENT)
Dept: CARDIOLOGY | Facility: HOSPITAL | Age: 78
End: 2023-03-10
Payer: MEDICARE

## 2023-03-10 ENCOUNTER — HOSPITAL ENCOUNTER (EMERGENCY)
Facility: HOSPITAL | Age: 78
Discharge: HOME | End: 2023-03-10
Attending: EMERGENCY MEDICINE | Admitting: EMERGENCY MEDICINE
Payer: MEDICARE

## 2023-03-10 VITALS
OXYGEN SATURATION: 96 % | HEIGHT: 72 IN | HEART RATE: 101 BPM | RESPIRATION RATE: 18 BRPM | TEMPERATURE: 98.5 F | BODY MASS INDEX: 25.74 KG/M2 | DIASTOLIC BLOOD PRESSURE: 91 MMHG | WEIGHT: 190.04 LBS | SYSTOLIC BLOOD PRESSURE: 144 MMHG

## 2023-03-10 DIAGNOSIS — R73.9 HYPERGLYCEMIA: ICD-10-CM

## 2023-03-10 DIAGNOSIS — E87.1 HYPONATREMIA: ICD-10-CM

## 2023-03-10 DIAGNOSIS — L03.115 CELLULITIS OF RIGHT LOWER LEG: Primary | ICD-10-CM

## 2023-03-10 LAB
ALBUMIN SERPL-MCNC: 3.5 G/DL (ref 3.5–5.2)
ALBUMIN/GLOB SERPL: 1 G/DL
ALP SERPL-CCNC: 116 U/L (ref 39–117)
ALT SERPL W P-5'-P-CCNC: 5 U/L (ref 1–41)
ANION GAP SERPL CALCULATED.3IONS-SCNC: 10 MMOL/L (ref 5–15)
AST SERPL-CCNC: 11 U/L (ref 1–40)
BASOPHILS # BLD AUTO: 0.07 10*3/MM3 (ref 0–0.2)
BASOPHILS NFR BLD AUTO: 0.7 % (ref 0–1.5)
BH CV LOWER VASCULAR LEFT COMMON FEMORAL AUGMENT: NORMAL
BH CV LOWER VASCULAR LEFT COMMON FEMORAL PHASIC: NORMAL
BH CV LOWER VASCULAR LEFT COMMON FEMORAL SPONT: NORMAL
BH CV LOWER VASCULAR RIGHT COMMON FEMORAL AUGMENT: NORMAL
BH CV LOWER VASCULAR RIGHT COMMON FEMORAL COMPRESS: NORMAL
BH CV LOWER VASCULAR RIGHT COMMON FEMORAL PHASIC: NORMAL
BH CV LOWER VASCULAR RIGHT COMMON FEMORAL SPONT: NORMAL
BH CV LOWER VASCULAR RIGHT DISTAL FEMORAL AUGMENT: NORMAL
BH CV LOWER VASCULAR RIGHT DISTAL FEMORAL COMPRESS: NORMAL
BH CV LOWER VASCULAR RIGHT DISTAL FEMORAL PHASIC: NORMAL
BH CV LOWER VASCULAR RIGHT DISTAL FEMORAL SPONT: NORMAL
BH CV LOWER VASCULAR RIGHT GASTRONEMIUS COMPRESS: NORMAL
BH CV LOWER VASCULAR RIGHT GREATER SAPH AK COMPRESS: NORMAL
BH CV LOWER VASCULAR RIGHT GREATER SAPH BK COMPRESS: NORMAL
BH CV LOWER VASCULAR RIGHT LESSER SAPH COMPRESS: NORMAL
BH CV LOWER VASCULAR RIGHT MID FEMORAL AUGMENT: NORMAL
BH CV LOWER VASCULAR RIGHT MID FEMORAL COMPRESS: NORMAL
BH CV LOWER VASCULAR RIGHT MID FEMORAL PHASIC: NORMAL
BH CV LOWER VASCULAR RIGHT MID FEMORAL SPONT: NORMAL
BH CV LOWER VASCULAR RIGHT PERONEAL COMPRESS: NORMAL
BH CV LOWER VASCULAR RIGHT POPLITEAL AUGMENT: NORMAL
BH CV LOWER VASCULAR RIGHT POPLITEAL COMPRESS: NORMAL
BH CV LOWER VASCULAR RIGHT POPLITEAL PHASIC: NORMAL
BH CV LOWER VASCULAR RIGHT POPLITEAL SPONT: NORMAL
BH CV LOWER VASCULAR RIGHT POSTERIOR TIBIAL COMPRESS: NORMAL
BH CV LOWER VASCULAR RIGHT PROFUNDA FEMORAL PHASIC: NORMAL
BH CV LOWER VASCULAR RIGHT PROFUNDA FEMORAL SPONT: NORMAL
BH CV LOWER VASCULAR RIGHT PROXIMAL FEMORAL AUGMENT: NORMAL
BH CV LOWER VASCULAR RIGHT PROXIMAL FEMORAL COMPRESS: NORMAL
BH CV LOWER VASCULAR RIGHT PROXIMAL FEMORAL PHASIC: NORMAL
BH CV LOWER VASCULAR RIGHT PROXIMAL FEMORAL SPONT: NORMAL
BH CV LOWER VASCULAR RIGHT SAPHENOFEMORAL JUNCTION COMPRESS: NORMAL
BH CV LOWER VASCULAR RIGHT SAPHENOFEMORAL JUNCTION PHASIC: NORMAL
BH CV LOWER VASCULAR RIGHT SAPHENOFEMORAL JUNCTION SPONT: NORMAL
BH CV VAS PRELIMINARY FINDINGS SCRIPTING: 1
BILIRUB SERPL-MCNC: 0.5 MG/DL (ref 0–1.2)
BUN SERPL-MCNC: 7 MG/DL (ref 8–23)
BUN/CREAT SERPL: 11.3 (ref 7–25)
CALCIUM SPEC-SCNC: 8.7 MG/DL (ref 8.6–10.5)
CHLORIDE SERPL-SCNC: 96 MMOL/L (ref 98–107)
CO2 SERPL-SCNC: 26 MMOL/L (ref 22–29)
CREAT SERPL-MCNC: 0.62 MG/DL (ref 0.76–1.27)
D-LACTATE SERPL-SCNC: 0.8 MMOL/L (ref 0.5–2)
DEPRECATED RDW RBC AUTO: 42.6 FL (ref 37–54)
EGFRCR SERPLBLD CKD-EPI 2021: 98.4 ML/MIN/1.73
EOSINOPHIL # BLD AUTO: 0.17 10*3/MM3 (ref 0–0.4)
EOSINOPHIL NFR BLD AUTO: 1.7 % (ref 0.3–6.2)
ERYTHROCYTE [DISTWIDTH] IN BLOOD BY AUTOMATED COUNT: 12.6 % (ref 12.3–15.4)
GLOBULIN UR ELPH-MCNC: 3.6 GM/DL
GLUCOSE SERPL-MCNC: 181 MG/DL (ref 65–99)
HCT VFR BLD AUTO: 48.7 % (ref 37.5–51)
HGB BLD-MCNC: 16.2 G/DL (ref 13–17.7)
IMM GRANULOCYTES # BLD AUTO: 0.03 10*3/MM3 (ref 0–0.05)
IMM GRANULOCYTES NFR BLD AUTO: 0.3 % (ref 0–0.5)
LYMPHOCYTES # BLD AUTO: 3 10*3/MM3 (ref 0.7–3.1)
LYMPHOCYTES NFR BLD AUTO: 29.7 % (ref 19.6–45.3)
MAXIMAL PREDICTED HEART RATE: 143 BPM
MCH RBC QN AUTO: 30.6 PG (ref 26.6–33)
MCHC RBC AUTO-ENTMCNC: 33.3 G/DL (ref 31.5–35.7)
MCV RBC AUTO: 91.9 FL (ref 79–97)
MONOCYTES # BLD AUTO: 0.77 10*3/MM3 (ref 0.1–0.9)
MONOCYTES NFR BLD AUTO: 7.6 % (ref 5–12)
NEUTROPHILS NFR BLD AUTO: 6.05 10*3/MM3 (ref 1.7–7)
NEUTROPHILS NFR BLD AUTO: 60 % (ref 42.7–76)
NRBC BLD AUTO-RTO: 0 /100 WBC (ref 0–0.2)
PLATELET # BLD AUTO: 376 10*3/MM3 (ref 140–450)
PMV BLD AUTO: 8 FL (ref 6–12)
POTASSIUM SERPL-SCNC: 4 MMOL/L (ref 3.5–5.2)
PROCALCITONIN SERPL-MCNC: 0.08 NG/ML (ref 0–0.25)
PROT SERPL-MCNC: 7.1 G/DL (ref 6–8.5)
RBC # BLD AUTO: 5.3 10*6/MM3 (ref 4.14–5.8)
SODIUM SERPL-SCNC: 132 MMOL/L (ref 136–145)
STRESS TARGET HR: 122 BPM
WBC NRBC COR # BLD: 10.09 10*3/MM3 (ref 3.4–10.8)

## 2023-03-10 PROCEDURE — 87040 BLOOD CULTURE FOR BACTERIA: CPT | Performed by: EMERGENCY MEDICINE

## 2023-03-10 PROCEDURE — 93971 EXTREMITY STUDY: CPT | Performed by: INTERNAL MEDICINE

## 2023-03-10 PROCEDURE — 36415 COLL VENOUS BLD VENIPUNCTURE: CPT

## 2023-03-10 PROCEDURE — 93971 EXTREMITY STUDY: CPT

## 2023-03-10 PROCEDURE — 83605 ASSAY OF LACTIC ACID: CPT | Performed by: EMERGENCY MEDICINE

## 2023-03-10 PROCEDURE — 80053 COMPREHEN METABOLIC PANEL: CPT | Performed by: EMERGENCY MEDICINE

## 2023-03-10 PROCEDURE — 99284 EMERGENCY DEPT VISIT MOD MDM: CPT

## 2023-03-10 PROCEDURE — 84145 PROCALCITONIN (PCT): CPT | Performed by: EMERGENCY MEDICINE

## 2023-03-10 PROCEDURE — 85025 COMPLETE CBC W/AUTO DIFF WBC: CPT | Performed by: EMERGENCY MEDICINE

## 2023-03-10 RX ORDER — ACETAMINOPHEN 500 MG
1000 TABLET ORAL ONCE
Status: COMPLETED | OUTPATIENT
Start: 2023-03-10 | End: 2023-03-10

## 2023-03-10 RX ORDER — CLINDAMYCIN HYDROCHLORIDE 150 MG/1
450 CAPSULE ORAL EVERY 6 HOURS SCHEDULED
Status: COMPLETED | OUTPATIENT
Start: 2023-03-10 | End: 2023-03-10

## 2023-03-10 RX ORDER — CLINDAMYCIN HYDROCHLORIDE 150 MG/1
450 CAPSULE ORAL 3 TIMES DAILY
Qty: 63 CAPSULE | Refills: 0 | OUTPATIENT
Start: 2023-03-10 | End: 2023-03-17

## 2023-03-10 RX ADMIN — ACETAMINOPHEN 1000 MG: 500 TABLET ORAL at 08:29

## 2023-03-10 RX ADMIN — CLINDAMYCIN HYDROCHLORIDE 450 MG: 150 CAPSULE ORAL at 10:13

## 2023-03-10 NOTE — ED PROVIDER NOTES
Subjective   History of Present Illness    77-year-old male presents for evaluation of right lower extremity redness and swelling.  The patient states that he began experiencing redness and swelling to his right lower leg and foot yesterday.  His symptoms have progressed since that time.  He denies any accompanying fever, chills, or other systemic symptoms.  He is unsure as to what may have triggered the swelling.  He denies any preceding injury or trauma.  No falls.  He currently rates his pain at 5 out of 10 in severity.  He called EMS regarding his symptoms today and was brought to our facility to be evaluated.  He has no other complaints at this time.  No shortness of breath.        Review of Systems   Musculoskeletal:        Pain, swelling to right lower leg   Skin:        Redness to right lower leg   All other systems reviewed and are negative.      Past Medical History:   Diagnosis Date   • Depression    • Diabetes mellitus (HCC)    • Disc degeneration, lumbar    • Emphysema of lung (HCC)    • Generalized anxiety disorder    • GERD (gastroesophageal reflux disease)    • Hyperlipidemia    • Hypertension    • Hypogonadism male    • Osteoarthritis    • Type 2 diabetes mellitus, uncontrolled    • Vitamin D deficiency        Allergies   Allergen Reactions   • Penicillins Rash     Tolerates ceftriaxone   • Shellfish-Derived Products Hives     Pt says only applies to oysters       Past Surgical History:   Procedure Laterality Date   • HERNIA REPAIR      Inguinal   • KNEE ARTHROPLASTY     • KYPHOPLASTY N/A 7/14/2021    Procedure: KYPHOPLASTY L3 AND T12;  Surgeon: Ruddy Zepeda MD;  Location: [facility];  Service: Neurosurgery;  Laterality: N/A;       Family History   Problem Relation Age of Onset   • Tuberculosis Mother    • Colon cancer Father    • Other Father         polyps of the sigmoid colon       Social History     Socioeconomic History   • Marital status:    Tobacco Use   • Smoking status: Every  Day     Packs/day: 1.50     Years: 45.00     Pack years: 67.50     Types: Cigarettes   • Smokeless tobacco: Never   Substance and Sexual Activity   • Alcohol use: Not Currently     Comment: HAS NOT DRANK IN A YEAR   • Drug use: No   • Sexual activity: Defer           Objective   Physical Exam  Vitals and nursing note reviewed.   Constitutional:       General: He is not in acute distress.     Appearance: He is well-developed. He is not diaphoretic.      Comments: Nontoxic-appearing male   HENT:      Head: Normocephalic and atraumatic.      Comments: No mucous membrane lesions  Neck:      Vascular: No JVD.   Cardiovascular:      Rate and Rhythm: Normal rate and regular rhythm.      Heart sounds: Normal heart sounds. No murmur heard.    No friction rub. No gallop.   Pulmonary:      Effort: Pulmonary effort is normal. No respiratory distress.      Breath sounds: Normal breath sounds. No wheezing or rales.   Abdominal:      General: Bowel sounds are normal. There is no distension.      Palpations: Abdomen is soft. There is no mass.      Tenderness: There is no abdominal tenderness. There is no guarding.   Musculoskeletal:         General: Normal range of motion.      Right lower leg: Edema present.   Skin:     Findings: Erythema present.      Comments: Warm, tender, macular erythema with circumferential soft tissue swelling noted to right foot and lower leg, no palpable crepitus, no induration, no fluctuance, no pain out of proportion to exam   Neurological:      Mental Status: He is alert and oriented to person, place, and time.      Comments: Right lower extremity is neurovascularly intact distally with bounding distal pulses and normal sensation   Psychiatric:         Mood and Affect: Mood normal.         Thought Content: Thought content normal.         Judgment: Judgment normal.         Procedures           ED Course  ED Course as of 03/10/23 1346   Fri Mar 10, 2023   0807 77-year-old male presents for evaluation of  right lower extremity swelling.  He notes that he began experiencing redness and swelling to his right lower leg and foot yesterday.  He denies any fevers, chills, or systemic symptoms.  He is unsure as to what may have triggered the swelling.  He called EMS this morning and was brought to our facility to be evaluated.  On arrival, the patient is nontoxic-appearing.  On visual inspection, he has erythema and soft tissue swelling noted to his right lower leg and right foot when compared to the left.  Right lower extremity is neurovascularly intact distally with bounding distal pulses and normal sensation.  We will obtain a Doppler ultrasound to assess for DVT, and we will reassess following labs and medications. [DD]   0948 Labs are unrevealing aside from mild hyponatremia and mild hyperglycemia.  Doppler ultrasound is negative for DVT.  Patient reassured and counseled regarding symptomatic management.  Prescription for clindamycin to cover for cellulitis of his right lower leg.  He will follow-up with his primary care physician within the next week.  Agreeable with plan and given appropriate strict return precautions. [DD]      ED Course User Index  [DD] Kevin Restrepo MD                                          Recent Results (from the past 24 hour(s))   Comprehensive Metabolic Panel    Collection Time: 03/10/23  8:20 AM    Specimen: Blood   Result Value Ref Range    Glucose 181 (H) 65 - 99 mg/dL    BUN 7 (L) 8 - 23 mg/dL    Creatinine 0.62 (L) 0.76 - 1.27 mg/dL    Sodium 132 (L) 136 - 145 mmol/L    Potassium 4.0 3.5 - 5.2 mmol/L    Chloride 96 (L) 98 - 107 mmol/L    CO2 26.0 22.0 - 29.0 mmol/L    Calcium 8.7 8.6 - 10.5 mg/dL    Total Protein 7.1 6.0 - 8.5 g/dL    Albumin 3.5 3.5 - 5.2 g/dL    ALT (SGPT) 5 1 - 41 U/L    AST (SGOT) 11 1 - 40 U/L    Alkaline Phosphatase 116 39 - 117 U/L    Total Bilirubin 0.5 0.0 - 1.2 mg/dL    Globulin 3.6 gm/dL    A/G Ratio 1.0 g/dL    BUN/Creatinine Ratio 11.3 7.0 - 25.0     Anion Gap 10.0 5.0 - 15.0 mmol/L    eGFR 98.4 >60.0 mL/min/1.73   CBC Auto Differential    Collection Time: 03/10/23  8:20 AM    Specimen: Blood   Result Value Ref Range    WBC 10.09 3.40 - 10.80 10*3/mm3    RBC 5.30 4.14 - 5.80 10*6/mm3    Hemoglobin 16.2 13.0 - 17.7 g/dL    Hematocrit 48.7 37.5 - 51.0 %    MCV 91.9 79.0 - 97.0 fL    MCH 30.6 26.6 - 33.0 pg    MCHC 33.3 31.5 - 35.7 g/dL    RDW 12.6 12.3 - 15.4 %    RDW-SD 42.6 37.0 - 54.0 fl    MPV 8.0 6.0 - 12.0 fL    Platelets 376 140 - 450 10*3/mm3    Neutrophil % 60.0 42.7 - 76.0 %    Lymphocyte % 29.7 19.6 - 45.3 %    Monocyte % 7.6 5.0 - 12.0 %    Eosinophil % 1.7 0.3 - 6.2 %    Basophil % 0.7 0.0 - 1.5 %    Immature Grans % 0.3 0.0 - 0.5 %    Neutrophils, Absolute 6.05 1.70 - 7.00 10*3/mm3    Lymphocytes, Absolute 3.00 0.70 - 3.10 10*3/mm3    Monocytes, Absolute 0.77 0.10 - 0.90 10*3/mm3    Eosinophils, Absolute 0.17 0.00 - 0.40 10*3/mm3    Basophils, Absolute 0.07 0.00 - 0.20 10*3/mm3    Immature Grans, Absolute 0.03 0.00 - 0.05 10*3/mm3    nRBC 0.0 0.0 - 0.2 /100 WBC   Lactic Acid, Plasma    Collection Time: 03/10/23  8:20 AM    Specimen: Blood   Result Value Ref Range    Lactate 0.8 0.5 - 2.0 mmol/L   Procalcitonin    Collection Time: 03/10/23  8:20 AM    Specimen: Blood   Result Value Ref Range    Procalcitonin 0.08 0.00 - 0.25 ng/mL   Duplex Venous Lower Extremity - Right    Collection Time: 03/10/23  9:34 AM   Result Value Ref Range    Target HR (85%) 122 bpm    Max. Pred. HR (100%) 143 bpm    Right Common Femoral Spont Y     Right Common Femoral Phasic Y     Right Common Femoral Compress C     Right Common Femoral Augment Y     Right Saphenofemoral Junction Spont Y     Right Saphenofemoral Junction Phasic Y     Right Saphenofemoral Junction Compress C     Right Profunda Femoral Spont Y     Right Profunda Femoral Phasic Y     Right Proximal Femoral Spont Y     Right Proximal Femoral Phasic Y     Right Proximal Femoral Compress C     Right Proximal  "Femoral Augment Y     Right Mid Femoral Spont Y     Right Mid Femoral Phasic Y     Right Mid Femoral Compress C     Right Mid Femoral Augment Y     Right Distal Femoral Spont Y     Right Distal Femoral Phasic Y     Right Distal Femoral Compress C     Right Distal Femoral Augment Y     Right Popliteal Spont Y     Right Popliteal Phasic Y     Right Popliteal Compress C     Right Popliteal Augment Y     Right Posterior Tibial Compress C     Right Peroneal Compress C     Right Gastronemius Compress C     Right Greater Saph AK Compress C     Right Greater Saph BK Compress C     Right Lesser Saph Compress C     Left Common Femoral Spont Y     Left Common Femoral Phasic Y     Left Common Femoral Augment Y     BH CV VAS PRELIMINARY FINDINGS SCRIPTING 1.0      Note: In addition to lab results from this visit, the labs listed above may include labs taken at another facility or during a different encounter within the last 24 hours. Please correlate lab times with ED admission and discharge times for further clarification of the services performed during this visit.    No orders to display     Vitals:    03/10/23 0800 03/10/23 0850   BP: 144/91    BP Location: Right arm    Patient Position: Sitting    Pulse: 101    Resp: 18    Temp: 98.5 °F (36.9 °C)    TempSrc: Oral    SpO2: 96%    Weight: 86.2 kg (190 lb) 86.2 kg (190 lb 0.6 oz)   Height: 182.9 cm (72\") 182.9 cm (72.01\")     Medications   acetaminophen (TYLENOL) tablet 1,000 mg (1,000 mg Oral Given 3/10/23 0829)   clindamycin (CLEOCIN) capsule 450 mg (450 mg Oral Given 3/10/23 1013)     ECG/EMG Results (last 24 hours)     ** No results found for the last 24 hours. **        No orders to display           MDM    Final diagnoses:   Cellulitis of right lower leg   Hyperglycemia   Hyponatremia       ED Disposition  ED Disposition     ED Disposition   Discharge    Condition   Stable    Comment   --             PATIENT CONNECTION - [facility] " [id number]  [phone]  In 1 week           Medication List      New Prescriptions    clindamycin 150 MG capsule  Commonly known as: CLEOCIN  Take 3 capsules by mouth 3 (Three) Times a Day for 7 days.        Changed    folic acid-vit B6-vit B12 2.2-25-1 MG tablet tablet  Commonly known as: FOLGARD  Take 1 tablet by mouth Daily.  What changed: additional instructions     thiamine 100 MG tablet  Commonly known as: VITAMIN B1  Take 1 tablet by mouth Daily.  What changed: additional instructions     vitamin D3 125 MCG (5000 UT) capsule capsule  Take 1 capsule by mouth Daily.  What changed: additional instructions           Where to Get Your Medications      These medications were sent to [facility] [id number] - [place], [place] - [facility] [address] [facility] [address] - [phone]  - [phone] [address] , [facility] [place] [address]    Phone: [phone]   · clindamycin 150 MG capsule          Kevin Restrepo MD  03/10/23 7311

## 2023-03-15 LAB
BACTERIA SPEC AEROBE CULT: NORMAL
BACTERIA SPEC AEROBE CULT: NORMAL

## 2023-03-17 ENCOUNTER — HOSPITAL ENCOUNTER (EMERGENCY)
Facility: HOSPITAL | Age: 78
Discharge: HOME | End: 2023-03-17
Attending: EMERGENCY MEDICINE | Admitting: EMERGENCY MEDICINE
Payer: MEDICARE

## 2023-03-17 VITALS
WEIGHT: 190 LBS | HEIGHT: 72 IN | BODY MASS INDEX: 25.73 KG/M2 | DIASTOLIC BLOOD PRESSURE: 76 MMHG | TEMPERATURE: 98.1 F | OXYGEN SATURATION: 90 % | RESPIRATION RATE: 16 BRPM | HEART RATE: 97 BPM | SYSTOLIC BLOOD PRESSURE: 103 MMHG

## 2023-03-17 DIAGNOSIS — L03.115 CELLULITIS OF RIGHT LOWER EXTREMITY: Primary | ICD-10-CM

## 2023-03-17 LAB
ANION GAP SERPL CALCULATED.3IONS-SCNC: 8 MMOL/L (ref 5–15)
BASOPHILS # BLD AUTO: 0.06 10*3/MM3 (ref 0–0.2)
BASOPHILS NFR BLD AUTO: 0.5 % (ref 0–1.5)
BUN SERPL-MCNC: 7 MG/DL (ref 8–23)
BUN/CREAT SERPL: 10.6 (ref 7–25)
CALCIUM SPEC-SCNC: 9.1 MG/DL (ref 8.6–10.5)
CHLORIDE SERPL-SCNC: 93 MMOL/L (ref 98–107)
CO2 SERPL-SCNC: 28 MMOL/L (ref 22–29)
CREAT SERPL-MCNC: 0.66 MG/DL (ref 0.76–1.27)
CRP SERPL-MCNC: 3.26 MG/DL (ref 0–0.5)
DEPRECATED RDW RBC AUTO: 43.3 FL (ref 37–54)
EGFRCR SERPLBLD CKD-EPI 2021: 96.6 ML/MIN/1.73
EOSINOPHIL # BLD AUTO: 0.07 10*3/MM3 (ref 0–0.4)
EOSINOPHIL NFR BLD AUTO: 0.6 % (ref 0.3–6.2)
ERYTHROCYTE [DISTWIDTH] IN BLOOD BY AUTOMATED COUNT: 12.7 % (ref 12.3–15.4)
ERYTHROCYTE [SEDIMENTATION RATE] IN BLOOD: 68 MM/HR (ref 0–20)
GLUCOSE SERPL-MCNC: 173 MG/DL (ref 65–99)
HCT VFR BLD AUTO: 48.8 % (ref 37.5–51)
HGB BLD-MCNC: 16.3 G/DL (ref 13–17.7)
IMM GRANULOCYTES # BLD AUTO: 0.06 10*3/MM3 (ref 0–0.05)
IMM GRANULOCYTES NFR BLD AUTO: 0.5 % (ref 0–0.5)
LYMPHOCYTES # BLD AUTO: 2.07 10*3/MM3 (ref 0.7–3.1)
LYMPHOCYTES NFR BLD AUTO: 18.5 % (ref 19.6–45.3)
MCH RBC QN AUTO: 30.6 PG (ref 26.6–33)
MCHC RBC AUTO-ENTMCNC: 33.4 G/DL (ref 31.5–35.7)
MCV RBC AUTO: 91.6 FL (ref 79–97)
MONOCYTES # BLD AUTO: 0.87 10*3/MM3 (ref 0.1–0.9)
MONOCYTES NFR BLD AUTO: 7.8 % (ref 5–12)
NEUTROPHILS NFR BLD AUTO: 72.1 % (ref 42.7–76)
NEUTROPHILS NFR BLD AUTO: 8.06 10*3/MM3 (ref 1.7–7)
NRBC BLD AUTO-RTO: 0 /100 WBC (ref 0–0.2)
PLATELET # BLD AUTO: 385 10*3/MM3 (ref 140–450)
PMV BLD AUTO: 7.9 FL (ref 6–12)
POTASSIUM SERPL-SCNC: 4.2 MMOL/L (ref 3.5–5.2)
RBC # BLD AUTO: 5.33 10*6/MM3 (ref 4.14–5.8)
SODIUM SERPL-SCNC: 129 MMOL/L (ref 136–145)
WBC NRBC COR # BLD: 11.19 10*3/MM3 (ref 3.4–10.8)

## 2023-03-17 PROCEDURE — 86140 C-REACTIVE PROTEIN: CPT | Performed by: EMERGENCY MEDICINE

## 2023-03-17 PROCEDURE — 96365 THER/PROPH/DIAG IV INF INIT: CPT

## 2023-03-17 PROCEDURE — 25010000002 DAPTOMYCIN PER 1 MG: Performed by: EMERGENCY MEDICINE

## 2023-03-17 PROCEDURE — 85652 RBC SED RATE AUTOMATED: CPT | Performed by: EMERGENCY MEDICINE

## 2023-03-17 PROCEDURE — 99283 EMERGENCY DEPT VISIT LOW MDM: CPT

## 2023-03-17 PROCEDURE — 85025 COMPLETE CBC W/AUTO DIFF WBC: CPT | Performed by: EMERGENCY MEDICINE

## 2023-03-17 PROCEDURE — 80048 BASIC METABOLIC PNL TOTAL CA: CPT | Performed by: EMERGENCY MEDICINE

## 2023-03-17 RX ADMIN — DAPTOMYCIN 500 MG: 500 INJECTION, POWDER, LYOPHILIZED, FOR SOLUTION INTRAVENOUS at 10:12

## 2023-03-17 NOTE — DISCHARGE INSTRUCTIONS
Go to Dr. Lassiter's office in the morning at 9:00.  Return here anytime if high fever, significant worsening, or any other concerns.

## 2023-03-17 NOTE — ED PROVIDER NOTES
Subjective   History of Present Illness  Mr. Watt arrives by ambulance with redness and swelling in his right lower extremity.  It has been there for just over a week.  He was seen here a week ago for this and started on clindamycin.  He had negative venous Doppler for DVT at that time he tells me he has been taking it regularly and his foot is not improving any.  He denies fevers or chills.  He denies nausea or vomiting.        Review of Systems    Past Medical History:   Diagnosis Date   • Depression    • Diabetes mellitus (HCC)    • Disc degeneration, lumbar    • Emphysema of lung (HCC)    • Generalized anxiety disorder    • GERD (gastroesophageal reflux disease)    • Hyperlipidemia    • Hypertension    • Hypogonadism male    • Osteoarthritis    • Type 2 diabetes mellitus, uncontrolled    • Vitamin D deficiency        Allergies   Allergen Reactions   • Penicillins Rash     Tolerates ceftriaxone   • Shellfish-Derived Products Hives     Pt says only applies to oysters       Past Surgical History:   Procedure Laterality Date   • HERNIA REPAIR      Inguinal   • KNEE ARTHROPLASTY     • KYPHOPLASTY N/A 7/14/2021    Procedure: KYPHOPLASTY L3 AND T12;  Surgeon: Ruddy Zepeda MD;  Location: [facility];  Service: Neurosurgery;  Laterality: N/A;       Family History   Problem Relation Age of Onset   • Tuberculosis Mother    • Colon cancer Father    • Other Father         polyps of the sigmoid colon       Social History     Socioeconomic History   • Marital status:    Tobacco Use   • Smoking status: Every Day     Packs/day: 1.50     Years: 45.00     Pack years: 67.50     Types: Cigarettes   • Smokeless tobacco: Never   Substance and Sexual Activity   • Alcohol use: Not Currently     Comment: HAS NOT DRANK IN A YEAR   • Drug use: No   • Sexual activity: Defer           Objective   Physical Exam  Vitals and nursing note reviewed.   Constitutional:       General: He is not in acute distress.     Appearance: Normal  appearance.   HENT:      Head: Normocephalic and atraumatic.   Eyes:      General: No scleral icterus.     Conjunctiva/sclera: Conjunctivae normal.   Neck:      Comments: No JVD  Cardiovascular:      Rate and Rhythm: Regular rhythm. Tachycardia present.   Pulmonary:      Effort: Pulmonary effort is normal.      Breath sounds: Normal breath sounds. No wheezing or rales.   Musculoskeletal:      Cervical back: Normal range of motion and neck supple.      Right lower leg: Edema present.      Left lower leg: No edema.   Skin:     General: Skin is warm and dry.      Capillary Refill: Capillary refill takes less than 2 seconds.      Findings: Erythema present.      Comments: He has dry scaly skin on both lower extremities.  The right lower extremity is edematous and erythematous.  He has violaceous erythema over the instep of his right foot.  That area is tender but otherwise no tenderness.   Neurological:      General: No focal deficit present.      Mental Status: He is alert.   Psychiatric:         Mood and Affect: Mood normal.         Behavior: Behavior normal.         Thought Content: Thought content normal.         Procedures           ED Course  ED Course as of 03/17/23 1701   Fri Mar 17, 2023   0848 Reviewed labs.  Have paged Dr. Lassiter who is on-call for infectious disease. [DT]   0848 I discussed with Dr. Lassiter who is on-call for infectious disease.  He has agreed to see Mr. Lassiter in the office tomorrow morning.  He recommended daptomycin 6 mg/kg today. [DT]   8130 Mr. Watt is comfortable.  I spoke with him about plan for IV daptomycin and follow-up tomorrow morning for repeat and he is happy with that [DT]      ED Course User Index  [DT] Al Joseph MD                                           Medical Decision Making  Please refer to emergency department course notes.  I obtained labs, reviewed prior records, consulted infectious disease, gave IV antibiotic, and made arrangements for him to be  seen tomorrow for further IV antibiotics and office visit with the infectious disease doctor.    Cellulitis of right lower extremity: acute illness or injury that poses a threat to life or bodily functions  Amount and/or Complexity of Data Reviewed  External Data Reviewed: notes.  Labs: ordered. Decision-making details documented in ED Course.          Final diagnoses:   Cellulitis of right lower extremity       ED Disposition  ED Disposition     ED Disposition   Discharge    Condition   Stable    Comment   --             Daryl Lassiter MD  [address] [address]  [phone]               Medication List      Changed    folic acid-vit B6-vit B12 2.2-25-1 MG tablet tablet  Commonly known as: FOLGARD  Take 1 tablet by mouth Daily.  What changed: additional instructions     thiamine 100 MG tablet  Commonly known as: VITAMIN B1  Take 1 tablet by mouth Daily.  What changed: additional instructions     vitamin D3 125 MCG (5000 UT) capsule capsule  Take 1 capsule by mouth Daily.  What changed: additional instructions        Stop    cephalexin 500 MG capsule  Commonly known as: Keflex     clindamycin 150 MG capsule  Commonly known as: Al Bradley MD  03/17/23 6799

## 2023-03-17 NOTE — CASE MANAGEMENT/SOCIAL WORK
Continued Stay Note  [facility]     Patient Name: Cristian Watt  MRN: [id number]  Today's Date: 3/17/2023    Admit Date: 3/17/2023    Plan: [facility] @ 3:30   Discharge Plan     Row Name 03/17/23 1140       Plan    Plan [facility] @ 3:30    Plan Comments MSW contacted for transportation assistance. MSW notified that  EMS had no availability. MSW spoke with Pt at bedside who reported his caregiver is unable to transport Pt and assist up his 6 stairs. Pt requested to be admitted; MSW educated Pt he had no admission criteria. MSW spoke with [facility] who reported they would be able to transport and assist up stairs @ 3:30PM. MSW notified Pt and RN of discharge transport. MSW available.    Final Discharge Disposition Code 01 - home or self-care               Discharge Codes    No documentation.                     JASMINE Pang

## 2023-07-25 ENCOUNTER — APPOINTMENT (OUTPATIENT)
Dept: GENERAL RADIOLOGY | Facility: HOSPITAL | Age: 78
DRG: 542 | End: 2023-07-25
Payer: MEDICARE

## 2023-07-25 ENCOUNTER — APPOINTMENT (OUTPATIENT)
Dept: CT IMAGING | Facility: HOSPITAL | Age: 78
DRG: 542 | End: 2023-07-25
Payer: MEDICARE

## 2023-07-25 ENCOUNTER — HOSPITAL ENCOUNTER (INPATIENT)
Facility: HOSPITAL | Age: 78
LOS: 7 days | Discharge: NURSING, REHAB OR LONG-TERM CARE | DRG: 542 | End: 2023-08-01
Attending: EMERGENCY MEDICINE | Admitting: INTERNAL MEDICINE
Payer: MEDICARE

## 2023-07-25 DIAGNOSIS — M48.56XA NONTRAUMATIC COMPRESSION FRACTURE OF L2 VERTEBRA, INITIAL ENCOUNTER: ICD-10-CM

## 2023-07-25 DIAGNOSIS — E86.0 DEHYDRATION: ICD-10-CM

## 2023-07-25 DIAGNOSIS — T79.6XXA TRAUMATIC RHABDOMYOLYSIS, INITIAL ENCOUNTER: Primary | ICD-10-CM

## 2023-07-25 DIAGNOSIS — S32.000A COMPRESSION FRACTURE OF LUMBAR VERTEBRA, UNSPECIFIED LUMBAR VERTEBRAL LEVEL, INITIAL ENCOUNTER: ICD-10-CM
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
## 2023-07-25 PROBLEM — M62.82 RHABDOMYOLYSIS: Status: ACTIVE | Noted: 2023-07-25

## 2023-07-25 PROBLEM — J18.9 PNEUMONIA: Status: ACTIVE | Noted: 2023-07-25

## 2023-07-25 PROBLEM — M79.89 SWELLING OF RIGHT FOOT: Status: ACTIVE | Noted: 2023-07-25

## 2023-07-25 LAB
ALBUMIN SERPL-MCNC: 4.1 G/DL (ref 3.5–5.2)
ALBUMIN/GLOB SERPL: 1.4 G/DL
ALP SERPL-CCNC: 104 U/L (ref 39–117)
ALT SERPL W P-5'-P-CCNC: 19 U/L (ref 1–41)
ANION GAP SERPL CALCULATED.3IONS-SCNC: 15 MMOL/L (ref 5–15)
AST SERPL-CCNC: 61 U/L (ref 1–40)
BACTERIA UR QL AUTO: ABNORMAL /HPF
BASOPHILS # BLD AUTO: 0.04 10*3/MM3 (ref 0–0.2)
BASOPHILS NFR BLD AUTO: 0.2 % (ref 0–1.5)
BILIRUB SERPL-MCNC: 0.8 MG/DL (ref 0–1.2)
BILIRUB UR QL STRIP: NEGATIVE
BUN SERPL-MCNC: 9 MG/DL (ref 8–23)
BUN/CREAT SERPL: 13.8 (ref 7–25)
CALCIUM SPEC-SCNC: 9 MG/DL (ref 8.6–10.5)
CHLORIDE SERPL-SCNC: 96 MMOL/L (ref 98–107)
CK SERPL-CCNC: 3814 U/L (ref 20–200)
CLARITY UR: ABNORMAL
CO2 SERPL-SCNC: 22 MMOL/L (ref 22–29)
COLOR UR: ABNORMAL
CREAT SERPL-MCNC: 0.65 MG/DL (ref 0.76–1.27)
DEPRECATED RDW RBC AUTO: 48.3 FL (ref 37–54)
EGFRCR SERPLBLD CKD-EPI 2021: 97 ML/MIN/1.73
EOSINOPHIL # BLD AUTO: 0.01 10*3/MM3 (ref 0–0.4)
EOSINOPHIL NFR BLD AUTO: 0.1 % (ref 0.3–6.2)
ERYTHROCYTE [DISTWIDTH] IN BLOOD BY AUTOMATED COUNT: 13.9 % (ref 12.3–15.4)
GEN 5 2HR TROPONIN T REFLEX: 69 NG/L
GLOBULIN UR ELPH-MCNC: 2.9 GM/DL
GLUCOSE SERPL-MCNC: 167 MG/DL (ref 65–99)
GLUCOSE UR STRIP-MCNC: NEGATIVE MG/DL
HCT VFR BLD AUTO: 52.1 % (ref 37.5–51)
HGB BLD-MCNC: 17.2 G/DL (ref 13–17.7)
HGB UR QL STRIP.AUTO: NEGATIVE
HOLD SPECIMEN: NORMAL
HYALINE CASTS UR QL AUTO: ABNORMAL /LPF
IMM GRANULOCYTES # BLD AUTO: 0.08 10*3/MM3 (ref 0–0.05)
IMM GRANULOCYTES NFR BLD AUTO: 0.5 % (ref 0–0.5)
KETONES UR QL STRIP: ABNORMAL
LEUKOCYTE ESTERASE UR QL STRIP.AUTO: NEGATIVE
LYMPHOCYTES # BLD AUTO: 1.83 10*3/MM3 (ref 0.7–3.1)
LYMPHOCYTES NFR BLD AUTO: 11 % (ref 19.6–45.3)
MAGNESIUM SERPL-MCNC: 2.2 MG/DL (ref 1.6–2.4)
MCH RBC QN AUTO: 31 PG (ref 26.6–33)
MCHC RBC AUTO-ENTMCNC: 33 G/DL (ref 31.5–35.7)
MCV RBC AUTO: 94 FL (ref 79–97)
MONOCYTES # BLD AUTO: 1.23 10*3/MM3 (ref 0.1–0.9)
MONOCYTES NFR BLD AUTO: 7.4 % (ref 5–12)
MYOGLOBIN SERPL-MCNC: 875.6 NG/ML (ref 28–72)
NEUTROPHILS NFR BLD AUTO: 13.4 10*3/MM3 (ref 1.7–7)
NEUTROPHILS NFR BLD AUTO: 80.8 % (ref 42.7–76)
NITRITE UR QL STRIP: NEGATIVE
NRBC BLD AUTO-RTO: 0 /100 WBC (ref 0–0.2)
PH UR STRIP.AUTO: 5.5 [PH] (ref 5–8)
PLATELET # BLD AUTO: 339 10*3/MM3 (ref 140–450)
PMV BLD AUTO: 7.8 FL (ref 6–12)
POTASSIUM SERPL-SCNC: 4.3 MMOL/L (ref 3.5–5.2)
PROT SERPL-MCNC: 7 G/DL (ref 6–8.5)
PROT UR QL STRIP: ABNORMAL
RBC # BLD AUTO: 5.54 10*6/MM3 (ref 4.14–5.8)
RBC # UR STRIP: ABNORMAL /HPF
REF LAB TEST METHOD: ABNORMAL
SODIUM SERPL-SCNC: 133 MMOL/L (ref 136–145)
SP GR UR STRIP: 1.02 (ref 1–1.03)
SQUAMOUS #/AREA URNS HPF: ABNORMAL /HPF
TROPONIN T DELTA: 6 NG/L
TROPONIN T SERPL HS-MCNC: 63 NG/L
UROBILINOGEN UR QL STRIP: ABNORMAL
WBC # UR STRIP: ABNORMAL /HPF
WBC NRBC COR # BLD: 16.59 10*3/MM3 (ref 3.4–10.8)
WHOLE BLOOD HOLD COAG: NORMAL
WHOLE BLOOD HOLD SPECIMEN: NORMAL

## 2023-07-25 PROCEDURE — 70450 CT HEAD/BRAIN W/O DYE: CPT

## 2023-07-25 PROCEDURE — 84145 PROCALCITONIN (PCT): CPT | Performed by: INTERNAL MEDICINE

## 2023-07-25 PROCEDURE — 83874 ASSAY OF MYOGLOBIN: CPT | Performed by: EMERGENCY MEDICINE

## 2023-07-25 PROCEDURE — 83735 ASSAY OF MAGNESIUM: CPT | Performed by: EMERGENCY MEDICINE

## 2023-07-25 PROCEDURE — 36415 COLL VENOUS BLD VENIPUNCTURE: CPT

## 2023-07-25 PROCEDURE — 81001 URINALYSIS AUTO W/SCOPE: CPT | Performed by: EMERGENCY MEDICINE

## 2023-07-25 PROCEDURE — 87040 BLOOD CULTURE FOR BACTERIA: CPT | Performed by: INTERNAL MEDICINE

## 2023-07-25 PROCEDURE — 71045 X-RAY EXAM CHEST 1 VIEW: CPT

## 2023-07-25 PROCEDURE — 0202U NFCT DS 22 TRGT SARS-COV-2: CPT | Performed by: INTERNAL MEDICINE

## 2023-07-25 PROCEDURE — P9612 CATHETERIZE FOR URINE SPEC: HCPCS

## 2023-07-25 PROCEDURE — 80053 COMPREHEN METABOLIC PANEL: CPT | Performed by: EMERGENCY MEDICINE

## 2023-07-25 PROCEDURE — 87086 URINE CULTURE/COLONY COUNT: CPT | Performed by: INTERNAL MEDICINE

## 2023-07-25 PROCEDURE — 84484 ASSAY OF TROPONIN QUANT: CPT | Performed by: EMERGENCY MEDICINE

## 2023-07-25 PROCEDURE — 82550 ASSAY OF CK (CPK): CPT | Performed by: EMERGENCY MEDICINE

## 2023-07-25 PROCEDURE — 73630 X-RAY EXAM OF FOOT: CPT

## 2023-07-25 PROCEDURE — 25010000002 CEFTRIAXONE PER 250 MG: Performed by: INTERNAL MEDICINE

## 2023-07-25 PROCEDURE — 25010000002 HEPARIN (PORCINE) PER 1000 UNITS: Performed by: INTERNAL MEDICINE

## 2023-07-25 PROCEDURE — 93005 ELECTROCARDIOGRAM TRACING: CPT | Performed by: EMERGENCY MEDICINE

## 2023-07-25 PROCEDURE — 74176 CT ABD & PELVIS W/O CONTRAST: CPT

## 2023-07-25 PROCEDURE — 83605 ASSAY OF LACTIC ACID: CPT | Performed by: INTERNAL MEDICINE

## 2023-07-25 PROCEDURE — 85025 COMPLETE CBC W/AUTO DIFF WBC: CPT | Performed by: EMERGENCY MEDICINE

## 2023-07-25 PROCEDURE — 73502 X-RAY EXAM HIP UNI 2-3 VIEWS: CPT

## 2023-07-25 PROCEDURE — 99285 EMERGENCY DEPT VISIT HI MDM: CPT

## 2023-07-25 RX ORDER — AMOXICILLIN 250 MG
2 CAPSULE ORAL 2 TIMES DAILY
Status: DISCONTINUED | OUTPATIENT
Start: 2023-07-25 | End: 2023-07-28

## 2023-07-25 RX ORDER — IPRATROPIUM BROMIDE AND ALBUTEROL SULFATE 2.5; .5 MG/3ML; MG/3ML
3 SOLUTION RESPIRATORY (INHALATION) EVERY 4 HOURS PRN
Status: DISCONTINUED | OUTPATIENT
Start: 2023-07-25 | End: 2023-08-01 | Stop reason: HOSPADM

## 2023-07-25 RX ORDER — ACETAMINOPHEN 500 MG
500 TABLET ORAL EVERY 6 HOURS PRN
Status: DISCONTINUED | OUTPATIENT
Start: 2023-07-25 | End: 2023-08-01 | Stop reason: HOSPADM

## 2023-07-25 RX ORDER — BISACODYL 5 MG/1
5 TABLET, DELAYED RELEASE ORAL DAILY PRN
Status: DISCONTINUED | OUTPATIENT
Start: 2023-07-25 | End: 2023-07-28

## 2023-07-25 RX ORDER — SODIUM CHLORIDE 0.9 % (FLUSH) 0.9 %
10 SYRINGE (ML) INJECTION EVERY 12 HOURS SCHEDULED
Status: DISCONTINUED | OUTPATIENT
Start: 2023-07-25 | End: 2023-08-01 | Stop reason: HOSPADM

## 2023-07-25 RX ORDER — IPRATROPIUM BROMIDE AND ALBUTEROL SULFATE 2.5; .5 MG/3ML; MG/3ML
3 SOLUTION RESPIRATORY (INHALATION)
Status: DISCONTINUED | OUTPATIENT
Start: 2023-07-25 | End: 2023-07-26

## 2023-07-25 RX ORDER — SODIUM CHLORIDE 0.9 % (FLUSH) 0.9 %
10 SYRINGE (ML) INJECTION AS NEEDED
Status: DISCONTINUED | OUTPATIENT
Start: 2023-07-25 | End: 2023-08-01 | Stop reason: HOSPADM

## 2023-07-25 RX ORDER — INSULIN LISPRO 100 [IU]/ML
2-7 INJECTION, SOLUTION INTRAVENOUS; SUBCUTANEOUS
Status: DISCONTINUED | OUTPATIENT
Start: 2023-07-26 | End: 2023-08-01 | Stop reason: HOSPADM

## 2023-07-25 RX ORDER — SODIUM CHLORIDE 9 MG/ML
40 INJECTION, SOLUTION INTRAVENOUS AS NEEDED
Status: DISCONTINUED | OUTPATIENT
Start: 2023-07-25 | End: 2023-08-01 | Stop reason: HOSPADM

## 2023-07-25 RX ORDER — NICOTINE 21 MG/24HR
1 PATCH, TRANSDERMAL 24 HOURS TRANSDERMAL EVERY 24 HOURS
Status: DISCONTINUED | OUTPATIENT
Start: 2023-07-25 | End: 2023-08-01 | Stop reason: HOSPADM

## 2023-07-25 RX ORDER — NALOXONE HCL 0.4 MG/ML
0.4 VIAL (ML) INJECTION AS NEEDED
Status: DISCONTINUED | OUTPATIENT
Start: 2023-07-25 | End: 2023-08-01 | Stop reason: HOSPADM

## 2023-07-25 RX ORDER — NICOTINE POLACRILEX 4 MG
15 LOZENGE BUCCAL
Status: DISCONTINUED | OUTPATIENT
Start: 2023-07-25 | End: 2023-08-01 | Stop reason: HOSPADM

## 2023-07-25 RX ORDER — IBUPROFEN 600 MG/1
1 TABLET ORAL
Status: DISCONTINUED | OUTPATIENT
Start: 2023-07-25 | End: 2023-08-01 | Stop reason: HOSPADM

## 2023-07-25 RX ORDER — CHOLECALCIFEROL (VITAMIN D3) 125 MCG
5 CAPSULE ORAL NIGHTLY
Status: DISCONTINUED | OUTPATIENT
Start: 2023-07-25 | End: 2023-08-01 | Stop reason: HOSPADM

## 2023-07-25 RX ORDER — POLYETHYLENE GLYCOL 3350 17 G/17G
17 POWDER, FOR SOLUTION ORAL DAILY PRN
Status: DISCONTINUED | OUTPATIENT
Start: 2023-07-25 | End: 2023-07-28

## 2023-07-25 RX ORDER — TRAMADOL HYDROCHLORIDE 50 MG/1
50 TABLET ORAL EVERY 6 HOURS PRN
Status: DISCONTINUED | OUTPATIENT
Start: 2023-07-25 | End: 2023-07-28

## 2023-07-25 RX ORDER — HEPARIN SODIUM 5000 [USP'U]/ML
5000 INJECTION, SOLUTION INTRAVENOUS; SUBCUTANEOUS EVERY 8 HOURS SCHEDULED
Status: DISCONTINUED | OUTPATIENT
Start: 2023-07-25 | End: 2023-08-01 | Stop reason: HOSPADM

## 2023-07-25 RX ORDER — DEXTROSE MONOHYDRATE 25 G/50ML
25 INJECTION, SOLUTION INTRAVENOUS
Status: DISCONTINUED | OUTPATIENT
Start: 2023-07-25 | End: 2023-08-01 | Stop reason: HOSPADM

## 2023-07-25 RX ORDER — SODIUM CHLORIDE, SODIUM LACTATE, POTASSIUM CHLORIDE, CALCIUM CHLORIDE 600; 310; 30; 20 MG/100ML; MG/100ML; MG/100ML; MG/100ML
125 INJECTION, SOLUTION INTRAVENOUS CONTINUOUS
Status: DISCONTINUED | OUTPATIENT
Start: 2023-07-25 | End: 2023-07-27

## 2023-07-25 RX ORDER — HYDROCODONE BITARTRATE AND ACETAMINOPHEN 7.5; 325 MG/1; MG/1
1 TABLET ORAL EVERY 6 HOURS PRN
Status: DISCONTINUED | OUTPATIENT
Start: 2023-07-25 | End: 2023-08-01 | Stop reason: HOSPADM

## 2023-07-25 RX ORDER — BISACODYL 10 MG
10 SUPPOSITORY, RECTAL RECTAL DAILY PRN
Status: DISCONTINUED | OUTPATIENT
Start: 2023-07-25 | End: 2023-07-28

## 2023-07-25 RX ADMIN — SODIUM CHLORIDE 2000 MG: 900 INJECTION INTRAVENOUS at 23:30

## 2023-07-25 RX ADMIN — SODIUM CHLORIDE, POTASSIUM CHLORIDE, SODIUM LACTATE AND CALCIUM CHLORIDE 125 ML/HR: 600; 310; 30; 20 INJECTION, SOLUTION INTRAVENOUS at 20:14

## 2023-07-25 RX ADMIN — Medication 5 MG: at 22:44

## 2023-07-25 RX ADMIN — Medication 10 ML: at 22:43

## 2023-07-25 RX ADMIN — TRAMADOL HYDROCHLORIDE 50 MG: 50 TABLET ORAL at 23:04

## 2023-07-25 RX ADMIN — SODIUM CHLORIDE, POTASSIUM CHLORIDE, SODIUM LACTATE AND CALCIUM CHLORIDE 1000 ML: 600; 310; 30; 20 INJECTION, SOLUTION INTRAVENOUS at 16:52

## 2023-07-25 RX ADMIN — HEPARIN SODIUM 5000 UNITS: 5000 INJECTION, SOLUTION INTRAVENOUS; SUBCUTANEOUS at 22:44

## 2023-07-25 NOTE — LETTER
EMS Transport Request  For use at [facility], [place], and [place] only   Patient Name: Cristian Watt : 1945   Weight:92.8 kg (204 lb 9.6 oz) Pick-up Location: [facility] ([facility]) BLS/ALS: BLS/ALS: BLS   Insurance: MEDICARE Auth End Date:    Pre-Cert #: D/C Summary complete:    Destination: [address] [facility] [address] , has 20 stairs   Contact Precautions: None   Equipment (O2, Fluids, etc.): O2, settings 2   Arrive By Date/Time:  Monday,  3-5 pm Stretcher/WC: Stretcher   CM Requesting: Linda Morrell RN Ext: [phone]   Notes/Medical Necessity fall risk, supervision for safety, shortness of breath, dizziness, and weakness for bed mobility, transfers, and gait     ______________________________________________________________________    *Only 2 patient bags OR 1 carry-on size bag are permitted.  Wheelchairs and walkers CANNOT transported with the patient. Acknowledge: Yes

## 2023-07-25 NOTE — Clinical Note
Level of Care: Telemetry [5]   Diagnosis: Rhabdomyolysis [728.88.ICD-9-CM]   Admitting Physician: MIKE REYES [[id number]]   Attending Physician: MIKE REYES [[id number]]   Isolate for COVID?: No [0]   Bed Request Comments: tele   Certification: I Certify That Inpatient Hospital Services Are Medically Necessary For Greater Than 2 Midnights

## 2023-07-25 NOTE — LETTER
EMS Transport Request  For use at [facility], [place], [place], and [place] only   Patient Name: Cristian Watt : 1945   Weight:92.8 kg (204 lb 9.6 oz) Pick-up Location: [facility] BLS/ALS: BLS/ALS: BLS   Insurance: MEDICARE Auth End Date:    Pre-Cert #: D/C Summary complete:    Destination: Other [place] at     Contact Precautions: None   Equipment (O2, Fluids, etc.): None   Arrive By Date/Time:  Stretcher/WC: Stretcher   CM Requesting: Shu Schmidt RN Ext: [phone]   Notes/Medical Necessity:      ______________________________________________________________________    *Only 2 patient bags OR 1 carry-on size bag are permitted.  Wheelchairs and walkers CANNOT transported with the patient. Acknowledge: Yes

## 2023-07-26 ENCOUNTER — APPOINTMENT (OUTPATIENT)
Dept: CARDIOLOGY | Facility: HOSPITAL | Age: 78
DRG: 542 | End: 2023-07-26
Payer: MEDICARE

## 2023-07-26 LAB
ANION GAP SERPL CALCULATED.3IONS-SCNC: 14 MMOL/L (ref 5–15)
B PARAPERT DNA SPEC QL NAA+PROBE: NOT DETECTED
B PERT DNA SPEC QL NAA+PROBE: NOT DETECTED
BACTERIA SPEC AEROBE CULT: NO GROWTH
BASOPHILS # BLD AUTO: 0.05 10*3/MM3 (ref 0–0.2)
BASOPHILS NFR BLD AUTO: 0.4 % (ref 0–1.5)
BUN SERPL-MCNC: 8 MG/DL (ref 8–23)
BUN/CREAT SERPL: 16.7 (ref 7–25)
C PNEUM DNA NPH QL NAA+NON-PROBE: NOT DETECTED
CALCIUM SPEC-SCNC: 8.8 MG/DL (ref 8.6–10.5)
CHLORIDE SERPL-SCNC: 99 MMOL/L (ref 98–107)
CK SERPL-CCNC: 3429 U/L (ref 20–200)
CO2 SERPL-SCNC: 22 MMOL/L (ref 22–29)
CREAT SERPL-MCNC: 0.48 MG/DL (ref 0.76–1.27)
D-LACTATE SERPL-SCNC: 1.8 MMOL/L (ref 0.5–2)
DEPRECATED RDW RBC AUTO: 47.4 FL (ref 37–54)
EGFRCR SERPLBLD CKD-EPI 2021: 106.4 ML/MIN/1.73
EOSINOPHIL # BLD AUTO: 0.04 10*3/MM3 (ref 0–0.4)
EOSINOPHIL NFR BLD AUTO: 0.4 % (ref 0.3–6.2)
ERYTHROCYTE [DISTWIDTH] IN BLOOD BY AUTOMATED COUNT: 13.5 % (ref 12.3–15.4)
FLUAV SUBTYP SPEC NAA+PROBE: NOT DETECTED
FLUBV RNA ISLT QL NAA+PROBE: NOT DETECTED
GLUCOSE BLDC GLUCOMTR-MCNC: 139 MG/DL (ref 70–130)
GLUCOSE BLDC GLUCOMTR-MCNC: 142 MG/DL (ref 70–130)
GLUCOSE BLDC GLUCOMTR-MCNC: 166 MG/DL (ref 70–130)
GLUCOSE BLDC GLUCOMTR-MCNC: 185 MG/DL (ref 70–130)
GLUCOSE SERPL-MCNC: 136 MG/DL (ref 65–99)
HADV DNA SPEC NAA+PROBE: NOT DETECTED
HBA1C MFR BLD: 7.9 % (ref 4.8–5.6)
HCOV 229E RNA SPEC QL NAA+PROBE: NOT DETECTED
HCOV HKU1 RNA SPEC QL NAA+PROBE: NOT DETECTED
HCOV NL63 RNA SPEC QL NAA+PROBE: NOT DETECTED
HCOV OC43 RNA SPEC QL NAA+PROBE: NOT DETECTED
HCT VFR BLD AUTO: 48.7 % (ref 37.5–51)
HGB BLD-MCNC: 16.3 G/DL (ref 13–17.7)
HMPV RNA NPH QL NAA+NON-PROBE: NOT DETECTED
HPIV1 RNA ISLT QL NAA+PROBE: NOT DETECTED
HPIV2 RNA SPEC QL NAA+PROBE: NOT DETECTED
HPIV3 RNA NPH QL NAA+PROBE: NOT DETECTED
HPIV4 P GENE NPH QL NAA+PROBE: NOT DETECTED
IMM GRANULOCYTES # BLD AUTO: 0.04 10*3/MM3 (ref 0–0.05)
IMM GRANULOCYTES NFR BLD AUTO: 0.4 % (ref 0–0.5)
LYMPHOCYTES # BLD AUTO: 2.17 10*3/MM3 (ref 0.7–3.1)
LYMPHOCYTES NFR BLD AUTO: 19.2 % (ref 19.6–45.3)
M PNEUMO IGG SER IA-ACNC: NOT DETECTED
MCH RBC QN AUTO: 31.5 PG (ref 26.6–33)
MCHC RBC AUTO-ENTMCNC: 33.5 G/DL (ref 31.5–35.7)
MCV RBC AUTO: 94 FL (ref 79–97)
MONOCYTES # BLD AUTO: 1.07 10*3/MM3 (ref 0.1–0.9)
MONOCYTES NFR BLD AUTO: 9.5 % (ref 5–12)
NEUTROPHILS NFR BLD AUTO: 7.94 10*3/MM3 (ref 1.7–7)
NEUTROPHILS NFR BLD AUTO: 70.1 % (ref 42.7–76)
NRBC BLD AUTO-RTO: 0 /100 WBC (ref 0–0.2)
PLATELET # BLD AUTO: 294 10*3/MM3 (ref 140–450)
PMV BLD AUTO: 8.5 FL (ref 6–12)
POTASSIUM SERPL-SCNC: 3.9 MMOL/L (ref 3.5–5.2)
PROCALCITONIN SERPL-MCNC: 0.21 NG/ML (ref 0–0.25)
RBC # BLD AUTO: 5.18 10*6/MM3 (ref 4.14–5.8)
RHINOVIRUS RNA SPEC NAA+PROBE: NOT DETECTED
RSV RNA NPH QL NAA+NON-PROBE: NOT DETECTED
SARS-COV-2 RNA NPH QL NAA+NON-PROBE: NOT DETECTED
SODIUM SERPL-SCNC: 135 MMOL/L (ref 136–145)
TSH SERPL DL<=0.05 MIU/L-ACNC: 1.94 UIU/ML (ref 0.27–4.2)
WBC NRBC COR # BLD: 11.31 10*3/MM3 (ref 3.4–10.8)

## 2023-07-26 PROCEDURE — 25010000002 HEPARIN (PORCINE) PER 1000 UNITS: Performed by: INTERNAL MEDICINE

## 2023-07-26 PROCEDURE — 83036 HEMOGLOBIN GLYCOSYLATED A1C: CPT | Performed by: INTERNAL MEDICINE

## 2023-07-26 PROCEDURE — 63710000001 INSULIN LISPRO (HUMAN) PER 5 UNITS: Performed by: INTERNAL MEDICINE

## 2023-07-26 PROCEDURE — 85025 COMPLETE CBC W/AUTO DIFF WBC: CPT | Performed by: INTERNAL MEDICINE

## 2023-07-26 PROCEDURE — 82948 REAGENT STRIP/BLOOD GLUCOSE: CPT

## 2023-07-26 PROCEDURE — 25010000002 CEFTRIAXONE PER 250 MG: Performed by: INTERNAL MEDICINE

## 2023-07-26 PROCEDURE — 93880 EXTRACRANIAL BILAT STUDY: CPT | Performed by: INTERNAL MEDICINE

## 2023-07-26 PROCEDURE — 93306 TTE W/DOPPLER COMPLETE: CPT | Performed by: INTERNAL MEDICINE

## 2023-07-26 PROCEDURE — 94640 AIRWAY INHALATION TREATMENT: CPT

## 2023-07-26 PROCEDURE — 80048 BASIC METABOLIC PNL TOTAL CA: CPT | Performed by: INTERNAL MEDICINE

## 2023-07-26 PROCEDURE — 93971 EXTREMITY STUDY: CPT | Performed by: INTERNAL MEDICINE

## 2023-07-26 PROCEDURE — 94664 DEMO&/EVAL PT USE INHALER: CPT

## 2023-07-26 PROCEDURE — 94799 UNLISTED PULMONARY SVC/PX: CPT

## 2023-07-26 PROCEDURE — 82550 ASSAY OF CK (CPK): CPT | Performed by: INTERNAL MEDICINE

## 2023-07-26 PROCEDURE — 93880 EXTRACRANIAL BILAT STUDY: CPT

## 2023-07-26 PROCEDURE — 93971 EXTREMITY STUDY: CPT

## 2023-07-26 PROCEDURE — 94761 N-INVAS EAR/PLS OXIMETRY MLT: CPT

## 2023-07-26 PROCEDURE — 84443 ASSAY THYROID STIM HORMONE: CPT | Performed by: INTERNAL MEDICINE

## 2023-07-26 PROCEDURE — 93306 TTE W/DOPPLER COMPLETE: CPT

## 2023-07-26 RX ADMIN — DOXYCYCLINE 100 MG: 100 INJECTION, POWDER, LYOPHILIZED, FOR SOLUTION INTRAVENOUS at 00:23

## 2023-07-26 RX ADMIN — SODIUM CHLORIDE, POTASSIUM CHLORIDE, SODIUM LACTATE AND CALCIUM CHLORIDE 125 ML/HR: 600; 310; 30; 20 INJECTION, SOLUTION INTRAVENOUS at 12:58

## 2023-07-26 RX ADMIN — INSULIN LISPRO 2 UNITS: 100 INJECTION, SOLUTION INTRAVENOUS; SUBCUTANEOUS at 20:50

## 2023-07-26 RX ADMIN — HEPARIN SODIUM 5000 UNITS: 5000 INJECTION, SOLUTION INTRAVENOUS; SUBCUTANEOUS at 15:46

## 2023-07-26 RX ADMIN — Medication 1 TABLET: at 10:04

## 2023-07-26 RX ADMIN — DOXYCYCLINE 100 MG: 100 INJECTION, POWDER, LYOPHILIZED, FOR SOLUTION INTRAVENOUS at 10:02

## 2023-07-26 RX ADMIN — SODIUM CHLORIDE, POTASSIUM CHLORIDE, SODIUM LACTATE AND CALCIUM CHLORIDE 125 ML/HR: 600; 310; 30; 20 INJECTION, SOLUTION INTRAVENOUS at 01:28

## 2023-07-26 RX ADMIN — Medication 10 ML: at 20:50

## 2023-07-26 RX ADMIN — Medication 5 MG: at 20:49

## 2023-07-26 RX ADMIN — INSULIN LISPRO 2 UNITS: 100 INJECTION, SOLUTION INTRAVENOUS; SUBCUTANEOUS at 12:58

## 2023-07-26 RX ADMIN — Medication 5000 UNITS: at 10:03

## 2023-07-26 RX ADMIN — THIAMINE HCL TAB 100 MG 100 MG: 100 TAB at 10:03

## 2023-07-26 RX ADMIN — HEPARIN SODIUM 5000 UNITS: 5000 INJECTION, SOLUTION INTRAVENOUS; SUBCUTANEOUS at 20:49

## 2023-07-26 RX ADMIN — HEPARIN SODIUM 5000 UNITS: 5000 INJECTION, SOLUTION INTRAVENOUS; SUBCUTANEOUS at 06:22

## 2023-07-26 RX ADMIN — IPRATROPIUM BROMIDE AND ALBUTEROL SULFATE 3 ML: 2.5; .5 SOLUTION RESPIRATORY (INHALATION) at 07:37

## 2023-07-26 RX ADMIN — DOXYCYCLINE 100 MG: 100 INJECTION, POWDER, LYOPHILIZED, FOR SOLUTION INTRAVENOUS at 21:38

## 2023-07-26 RX ADMIN — SODIUM CHLORIDE, POTASSIUM CHLORIDE, SODIUM LACTATE AND CALCIUM CHLORIDE 125 ML/HR: 600; 310; 30; 20 INJECTION, SOLUTION INTRAVENOUS at 20:46

## 2023-07-26 RX ADMIN — SENNOSIDES AND DOCUSATE SODIUM 2 TABLET: 50; 8.6 TABLET ORAL at 10:03

## 2023-07-26 RX ADMIN — SODIUM CHLORIDE 2000 MG: 900 INJECTION INTRAVENOUS at 20:46

## 2023-07-26 NOTE — ED PROVIDER NOTES
Subjective   History of Present Illness    Pt presents after a fall.  He was unable to get up and estimates he spent 18 hours on the floor.  He reports some low back pain from falling that is worse with movement of the right leg.  He denies any other injury including head, neck, arms, legs or torso.  This afternoon he started having diffuse myalgias as well.    He is unsure why he fell.  He has not had any recent med changes.  Denies fever, vomiting or other symptoms of infection.    History provided by:  Patient    Review of Systems   Constitutional:  Negative for fever.   Cardiovascular:  Negative for chest pain.   Gastrointestinal:  Negative for vomiting.   Musculoskeletal:  Positive for back pain and myalgias.   Neurological:  Negative for headaches.   All other systems reviewed and are negative.    Past Medical History:   Diagnosis Date    Depression     Diabetes mellitus     Disc degeneration, lumbar     Emphysema of lung     Generalized anxiety disorder     GERD (gastroesophageal reflux disease)     Hyperlipidemia     Hypertension     Hypogonadism male     Osteoarthritis     Type 2 diabetes mellitus, uncontrolled     Vitamin D deficiency        Allergies   Allergen Reactions    Penicillins Rash     Tolerates ceftriaxone    Shellfish-Derived Products Hives     Pt says only applies to oysters       Past Surgical History:   Procedure Laterality Date    HERNIA REPAIR      Inguinal    KNEE ARTHROPLASTY      KYPHOPLASTY N/A 7/14/2021    Procedure: KYPHOPLASTY L3 AND T12;  Surgeon: Ruddy Zepeda MD;  Location: [facility];  Service: Neurosurgery;  Laterality: N/A;       Family History   Problem Relation Age of Onset    Tuberculosis Mother     Colon cancer Father     Other Father         polyps of the sigmoid colon       Social History     Socioeconomic History    Marital status:    Tobacco Use    Smoking status: Every Day     Packs/day: 1.50     Years: 45.00     Pack years: 67.50     Types: Cigarettes     Smokeless tobacco: Never   Substance and Sexual Activity    Alcohol use: Not Currently     Comment: HAS NOT DRANK IN A YEAR    Drug use: No    Sexual activity: Defer           Objective   Physical Exam  Vitals and nursing note reviewed.   Constitutional:       General: He is not in acute distress.     Appearance: Normal appearance. He is not ill-appearing.   HENT:      Head: Normocephalic and atraumatic.      Mouth/Throat:      Mouth: Mucous membranes are moist.   Eyes:      General: No scleral icterus.        Right eye: No discharge.         Left eye: No discharge.      Conjunctiva/sclera: Conjunctivae normal.   Neck:      Comments: No tenderness  Cardiovascular:      Rate and Rhythm: Normal rate and regular rhythm.      Heart sounds: No murmur heard.  Pulmonary:      Effort: Pulmonary effort is normal. No respiratory distress.      Breath sounds: Normal breath sounds. No wheezing.   Abdominal:      General: Bowel sounds are normal. There is no distension.      Palpations: Abdomen is soft.      Tenderness: There is abdominal tenderness (mild suprapubic). There is no guarding or rebound.   Musculoskeletal:         General: No swelling. Normal range of motion.      Cervical back: Normal range of motion and neck supple.      Comments: Mild lumbar back tenderness.    Mild tenderness to the right inguinal crease. Normal hip ROM.  No shortening or rotation.      Except as documented there is no tenderness to gross palpation of the arms or legs, and intact painless ROM to the shoulders, elbows, wrists, hips, knees and ankles.   Skin:     General: Skin is warm and dry.      Findings: No rash.   Neurological:      General: No focal deficit present.      Mental Status: He is alert and oriented to person, place, and time. Mental status is at baseline.   Psychiatric:         Mood and Affect: Mood normal.         Behavior: Behavior normal.         Thought Content: Thought content normal.       Procedures           ED Course          Labs c/w rhabdomyolysis.  Creatinine preserved.  Fluids ordered.    CT imaging negative except for compression fractures.  Some are old, a few are new since 8/2022 so possibly from this injury but cannot be certain.  I reviewed old charts, he had compression fx of L3 and T12 prior with kyphoplasty performed.  New L1, L5 and T11 since last imaging.    Patient stable on serial rechecks.  Discussed exam findings, test results so far and concerns in detail at the bedside.  Discussed need for admission for further evaluation and treatment.  I discussed the patient's presentation, test results and need for admission with the hospitalist.                                    Medical Decision Making  Problems Addressed:  Compression fracture of lumbar vertebra, unspecified lumbar vertebral level, initial encounter: complicated acute illness or injury  Dehydration: complicated acute illness or injury  Traumatic rhabdomyolysis, initial encounter: complicated acute illness or injury that poses a threat to life or bodily functions    Amount and/or Complexity of Data Reviewed  External Data Reviewed: notes.  Labs: ordered.  Radiology: ordered.  ECG/medicine tests: ordered.    Risk  Prescription drug management.  Decision regarding hospitalization.        Final diagnoses:   Traumatic rhabdomyolysis, initial encounter   Compression fracture of lumbar vertebra, unspecified lumbar vertebral level, initial encounter   Dehydration       ED Disposition  ED Disposition       ED Disposition   Decision to Admit    Condition   --    Comment   --               No follow-up provider specified.       Medication List      No changes were made to your prescriptions during this visit.            Deven Antonio MD  07/25/23 6247

## 2023-07-26 NOTE — NURSING NOTE
"                             Wound, Ostomy and Continence (WOC) Note    Reason for WOC Consultation: \"Abrasion status post fall at home\"     Discussed with RN over the phone regarding consultation and offered recommendations for the abrasion on the right knee status post fall sustained at home.  Cleanse with normal saline, apply multilayer Xeroform to the abrasion and cover with rolled gauze..  No other concerns at this time.  Summary and Recommendation(s):     - Refer to wound care instructions in the \"Orders\" tab  - Specialty support surface in place: Foam Mattress with Waffle Overlay       Pressure Injury Prevention Measures (initiate for a Neel Scale Score of <18):     Most recent Neel Scale score:  Sensory Perception: 3-->slightly limited  Moisture: 3-->occasionally moist  Activity: 3-->walks occasionally  Mobility: 3-->slightly limited  Nutrition: 3-->adequate  Friction and Shear: 2-->potential problem  Neel Score: 17 (07/26/23 0851)     -Turn q 2 hr. using an offloading foam wedge, keep heels elevated and offloaded with offloading heel boots.    -Raise knee-gatch before elevating HOB to reduce shearing   -Follow C.A.R.E protocol if medical devices (Bipap, greer, Ng tube, etc) are being used.  -Apply moisture barrier cream to bottom BID & PRN, if incontinent.  -Clean skin gently with no-rinse PH-balanced foam cleanser and a soft, disposable cloth (barrier wipes-blue pack).   -Reduce layers under patient (one sheet as drawsheet and two incontinence pads)    Thank you for consulting the WOC Nurse.  WOC Team will sign off.  Please re consult if the wound(s) worsens.     Maldonado Polo RN, BSN, CCRN, CWOCN  Wound, Ostomy and Continence (WOC) Department  [facility]          "

## 2023-07-26 NOTE — PROGRESS NOTES
[facility] Medicine Services  PROGRESS NOTE    Patient Name: Cristian Watt  : 1945  MRN: [id number]    Date of Admission: 2023  Primary Care Physician: Provider, No Known    Subjective   Subjective     CC:  Follow-up for fall and syncope    HPI:  I have seen and evaluated the patient this morning.  History clarified with him.  Apparently he went around midnight to the bathroom and when he attempted to get up, he fell to the floor.  He does not remember any of that.  This has been a recurring issue according to him.  Currently, comfortable and denied any chest pain or SOB but complaining of right shoulder pain.    ROS:  General : no fevers, chills  CVS: No chest pain, palpitations  Respiratory: No cough, dyspnea  GI: No N/V/D, abd pain  10 point review of systems is negative except for what is mentioned in HPI    Objective   Objective     Vital Signs:   Temp:  [96.1 øF (35.6 øC)-97.3 øF (36.3 øC)] 96.1 øF (35.6 øC)  Heart Rate:  [] 89  Resp:  [16-18] 18  BP: (119-155)/() 121/85  Flow (L/min):  [2] 2     Physical Exam:  General: Chronically ill and frail looking, not in distress, conversant and cooperative  Head: Atraumatic and normocephalic  Eyes: No Icterus. No pallor  Ears:  Ears appear intact with no abnormalities noted  Throat: No oral lesions, no thrush  Neck: Supple, trachea midline  Lungs: Clear to auscultation bilaterally, equal air entry, no wheezing or crackles  Heart:  Normal S1 and S2, no murmur, no gallop, No JVD, no lower extremity swelling  Abdomen:  Soft, no tenderness, no organomegaly, normal bowel sounds, no organomegaly  Extremities: pulses equal bilaterally  Skin: No bleeding, bruising or rash, normal skin turgor and elasticity  Neurologic: Cranial nerves appear intact with no evidence of facial asymmetry, normal motor and sensory functions in all 4 extremities  Psych: Alert and oriented x 3, normal mood    Results Reviewed:  LAB RESULTS:       Lab 07/26/23  0421 07/25/23  1817 07/25/23  1613   WBC 11.31*  --  16.59*   HEMOGLOBIN 16.3  --  17.2   HEMATOCRIT 48.7  --  52.1*   PLATELETS 294  --  339   NEUTROS ABS 7.94*  --  13.40*   IMMATURE GRANS (ABS) 0.04  --  0.08*   LYMPHS ABS 2.17  --  1.83   MONOS ABS 1.07*  --  1.23*   EOS ABS 0.04  --  0.01   MCV 94.0  --  94.0   PROCALCITONIN  --  0.21  --    LACTATE  --   --  1.8         Lab 07/26/23  0421 07/25/23  1613   SODIUM 135* 133*   POTASSIUM 3.9 4.3   CHLORIDE 99 96*   CO2 22.0 22.0   ANION GAP 14.0 15.0   BUN 8 9   CREATININE 0.48* 0.65*   EGFR 106.4 97.0   GLUCOSE 136* 167*   CALCIUM 8.8 9.0   MAGNESIUM  --  2.2   HEMOGLOBIN A1C 7.90*  --    TSH 1.940  --          Lab 07/25/23  1613   TOTAL PROTEIN 7.0   ALBUMIN 4.1   GLOBULIN 2.9   ALT (SGPT) 19   AST (SGOT) 61*   BILIRUBIN 0.8   ALK PHOS 104         Lab 07/25/23  1817 07/25/23  1613   HSTROP T 69* 63*                 Brief Urine Lab Results  (Last result in the past 365 days)        Color   Clarity   Blood   Leuk Est   Nitrite   Protein   CREAT   Urine HCG        07/25/23 1720 Orange   Cloudy   Negative   Negative   Negative   30 mg/dL (1+)                   Microbiology Results Abnormal       Procedure Component Value - Date/Time    COVID PRE-OP / PRE-PROCEDURE SCREENING ORDER (NO ISOLATION) - Swab, Nasopharynx [[id number]]  (Normal) Collected: 07/25/23 2340    Lab Status: Final result Specimen: Swab from Nasopharynx Updated: 07/26/23 0319    Narrative:      The following orders were created for panel order COVID PRE-OP / PRE-PROCEDURE SCREENING ORDER (NO ISOLATION) - Swab, Nasopharynx.  Procedure                               Abnormality         Status                     ---------                               -----------         ------                     Respiratory Panel PCR w/...[[id number]]  Normal              Final result                 Please view results for these tests on the individual orders.    Respiratory Panel PCR  w/COVID-19(SARS-CoV-2) BRANDI/CYNTHIA/CASSI/PAD/COR/MAD/DAVIE In-House, NP Swab in UTM/VTM, 3-4 HR TAT - Swab, Nasopharynx [[id number]]  (Normal) Collected: 07/25/23 2340    Lab Status: Final result Specimen: Swab from Nasopharynx Updated: 07/26/23 0319     ADENOVIRUS, PCR Not Detected     Coronavirus 229E Not Detected     Coronavirus HKU1 Not Detected     Coronavirus NL63 Not Detected     Coronavirus OC43 Not Detected     COVID19 Not Detected     Human Metapneumovirus Not Detected     Human Rhinovirus/Enterovirus Not Detected     Influenza A PCR Not Detected     Influenza B PCR Not Detected     Parainfluenza Virus 1 Not Detected     Parainfluenza Virus 2 Not Detected     Parainfluenza Virus 3 Not Detected     Parainfluenza Virus 4 Not Detected     RSV, PCR Not Detected     Bordetella pertussis pcr Not Detected     Bordetella parapertussis PCR Not Detected     Chlamydophila pneumoniae PCR Not Detected     Mycoplasma pneumo by PCR Not Detected    Narrative:      In the setting of a positive respiratory panel with a viral infection PLUS a negative procalcitonin without other underlying concern for bacterial infection, consider observing off antibiotics or discontinuation of antibiotics and continue supportive care. If the respiratory panel is positive for atypical bacterial infection (Bordetella pertussis, Chlamydophila pneumoniae, or Mycoplasma pneumoniae), consider antibiotic de-escalation to target atypical bacterial infection.            CT Abdomen Pelvis Without Contrast    Result Date: 7/25/2023  CT ABDOMEN PELVIS WO CONTRAST Date of Exam: 7/25/2023 6:40 PM EDT Indication: fall, lower abdominal pain. Comparison: 8/1/2022. 7/7/2021. Technique: Axial CT images were obtained of the abdomen and pelvis without the administration of contrast. Reconstructed coronal and sagittal images were also obtained. Automated exposure control and iterative construction methods were used. Findings: *Lower Thorax: Small bilateral pleural  effusions. Bibasilar infiltrates. *Liver: Unremarkable. *Gallbladder: Normal gallbladder. *Pancreas: Normal. *Spleen: Normal. *Adrenal Glands: Unchanged small benign left adrenal gland adenoma. Unremarkable right adrenal gland. *Kidneys: 3 mm nonobstructing renal stones bilaterally. No hydronephrosis bilaterally. Bilateral perinephric fat stranding. Small right renal cyst. *Stomach: Gastric wall thickening. Small hiatal hernia. Distal esophageal wall thickening. *Bowel: Nonobstructive bowel gas pattern. No bowel wall inflammation. Diverticulosis coli. *Appendix: Normal appendix. *Peritoneal Cavity: No pneumoperitoneum.  No lymphadenopathy. *Urinary Bladder: Urinary bladder wall thickening. *Pelvis: No free pelvic fluid. *Bones: Unchanged compression deformity of L3 vertebral body with interval cement vertebroplasty. Worsening T12 vertebral body with interval cement vertebroplasty. Interval development of compression deformity of L5 vertebral body with 20% loss of interbody height. Interval development of severe compression deformity of L1 vertebral body with 50% loss of interbody height. Interval development of a severe compression deformity of T11 vertebral body with 70% loss of vertebral body height. Interval development of severe compression deformity of T8 vertebral body with 80% loss of interbody height. Multilevel spondylosis. Osteopenia. *     Impression: 1.Small bilateral pleural effusions. Bibasilar infiltrates. Follow-up recommended. 2.3 mm nonobstructing bilateral renal stones. 3.Gastric wall thickening may represent gastritis or other etiologies. Distal esophageal wall thickening may represent esophagitis or other etiologies. Small hiatal hernia. Follow-up recommended. 4.Diverticulosis without diverticulitis. 5.Urinary bladder wall thickening may represent under distention, cystitis or other etiologies. Please correlate with urinalysis. Follow-up recommended. 6.Interval development of multiple  compression deformities of lower thoracic and lumbar spine as detailed above. Follow-up recommended. 7.Unchanged left adrenal gland benign adenoma. Electronically Signed: Esau Coon  7/25/2023 8:17 PM EDT  Workstation ID: [id number]    XR Foot 3+ View Right    Result Date: 7/25/2023  XR FOOT 3+ VW RIGHT Date of Exam: 7/25/2023 11:36 PM EDT Indication: foot swelling Comparison: None available. Findings: There is a Lisfranc injury with widening of the interspace between the first and second metatarsals with displaced fractures of the intermediate cuneiform, the navicular bone and likely the lateral margin of the medial cuneiform. This is age indeterminant. The osseous tractors appear diffusely osteopenic. Advanced degenerative changes are present within the midfoot. Diffuse soft tissue swelling is present.     Impression: Impression: Age-indeterminate fractures of the intermediate cuneiform, lateral margin of the medial cuneiform, the navicular bone with widening and disruption of the Lisfranc ligament complex. Advanced degenerative changes are present within the midfoot. I suspect these findings are at least partially chronic given presence of advanced degenerative changes. Mild diffuse soft tissue swelling is present. No suspicious periostitis. Electronically Signed: Joana Del Angel  7/25/2023 11:58 PM EDT  Workstation ID: [id number]    CT Head Without Contrast    Result Date: 7/25/2023  CT HEAD WO CONTRAST Date of Exam: 7/25/2023 6:40 PM EDT Indication: fall, head injury. Comparison: None available. Technique: Axial CT images were obtained of the head without contrast administration.  Automated exposure control and iterative construction methods were used. Findings: *No acute intracranial hemorrhage. *No masses, mass effect, midline shift or hydrocephalus. *Chronic small vessel ischemic disease. Generalized brain atrophy. *Calvarium is intact. *Visualized orbits and globes are unremarkable without radiopaque foreign  bodies. *Visualized paranasal sinuses are clear. *Visualized mastoid air cells are clear.     Impression: 1.No acute intracranial hemorrhage. Calvarium is intact. 2.Chronic findings as detailed above. Electronically Signed: Esau Coon  7/25/2023 8:20 PM EDT  Workstation ID: [id number]    XR Chest 1 View    Result Date: 7/25/2023  XR CHEST 1 VW Date of Exam: 7/25/2023 5:52 PM EDT Indication: Weak/Dizzy/AMS triage protocol Comparison: 8/3/2022. Findings: There are diffuse increased interstitial markings unchanged from prior examination to believed to be chronic in nature. There is mild bibasilar scarring unchanged from prior exam. There is no definite acute infiltrate however a small infiltrate could be obscured by the chronic markings. The heart is poorly evaluated but appears unremarkable. The osseous structures demonstrate no gross abnormality.     Impression: Diffuse increased interstitial markings with mild chronic scarring of the lung bases similar to that seen on 8/3/2022. No definite infiltrate identified however small infiltrate could be obscured by the chronic interstitial markings. * Electronically Signed: Angel Norris  7/25/2023 6:42 PM EDT  Workstation ID: [id number]    XR Hip With or Without Pelvis 2 - 3 View Right    Result Date: 7/25/2023  XR HIP W OR WO PELVIS 2-3 VIEW RIGHT Date of Exam: 7/25/2023 5:52 PM EDT Indication: right hip pain after fall,suspect ramus fracture Comparison: None available. Findings: No acute fractures or dislocations. Joint spaces are well preserved. No joint effusion. No  soft tissue swelling. No osseous destructive lesions. No radiopaque foreign bodies.     Impression: 1.No acute fractures or dislocations. Electronically Signed: Angel Norris  7/25/2023 6:40 PM EDT  Workstation ID: [id number]     Results for orders placed during the hospital encounter of 08/01/22    Adult Transthoracic Echo Complete W/ Cont if Necessary Per Protocol    Interpretation Summary  ú Estimated  left ventricular EF = 65% Left ventricular systolic function is normal.  ú Left ventricular diastolic function is consistent with (grade I) impaired relaxation.      Current medications:  Scheduled Meds:cefTRIAXone, 2,000 mg, Intravenous, Q24H  doxycycline, 100 mg, Intravenous, Q12H  folic acid-vit B6-vit B12, 1 tablet, Oral, Daily  heparin (porcine), 5,000 Units, Subcutaneous, Q8H  insulin lispro, 2-7 Units, Subcutaneous, 4x Daily AC & at Bedtime  melatonin, 5 mg, Oral, Nightly  nicotine, 1 patch, Transdermal, Q24H  senna-docusate sodium, 2 tablet, Oral, BID  sodium chloride, 10 mL, Intravenous, Q12H  thiamine, 100 mg, Oral, Daily  vitamin D3, 5,000 Units, Oral, Daily      Continuous Infusions:lactated ringers, 125 mL/hr, Last Rate: 125 mL/hr (07/26/23 0128)      PRN Meds:.  acetaminophen    senna-docusate sodium **AND** polyethylene glycol **AND** bisacodyl **AND** bisacodyl    Calcium Replacement - Follow Nurse / BPA Driven Protocol    dextrose    dextrose    glucagon (human recombinant)    HYDROcodone-acetaminophen    ipratropium-albuterol    Magnesium Standard Dose Replacement - Follow Nurse / BPA Driven Protocol    naloxone    Phosphorus Replacement - Follow Nurse / BPA Driven Protocol    Potassium Replacement - Follow Nurse / BPA Driven Protocol    sodium chloride    sodium chloride    sodium chloride    traMADol    Assessment & Plan   Assessment & Plan     Active Hospital Problems    Diagnosis  POA    **Rhabdomyolysis [M62.82]  Yes    Pneumonia [J18.9]  Unknown    Swelling of right foot [M79.89]  Unknown    Compression fracture of lumbar spine, non-traumatic [M48.56XA]  Unknown    Fall [W19.XXXA]  Unknown    Mixed hyperlipidemia [E78.2]  Yes    Episode of recurrent major depressive disorder [F33.9]  Yes    Cigarette nicotine dependence with nicotine-induced disorder [F17.219]  Yes    Hypertension [I10]  Yes    Uncontrolled type 2 diabetes mellitus with hyperglycemia [E11.65]  Yes       Resolved Hospital Problems   No resolved problems to display.        Brief Hospital Course to date:  Cristian Watt is a 77 y.o. male with a PMH significant for diabetes mellitus type 2, emphysema, GERD, depression, HTN, HLD, history of compression fracture who presents to the ED after a fall.     Assessment and plan:  Concern for syncope  Fall and acute rhabdomyolysis  Patient has been having recurring falls  According to his description, this sounds like syncopal episode while using the bathroom  Obtain echocardiogram, carotid Doppler ultrasound and continue telemetry  Continue IV fluids for acute rhabdomyolysis and continue to monitor CK level  Fall precautions  PT and OT consultation      Possible community-acquired pneumonia  Leukocytosis  Consolidation seen on CT abdomen pelvis  Continue Rocephin, doxycycline  Follow blood/urine/sputum cultures, urine antigens, respiratory PCR  Continue incentive spirometry     Compression fracture  Severe compression and T/L spines  Consult neurosurgery for a.m.     Diabetes mellitus type 2  A1c 7.9%  Continue SSI with Accu-Cheks     Early Charcot changes right foot  X-ray right foot with early changes consistent with Charcot joint:  age indeterminate fractures of the intermediate cuneiform, lateral margin of the medial cuneiform, the navicular bone with widening and disruption of the Lisfranc ligament complex.  Discussed with Dr. Coronado over the phone and he recommended podiatry follow-up as outpatient  Weightbearing as tolerated  Follow venous Doppler ultrasound for the morning     Tobacco abuse  Counseled on cessation  Nicotine patch      Expected Discharge Location and Transportation: home  Expected Discharge   Expected Discharge Date: 7/27/2023; Expected Discharge Time:      DVT prophylaxis:  Medical DVT prophylaxis orders are present.          CODE STATUS:   Code Status and Medical Interventions:   Ordered at: 07/25/23 9142     Level Of Support Discussed With:     Patient     Code Status (Patient has no pulse and is not breathing):    CPR (Attempt to Resuscitate)     Medical Interventions (Patient has pulse or is breathing):    Full Support     Release to patient:    Routine Release     Copied text in this note has been reviewed and is accurate as of 07/26/23.     Jorge Luis Gaviria MD  07/26/23

## 2023-07-26 NOTE — H&P
[facility] Medicine Services  HISTORY AND PHYSICAL    Patient Name: Cristian Watt  : 1945  MRN: [id number]  Primary Care Physician: Yaya, No Known  Date of admission: 2023      Subjective   Subjective     Chief Complaint:  Fall    HPI:  Cristian Watt is a 77 y.o. male with a PMH significant for diabetes mellitus type 2, emphysema, GERD, depression, HTN, HLD, history of compression fracture who presents to the ED after a fall.  Patient says that he had a fall sometime last night.  He woke up in the floor, he is unsure how he got there.  He was too weak to get out of the floor.  He was found today by his  and brought to the ED.  He complains of acute worsening of chronic low back pain.  He denies dizziness, fever, cough, shortness of breath, nausea, vomiting.  He reports diarrhea.  At baseline patient ambulates with a walker.  He is also concerned about his right foot, he said he has noticed increased swelling over the past 6 months.  He denies injury to his foot.      Review of Systems   HENT: Negative.     Eyes: Negative.    Respiratory: Negative.  Negative for cough and shortness of breath.    Cardiovascular: Negative.  Negative for palpitations.   Gastrointestinal:  Positive for diarrhea. Negative for abdominal pain and nausea.   Endocrine: Negative.    Genitourinary: Negative.  Negative for dysuria.   Musculoskeletal:  Positive for back pain and gait problem.   Skin: Negative.    Allergic/Immunologic: Negative.    Neurological:  Positive for weakness.   Hematological: Negative.    Psychiatric/Behavioral: Negative.          Personal History     Past Medical History:   Diagnosis Date    Depression     Diabetes mellitus     Disc degeneration, lumbar     Emphysema of lung     Generalized anxiety disorder     GERD (gastroesophageal reflux disease)     Hyperlipidemia     Hypertension     Hypogonadism male     Osteoarthritis     Type 2 diabetes mellitus,  uncontrolled     Vitamin D deficiency          Past Surgical History:   Procedure Laterality Date    HERNIA REPAIR      Inguinal    KNEE ARTHROPLASTY      KYPHOPLASTY N/A 7/14/2021    Procedure: KYPHOPLASTY L3 AND T12;  Surgeon: Ruddy Zepeda MD;  [facility];  Service: Neurosurgery;  Laterality: N/A;       Family History: family history includes Colon cancer in his father; Other in his father; Tuberculosis in his mother.     Social History:  reports that he has been smoking cigarettes. He has a 60.00 pack-year smoking history. He has never used smokeless tobacco. He reports that he does not currently use alcohol. He reports that he does not use drugs.  Social History     Social History Narrative    Not on file       Medications:  Available home medication information reviewed.  Medications Prior to Admission   Medication Sig Dispense Refill Last Dose    acetaminophen (TYLENOL) 500 MG tablet Take 1 tablet by mouth Every 6 (Six) Hours As Needed for Mild Pain, Fever or Headache. OTC   More than a month    fish oil-omega-3 fatty acids 1000 MG capsule Take 1 capsule by mouth Daily. OTC (Patient not taking: Reported on 7/25/2023)   Not Taking    folic acid-vit B6-vit B12 (FOLGARD) 2.2-25-1 MG tablet tablet Take 1 tablet by mouth Daily. (Patient taking differently: Take 1 tablet by mouth Daily. OTC)       losartan (COZAAR) 50 MG tablet Take 1 tablet by mouth Daily. (Patient not taking: Reported on 7/25/2023) 30 tablet 0 Not Taking    melatonin 5 MG tablet tablet Take 1 tablet by mouth Every Night. (Patient not taking: Reported on 7/25/2023) 30 tablet 0 Not Taking    sitaGLIPtin-metFORMIN (Janumet)  MG per tablet Take 1 tablet by mouth 2 (Two) Times a Day With Meals. (Patient not taking: Reported on 7/25/2023) 60 tablet 0 Not Taking    thiamine (VITAMIN B-1) 100 MG tablet  tablet Take 1 tablet by mouth Daily. (Patient taking differently: Take 1 tablet by mouth Daily. OTC) 30 tablet 0     traMADol (ULTRAM)  50 MG tablet Take 1 tablet by mouth Every 6 (Six) Hours As Needed for Moderate Pain  (back pain). (Patient not taking: Reported on 7/25/2023) 30 tablet 0 Not Taking    vitamin D3 125 MCG (5000 UT) capsule capsule Take 1 capsule by mouth Daily. (Patient taking differently: Take 1 capsule by mouth Daily. OTC) 30 capsule         Allergies   Allergen Reactions    Penicillins Rash     Tolerates ceftriaxone    Shellfish-Derived Products Hives     Pt says only applies to oysters       Objective   Objective     Vital Signs:   Temp:  [97.2 øF (36.2 øC)] 97.2 øF (36.2 øC)  Heart Rate:  [] 99  Resp:  [16-18] 18  BP: (119-155)/() 147/93  Flow (L/min):  [2] 2       Physical Exam   Constitutional: Awake, alert  Eyes: PERRLA, sclerae anicteric, no conjunctival injection  HENT: NCAT, mucous membranes moist  Neck: Supple, no thyromegaly, no lymphadenopathy, trachea midline  Respiratory: Clear to auscultation bilaterally, nonlabored respirations   Cardiovascular: RRR, no murmurs, rubs, or gallops, palpable pedal pulses bilaterally  Gastrointestinal: Positive bowel sounds, soft, nontender, nondistended  Musculoskeletal: Visible swelling and deformity to right foot and ankle, no clubbing or cyanosis to extremities  Psychiatric: Appropriate affect, cooperative  Neurologic: Oriented x 3, strength symmetric in all extremities, Cranial Nerves grossly intact to confrontation, speech clear  Skin: No rashes      Result Review:  I have personally reviewed the results from the time of this admission to 7/25/2023 23:32 EDT and agree with these findings:  [x]  Laboratory list / accordion  []  Microbiology  [x]  Radiology  [x]  EKG/Telemetry   []  Cardiology/Vascular   []  Pathology  [x]  Old records          LAB RESULTS:      Lab 07/25/23  1613   WBC 16.59*   HEMOGLOBIN 17.2   HEMATOCRIT 52.1*   PLATELETS 339   NEUTROS ABS 13.40*   IMMATURE GRANS (ABS) 0.08*   LYMPHS ABS 1.83   MONOS ABS 1.23*   EOS ABS 0.01   MCV 94.0         Lab  07/25/23  1613   SODIUM 133*   POTASSIUM 4.3   CHLORIDE 96*   CO2 22.0   ANION GAP 15.0   BUN 9   CREATININE 0.65*   EGFR 97.0   GLUCOSE 167*   CALCIUM 9.0   MAGNESIUM 2.2         Lab 07/25/23  1613   TOTAL PROTEIN 7.0   ALBUMIN 4.1   GLOBULIN 2.9   ALT (SGPT) 19   AST (SGOT) 61*   BILIRUBIN 0.8   ALK PHOS 104         Lab 07/25/23  1817 07/25/23  1613   HSTROP T 69* 63*                 UA          8/1/2022    10:16 7/25/2023    17:20   Urinalysis   Squamous Epithelial Cells, UA 0-2  0-2    Specific Gravity, UA 1.014  1.021    Ketones, UA Negative  15 mg/dL (1+)    Blood, UA Trace  Negative    Leukocytes, UA Moderate (2+)  Negative    Nitrite, UA Negative  Negative    RBC, UA 7-12  31-50    WBC, UA 31-50  3-5    Bacteria, UA None Seen  Trace        Microbiology Results (last 10 days)       ** No results found for the last 240 hours. **            CT Abdomen Pelvis Without Contrast    Result Date: 7/25/2023  CT ABDOMEN PELVIS WO CONTRAST Date of Exam: 7/25/2023 6:40 PM EDT Indication: fall, lower abdominal pain. Comparison: 8/1/2022. 7/7/2021. Technique: Axial CT images were obtained of the abdomen and pelvis without the administration of contrast. Reconstructed coronal and sagittal images were also obtained. Automated exposure control and iterative construction methods were used. Findings: *Lower Thorax: Small bilateral pleural effusions. Bibasilar infiltrates. *Liver: Unremarkable. *Gallbladder: Normal gallbladder. *Pancreas: Normal. *Spleen: Normal. *Adrenal Glands: Unchanged small benign left adrenal gland adenoma. Unremarkable right adrenal gland. *Kidneys: 3 mm nonobstructing renal stones bilaterally. No hydronephrosis bilaterally. Bilateral perinephric fat stranding. Small right renal cyst. *Stomach: Gastric wall thickening. Small hiatal hernia. Distal esophageal wall thickening. *Bowel: Nonobstructive bowel gas pattern. No bowel wall inflammation. Diverticulosis coli. *Appendix: Normal appendix. *Peritoneal  Cavity: No pneumoperitoneum.  No lymphadenopathy. *Urinary Bladder: Urinary bladder wall thickening. *Pelvis: No free pelvic fluid. *Bones: Unchanged compression deformity of L3 vertebral body with interval cement vertebroplasty. Worsening T12 vertebral body with interval cement vertebroplasty. Interval development of compression deformity of L5 vertebral body with 20% loss of interbody height. Interval development of severe compression deformity of L1 vertebral body with 50% loss of interbody height. Interval development of a severe compression deformity of T11 vertebral body with 70% loss of vertebral body height. Interval development of severe compression deformity of T8 vertebral body with 80% loss of interbody height. Multilevel spondylosis. Osteopenia. *     Impression: 1.Small bilateral pleural effusions. Bibasilar infiltrates. Follow-up recommended. 2.3 mm nonobstructing bilateral renal stones. 3.Gastric wall thickening may represent gastritis or other etiologies. Distal esophageal wall thickening may represent esophagitis or other etiologies. Small hiatal hernia. Follow-up recommended. 4.Diverticulosis without diverticulitis. 5.Urinary bladder wall thickening may represent under distention, cystitis or other etiologies. Please correlate with urinalysis. Follow-up recommended. 6.Interval development of multiple compression deformities of lower thoracic and lumbar spine as detailed above. Follow-up recommended. 7.Unchanged left adrenal gland benign adenoma. Electronically Signed: Esau Coon  7/25/2023 8:17 PM EDT  Workstation ID: [id number]    CT Head Without Contrast    Result Date: 7/25/2023  CT HEAD WO CONTRAST Date of Exam: 7/25/2023 6:40 PM EDT Indication: fall, head injury. Comparison: None available. Technique: Axial CT images were obtained of the head without contrast administration.  Automated exposure control and iterative construction methods were used. Findings: *No acute intracranial hemorrhage.  *No masses, mass effect, midline shift or hydrocephalus. *Chronic small vessel ischemic disease. Generalized brain atrophy. *Calvarium is intact. *Visualized orbits and globes are unremarkable without radiopaque foreign bodies. *Visualized paranasal sinuses are clear. *Visualized mastoid air cells are clear.     Impression: 1.No acute intracranial hemorrhage. Calvarium is intact. 2.Chronic findings as detailed above. Electronically Signed: Esau Coon  7/25/2023 8:20 PM EDT  Workstation ID: [id number]    XR Chest 1 View    Result Date: 7/25/2023  XR CHEST 1 VW Date of Exam: 7/25/2023 5:52 PM EDT Indication: Weak/Dizzy/AMS triage protocol Comparison: 8/3/2022. Findings: There are diffuse increased interstitial markings unchanged from prior examination to believed to be chronic in nature. There is mild bibasilar scarring unchanged from prior exam. There is no definite acute infiltrate however a small infiltrate could be obscured by the chronic markings. The heart is poorly evaluated but appears unremarkable. The osseous structures demonstrate no gross abnormality.     Impression: Diffuse increased interstitial markings with mild chronic scarring of the lung bases similar to that seen on 8/3/2022. No definite infiltrate identified however small infiltrate could be obscured by the chronic interstitial markings. * Electronically Signed: Angel Norris  7/25/2023 6:42 PM EDT  Workstation ID: [id number]    XR Hip With or Without Pelvis 2 - 3 View Right    Result Date: 7/25/2023  XR HIP W OR WO PELVIS 2-3 VIEW RIGHT Date of Exam: 7/25/2023 5:52 PM EDT Indication: right hip pain after fall,suspect ramus fracture Comparison: None available. Findings: No acute fractures or dislocations. Joint spaces are well preserved. No joint effusion. No  soft tissue swelling. No osseous destructive lesions. No radiopaque foreign bodies.     Impression: 1.No acute fractures or dislocations. Electronically Signed: Angel Norris  7/25/2023  6:40 PM EDT  Workstation ID: [id number]     Results for orders placed during the hospital encounter of 08/01/22    Adult Transthoracic Echo Complete W/ Cont if Necessary Per Protocol    Interpretation Summary  ú Estimated left ventricular EF = 65% Left ventricular systolic function is normal.  ú Left ventricular diastolic function is consistent with (grade I) impaired relaxation.      Assessment & Plan   Assessment & Plan     Active Hospital Problems    Diagnosis  POA    **Rhabdomyolysis [M62.82]  Yes    Pneumonia [J18.9]  Unknown    Swelling of right foot [M79.89]  Unknown    Compression fracture of lumbar spine, non-traumatic [M48.56XA]  Unknown    Mixed hyperlipidemia [E78.2]  Yes    Episode of recurrent major depressive disorder [F33.9]  Yes    Cigarette nicotine dependence with nicotine-induced disorder [F17.219]  Yes    Hypertension [I10]  Yes    Uncontrolled type 2 diabetes mellitus with hyperglycemia [E11.65]  Yes   Cristian Watt is a 77 y.o. male with a PMH significant for diabetes mellitus type 2, emphysema, GERD, depression, HTN, HLD, history of compression fracture who presents to the ED after a fall.     Rhabdomyolysis  Fall  - CK elevated at 3814, repeat in a.m.  - IVF's  - Strict intake and output  - PT/OT  - Fall precautions    Pneumonia  UTI?  - Leukocytosis  - Check lactic acid, procalcitonin  - Consolidation seen on CT abdomen pelvis  - Questionable UTI on UA  - Rocephin, doxycycline  - Blood/urine/sputum cultures, urine antigens, respiratory PCR  - Incentive spirometry    Compression fracture  - Severe compression and T/L spines  - Consult neurosurgery for a.m.    Diabetes mellitus type 2  - Hemoglobin A1c for a.m.  - SSI with Accu-Cheks  - Hold home oral meds    Swelling of right foot  - Denies injury  - Get x-ray tonight  - Venous Doppler ultrasound for the morning    Tobacco abuse  - Counseled on cessation  - Nicotine patch    Total time spent: 75 minutes  Time spent includes time reviewing  chart, face-to-face time, counseling patient/family/caregiver, ordering medications/tests/procedures, communicating with other health care professionals, documenting clinical information in the electronic health record, and coordination of care.      DVT prophylaxis:  heparin      CODE STATUS: Full code  There are no questions and answers to display.       Expected Discharge   Expected Discharge Date: 7/27/2023; Expected Discharge Time:      Kellie Figueroa DO  07/25/23

## 2023-07-26 NOTE — CASE MANAGEMENT/SOCIAL WORK
Discharge Planning Assessment  [facility]     Patient Name: Cristian Watt  MRN: [id number]  Today's Date: 7/26/2023    Admit Date: 7/25/2023    Plan: TBD   Discharge Needs Assessment       Row Name 07/26/23 1022       Living Environment    People in Home alone    Current Living Arrangements home    Potentially Unsafe Housing Conditions none    Primary Care Provided by self    Family Caregiver if Needed none       Resource/Environmental Concerns    Resource/Environmental Concerns none       Transition Planning    Patient/Family Anticipates Transition to home with help/services       Discharge Needs Assessment    Equipment Currently Used at Home walker, rolling;shower chair    Equipment Needed After Discharge none                   Discharge Plan       Row Name 07/26/23 1104       Plan    Plan --    Patient/Family in Agreement with Plan --      Row Name 07/26/23 1023       Plan    Plan TBD    Patient/Family in Agreement with Plan yes    Plan Comments Spoke with patient at bedside. Patient lives alone in [facility]. Uses a walker for mobility. Has a friend that helps him with medication and meals. He is not current with  services. He states that he doesn't have a PCP. Will need one when discharge. Insurance is Medicare A and B and [facility] Supp. Discharge plan is ongoing. Will likely need some form of rehab. PT is ordered. Map if rehab facilities given. CM will follow.    Final Discharge Disposition Code 01 - home or self-care                  Continued Care and Services - Admitted Since 7/25/2023    Coordination has not been started for this encounter.       Expected Discharge Date and Time       Expected Discharge Date Expected Discharge Time    Jul 27, 2023            Demographic Summary       Row Name 07/26/23 1022       General Information    Preferred Language English                   Functional Status       Row Name 07/26/23 1022       Functional Status    Usual Activity Tolerance fair    Current  Activity Tolerance fair       Functional Status, IADL    Medications assistive equipment    Meal Preparation assistive equipment and person    Housekeeping assistive equipment and person    Laundry assistive equipment and person    Shopping assistive equipment and person                   Psychosocial    No documentation.                  Abuse/Neglect    No documentation.                  Legal    No documentation.                  Substance Abuse    No documentation.                  Patient Forms    No documentation.                     Shu Schmidt RN

## 2023-07-26 NOTE — PLAN OF CARE
Goal Outcome Evaluation:                      Admitted overnight. 2LNC, RA at home. Bladder scan this morning 188 mL. Last  void was straight cath in ED.

## 2023-07-27 ENCOUNTER — APPOINTMENT (OUTPATIENT)
Dept: MRI IMAGING | Facility: HOSPITAL | Age: 78
DRG: 542 | End: 2023-07-27
Payer: MEDICARE

## 2023-07-27 ENCOUNTER — APPOINTMENT (OUTPATIENT)
Dept: CT IMAGING | Facility: HOSPITAL | Age: 78
DRG: 542 | End: 2023-07-27
Payer: MEDICARE

## 2023-07-27 LAB
ALBUMIN SERPL-MCNC: 3.2 G/DL (ref 3.5–5.2)
ALBUMIN/GLOB SERPL: 0.9 G/DL
ALP SERPL-CCNC: 86 U/L (ref 39–117)
ALT SERPL W P-5'-P-CCNC: 16 U/L (ref 1–41)
ANION GAP SERPL CALCULATED.3IONS-SCNC: 9 MMOL/L (ref 5–15)
AST SERPL-CCNC: 51 U/L (ref 1–40)
BASOPHILS # BLD AUTO: 0.06 10*3/MM3 (ref 0–0.2)
BASOPHILS NFR BLD AUTO: 0.6 % (ref 0–1.5)
BH CV ECHO MEAS - AO MAX PG: 3.6 MMHG
BH CV ECHO MEAS - AO MEAN PG: 2 MMHG
BH CV ECHO MEAS - AO ROOT DIAM: 3.6 CM
BH CV ECHO MEAS - AO V2 MAX: 95.5 CM/SEC
BH CV ECHO MEAS - AO V2 VTI: 22.7 CM
BH CV ECHO MEAS - AVA(I,D): 3.4 CM2
BH CV ECHO MEAS - EDV(CUBED): 110.6 ML
BH CV ECHO MEAS - EDV(MOD-SP2): 79.5 ML
BH CV ECHO MEAS - EDV(MOD-SP4): 96.7 ML
BH CV ECHO MEAS - EF(MOD-BP): 60.1 %
BH CV ECHO MEAS - EF(MOD-SP2): 62.4 %
BH CV ECHO MEAS - EF(MOD-SP4): 57.4 %
BH CV ECHO MEAS - ESV(CUBED): 27 ML
BH CV ECHO MEAS - ESV(MOD-SP2): 29.9 ML
BH CV ECHO MEAS - ESV(MOD-SP4): 41.2 ML
BH CV ECHO MEAS - FS: 37.5 %
BH CV ECHO MEAS - IVS/LVPW: 1 CM
BH CV ECHO MEAS - IVSD: 1.2 CM
BH CV ECHO MEAS - LAT PEAK E' VEL: 7.8 CM/SEC
BH CV ECHO MEAS - LV MASS(C)D: 219.1 GRAMS
BH CV ECHO MEAS - LV MAX PG: 4.6 MMHG
BH CV ECHO MEAS - LV MEAN PG: 3 MMHG
BH CV ECHO MEAS - LV V1 MAX: 107 CM/SEC
BH CV ECHO MEAS - LV V1 VTI: 24.7 CM
BH CV ECHO MEAS - LVIDD: 4.8 CM
BH CV ECHO MEAS - LVIDS: 3 CM
BH CV ECHO MEAS - LVOT AREA: 3.1 CM2
BH CV ECHO MEAS - LVOT DIAM: 2 CM
BH CV ECHO MEAS - LVPWD: 1.2 CM
BH CV ECHO MEAS - MED PEAK E' VEL: 9.6 CM/SEC
BH CV ECHO MEAS - MV A MAX VEL: 137 CM/SEC
BH CV ECHO MEAS - MV DEC SLOPE: 398 CM/SEC2
BH CV ECHO MEAS - MV DEC TIME: 0.15 MSEC
BH CV ECHO MEAS - MV E MAX VEL: 72.4 CM/SEC
BH CV ECHO MEAS - MV E/A: 0.53
BH CV ECHO MEAS - MV P1/2T: 59.9 MSEC
BH CV ECHO MEAS - MVA(P1/2T): 3.7 CM2
BH CV ECHO MEAS - PA ACC TIME: 0.12 SEC
BH CV ECHO MEAS - SV(LVOT): 77.6 ML
BH CV ECHO MEAS - SV(MOD-SP2): 49.6 ML
BH CV ECHO MEAS - SV(MOD-SP4): 55.5 ML
BH CV ECHO MEAS - TAPSE (>1.6): 1.98 CM
BH CV ECHO MEASUREMENTS AVERAGE E/E' RATIO: 8.32
BH CV LOWER VASCULAR LEFT COMMON FEMORAL AUGMENT: NORMAL
BH CV LOWER VASCULAR LEFT COMMON FEMORAL PHASIC: NORMAL
BH CV LOWER VASCULAR LEFT COMMON FEMORAL SPONT: NORMAL
BH CV LOWER VASCULAR RIGHT COMMON FEMORAL AUGMENT: NORMAL
BH CV LOWER VASCULAR RIGHT COMMON FEMORAL COMPRESS: NORMAL
BH CV LOWER VASCULAR RIGHT COMMON FEMORAL PHASIC: NORMAL
BH CV LOWER VASCULAR RIGHT COMMON FEMORAL SPONT: NORMAL
BH CV LOWER VASCULAR RIGHT DISTAL FEMORAL COMPRESS: NORMAL
BH CV LOWER VASCULAR RIGHT GASTRONEMIUS COMPRESS: NORMAL
BH CV LOWER VASCULAR RIGHT GREATER SAPH AK COMPRESS: NORMAL
BH CV LOWER VASCULAR RIGHT GREATER SAPH BK COMPRESS: NORMAL
BH CV LOWER VASCULAR RIGHT LESSER SAPH COMPRESS: NORMAL
BH CV LOWER VASCULAR RIGHT MID FEMORAL AUGMENT: NORMAL
BH CV LOWER VASCULAR RIGHT MID FEMORAL COMPRESS: NORMAL
BH CV LOWER VASCULAR RIGHT MID FEMORAL PHASIC: NORMAL
BH CV LOWER VASCULAR RIGHT MID FEMORAL SPONT: NORMAL
BH CV LOWER VASCULAR RIGHT PERONEAL AUGMENT: NORMAL
BH CV LOWER VASCULAR RIGHT POPLITEAL AUGMENT: NORMAL
BH CV LOWER VASCULAR RIGHT POPLITEAL COMPRESS: NORMAL
BH CV LOWER VASCULAR RIGHT POPLITEAL PHASIC: NORMAL
BH CV LOWER VASCULAR RIGHT POPLITEAL SPONT: NORMAL
BH CV LOWER VASCULAR RIGHT POSTERIOR TIBIAL COMPRESS: NORMAL
BH CV LOWER VASCULAR RIGHT PROFUNDA FEMORAL COMPRESS: NORMAL
BH CV LOWER VASCULAR RIGHT PROXIMAL FEMORAL COMPRESS: NORMAL
BH CV LOWER VASCULAR RIGHT SAPHENOFEMORAL JUNCTION COMPRESS: NORMAL
BH CV XLRA - RV BASE: 4.5 CM
BH CV XLRA - RV LENGTH: 8.3 CM
BH CV XLRA - RV MID: 3.7 CM
BH CV XLRA - TDI S': 15.5 CM/SEC
BH CV XLRA MEAS LEFT DIST CCA EDV: 14.9 CM/SEC
BH CV XLRA MEAS LEFT DIST CCA PSV: 73.9 CM/SEC
BH CV XLRA MEAS LEFT DIST ICA EDV: 27.3 CM/SEC
BH CV XLRA MEAS LEFT DIST ICA PSV: 89.5 CM/SEC
BH CV XLRA MEAS LEFT ICA/CCA RATIO: 0.95
BH CV XLRA MEAS LEFT MID CCA EDV: 22.4 CM/SEC
BH CV XLRA MEAS LEFT MID CCA PSV: 80.1 CM/SEC
BH CV XLRA MEAS LEFT MID ICA EDV: 29.2 CM/SEC
BH CV XLRA MEAS LEFT MID ICA PSV: 75.8 CM/SEC
BH CV XLRA MEAS LEFT PROX CCA EDV: 19.4 CM/SEC
BH CV XLRA MEAS LEFT PROX CCA PSV: 87.2 CM/SEC
BH CV XLRA MEAS LEFT PROX ECA PSV: 90.1 CM/SEC
BH CV XLRA MEAS LEFT PROX ICA EDV: 20.5 CM/SEC
BH CV XLRA MEAS LEFT PROX ICA PSV: 55.3 CM/SEC
BH CV XLRA MEAS LEFT PROX SCLA PSV: 131 CM/SEC
BH CV XLRA MEAS LEFT VERTEBRAL A PSV: 49.1 CM/SEC
BH CV XLRA MEAS RIGHT DIST CCA EDV: 21.1 CM/SEC
BH CV XLRA MEAS RIGHT DIST CCA PSV: 84.5 CM/SEC
BH CV XLRA MEAS RIGHT DIST ICA EDV: 34.2 CM/SEC
BH CV XLRA MEAS RIGHT DIST ICA PSV: 112 CM/SEC
BH CV XLRA MEAS RIGHT ICA/CCA RATIO: 1.14
BH CV XLRA MEAS RIGHT MID CCA EDV: 20.5 CM/SEC
BH CV XLRA MEAS RIGHT MID CCA PSV: 103 CM/SEC
BH CV XLRA MEAS RIGHT MID ICA EDV: 39.1 CM/SEC
BH CV XLRA MEAS RIGHT MID ICA PSV: 117 CM/SEC
BH CV XLRA MEAS RIGHT PROX CCA EDV: 16.2 CM/SEC
BH CV XLRA MEAS RIGHT PROX CCA PSV: 95.1 CM/SEC
BH CV XLRA MEAS RIGHT PROX ECA PSV: 103 CM/SEC
BH CV XLRA MEAS RIGHT PROX ICA EDV: 11 CM/SEC
BH CV XLRA MEAS RIGHT PROX ICA PSV: 40.8 CM/SEC
BH CV XLRA MEAS RIGHT PROX SCLA PSV: 94.4 CM/SEC
BH CV XLRA MEAS RIGHT VERTEBRAL A PSV: 48.3 CM/SEC
BILIRUB SERPL-MCNC: 0.4 MG/DL (ref 0–1.2)
BUN SERPL-MCNC: 5 MG/DL (ref 8–23)
BUN/CREAT SERPL: 9.8 (ref 7–25)
CALCIUM SPEC-SCNC: 8.7 MG/DL (ref 8.6–10.5)
CHLORIDE SERPL-SCNC: 97 MMOL/L (ref 98–107)
CK SERPL-CCNC: 1788 U/L (ref 20–200)
CO2 SERPL-SCNC: 30 MMOL/L (ref 22–29)
CREAT SERPL-MCNC: 0.51 MG/DL (ref 0.76–1.27)
DEPRECATED RDW RBC AUTO: 47.4 FL (ref 37–54)
EGFRCR SERPLBLD CKD-EPI 2021: 104.4 ML/MIN/1.73
EOSINOPHIL # BLD AUTO: 0.05 10*3/MM3 (ref 0–0.4)
EOSINOPHIL NFR BLD AUTO: 0.5 % (ref 0.3–6.2)
ERYTHROCYTE [DISTWIDTH] IN BLOOD BY AUTOMATED COUNT: 13.7 % (ref 12.3–15.4)
GLOBULIN UR ELPH-MCNC: 3.5 GM/DL
GLUCOSE BLDC GLUCOMTR-MCNC: 125 MG/DL (ref 70–130)
GLUCOSE BLDC GLUCOMTR-MCNC: 145 MG/DL (ref 70–130)
GLUCOSE BLDC GLUCOMTR-MCNC: 171 MG/DL (ref 70–130)
GLUCOSE BLDC GLUCOMTR-MCNC: 173 MG/DL (ref 70–130)
GLUCOSE SERPL-MCNC: 126 MG/DL (ref 65–99)
HCT VFR BLD AUTO: 49.8 % (ref 37.5–51)
HGB BLD-MCNC: 16.4 G/DL (ref 13–17.7)
IMM GRANULOCYTES # BLD AUTO: 0.04 10*3/MM3 (ref 0–0.05)
IMM GRANULOCYTES NFR BLD AUTO: 0.4 % (ref 0–0.5)
IVRT: 92 MSEC
LEFT ATRIUM VOLUME INDEX: 27.4 ML/M2
LEFT ATRIUM VOLUME: 58.6 ML
LV EF 2D ECHO EST: 60 %
LYMPHOCYTES # BLD AUTO: 2.25 10*3/MM3 (ref 0.7–3.1)
LYMPHOCYTES NFR BLD AUTO: 22.2 % (ref 19.6–45.3)
MAGNESIUM SERPL-MCNC: 1.9 MG/DL (ref 1.6–2.4)
MCH RBC QN AUTO: 31.1 PG (ref 26.6–33)
MCHC RBC AUTO-ENTMCNC: 32.9 G/DL (ref 31.5–35.7)
MCV RBC AUTO: 94.5 FL (ref 79–97)
MONOCYTES # BLD AUTO: 0.89 10*3/MM3 (ref 0.1–0.9)
MONOCYTES NFR BLD AUTO: 8.8 % (ref 5–12)
NEUTROPHILS NFR BLD AUTO: 6.83 10*3/MM3 (ref 1.7–7)
NEUTROPHILS NFR BLD AUTO: 67.5 % (ref 42.7–76)
NRBC BLD AUTO-RTO: 0 /100 WBC (ref 0–0.2)
PHOSPHATE SERPL-MCNC: 2.8 MG/DL (ref 2.5–4.5)
PLATELET # BLD AUTO: 308 10*3/MM3 (ref 140–450)
PMV BLD AUTO: 8.5 FL (ref 6–12)
POTASSIUM SERPL-SCNC: 4.2 MMOL/L (ref 3.5–5.2)
PROT SERPL-MCNC: 6.7 G/DL (ref 6–8.5)
QT INTERVAL: 380 MS
QTC INTERVAL: 482 MS
RBC # BLD AUTO: 5.27 10*6/MM3 (ref 4.14–5.8)
RIGHT ARM BP: NORMAL MMHG
SODIUM SERPL-SCNC: 136 MMOL/L (ref 136–145)
WBC NRBC COR # BLD: 10.12 10*3/MM3 (ref 3.4–10.8)

## 2023-07-27 PROCEDURE — 25510000001 IOPAMIDOL PER 1 ML: Performed by: INTERNAL MEDICINE

## 2023-07-27 PROCEDURE — 63710000001 INSULIN LISPRO (HUMAN) PER 5 UNITS: Performed by: INTERNAL MEDICINE

## 2023-07-27 PROCEDURE — 83735 ASSAY OF MAGNESIUM: CPT | Performed by: INTERNAL MEDICINE

## 2023-07-27 PROCEDURE — 84100 ASSAY OF PHOSPHORUS: CPT | Performed by: INTERNAL MEDICINE

## 2023-07-27 PROCEDURE — 72148 MRI LUMBAR SPINE W/O DYE: CPT

## 2023-07-27 PROCEDURE — 72146 MRI CHEST SPINE W/O DYE: CPT

## 2023-07-27 PROCEDURE — 82948 REAGENT STRIP/BLOOD GLUCOSE: CPT

## 2023-07-27 PROCEDURE — 71275 CT ANGIOGRAPHY CHEST: CPT

## 2023-07-27 PROCEDURE — 82550 ASSAY OF CK (CPK): CPT | Performed by: INTERNAL MEDICINE

## 2023-07-27 PROCEDURE — 80053 COMPREHEN METABOLIC PANEL: CPT | Performed by: INTERNAL MEDICINE

## 2023-07-27 PROCEDURE — 85025 COMPLETE CBC W/AUTO DIFF WBC: CPT | Performed by: INTERNAL MEDICINE

## 2023-07-27 PROCEDURE — 25010000002 CEFTRIAXONE PER 250 MG: Performed by: INTERNAL MEDICINE

## 2023-07-27 PROCEDURE — 25010000002 HEPARIN (PORCINE) PER 1000 UNITS: Performed by: INTERNAL MEDICINE

## 2023-07-27 PROCEDURE — 94799 UNLISTED PULMONARY SVC/PX: CPT

## 2023-07-27 PROCEDURE — 99222 1ST HOSP IP/OBS MODERATE 55: CPT

## 2023-07-27 RX ORDER — BUMETANIDE 0.25 MG/ML
2 INJECTION INTRAMUSCULAR; INTRAVENOUS ONCE
Status: COMPLETED | OUTPATIENT
Start: 2023-07-27 | End: 2023-07-27

## 2023-07-27 RX ADMIN — SODIUM CHLORIDE, POTASSIUM CHLORIDE, SODIUM LACTATE AND CALCIUM CHLORIDE 125 ML/HR: 600; 310; 30; 20 INJECTION, SOLUTION INTRAVENOUS at 04:02

## 2023-07-27 RX ADMIN — BUMETANIDE 2 MG: 0.25 INJECTION, SOLUTION INTRAMUSCULAR; INTRAVENOUS at 15:58

## 2023-07-27 RX ADMIN — HEPARIN SODIUM 5000 UNITS: 5000 INJECTION, SOLUTION INTRAVENOUS; SUBCUTANEOUS at 16:24

## 2023-07-27 RX ADMIN — THIAMINE HCL TAB 100 MG 100 MG: 100 TAB at 09:24

## 2023-07-27 RX ADMIN — HEPARIN SODIUM 5000 UNITS: 5000 INJECTION, SOLUTION INTRAVENOUS; SUBCUTANEOUS at 05:09

## 2023-07-27 RX ADMIN — HEPARIN SODIUM 5000 UNITS: 5000 INJECTION, SOLUTION INTRAVENOUS; SUBCUTANEOUS at 20:32

## 2023-07-27 RX ADMIN — Medication 5 MG: at 20:32

## 2023-07-27 RX ADMIN — IOPAMIDOL 85 ML: 755 INJECTION, SOLUTION INTRAVENOUS at 12:29

## 2023-07-27 RX ADMIN — INSULIN LISPRO 2 UNITS: 100 INJECTION, SOLUTION INTRAVENOUS; SUBCUTANEOUS at 16:50

## 2023-07-27 RX ADMIN — DOXYCYCLINE 100 MG: 100 INJECTION, POWDER, LYOPHILIZED, FOR SOLUTION INTRAVENOUS at 09:23

## 2023-07-27 RX ADMIN — Medication 10 ML: at 09:00

## 2023-07-27 RX ADMIN — INSULIN LISPRO 2 UNITS: 100 INJECTION, SOLUTION INTRAVENOUS; SUBCUTANEOUS at 22:06

## 2023-07-27 RX ADMIN — DOXYCYCLINE 100 MG: 100 INJECTION, POWDER, LYOPHILIZED, FOR SOLUTION INTRAVENOUS at 21:12

## 2023-07-27 RX ADMIN — SODIUM CHLORIDE 2000 MG: 900 INJECTION INTRAVENOUS at 20:32

## 2023-07-27 RX ADMIN — Medication 1 TABLET: at 09:24

## 2023-07-27 RX ADMIN — Medication 5000 UNITS: at 09:23

## 2023-07-27 RX ADMIN — Medication 10 ML: at 20:32

## 2023-07-27 RX ADMIN — HYDROCODONE BITARTRATE AND ACETAMINOPHEN 1 TABLET: 7.5; 325 TABLET ORAL at 20:43

## 2023-07-27 NOTE — CONSULTS
"NEUROSURGERY CONSULT NOTE    Chief Complaint: Low back pain/compression fractures    Subjective: 77-year-old male with a past medical history significant for T2 DM, emphysema, GERD, depression, hypertension, hyperlipidemia, history of compression fractures status post vertebral augmentation procedures at L3, T12.  Patient lives alone and resides at the [facility] in [place].  Patient states on 07/25/2023 he was found by one of the staff at the [facility] down in his bathroom floor.  Patient states he been there for around 18 hours, he states that he fell trying to get off the \"toilet\", unclear if this was a presyncopal/syncopal episode.  He states he has been \"falling a lot\".  He was subsequently transferred to [facility] urgency department.  With myalgias, was found to have acute rhabdomyolysis, CT head was negative for any acute fractures, CT abdomen pelvis revealed several ongoing interval compression deformities at L5, severe compression forming L1, severe compression forming at T11, ongoing at T8 as well.  Patient is being treated for CAP, currently on IV antibiotics, IV fluids running at the bedside.  The patient is able to converse with me appropriately at the bedside this morning.  He was alert, oriented x3, he had no acute complaints currently, lying comfortably in bed.  He did state that he had some back pain especially \"on the right\" when moving different positions.  His current pain is a 0, he states in certain positions that is a 10.  He has not been up and ambulated today.  He tells me he ambulates with a cane at baseline.  He denies any upper or lower extremity weakness, denies any urinary bladder or bowel dysfunction, denies any numbness or tingling.    Objective    Vital Signs: Blood pressure 167/98, pulse 92, temperature 96.1 øF (35.6 øC), temperature source Oral, resp. rate 18, height 182.9 cm (72.01\"), weight 92.4 kg (203 lb 11.3 oz), SpO2 90 %.    Physical Exam  Awake, alert and oriented x " 3  Opens eyes spont  Pupils 3 mm rx bilat  Extraocular muscles intact bilaterally  Face symmetric bilaterally  Tongue midline  5/5 in all 4 ext, dressing on patient's right leg, several noted abrasions consistent with the patient's fall in the right lower extremity  No pronator drift  Good pulses palpated throughout lower and upper extremity  Minimal to mild tenderness to palpation/percussion especially right lower lumbar spine.    Intake/Output:   Intake/Output Summary (Last 24 hours) at 7/27/2023 1251  Last data filed at 7/27/2023 0900  Gross per 24 hour   Intake 1500 ml   Output 1400 ml   Net 100 ml       Current Medications:   Current Facility-Administered Medications:     acetaminophen (TYLENOL) tablet 500 mg, 500 mg, Oral, Q6H PRN, Henry, Kellie G, DO    sennosides-docusate (PERICOLACE) 8.6-50 MG per tablet 2 tablet, 2 tablet, Oral, BID, 2 tablet at 07/26/23 1003 **AND** polyethylene glycol (MIRALAX) packet 17 g, 17 g, Oral, Daily PRN **AND** bisacodyl (DULCOLAX) EC tablet 5 mg, 5 mg, Oral, Daily PRN **AND** bisacodyl (DULCOLAX) suppository 10 mg, 10 mg, Rectal, Daily PRN, Henry, Kellie G, DO    Calcium Replacement - Follow Nurse / BPA Driven Protocol, , Does not apply, PRN, Henry, Kellie G, DO    cefTRIAXone (ROCEPHIN) 2000 mg/100 mL 0.9% NS IVPB (MBP), 2,000 mg, Intravenous, Q24H, Henry, Kellie G, DO, 2,000 mg at 07/26/23 2046    dextrose (D50W) (25 g/50 mL) IV injection 25 g, 25 g, Intravenous, Q15 Min PRN, Henry, Kellie G, DO    dextrose (GLUTOSE) oral gel 15 g, 15 g, Oral, Q15 Min PRN, Henry, Kellie G, DO    doxycycline (VIBRAMYCIN) 100 mg in sodium chloride 0.9 % 100 mL IVPB-VTB, 100 mg, Intravenous, Q12H, Henry, Kellie G, DO, 100 mg at 07/27/23 0923    folic acid-vit B6-vit B12 (FOLGARD) tablet 1 tablet, 1 tablet, Oral, Daily, Kellie Figueroa DO, 1 tablet at 07/27/23 0924    glucagon (GLUCAGEN) injection 1 mg, 1 mg, Intramuscular, Q15 Min PRN, Kellie Figueroa DO    heparin (porcine) 5000 UNIT/ML  injection 5,000 Units, 5,000 Units, Subcutaneous, Q8H, Henry, Kellie G, DO, 5,000 Units at 07/27/23 0509    HYDROcodone-acetaminophen (NORCO) 7.5-325 MG per tablet 1 tablet, 1 tablet, Oral, Q6H PRN, Henry, Kellie G, DO    Insulin Lispro (humaLOG) injection 2-7 Units, 2-7 Units, Subcutaneous, 4x Daily AC & at Bedtime, Henry, Kellie G, DO, 2 Units at 07/26/23 2050    ipratropium-albuterol (DUO-NEB) nebulizer solution 3 mL, 3 mL, Nebulization, Q4H PRN, Henry, Kellie G, DO    lactated ringers infusion, 125 mL/hr, Intravenous, Continuous, Henry, Kellie G, DO, Last Rate: 125 mL/hr at 07/27/23 0402, 125 mL/hr at 07/27/23 0402    Magnesium Standard Dose Replacement - Follow Nurse / BPA Driven Protocol, , Does not apply, PRN, Henry, Kellie G, DO    melatonin tablet 5 mg, 5 mg, Oral, Nightly, Henry, Kellie G, DO, 5 mg at 07/26/23 2049    naloxone (NARCAN) injection 0.4 mg, 0.4 mg, Intravenous, PRN, Henry, Kellie G, DO    nicotine (NICODERM CQ) 21 MG/24HR patch 1 patch, 1 patch, Transdermal, Q24H, Henry, Kellie G, DO    Phosphorus Replacement - Follow Nurse / BPA Driven Protocol, , Does not apply, PRN, Henry, Kellie G, DO    Potassium Replacement - Follow Nurse / BPA Driven Protocol, , Does not apply, PRN, Henry, Kellie G, DO    sodium chloride 0.9 % flush 10 mL, 10 mL, Intravenous, PRN, Henry, Kellie G, DO    sodium chloride 0.9 % flush 10 mL, 10 mL, Intravenous, Q12H, Henry, Kellie G, DO, 10 mL at 07/26/23 2050    sodium chloride 0.9 % flush 10 mL, 10 mL, Intravenous, PRN, Henry, Kellie G, DO    sodium chloride 0.9 % infusion 40 mL, 40 mL, Intravenous, PRN, Henry, Kellie G, DO    thiamine (VITAMIN B-1) tablet 100 mg, 100 mg, Oral, Daily, Henry, Kellie G, DO, 100 mg at 07/27/23 0924    traMADol (ULTRAM) tablet 50 mg, 50 mg, Oral, Q6H PRN, Henry, Kellie G, DO, 50 mg at 07/25/23 2304    vitamin D3 capsule 5,000 Units, 5,000 Units, Oral, Daily, Henry, Kellie G, DO, 5,000 Units at 07/27/23 0923     Laboratory Results:       Lab  07/27/23  0552 07/26/23  0421 07/25/23 1817 07/25/23  1613   WBC 10.12 11.31*  --  16.59*   HEMOGLOBIN 16.4 16.3  --  17.2   HEMATOCRIT 49.8 48.7  --  52.1*   PLATELETS 308 294  --  339   NEUTROS ABS 6.83 7.94*  --  13.40*   IMMATURE GRANS (ABS) 0.04 0.04  --  0.08*   LYMPHS ABS 2.25 2.17  --  1.83   MONOS ABS 0.89 1.07*  --  1.23*   EOS ABS 0.05 0.04  --  0.01   MCV 94.5 94.0  --  94.0   PROCALCITONIN  --   --  0.21  --    LACTATE  --   --   --  1.8         Lab 07/27/23  0552 07/26/23  0421 07/25/23  1613   SODIUM 136 135* 133*   POTASSIUM 4.2 3.9 4.3   CHLORIDE 97* 99 96*   CO2 30.0* 22.0 22.0   ANION GAP 9.0 14.0 15.0   BUN 5* 8 9   CREATININE 0.51* 0.48* 0.65*   EGFR 104.4 106.4 97.0   GLUCOSE 126* 136* 167*   CALCIUM 8.7 8.8 9.0   MAGNESIUM 1.9  --  2.2   PHOSPHORUS 2.8  --   --    HEMOGLOBIN A1C  --  7.90*  --    TSH  --  1.940  --          Lab 07/27/23  0552 07/25/23  1613   TOTAL PROTEIN 6.7 7.0   ALBUMIN 3.2* 4.1   GLOBULIN 3.5 2.9   ALT (SGPT) 16 19   AST (SGOT) 51* 61*   BILIRUBIN 0.4 0.8   ALK PHOS 86 104         Lab 07/25/23  1817 07/25/23  1613   HSTROP T 69* 63*                 Brief Urine Lab Results  (Last result in the past 365 days)        Color   Clarity   Blood   Leuk Est   Nitrite   Protein   CREAT   Urine HCG        07/25/23 1720 Orange   Cloudy   Negative   Negative   Negative   30 mg/dL (1+)                 Microbiology Results (last 10 days)       Procedure Component Value - Date/Time    COVID PRE-OP / PRE-PROCEDURE SCREENING ORDER (NO ISOLATION) - Swab, Nasopharynx [[id number]]  (Normal) Collected: 07/25/23 6777    Lab Status: Final result Specimen: Swab from Nasopharynx Updated: 07/26/23 0313    Narrative:      The following orders were created for panel order COVID PRE-OP / PRE-PROCEDURE SCREENING ORDER (NO ISOLATION) - Swab, Nasopharynx.  Procedure                               Abnormality         Status                     ---------                               -----------          ------                     Respiratory Panel PCR w/...[[id number]]  Normal              Final result                 Please view results for these tests on the individual orders.    Respiratory Panel PCR w/COVID-19(SARS-CoV-2) BRANDI/CYNTHIA/CASSI/PAD/COR/MAD/DAVIE In-House, NP Swab in UTM/VTM, 3-4 HR TAT - Swab, Nasopharynx [[id number]]  (Normal) Collected: 07/25/23 2340    Lab Status: Final result Specimen: Swab from Nasopharynx Updated: 07/26/23 0319     ADENOVIRUS, PCR Not Detected     Coronavirus 229E Not Detected     Coronavirus HKU1 Not Detected     Coronavirus NL63 Not Detected     Coronavirus OC43 Not Detected     COVID19 Not Detected     Human Metapneumovirus Not Detected     Human Rhinovirus/Enterovirus Not Detected     Influenza A PCR Not Detected     Influenza B PCR Not Detected     Parainfluenza Virus 1 Not Detected     Parainfluenza Virus 2 Not Detected     Parainfluenza Virus 3 Not Detected     Parainfluenza Virus 4 Not Detected     RSV, PCR Not Detected     Bordetella pertussis pcr Not Detected     Bordetella parapertussis PCR Not Detected     Chlamydophila pneumoniae PCR Not Detected     Mycoplasma pneumo by PCR Not Detected    Narrative:      In the setting of a positive respiratory panel with a viral infection PLUS a negative procalcitonin without other underlying concern for bacterial infection, consider observing off antibiotics or discontinuation of antibiotics and continue supportive care. If the respiratory panel is positive for atypical bacterial infection (Bordetella pertussis, Chlamydophila pneumoniae, or Mycoplasma pneumoniae), consider antibiotic de-escalation to target atypical bacterial infection.    Blood Culture - Blood, Hand, Left [[id number]]  (Normal) Collected: 07/25/23 2255    Lab Status: Preliminary result Specimen: Blood from Hand, Left Updated: 07/26/23 2345     Blood Culture No growth at 24 hours    Blood Culture - Blood, Hand, Left [[id number]]  (Normal) Collected: 07/25/23 2202    Lab  Status: Preliminary result Specimen: Blood from Hand, Left Updated: 07/26/23 2345     Blood Culture No growth at 24 hours    Urine Culture - Urine, Urine, Catheter [[id number]]  (Normal) Collected: 07/25/23 1720    Lab Status: Final result Specimen: Urine, Catheter Updated: 07/26/23 2121     Urine Culture No growth             Diagnostic Imaging: I along with Dr. Pandey reviewed and independently interpreted the new imaging.  CT abdomen pelvis performed on 7/25/2023, shows unchanged compression from the L3 vertebral body with interval cement vertebroplasty, worsening T12 vertebral body interval cement vertebroplasty, interval development of compression deformity L5, interval development of severe compression from the L1 with 50% loss of height, development of severe compression from a T11 as well as T8 with multilevel spondylosis and osteopenia.  Comparison can be made from the patient's MRI lumbar spine performed on 8/1/2022, surgical brace fracture at L2 was noted then, he has chronic compression fracture at L1 and L4 status post kyphoplasty.  Comparison made with the patient's MRI of the thoracic spine performed on 8/1/2022, there was note of severe compression fracture at T9, mild superior endplate compression fracture of T12 at that time.    Assessment/Plan:  Back pain/compression fractures  -Patient has ongoing hard to determine acute versus chronic L5 compression deformity, severe deformity at L1, severe compression from the T11 as well as T8 with multilevel spondylosis and osteopenia based on his CT abdomen pelvis and fall history.  I do think it is appropriate for the patient to obtain MRI lumbar MRI thoracic spine to better assess the acute versus chronic nature and assess for any edema.  -Patient is fairly comfortable and had no significant pain to palpation or percussion lower lumbar mid thoracic spine, continue p.o. pain medications tolerated  -We will determine if there is any role for vertebral  augmentation based on the patient's MRI lumbar and T-spine, would like the patient to get over acute rhabdo and CAP before attempting any neurosurgical intervention.   -PT/OT as tolerated  -I have ordered the patient a custom bi-valve Flexfoam TLSO for spine stabilization, comfort and pain control. The patient requires a custom brace because of age (elderly)  that prevents them from wearing a prefabricated brace. The patient must wear the brace when up and out of bed during activities for the next 8-12 weeks.        Medical management per hospitalist team.     Any copied data from previous notes included in the (1) History of Present Illness, (2) Physical Examination and (3) Medical Decision Making and/or Assessment and Plan has been reviewed and is accurate as of 07/27/23      Nino Christine PA-C  07/27/23  12:51 EDT

## 2023-07-27 NOTE — NURSING NOTE
Called [facility] and spoke with Deven Roberson.  He was given patient name, room number and age and reason for the call. He said someone would come tonight before 9 pm to fit the patient and the custom made LSO brace will be delivered tomorrow, July 28, 2023. Updated patient of this conversation and plan.

## 2023-07-27 NOTE — PROGRESS NOTES
[facility] Medicine Services  PROGRESS NOTE    Patient Name: Cristian Watt  : 1945  MRN: [id number]    Date of Admission: 2023  Primary Care Physician: Provider, No Known    Subjective   Subjective     CC:  Follow-up for fall and syncope    HPI:  I have seen and evaluated the patient this morning.  Feeling better today.  Wanting to go home.  Denied any chest pain.  Oxygen saturation is 90% on room air and denied any shortness of breath    ROS:  General : no fevers, chills  CVS: No chest pain, palpitations  Respiratory: No cough, dyspnea  GI: No N/V/D, abd pain  10 point review of systems is negative except for what is mentioned in HPI    Objective   Objective     Vital Signs:   Temp:  [96.3 øF (35.7 øC)-97.2 øF (36.2 øC)] 96.3 øF (35.7 øC)  Heart Rate:  [85-96] 96  Resp:  [18] 18  BP: (136-170)/(74-98) 167/98  Flow (L/min):  [2] 2     Physical Exam:  General: Chronically ill and frail looking, not in distress, conversant and cooperative  Head: Atraumatic and normocephalic  Eyes: No Icterus. No pallor  Ears:  Ears appear intact with no abnormalities noted  Throat: No oral lesions, no thrush  Neck: Supple, trachea midline  Lungs: Clear to auscultation bilaterally, equal air entry, no wheezing or crackles  Heart:  Normal S1 and S2, no murmur, no gallop, No JVD, no lower extremity swelling  Abdomen:  Soft, no tenderness, no organomegaly, normal bowel sounds, no organomegaly  Extremities: pulses equal bilaterally  Skin: No bleeding, bruising or rash, normal skin turgor and elasticity  Neurologic: Cranial nerves appear intact with no evidence of facial asymmetry, normal motor and sensory functions in all 4 extremities  Psych: Alert and oriented x 3, normal mood    Results Reviewed:  LAB RESULTS:      Lab 23  0552 23  0421 23  1817 23  1613   WBC 10.12 11.31*  --  16.59*   HEMOGLOBIN 16.4 16.3  --  17.2   HEMATOCRIT 49.8 48.7  --  52.1*   PLATELETS 308 294   --  339   NEUTROS ABS 6.83 7.94*  --  13.40*   IMMATURE GRANS (ABS) 0.04 0.04  --  0.08*   LYMPHS ABS 2.25 2.17  --  1.83   MONOS ABS 0.89 1.07*  --  1.23*   EOS ABS 0.05 0.04  --  0.01   MCV 94.5 94.0  --  94.0   PROCALCITONIN  --   --  0.21  --    LACTATE  --   --   --  1.8           Lab 07/27/23  0552 07/26/23  0421 07/25/23  1613   SODIUM 136 135* 133*   POTASSIUM 4.2 3.9 4.3   CHLORIDE 97* 99 96*   CO2 30.0* 22.0 22.0   ANION GAP 9.0 14.0 15.0   BUN 5* 8 9   CREATININE 0.51* 0.48* 0.65*   EGFR 104.4 106.4 97.0   GLUCOSE 126* 136* 167*   CALCIUM 8.7 8.8 9.0   MAGNESIUM 1.9  --  2.2   PHOSPHORUS 2.8  --   --    HEMOGLOBIN A1C  --  7.90*  --    TSH  --  1.940  --            Lab 07/27/23  0552 07/25/23  1613   TOTAL PROTEIN 6.7 7.0   ALBUMIN 3.2* 4.1   GLOBULIN 3.5 2.9   ALT (SGPT) 16 19   AST (SGOT) 51* 61*   BILIRUBIN 0.4 0.8   ALK PHOS 86 104           Lab 07/25/23  1817 07/25/23  1613   HSTROP T 69* 63*                   Brief Urine Lab Results  (Last result in the past 365 days)        Color   Clarity   Blood   Leuk Est   Nitrite   Protein   CREAT   Urine HCG        07/25/23 1720 Orange   Cloudy   Negative   Negative   Negative   30 mg/dL (1+)                   Microbiology Results Abnormal       Procedure Component Value - Date/Time    Blood Culture - Blood, Hand, Left [[id number]]  (Normal) Collected: 07/25/23 2202    Lab Status: Preliminary result Specimen: Blood from Hand, Left Updated: 07/26/23 2345     Blood Culture No growth at 24 hours    Blood Culture - Blood, Hand, Left [[id number]]  (Normal) Collected: 07/25/23 2255    Lab Status: Preliminary result Specimen: Blood from Hand, Left Updated: 07/26/23 2345     Blood Culture No growth at 24 hours    Urine Culture - Urine, Urine, Catheter [[id number]]  (Normal) Collected: 07/25/23 1720    Lab Status: Final result Specimen: Urine, Catheter Updated: 07/26/23 2121     Urine Culture No growth    COVID PRE-OP / PRE-PROCEDURE SCREENING ORDER (NO ISOLATION) -  Swab, Nasopharynx [[id number]]  (Normal) Collected: 07/25/23 2340    Lab Status: Final result Specimen: Swab from Nasopharynx Updated: 07/26/23 0319    Narrative:      The following orders were created for panel order COVID PRE-OP / PRE-PROCEDURE SCREENING ORDER (NO ISOLATION) - Swab, Nasopharynx.  Procedure                               Abnormality         Status                     ---------                               -----------         ------                     Respiratory Panel PCR w/...[[id number]]  Normal              Final result                 Please view results for these tests on the individual orders.    Respiratory Panel PCR w/COVID-19(SARS-CoV-2) BRANDI/CYNTHIA/CASSI/PAD/COR/MAD/DAVIE In-House, NP Swab in UTM/VTM, 3-4 HR TAT - Swab, Nasopharynx [[id number]]  (Normal) Collected: 07/25/23 2340    Lab Status: Final result Specimen: Swab from Nasopharynx Updated: 07/26/23 0319     ADENOVIRUS, PCR Not Detected     Coronavirus 229E Not Detected     Coronavirus HKU1 Not Detected     Coronavirus NL63 Not Detected     Coronavirus OC43 Not Detected     COVID19 Not Detected     Human Metapneumovirus Not Detected     Human Rhinovirus/Enterovirus Not Detected     Influenza A PCR Not Detected     Influenza B PCR Not Detected     Parainfluenza Virus 1 Not Detected     Parainfluenza Virus 2 Not Detected     Parainfluenza Virus 3 Not Detected     Parainfluenza Virus 4 Not Detected     RSV, PCR Not Detected     Bordetella pertussis pcr Not Detected     Bordetella parapertussis PCR Not Detected     Chlamydophila pneumoniae PCR Not Detected     Mycoplasma pneumo by PCR Not Detected    Narrative:      In the setting of a positive respiratory panel with a viral infection PLUS a negative procalcitonin without other underlying concern for bacterial infection, consider observing off antibiotics or discontinuation of antibiotics and continue supportive care. If the respiratory panel is positive for atypical bacterial infection  (Bordetella pertussis, Chlamydophila pneumoniae, or Mycoplasma pneumoniae), consider antibiotic de-escalation to target atypical bacterial infection.            Adult Transthoracic Echo Complete W/ Cont if Necessary Per Protocol    Result Date: 7/27/2023    Left ventricular systolic function is normal. Estimated left ventricular EF = 60%   Left ventricular wall thickness is consistent with mild concentric hypertrophy.   Left ventricular diastolic function is consistent with (grade I) impaired relaxation.   The right ventricular cavity is mildly dilated.   No significant valvular abnormalities.     CT Abdomen Pelvis Without Contrast    Result Date: 7/25/2023  CT ABDOMEN PELVIS WO CONTRAST Date of Exam: 7/25/2023 6:40 PM EDT Indication: fall, lower abdominal pain. Comparison: 8/1/2022. 7/7/2021. Technique: Axial CT images were obtained of the abdomen and pelvis without the administration of contrast. Reconstructed coronal and sagittal images were also obtained. Automated exposure control and iterative construction methods were used. Findings: *Lower Thorax: Small bilateral pleural effusions. Bibasilar infiltrates. *Liver: Unremarkable. *Gallbladder: Normal gallbladder. *Pancreas: Normal. *Spleen: Normal. *Adrenal Glands: Unchanged small benign left adrenal gland adenoma. Unremarkable right adrenal gland. *Kidneys: 3 mm nonobstructing renal stones bilaterally. No hydronephrosis bilaterally. Bilateral perinephric fat stranding. Small right renal cyst. *Stomach: Gastric wall thickening. Small hiatal hernia. Distal esophageal wall thickening. *Bowel: Nonobstructive bowel gas pattern. No bowel wall inflammation. Diverticulosis coli. *Appendix: Normal appendix. *Peritoneal Cavity: No pneumoperitoneum.  No lymphadenopathy. *Urinary Bladder: Urinary bladder wall thickening. *Pelvis: No free pelvic fluid. *Bones: Unchanged compression deformity of L3 vertebral body with interval cement vertebroplasty. Worsening T12  vertebral body with interval cement vertebroplasty. Interval development of compression deformity of L5 vertebral body with 20% loss of interbody height. Interval development of severe compression deformity of L1 vertebral body with 50% loss of interbody height. Interval development of a severe compression deformity of T11 vertebral body with 70% loss of vertebral body height. Interval development of severe compression deformity of T8 vertebral body with 80% loss of interbody height. Multilevel spondylosis. Osteopenia. *     Impression: 1.Small bilateral pleural effusions. Bibasilar infiltrates. Follow-up recommended. 2.3 mm nonobstructing bilateral renal stones. 3.Gastric wall thickening may represent gastritis or other etiologies. Distal esophageal wall thickening may represent esophagitis or other etiologies. Small hiatal hernia. Follow-up recommended. 4.Diverticulosis without diverticulitis. 5.Urinary bladder wall thickening may represent under distention, cystitis or other etiologies. Please correlate with urinalysis. Follow-up recommended. 6.Interval development of multiple compression deformities of lower thoracic and lumbar spine as detailed above. Follow-up recommended. 7.Unchanged left adrenal gland benign adenoma. Electronically Signed: Esau Coon  7/25/2023 8:17 PM EDT  Workstation ID: [id number]    XR Foot 3+ View Right    Result Date: 7/25/2023  XR FOOT 3+ VW RIGHT Date of Exam: 7/25/2023 11:36 PM EDT Indication: foot swelling Comparison: None available. Findings: There is a Lisfranc injury with widening of the interspace between the first and second metatarsals with displaced fractures of the intermediate cuneiform, the navicular bone and likely the lateral margin of the medial cuneiform. This is age indeterminant. The osseous tractors appear diffusely osteopenic. Advanced degenerative changes are present within the midfoot. Diffuse soft tissue swelling is present.     Impression: Impression:  Age-indeterminate fractures of the intermediate cuneiform, lateral margin of the medial cuneiform, the navicular bone with widening and disruption of the Lisfranc ligament complex. Advanced degenerative changes are present within the midfoot. I suspect these findings are at least partially chronic given presence of advanced degenerative changes. Mild diffuse soft tissue swelling is present. No suspicious periostitis. Electronically Signed: Joana Del Angel  7/25/2023 11:58 PM EDT  Workstation ID: [id number]    CT Head Without Contrast    Result Date: 7/25/2023  CT HEAD WO CONTRAST Date of Exam: 7/25/2023 6:40 PM EDT Indication: fall, head injury. Comparison: None available. Technique: Axial CT images were obtained of the head without contrast administration.  Automated exposure control and iterative construction methods were used. Findings: *No acute intracranial hemorrhage. *No masses, mass effect, midline shift or hydrocephalus. *Chronic small vessel ischemic disease. Generalized brain atrophy. *Calvarium is intact. *Visualized orbits and globes are unremarkable without radiopaque foreign bodies. *Visualized paranasal sinuses are clear. *Visualized mastoid air cells are clear.     Impression: 1.No acute intracranial hemorrhage. Calvarium is intact. 2.Chronic findings as detailed above. Electronically Signed: Esau Coon  7/25/2023 8:20 PM EDT  Workstation ID: [id number]    XR Chest 1 View    Result Date: 7/25/2023  XR CHEST 1 VW Date of Exam: 7/25/2023 5:52 PM EDT Indication: Weak/Dizzy/AMS triage protocol Comparison: 8/3/2022. Findings: There are diffuse increased interstitial markings unchanged from prior examination to believed to be chronic in nature. There is mild bibasilar scarring unchanged from prior exam. There is no definite acute infiltrate however a small infiltrate could be obscured by the chronic markings. The heart is poorly evaluated but appears unremarkable. The osseous structures demonstrate no gross  abnormality.     Impression: Diffuse increased interstitial markings with mild chronic scarring of the lung bases similar to that seen on 8/3/2022. No definite infiltrate identified however small infiltrate could be obscured by the chronic interstitial markings. * Electronically Signed: Angel Norris  7/25/2023 6:42 PM EDT  Workstation ID: [id number]    Duplex Carotid Ultrasound CAR    Result Date: 7/27/2023    Right internal carotid artery demonstrates normal flow without evidence of hemodynamically significant stenosis.   Left internal carotid artery demonstrates normal flow without evidence of hemodynamically significant stenosis.   Bilateral mild irregular plaque is noted.     Duplex Venous Lower Extremity - Right CAR    Result Date: 7/27/2023    Normal right lower extremity venous duplex scan.     XR Hip With or Without Pelvis 2 - 3 View Right    Result Date: 7/25/2023  XR HIP W OR WO PELVIS 2-3 VIEW RIGHT Date of Exam: 7/25/2023 5:52 PM EDT Indication: right hip pain after fall,suspect ramus fracture Comparison: None available. Findings: No acute fractures or dislocations. Joint spaces are well preserved. No joint effusion. No  soft tissue swelling. No osseous destructive lesions. No radiopaque foreign bodies.     Impression: 1.No acute fractures or dislocations. Electronically Signed: Angel Norris  7/25/2023 6:40 PM EDT  Workstation ID: [id number]     Results for orders placed during the hospital encounter of 07/25/23    Adult Transthoracic Echo Complete W/ Cont if Necessary Per Protocol    Interpretation Summary    Left ventricular systolic function is normal. Estimated left ventricular EF = 60%    Left ventricular wall thickness is consistent with mild concentric hypertrophy.    Left ventricular diastolic function is consistent with (grade I) impaired relaxation.    The right ventricular cavity is mildly dilated.    No significant valvular abnormalities.      Current medications:  Scheduled  Meds:cefTRIAXone, 2,000 mg, Intravenous, Q24H  doxycycline, 100 mg, Intravenous, Q12H  folic acid-vit B6-vit B12, 1 tablet, Oral, Daily  heparin (porcine), 5,000 Units, Subcutaneous, Q8H  insulin lispro, 2-7 Units, Subcutaneous, 4x Daily AC & at Bedtime  melatonin, 5 mg, Oral, Nightly  nicotine, 1 patch, Transdermal, Q24H  senna-docusate sodium, 2 tablet, Oral, BID  sodium chloride, 10 mL, Intravenous, Q12H  thiamine, 100 mg, Oral, Daily  vitamin D3, 5,000 Units, Oral, Daily      Continuous Infusions:lactated ringers, 125 mL/hr, Last Rate: 125 mL/hr (07/27/23 0402)      PRN Meds:.  acetaminophen    senna-docusate sodium **AND** polyethylene glycol **AND** bisacodyl **AND** bisacodyl    Calcium Replacement - Follow Nurse / BPA Driven Protocol    dextrose    dextrose    glucagon (human recombinant)    HYDROcodone-acetaminophen    ipratropium-albuterol    Magnesium Standard Dose Replacement - Follow Nurse / BPA Driven Protocol    naloxone    Phosphorus Replacement - Follow Nurse / BPA Driven Protocol    Potassium Replacement - Follow Nurse / BPA Driven Protocol    sodium chloride    sodium chloride    sodium chloride    traMADol    Assessment & Plan   Assessment & Plan     Active Hospital Problems    Diagnosis  POA    **Rhabdomyolysis [M62.82]  Yes    Pneumonia [J18.9]  Unknown    Swelling of right foot [M79.89]  Unknown    Compression fracture of lumbar spine, non-traumatic [M48.56XA]  Unknown    Fall [W19.XXXA]  Unknown    Mixed hyperlipidemia [E78.2]  Yes    Episode of recurrent major depressive disorder [F33.9]  Yes    Cigarette nicotine dependence with nicotine-induced disorder [F17.219]  Yes    Hypertension [I10]  Yes    Uncontrolled type 2 diabetes mellitus with hyperglycemia [E11.65]  Yes      Resolved Hospital Problems   No resolved problems to display.        Brief Hospital Course to date:  Cristian Watt is a 77 y.o. male with a PMH significant for diabetes mellitus type 2,  emphysema, GERD, depression, HTN, HLD, history of compression fracture who presents to the ED after a fall.     Assessment and plan:  Concern for syncope  Fall and acute rhabdomyolysis  Patient has been having recurring falls  According to his description, this sounds like syncopal episode while using the bathroom.  Likely secondary to orthostasis  Echocardiogram with normal systolic function.  No significant valvular heart disease  Carotid ultrasound with no carotid artery stenosis  No arrhythmias noted on telemetry  CTA chest with no evidence of PE  Hold further IV fluids given his worsening pleural effusion.    Fall precautions  PT and OT consultation      Compression fracture  Severe compression and T/L spines  Neurosurgery team recommendations appreciated    Possible community-acquired pneumonia  Leukocytosis  CTA chest with bilateral basal infiltrates and pleural effusion  Continue Rocephin, doxycycline  Cultures negative to date  Continue incentive spirometry      Diabetes mellitus type 2  A1c 7.9%  Continue SSI with Accu-Cheks     Early Charcot changes right foot  X-ray right foot with early changes consistent with Charcot joint:  age indeterminate fractures of the intermediate cuneiform, lateral margin of the medial cuneiform, the navicular bone with widening and disruption of the Lisfranc ligament complex.  Discussed with Dr. Coronado over the phone and he recommended podiatry follow-up as outpatient  Weightbearing as tolerated  Follow venous Doppler ultrasound for the morning     Tobacco abuse  Counseled on cessation  Nicotine patch      Expected Discharge Location and Transportation: home  Expected Discharge   Expected Discharge Date: 7/27/2023; Expected Discharge Time:      DVT prophylaxis:  Medical DVT prophylaxis orders are present.     AM-PAC 6 Clicks Score (PT): 18 (07/26/23 5561)    CODE STATUS:   Code Status and Medical Interventions:   Ordered at: 07/25/23 7520     Level Of Support Discussed With:     Patient     Code Status (Patient has no pulse and is not breathing):    CPR (Attempt to Resuscitate)     Medical Interventions (Patient has pulse or is breathing):    Full Support     Release to patient:    Routine Release     Copied text in this note has been reviewed and is accurate as of 07/27/23.     Jorge Luis Gaviria MD  07/27/23

## 2023-07-27 NOTE — PLAN OF CARE
Goal Outcome Evaluation:  Plan of Care Reviewed With: patient        Progress: no change  Outcome Evaluation: Had CT Angio today & MRI lumbar & thoracic spine. Notified [facility] for LSO brace to fit patient for brace. 2L/NC 98%. Had Bumex IV today. LR discontinued. Will continue to monitor. Call light within reach.

## 2023-07-27 NOTE — PLAN OF CARE
Goal Outcome Evaluation:  Plan of Care Reviewed With: patient        Progress: no change  Outcome Evaluation: Remains on 2LNC. No resp distress noted. Slept on and off. Awake most of the night. Denies pain when asked.  SR on tele. No c/o CP or SOA. VSS. Will cont with POC

## 2023-07-28 LAB
ALBUMIN SERPL-MCNC: 3.1 G/DL (ref 3.5–5.2)
ALBUMIN/GLOB SERPL: 0.9 G/DL
ALP SERPL-CCNC: 80 U/L (ref 39–117)
ALT SERPL W P-5'-P-CCNC: 10 U/L (ref 1–41)
ANION GAP SERPL CALCULATED.3IONS-SCNC: 8 MMOL/L (ref 5–15)
AST SERPL-CCNC: 34 U/L (ref 1–40)
BASOPHILS # BLD AUTO: 0.05 10*3/MM3 (ref 0–0.2)
BASOPHILS NFR BLD AUTO: 0.6 % (ref 0–1.5)
BILIRUB SERPL-MCNC: 0.3 MG/DL (ref 0–1.2)
BUN SERPL-MCNC: 6 MG/DL (ref 8–23)
BUN/CREAT SERPL: 11.5 (ref 7–25)
CALCIUM SPEC-SCNC: 8.5 MG/DL (ref 8.6–10.5)
CHLORIDE SERPL-SCNC: 98 MMOL/L (ref 98–107)
CO2 SERPL-SCNC: 29 MMOL/L (ref 22–29)
CREAT SERPL-MCNC: 0.52 MG/DL (ref 0.76–1.27)
DEPRECATED RDW RBC AUTO: 46.1 FL (ref 37–54)
EGFRCR SERPLBLD CKD-EPI 2021: 103.8 ML/MIN/1.73
EOSINOPHIL # BLD AUTO: 0.15 10*3/MM3 (ref 0–0.4)
EOSINOPHIL NFR BLD AUTO: 1.9 % (ref 0.3–6.2)
ERYTHROCYTE [DISTWIDTH] IN BLOOD BY AUTOMATED COUNT: 13.5 % (ref 12.3–15.4)
GLOBULIN UR ELPH-MCNC: 3.4 GM/DL
GLUCOSE BLDC GLUCOMTR-MCNC: 119 MG/DL (ref 70–130)
GLUCOSE BLDC GLUCOMTR-MCNC: 224 MG/DL (ref 70–130)
GLUCOSE BLDC GLUCOMTR-MCNC: 337 MG/DL (ref 70–130)
GLUCOSE BLDC GLUCOMTR-MCNC: 96 MG/DL (ref 70–130)
GLUCOSE SERPL-MCNC: 179 MG/DL (ref 65–99)
HCT VFR BLD AUTO: 49.5 % (ref 37.5–51)
HGB BLD-MCNC: 16.6 G/DL (ref 13–17.7)
IMM GRANULOCYTES # BLD AUTO: 0.03 10*3/MM3 (ref 0–0.05)
IMM GRANULOCYTES NFR BLD AUTO: 0.4 % (ref 0–0.5)
LYMPHOCYTES # BLD AUTO: 2.42 10*3/MM3 (ref 0.7–3.1)
LYMPHOCYTES NFR BLD AUTO: 30 % (ref 19.6–45.3)
MAGNESIUM SERPL-MCNC: 1.8 MG/DL (ref 1.6–2.4)
MCH RBC QN AUTO: 31.3 PG (ref 26.6–33)
MCHC RBC AUTO-ENTMCNC: 33.5 G/DL (ref 31.5–35.7)
MCV RBC AUTO: 93.4 FL (ref 79–97)
MONOCYTES # BLD AUTO: 0.66 10*3/MM3 (ref 0.1–0.9)
MONOCYTES NFR BLD AUTO: 8.2 % (ref 5–12)
NEUTROPHILS NFR BLD AUTO: 4.77 10*3/MM3 (ref 1.7–7)
NEUTROPHILS NFR BLD AUTO: 58.9 % (ref 42.7–76)
NRBC BLD AUTO-RTO: 0 /100 WBC (ref 0–0.2)
PHOSPHATE SERPL-MCNC: 3.8 MG/DL (ref 2.5–4.5)
PLATELET # BLD AUTO: 260 10*3/MM3 (ref 140–450)
PMV BLD AUTO: 8.1 FL (ref 6–12)
POTASSIUM SERPL-SCNC: 3.3 MMOL/L (ref 3.5–5.2)
POTASSIUM SERPL-SCNC: 4.5 MMOL/L (ref 3.5–5.2)
PROT SERPL-MCNC: 6.5 G/DL (ref 6–8.5)
RBC # BLD AUTO: 5.3 10*6/MM3 (ref 4.14–5.8)
SODIUM SERPL-SCNC: 135 MMOL/L (ref 136–145)
WBC NRBC COR # BLD: 8.08 10*3/MM3 (ref 3.4–10.8)

## 2023-07-28 PROCEDURE — 94761 N-INVAS EAR/PLS OXIMETRY MLT: CPT

## 2023-07-28 PROCEDURE — 84132 ASSAY OF SERUM POTASSIUM: CPT | Performed by: INTERNAL MEDICINE

## 2023-07-28 PROCEDURE — 97162 PT EVAL MOD COMPLEX 30 MIN: CPT

## 2023-07-28 PROCEDURE — 25010000002 HEPARIN (PORCINE) PER 1000 UNITS: Performed by: INTERNAL MEDICINE

## 2023-07-28 PROCEDURE — 25010000002 CEFTRIAXONE PER 250 MG: Performed by: INTERNAL MEDICINE

## 2023-07-28 PROCEDURE — 97535 SELF CARE MNGMENT TRAINING: CPT

## 2023-07-28 PROCEDURE — 94799 UNLISTED PULMONARY SVC/PX: CPT

## 2023-07-28 PROCEDURE — 99231 SBSQ HOSP IP/OBS SF/LOW 25: CPT

## 2023-07-28 PROCEDURE — 97530 THERAPEUTIC ACTIVITIES: CPT

## 2023-07-28 PROCEDURE — 85025 COMPLETE CBC W/AUTO DIFF WBC: CPT | Performed by: INTERNAL MEDICINE

## 2023-07-28 PROCEDURE — 63710000001 INSULIN LISPRO (HUMAN) PER 5 UNITS: Performed by: INTERNAL MEDICINE

## 2023-07-28 PROCEDURE — 97110 THERAPEUTIC EXERCISES: CPT

## 2023-07-28 PROCEDURE — 82948 REAGENT STRIP/BLOOD GLUCOSE: CPT

## 2023-07-28 PROCEDURE — 97166 OT EVAL MOD COMPLEX 45 MIN: CPT

## 2023-07-28 PROCEDURE — 84100 ASSAY OF PHOSPHORUS: CPT | Performed by: INTERNAL MEDICINE

## 2023-07-28 PROCEDURE — 80053 COMPREHEN METABOLIC PANEL: CPT | Performed by: INTERNAL MEDICINE

## 2023-07-28 PROCEDURE — 83735 ASSAY OF MAGNESIUM: CPT | Performed by: INTERNAL MEDICINE

## 2023-07-28 RX ORDER — BISACODYL 10 MG
10 SUPPOSITORY, RECTAL RECTAL DAILY PRN
Status: DISCONTINUED | OUTPATIENT
Start: 2023-07-28 | End: 2023-08-01 | Stop reason: HOSPADM

## 2023-07-28 RX ORDER — AMOXICILLIN 250 MG
2 CAPSULE ORAL 2 TIMES DAILY
Status: DISCONTINUED | OUTPATIENT
Start: 2023-07-28 | End: 2023-08-01 | Stop reason: HOSPADM

## 2023-07-28 RX ORDER — POTASSIUM CHLORIDE 1.5 G/1.58G
40 POWDER, FOR SOLUTION ORAL EVERY 4 HOURS
Status: COMPLETED | OUTPATIENT
Start: 2023-07-28 | End: 2023-07-28

## 2023-07-28 RX ORDER — BISACODYL 5 MG/1
5 TABLET, DELAYED RELEASE ORAL DAILY PRN
Status: DISCONTINUED | OUTPATIENT
Start: 2023-07-28 | End: 2023-08-01 | Stop reason: HOSPADM

## 2023-07-28 RX ORDER — POLYETHYLENE GLYCOL 3350 17 G/17G
17 POWDER, FOR SOLUTION ORAL DAILY
Status: DISCONTINUED | OUTPATIENT
Start: 2023-07-28 | End: 2023-08-01 | Stop reason: HOSPADM

## 2023-07-28 RX ADMIN — POTASSIUM CHLORIDE 40 MEQ: 1.5 POWDER, FOR SOLUTION ORAL at 09:45

## 2023-07-28 RX ADMIN — SENNOSIDES AND DOCUSATE SODIUM 2 TABLET: 50; 8.6 TABLET ORAL at 20:13

## 2023-07-28 RX ADMIN — HYDROCODONE BITARTRATE AND ACETAMINOPHEN 1 TABLET: 7.5; 325 TABLET ORAL at 14:08

## 2023-07-28 RX ADMIN — DOXYCYCLINE 100 MG: 100 INJECTION, POWDER, LYOPHILIZED, FOR SOLUTION INTRAVENOUS at 08:53

## 2023-07-28 RX ADMIN — SENNOSIDES AND DOCUSATE SODIUM 2 TABLET: 50; 8.6 TABLET ORAL at 08:53

## 2023-07-28 RX ADMIN — HEPARIN SODIUM 5000 UNITS: 5000 INJECTION, SOLUTION INTRAVENOUS; SUBCUTANEOUS at 14:08

## 2023-07-28 RX ADMIN — Medication 1 TABLET: at 08:56

## 2023-07-28 RX ADMIN — HEPARIN SODIUM 5000 UNITS: 5000 INJECTION, SOLUTION INTRAVENOUS; SUBCUTANEOUS at 21:51

## 2023-07-28 RX ADMIN — HYDROCODONE BITARTRATE AND ACETAMINOPHEN 1 TABLET: 7.5; 325 TABLET ORAL at 08:53

## 2023-07-28 RX ADMIN — INSULIN LISPRO 5 UNITS: 100 INJECTION, SOLUTION INTRAVENOUS; SUBCUTANEOUS at 12:34

## 2023-07-28 RX ADMIN — Medication 5000 UNITS: at 08:53

## 2023-07-28 RX ADMIN — SODIUM CHLORIDE 2000 MG: 900 INJECTION INTRAVENOUS at 20:13

## 2023-07-28 RX ADMIN — POTASSIUM CHLORIDE 40 MEQ: 1.5 POWDER, FOR SOLUTION ORAL at 14:07

## 2023-07-28 RX ADMIN — HEPARIN SODIUM 5000 UNITS: 5000 INJECTION, SOLUTION INTRAVENOUS; SUBCUTANEOUS at 05:53

## 2023-07-28 RX ADMIN — Medication 10 ML: at 20:14

## 2023-07-28 RX ADMIN — Medication 5 MG: at 20:13

## 2023-07-28 RX ADMIN — Medication 10 ML: at 08:54

## 2023-07-28 RX ADMIN — POLYETHYLENE GLYCOL 3350 17 G: 17 POWDER, FOR SOLUTION ORAL at 12:34

## 2023-07-28 RX ADMIN — DOXYCYCLINE 100 MG: 100 INJECTION, POWDER, LYOPHILIZED, FOR SOLUTION INTRAVENOUS at 21:51

## 2023-07-28 RX ADMIN — INSULIN LISPRO 3 UNITS: 100 INJECTION, SOLUTION INTRAVENOUS; SUBCUTANEOUS at 20:26

## 2023-07-28 RX ADMIN — THIAMINE HCL TAB 100 MG 100 MG: 100 TAB at 08:53

## 2023-07-28 RX ADMIN — HYDROCODONE BITARTRATE AND ACETAMINOPHEN 1 TABLET: 7.5; 325 TABLET ORAL at 20:13

## 2023-07-28 NOTE — PLAN OF CARE
Goal Outcome Evaluation:  Plan of Care Reviewed With: (P) patient        Progress: (P) no change  Outcome Evaluation: (P) patient is limited by shortness of breath, dizziness, and weakness for bed mobility, transfers, and gait. Donned and doffed TLSO and R walking boot today. patient requires extensive motivation for therapy. he is below previous functional mobility baseline and is unable to return home safely at this time. recommend skilled nursing facility upon d/c.      Anticipated Discharge Disposition (PT): (P) skilled nursing facility

## 2023-07-28 NOTE — CASE MANAGEMENT/SOCIAL WORK
Continued Stay Note  [facility]     Patient Name: Cristian Watt  MRN: [id number]  Today's Date: 7/28/2023    Admit Date: 7/25/2023    Plan: Home   Discharge Plan       Row Name 07/28/23 1608       Plan    Plan Home    Patient/Family in Agreement with Plan yes    Plan Comments Spoke with patient at bedside. We discussed rehab and HH services. Patient declined rehab and HH services. He would like to home. No discharge needs identifed. CM will follow.    Final Discharge Disposition Code 01 - home or self-care                   Discharge Codes    No documentation.                 Expected Discharge Date and Time       Expected Discharge Date Expected Discharge Time    Jul 29, 2023               Shu Schmidt RN

## 2023-07-28 NOTE — PROGRESS NOTES
[facility] Medicine Services  PROGRESS NOTE    Patient Name: Cristian Watt  : 1945  MRN: [id number]    Date of Admission: 2023  Primary Care Physician: No primary care provider on file.    Subjective   Subjective     CC:  Follow-up for fall and syncope    HPI:  Patient seen resting in up in bed in no acute distress.  Awake and alert.  Denies nausea or vomiting.  States he is not eating well mainly because he does not like the food.  Also beginning to feel constipated and needs bowel medicines.  Still having significant back pain and interfering with movement.  Currently rates pain 8/10 scale.  Waiting further MRI read and neurosurgery recommendations.  Due to significant ongoing pain and recent fall with increased risk for further falls, may need to consider short-term rehab.    ROS:  General : no fevers, chills  CVS: No chest pain, palpitations  Respiratory: No cough, dyspnea  GI: No N/V/D, abd pain    10 point review of systems is negative except for what is mentioned in HPI    Objective   Objective     Vital Signs:   Temp:  [95.6 øF (35.3 øC)-97.8 øF (36.6 øC)] 95.6 øF (35.3 øC)  Heart Rate:  [76-91] 76  Resp:  [18] 18  BP: (108-149)/(64-94) 149/85  Flow (L/min):  [2] 2     Physical Exam:  Constitutional: No acute distress, awake, alert.  Chronically ill and frail appearing male.  No visitors at bedside.  HENT: NCAT, mucous membranes moist  Respiratory: Clear to auscultation bilaterally but slightly decreased at bases, respiratory effort normal on 2 LNC with sats 95%.  Cardiovascular: RRR, no murmurs, rubs, or gallops  Gastrointestinal: Positive bowel sounds, soft, nontender, nondistended.  Musculoskeletal: No bilateral ankle edema.  ALLRED spontaneously.  Psychiatric: Appropriate affect, cooperative  Neurologic: Oriented x 3, strength symmetric in all extremities, Cranial Nerves grossly intact to confrontation, speech clear.  Follows commands.  Skin: No  yoselin      Results Reviewed:  LAB RESULTS:      Lab 07/28/23  0848 07/27/23  0552 07/26/23  0421 07/25/23  1817 07/25/23  1613   WBC 8.08 10.12 11.31*  --  16.59*   HEMOGLOBIN 16.6 16.4 16.3  --  17.2   HEMATOCRIT 49.5 49.8 48.7  --  52.1*   PLATELETS 260 308 294  --  339   NEUTROS ABS 4.77 6.83 7.94*  --  13.40*   IMMATURE GRANS (ABS) 0.03 0.04 0.04  --  0.08*   LYMPHS ABS 2.42 2.25 2.17  --  1.83   MONOS ABS 0.66 0.89 1.07*  --  1.23*   EOS ABS 0.15 0.05 0.04  --  0.01   MCV 93.4 94.5 94.0  --  94.0   PROCALCITONIN  --   --   --  0.21  --    LACTATE  --   --   --   --  1.8         Lab 07/28/23  0848 07/27/23  0552 07/26/23  0421 07/25/23  1613   SODIUM 135* 136 135* 133*   POTASSIUM 3.3* 4.2 3.9 4.3   CHLORIDE 98 97* 99 96*   CO2 29.0 30.0* 22.0 22.0   ANION GAP 8.0 9.0 14.0 15.0   BUN 6* 5* 8 9   CREATININE 0.52* 0.51* 0.48* 0.65*   EGFR 103.8 104.4 106.4 97.0   GLUCOSE 179* 126* 136* 167*   CALCIUM 8.5* 8.7 8.8 9.0   MAGNESIUM 1.8 1.9  --  2.2   PHOSPHORUS 3.8 2.8  --   --    HEMOGLOBIN A1C  --   --  7.90*  --    TSH  --   --  1.940  --          Lab 07/28/23  0848 07/27/23  0552 07/25/23  1613   TOTAL PROTEIN 6.5 6.7 7.0   ALBUMIN 3.1* 3.2* 4.1   GLOBULIN 3.4 3.5 2.9   ALT (SGPT) 10 16 19   AST (SGOT) 34 51* 61*   BILIRUBIN 0.3 0.4 0.8   ALK PHOS 80 86 104         Lab 07/25/23  1817 07/25/23  1613   HSTROP T 69* 63*                 Brief Urine Lab Results  (Last result in the past 365 days)        Color   Clarity   Blood   Leuk Est   Nitrite   Protein   CREAT   Urine HCG        07/25/23 1720 Orange   Cloudy   Negative   Negative   Negative   30 mg/dL (1+)                   Microbiology Results Abnormal       Procedure Component Value - Date/Time    Blood Culture - Blood, Hand, Left [[id number]]  (Normal) Collected: 07/25/23 2255    Lab Status: Preliminary result Specimen: Blood from Hand, Left Updated: 07/27/23 2345     Blood Culture No growth at 2 days    Blood Culture - Blood, Hand, Left [[id number]]  (Normal)  Collected: 07/25/23 2202    Lab Status: Preliminary result Specimen: Blood from Hand, Left Updated: 07/27/23 2345     Blood Culture No growth at 2 days    Urine Culture - Urine, Urine, Catheter [[id number]]  (Normal) Collected: 07/25/23 1720    Lab Status: Final result Specimen: Urine, Catheter Updated: 07/26/23 2121     Urine Culture No growth    COVID PRE-OP / PRE-PROCEDURE SCREENING ORDER (NO ISOLATION) - Swab, Nasopharynx [[id number]]  (Normal) Collected: 07/25/23 2340    Lab Status: Final result Specimen: Swab from Nasopharynx Updated: 07/26/23 0319    Narrative:      The following orders were created for panel order COVID PRE-OP / PRE-PROCEDURE SCREENING ORDER (NO ISOLATION) - Swab, Nasopharynx.  Procedure                               Abnormality         Status                     ---------                               -----------         ------                     Respiratory Panel PCR w/...[[id number]]  Normal              Final result                 Please view results for these tests on the individual orders.    Respiratory Panel PCR w/COVID-19(SARS-CoV-2) BRANDI/CYNTHIA/CASSI/PAD/COR/MAD/DAVIE In-House, NP Swab in UTM/VTM, 3-4 HR TAT - Swab, Nasopharynx [[id number]]  (Normal) Collected: 07/25/23 2340    Lab Status: Final result Specimen: Swab from Nasopharynx Updated: 07/26/23 0319     ADENOVIRUS, PCR Not Detected     Coronavirus 229E Not Detected     Coronavirus HKU1 Not Detected     Coronavirus NL63 Not Detected     Coronavirus OC43 Not Detected     COVID19 Not Detected     Human Metapneumovirus Not Detected     Human Rhinovirus/Enterovirus Not Detected     Influenza A PCR Not Detected     Influenza B PCR Not Detected     Parainfluenza Virus 1 Not Detected     Parainfluenza Virus 2 Not Detected     Parainfluenza Virus 3 Not Detected     Parainfluenza Virus 4 Not Detected     RSV, PCR Not Detected     Bordetella pertussis pcr Not Detected     Bordetella parapertussis PCR Not Detected     Chlamydophila pneumoniae PCR  Not Detected     Mycoplasma pneumo by PCR Not Detected    Narrative:      In the setting of a positive respiratory panel with a viral infection PLUS a negative procalcitonin without other underlying concern for bacterial infection, consider observing off antibiotics or discontinuation of antibiotics and continue supportive care. If the respiratory panel is positive for atypical bacterial infection (Bordetella pertussis, Chlamydophila pneumoniae, or Mycoplasma pneumoniae), consider antibiotic de-escalation to target atypical bacterial infection.            Adult Transthoracic Echo Complete W/ Cont if Necessary Per Protocol    Result Date: 7/27/2023    Left ventricular systolic function is normal. Estimated left ventricular EF = 60%   Left ventricular wall thickness is consistent with mild concentric hypertrophy.   Left ventricular diastolic function is consistent with (grade I) impaired relaxation.   The right ventricular cavity is mildly dilated.   No significant valvular abnormalities.     MRI Thoracic Spine Without Contrast    Result Date: 7/28/2023  MRI THORACIC SPINE WO CONTRAST, MRI LUMBAR SPINE WO CONTRAST Date of Exam: 7/27/2023 11:17 PM EDT Indication: Multiple thoracic compression deformities, acute midthoracic back pain, history of vertebroplasty/multiple compression fractures, recent fall.  Comparison: 8/1/2022 MR thoracic and lumbar spine, CTA chest 7/27/2023, CT abdomen pelvis 7/27/2023 Technique:  Routine multiplanar/multisequence sequence images of the thoracic and lumbar spine were obtained without contrast administration. Findings: THORACIC: Localizer image: No visible abnormality. Alignment: Normal. Bone marrow: Marrow edema in T9 and T12. Inferior endplate marrow edema along T11. Vertebrae: Severe compression fracture of T9 with associated edema. There is approximately 4 mm of retropulsion which results in flattening of the ventral cord which is similar to 8/1/2022. Moderate compression  fracture of T12 with associated edema and approximately 2 mm of retropulsion which is increased from 8/1/2020. No high-grade canal stenosis. Moderate left and mild to severe right T9-10, mild bilateral T10-T11 and severe left and moderate right T11-T12 neural foraminal stenosis. The stenosis at T11-T12 appears to be increased from prior examination. Cord and conus: Normal. Extra-vertebral soft tissues: Diffuse fatty atrophy. Visualized chest: Unremarkable. LUMBAR: 6 lumbar-type vertebral bodies with lumbarization of S1 and last displaced being labeled as S1-S2. Distal cord and conus: Normal morphology and signal. The conus medullaris terminates at L1-L2. Cauda equina and nerve roots: Normal morphology and signal. Alignment: Mild retrolisthesis of L2 on L3 and L5 on S1. Grade 1 anterolisthesis of S1 on S2. Bones: Mild compression deformity with vertebral augmentation material seen in L1 and L4. Increasing now moderate compression fracture of L2 without substantial retropulsion. The bone marrow signal is within normal limits for age. No suspicious osseous lesions are demonstrated. Description of degenerative changes at specific levels is as follows: T12-L1: No spinal canal or neural foraminal stenosis. L1-2: No spinal canal or neural foraminal stenosis. L2-3: Minimal posterior disc bulge. Mild spinal canal stenosis. Mild bilateral neural foraminal stenosis. L3-4: No spinal canal or neural foraminal stenosis. L4-5: Facet arthropathy, epidural lipomatous hypertrophy and minimal posterior disc bulge. Mild spinal canal stenosis with narrowing of the left greater than right lateral recess. Mild left neural foraminal stenosis. L5-S1: Facet arthropathy. No spinal canal stenosis. Moderate right neural foraminal stenosis. S1-S2: Facet arthropathy and posterior disc bulge. Moderate right and mild left neural foraminal stenosis. No spinal canal stenosis. Extra-vertebral soft tissues: Fatty atrophy of the muscles. Visualized  abdomen/pelvis: Renal cystic lesions.     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 New compression fractures of T12 and L2. There is minimal retropulsion of the T12 fracture. There is resultant severe left and moderate right T11-T12 neural foraminal stenosis. Additional areas of compression fracture and post kyphoplasty are again seen. There is still edema seen within the severe T9 compression fracture similar to prior examination. Additionally there is approximately 4 mm of retropulsion flattening of the ventral cord at this level. Degenerative changes of the lumbar spine most advanced at L5-S1 and S1-S2. There is moderate right neural foraminal stenosis at both levels. Electronically Signed: Jaime Blackman  7/28/2023 9:06 AM EDT  Workstation ID: [id number]    MRI Lumbar Spine Without Contrast    Result Date: 7/28/2023  MRI THORACIC SPINE WO CONTRAST, MRI LUMBAR SPINE WO CONTRAST Date of Exam: 7/27/2023 11:17 PM EDT Indication: Multiple thoracic compression deformities, acute midthoracic back pain, history of vertebroplasty/multiple compression fractures, recent fall.  Comparison: 8/1/2022 MR thoracic and lumbar spine, CTA chest 7/27/2023, CT abdomen pelvis 7/27/2023 Technique:  Routine multiplanar/multisequence sequence images of the thoracic and lumbar spine were obtained without contrast administration. Findings: THORACIC: Localizer image: No visible abnormality. Alignment: Normal. Bone marrow: Marrow edema in T9 and T12. Inferior endplate marrow edema along T11. Vertebrae: Severe compression fracture of T9 with associated edema. There is approximately 4 mm of retropulsion which results in flattening of the ventral cord which is similar to 8/1/2022. Moderate compression fracture of T12 with associated edema and approximately 2 mm of retropulsion which is increased from 8/1/2020. No high-grade canal stenosis. Moderate left and mild to severe right T9-10, mild  bilateral T10-T11 and severe left and moderate right T11-T12 neural foraminal stenosis. The stenosis at T11-T12 appears to be increased from prior examination. Cord and conus: Normal. Extra-vertebral soft tissues: Diffuse fatty atrophy. Visualized chest: Unremarkable. LUMBAR: 6 lumbar-type vertebral bodies with lumbarization of S1 and last displaced being labeled as S1-S2. Distal cord and conus: Normal morphology and signal. The conus medullaris terminates at L1-L2. Cauda equina and nerve roots: Normal morphology and signal. Alignment: Mild retrolisthesis of L2 on L3 and L5 on S1. Grade 1 anterolisthesis of S1 on S2. Bones: Mild compression deformity with vertebral augmentation material seen in L1 and L4. Increasing now moderate compression fracture of L2 without substantial retropulsion. The bone marrow signal is within normal limits for age. No suspicious osseous lesions are demonstrated. Description of degenerative changes at specific levels is as follows: T12-L1: No spinal canal or neural foraminal stenosis. L1-2: No spinal canal or neural foraminal stenosis. L2-3: Minimal posterior disc bulge. Mild spinal canal stenosis. Mild bilateral neural foraminal stenosis. L3-4: No spinal canal or neural foraminal stenosis. L4-5: Facet arthropathy, epidural lipomatous hypertrophy and minimal posterior disc bulge. Mild spinal canal stenosis with narrowing of the left greater than right lateral recess. Mild left neural foraminal stenosis. L5-S1: Facet arthropathy. No spinal canal stenosis. Moderate right neural foraminal stenosis. S1-S2: Facet arthropathy and posterior disc bulge. Moderate right and mild left neural foraminal stenosis. No spinal canal stenosis. Extra-vertebral soft tissues: Fatty atrophy of the muscles. Visualized abdomen/pelvis: Renal cystic lesions.     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 New compression fractures of T12 and L2. There is minimal  retropulsion of the T12 fracture. There is resultant severe left and moderate right T11-T12 neural foraminal stenosis. Additional areas of compression fracture and post kyphoplasty are again seen. There is still edema seen within the severe T9 compression fracture similar to prior examination. Additionally there is approximately 4 mm of retropulsion flattening of the ventral cord at this level. Degenerative changes of the lumbar spine most advanced at L5-S1 and S1-S2. There is moderate right neural foraminal stenosis at both levels. Electronically Signed: Jaime Blackman  7/28/2023 9:06 AM EDT  Workstation ID: [id number]    CT Angiogram Chest Pulmonary Embolism    Result Date: 7/27/2023  CT ANGIOGRAM CHEST PULMONARY EMBOLISM Date of Exam: 7/27/2023 12:07 PM EDT Indication: Pulmonary embolism (PE) suspected, high prob. Comparison: None available. Technique: Axial CT images were obtained of the chest after the uneventful intravenous administration of 95 mL Isovue 370 utilizing pulmonary embolism protocol.  Reconstructed coronal and sagittal images were also obtained. Automated exposure control and  iterative construction methods were used. Findings: There are no filling defects suspicious for pulmonary emboli. There are no enlarged mediastinal nodes. There are no hilar masses. There are minimal pleural effusions. There is subsegmental atelectasis in the lung bases. There is evidence of chronic underlying lung disease with infiltrates primarily in the lung peripheries.     Impression: Impression: Negative exam for pulmonary embolism. Small effusions with mild bibasilar atelectasis. Evidence of chronic lung disease. Electronically Signed: Ronda Castro MD  7/27/2023 12:33 PM EDT  Workstation ID: [id number]    Duplex Carotid Ultrasound CAR    Result Date: 7/27/2023    Right internal carotid artery demonstrates normal flow without evidence of hemodynamically significant stenosis.   Left internal carotid artery  demonstrates normal flow without evidence of hemodynamically significant stenosis.   Bilateral mild irregular plaque is noted.     Duplex Venous Lower Extremity - Right CAR    Result Date: 7/27/2023    Normal right lower extremity venous duplex scan.      Results for orders placed during the hospital encounter of 07/25/23    Adult Transthoracic Echo Complete W/ Cont if Necessary Per Protocol    Interpretation Summary    Left ventricular systolic function is normal. Estimated left ventricular EF = 60%    Left ventricular wall thickness is consistent with mild concentric hypertrophy.    Left ventricular diastolic function is consistent with (grade I) impaired relaxation.    The right ventricular cavity is mildly dilated.    No significant valvular abnormalities.      Current medications:  Scheduled Meds:cefTRIAXone, 2,000 mg, Intravenous, Q24H  doxycycline, 100 mg, Intravenous, Q12H  folic acid-vit B6-vit B12, 1 tablet, Oral, Daily  heparin (porcine), 5,000 Units, Subcutaneous, Q8H  insulin lispro, 2-7 Units, Subcutaneous, 4x Daily AC & at Bedtime  melatonin, 5 mg, Oral, Nightly  nicotine, 1 patch, Transdermal, Q24H  senna-docusate sodium, 2 tablet, Oral, BID   And  polyethylene glycol, 17 g, Oral, Daily  potassium chloride, 40 mEq, Oral, Q4H  sodium chloride, 10 mL, Intravenous, Q12H  thiamine, 100 mg, Oral, Daily  vitamin D3, 5,000 Units, Oral, Daily      Continuous Infusions:     PRN Meds:.  acetaminophen    senna-docusate sodium **AND** polyethylene glycol **AND** bisacodyl **AND** bisacodyl    Calcium Replacement - Follow Nurse / BPA Driven Protocol    dextrose    dextrose    glucagon (human recombinant)    HYDROcodone-acetaminophen    ipratropium-albuterol    Magnesium Standard Dose Replacement - Follow Nurse / BPA Driven Protocol    naloxone    Phosphorus Replacement - Follow Nurse / BPA Driven Protocol    Potassium Replacement - Follow Nurse / BPA Driven Protocol    sodium chloride     sodium chloride    sodium chloride    Assessment & Plan   Assessment & Plan     Active Hospital Problems    Diagnosis  POA    **Rhabdomyolysis [M62.82]  Yes    Pneumonia [J18.9]  Unknown    Swelling of right foot [M79.89]  Unknown    Compression fracture of lumbar spine, non-traumatic [M48.56XA]  Unknown    Fall [W19.XXXA]  Unknown    Mixed hyperlipidemia [E78.2]  Yes    Episode of recurrent major depressive disorder [F33.9]  Yes    Cigarette nicotine dependence with nicotine-induced disorder [F17.219]  Yes    Hypertension [I10]  Yes    Uncontrolled type 2 diabetes mellitus with hyperglycemia [E11.65]  Yes      Resolved Hospital Problems   No resolved problems to display.        Brief Hospital Course to date:  Cristian Watt is a 77 y.o. male with a PMH significant for diabetes mellitus type 2, emphysema, GERD, depression, HTN, HLD, history of compression fracture who presents to the ED after a fall.     Assessment and plan:  Pt is new to me today     Concern for syncope  Fall and acute rhabdomyolysis  Patient has been having recurring falls  According to his description, this sounds like syncopal episode while using the bathroom.  Likely secondary to orthostasis  Echocardiogram with normal systolic function.  No significant valvular heart disease  Carotid ultrasound with no carotid artery stenosis  No arrhythmias noted on telemetry  CTA chest with no evidence of PE  Hold further IV fluids given his worsening pleural effusion.    Fall precautions  PT and OT consultation.    Today patient continues with significant back pain.  May need short-term rehab pending final neurosurgery recommendations.      Compression fracture  Severe compression and T/L spines  Neurosurgery team recommendations appreciated    Possible community-acquired pneumonia  Leukocytosis, improved  CTA chest with bilateral basal infiltrates and pleural effusion  Continue Rocephin, doxycycline  Cultures negative to date  Continue  incentive spirometry    Hypokalemia  Potassium 3.3 today.  Replacement protocol in place.  Replace per protocol.  A.m. labs.      Diabetes mellitus type 2  A1c 7.9%  Continue SSI with Accu-Cheks.  Glucose running generally stable.  Continue current regimen and monitor.     Early Charcot changes right foot  X-ray right foot with early changes consistent with Charcot joint:  age indeterminate fractures of the intermediate cuneiform, lateral margin of the medial cuneiform, the navicular bone with widening and disruption of the Lisfranc ligament complex.  Partner prior discussed with Dr. Coronado over the phone and he recommended podiatry follow-up as outpatient  Weightbearing as tolerated  Right lower extremity venous duplex completed: Results, normal right lower extremity venous duplex scan.     Tobacco abuse  Counseled on cessation  Nicotine patch      Expected Discharge Location and Transportation: home  Expected Discharge   Expected Discharge Date: 7/29/2023; Expected Discharge Time:      DVT prophylaxis:  Medical DVT prophylaxis orders are present.     AM-PAC 6 Clicks Score (PT): 16 (07/28/23 0852)    CODE STATUS:   Code Status and Medical Interventions:   Ordered at: 07/25/23 4615     Level Of Support Discussed With:    Patient     Code Status (Patient has no pulse and is not breathing):    CPR (Attempt to Resuscitate)     Medical Interventions (Patient has pulse or is breathing):    Full Support     Release to patient:    Routine Release     Copied text in this note has been reviewed and is accurate as of 07/28/23.     Venessa Sheikh, APRN  07/28/23

## 2023-07-28 NOTE — PLAN OF CARE
Goal Outcome Evaluation:  Plan of Care Reviewed With: patient        Progress: no change  Outcome Evaluation: Pt. presents with high pain along with generalized weakness, impaired safety awareness, ROM and activity tolerance.  Reported dizziness in all positions, but BP stable when orthostatics taken.  Pt. dependent donning/doff brace and boot.  Pt. only able to take a few side steps and needed mod of 2 with bed mobility.  Due to change in PLOF and deficit areas skilled OT services appropriate and recommend SNF at discharge with possible need for ECF vs 24/7 home assist pending progress.  If home recommend BSC and wx issued.      Anticipated Discharge Disposition (OT): skilled nursing facility

## 2023-07-28 NOTE — PLAN OF CARE
Goal Outcome Evaluation:  Plan of Care Reviewed With: patient        Progress: no change  Outcome Evaluation: Awake most of the night. PRN pain made given and verbalized relief. Remains on 2LNC. No resp distress noted. VSS. Cont current POC

## 2023-07-28 NOTE — THERAPY EVALUATION
Patient Name: Cristian Watt  : 1945    MRN: [id number]                              Today's Date: 2023       Admit Date: 2023    Visit Dx:     ICD-10-CM ICD-9-CM   1. Traumatic rhabdomyolysis, initial encounter  T79.6XXA 958.6   2. Compression fracture of lumbar vertebra, unspecified lumbar vertebral level, initial encounter  S32.000A 805.4   3. Dehydration  E86.0 276.51     Patient Active Problem List   Diagnosis    C. difficile colitis    Disc degeneration, lumbar    Osteoarthritis    Uncontrolled type 2 diabetes mellitus with hyperglycemia    Hypertension    Diarrhea    Mixed hyperlipidemia    Diabetic autonomic neuropathy associated with type 2 diabetes mellitus    Episode of recurrent major depressive disorder    Cigarette nicotine dependence with nicotine-induced disorder    Compression fracture of L3 vertebra    Compression fracture of T12 vertebra    Mood disorder    Chronic bilateral low back pain    Constipation    Intractable low back pain    Acute UTI    Rhabdomyolysis    Pneumonia    Swelling of right foot    Compression fracture of lumbar spine, non-traumatic    Fall     Past Medical History:   Diagnosis Date    Depression     Diabetes mellitus     Disc degeneration, lumbar     Emphysema of lung     Generalized anxiety disorder     GERD (gastroesophageal reflux disease)     Hyperlipidemia     Hypertension     Hypogonadism male     Osteoarthritis     Type 2 diabetes mellitus, uncontrolled     Vitamin D deficiency      Past Surgical History:   Procedure Laterality Date    HERNIA REPAIR      Inguinal    KNEE ARTHROPLASTY      KYPHOPLASTY N/A 2021    Procedure: KYPHOPLASTY L3 AND T12;  Surgeon: Ruddy Zepeda MD;  Location: [facility];  Service: Neurosurgery;  Laterality: N/A;      General Information       Row Name 23 1320          Physical Therapy Time and Intention    Document Type evaluation (P)   -ER     Mode of Treatment physical therapy (P)   -ER       Row Name  07/28/23 1320          General Information    Patient Profile Reviewed yes (P)   -ER     Prior Level of Function min assist:;all household mobility;transfer;bed mobility;dependent:;home management;cooking;cleaning;driving;shopping (P)   -ER     Existing Precautions/Restrictions brace worn when out of bed;oxygen therapy device and L/min;spinal;other (see comments);TLSO (P)   RLE walking boot  -ER     Barriers to Rehab medically complex;previous functional deficit (P)   -ER       Row Name 07/28/23 1320          Living Environment    People in Home alone (P)   -ER       Row Name 07/28/23 1320          Home Main Entrance    Number of Stairs, Main Entrance one (P)   -ER     Stair Railings, Main Entrance railing on left side (ascending) (P)   -ER       Row Name 07/28/23 1320          Stairs Within Home, Primary    Number of Stairs, Within Home, Primary none (P)   -ER       Row Name 07/28/23 1320          Cognition    Orientation Status (Cognition) oriented x 4 (P)   -ER       Row Name 07/28/23 1320          Safety Issues, Functional Mobility    Safety Issues Affecting Function (Mobility) ability to follow commands;awareness of need for assistance;insight into deficits/self-awareness;judgment;sequencing abilities;safety precaution awareness (P)   -ER     Impairments Affecting Function (Mobility) balance;cognition;endurance/activity tolerance;pain;postural/trunk control;strength;shortness of breath (P)   -ER     Cognitive Impairments, Mobility Safety/Performance awareness, need for assistance;impulsivity;insight into deficits/self-awareness;safety precaution awareness;safety precaution follow-through (P)   -ER     Comment, Safety Issues/Impairments (Mobility) no falls or LOB (P)   -ER               User Key  (r) = Recorded By, (t) = Taken By, (c) = Cosigned By      Initials Name Provider Type    ER Clarisa Carlson, PT Student PT Student                   Mobility       Row Name 07/28/23 1320          Bed Mobility    Bed  Mobility rolling left;rolling right;supine-sit;sit-supine;scooting/bridging (P)   -ER     Rolling Left [place] (Bed Mobility) moderate assist (50% patient effort);2 person assist (P)   -ER     Rolling Right [place] (Bed Mobility) moderate assist (50% patient effort);2 person assist (P)   -ER     Scooting/Bridging [place] (Bed Mobility) 2 person assist;dependent (less than 25% patient effort) (P)   -ER     Supine-Sit [place] (Bed Mobility) maximum assist (25% patient effort);2 person assist (P)   -ER     Sit-Supine [place] (Bed Mobility) maximum assist (25% patient effort);2 person assist (P)   -ER     Assistive Device (Bed Mobility) bed rails;draw sheet;head of bed elevated (P)   -ER     Comment, (Bed Mobility) taught log rolling technique, extra time required (P)   -ER       Row Name 07/28/23 1320          Bed-Chair Transfer    Bed-Chair [place] (Transfers) unable to assess (P)   -ER       Row Name 07/28/23 1320          Sit-Stand Transfer    Sit-Stand [place] (Transfers) contact guard;2 person assist (P)   -ER     Assistive Device (Sit-Stand Transfers) walker, front-wheeled (P)   -ER     Comment, (Sit-Stand Transfer) cues for hand placement, poor eccentric control (P)   -ER       Row Name 07/28/23 1320          Gait/Stairs (Locomotion)    [place] Level (Gait) unable to assess (P)   -ER     Comment, (Gait/Stairs) patient was able to perform a few steps to the left but was very unsteady and required CGAx2 for safety. (P)   -ER       Row Name 07/28/23 1320          Mobility    Extremity Weight-bearing Status right lower extremity (P)   -ER     Right Lower Extremity (Weight-bearing Status) weight-bearing as tolerated (WBAT) (P)   -ER               User Key  (r) = Recorded By, (t) = Taken By, (c) = Cosigned By      Initials Name Provider Type    Clarisa Teresa, PT Student PT Student                   Obj/Interventions       Row Name 07/28/23 1320          Range of Motion  Comprehensive    General Range of Motion no range of motion deficits identified (P)   -ER       Row Name 07/28/23 1320          Strength Comprehensive (MMT)    General Manual Muscle Testing (MMT) Assessment lower extremity strength deficits identified (P)   -ER     Comment, General Manual Muscle Testing (MMT) Assessment Bilateral hips 5/5, L knee 4-/5, R knee 4/5, bilateral ankles 4+/5, bilateral EHL 4/5 (P)   -ER       [facility] Name 07/28/23 1320          Motor Skills    Motor Skills posture (P)   -ER       Row Name 07/28/23 1320          Balance    Balance Assessment sitting static balance;sitting dynamic balance;sit to stand dynamic balance;standing static balance;standing dynamic balance (P)   -ER     Static Sitting Balance contact guard (P)   -ER     Dynamic Sitting Balance contact guard (P)   -ER     Position, Sitting Balance supported;unsupported;sitting edge of bed (P)   -ER     Sit to Stand Dynamic Balance contact guard (P)   -ER     Static Standing Balance contact guard (P)   -ER     Dynamic Standing Balance minimal assist (P)   -ER     Position/Device Used, Standing Balance supported;walker, rolling (P)   -ER     Balance Interventions sitting;standing;sit to stand;supported;static;dynamic;minimal challenge (P)   -ER     Comment, Balance patient would benefit from tennis shoe in standing to improve balance (P)   -ER       Row Name 07/28/23 1320          Sensory Assessment (Somatosensory)    Sensory Assessment (Somatosensory) sensation intact (P)   -ER       Row Name 07/28/23 1320          Posture    Posture postural deviations (P)   -ER       Row Name 07/28/23 1320          Postural Deviations    Postural Deviations head and neck;upper back (P)   -ER     Head and Neck (Postural Deviation) forward head (P)   -ER     Upper Back (Postural Deviation) kyphosis (P)   -ER               User Key  (r) = Recorded By, (t) = Taken By, (c) = Cosigned By      Initials Name Provider Type    ER Clarisa Carlson, PT Student PT  Student                   Goals/Plan       Row Name 07/28/23 1320          Bed Mobility Goal 1 (PT)    Activity/Assistive Device (Bed Mobility Goal 1, PT) bridging;rolling to left;rolling to right;sit to supine;supine to sit (P)   -ER     [place] Level/Cues Needed (Bed Mobility Goal 1, PT) modified independence (P)   -ER     Time Frame (Bed Mobility Goal 1, PT) long term goal (LTG);10 days (P)   -ER       Row Name 07/28/23 1320          Transfer Goal 1 (PT)    Activity/Assistive Device (Transfer Goal 1, PT) sit-to-stand/stand-to-sit;bed-to-chair/chair-to-bed;toilet (P)   -ER     [place] Level/Cues Needed (Transfer Goal 1, PT) modified independence (P)   -ER     Time Frame (Transfer Goal 1, PT) long term goal (LTG);10 days (P)   -ER       Row Name 07/28/23 1320          Gait Training Goal 1 (PT)    Activity/Assistive Device (Gait Training Goal 1, PT) gait (walking locomotion);assistive device use;backward stepping;decrease fall risk (P)   -ER     [place] Level (Gait Training Goal 1, PT) modified independence (P)   -ER     Distance (Gait Training Goal 1, PT) 50 (P)   -ER     Time Frame (Gait Training Goal 1, PT) long term goal (LTG);10 days (P)   -ER       Row Name 07/28/23 1320          Stairs Goal 1 (PT)    Activity/Assistive Device (Stairs Goal 1, PT) ascending stairs;descending stairs;using handrail, left;step-to-step (P)   -ER     [place] Level/Cues Needed (Stairs Goal 1, PT) modified independence (P)   -ER     Number of Stairs (Stairs Goal 1, PT) 1 (P)   -ER     Time Frame (Stairs Goal 1, PT) long term goal (LTG);10 days (P)   -ER       Row Name 07/28/23 1320          Patient Education Goal (PT)    Activity (Patient Education Goal, PT) spinal precautions (P)   -ER     [place]/Cues/Accuracy (Memory Goal 2, PT) demonstrates adequately;independent;verbalizes understanding (P)   -ER     Time Frame (Patient Education Goal, PT) long term goal (LTG);10 days (P)   -ER       Row Name 07/28/23  1320          Therapy Assessment/Plan (PT)    Planned Therapy Interventions (PT) balance training;bed mobility training;gait training;lumbar stabilization;motor coordination training;postural re-education;patient/family education;orthotic fitting/training;neuromuscular re-education;prosthetic fitting/training;ROM (range of motion);stair training;strengthening;transfer training (P)   -ER               User Key  (r) = Recorded By, (t) = Taken By, (c) = Cosigned By      Initials Name Provider Type    ER Clarisa Carlson, PT Student PT Student                   Clinical Impression       Row Name 07/28/23 1320          Pain    Pretreatment Pain Rating 8/10 (P)   -ER     Posttreatment Pain Rating 8/10 (P)   -ER     Pain Location generalized (P)   -ER     Pain Location - back (P)   -ER     Pain Intervention(s) Repositioned;Rest (P)   -ER       Row Name 07/28/23 1320          Plan of Care Review    Plan of Care Reviewed With patient (P)   -ER     Progress no change (P)   -ER     Outcome Evaluation patient is limited by shortness of breath, dizziness, and weakness for bed mobility, transfers, and gait. Dependent for donning and doffing TLSO and R walking boot today, currently unable to state if he will have any independence with this yet. patient requires extensive motivation for therapy. he is below previous functional mobility baseline and is unable to return home safely at this time. recommend skilled nursing facility upon d/c. (P)   -ER       Row Name 07/28/23 1320          Therapy Assessment/Plan (PT)    Rehab Potential (PT) good, to achieve stated therapy goals (P)   -ER     Criteria for Skilled Interventions Met (PT) yes;meets criteria;skilled treatment is necessary (P)   -ER     Therapy Frequency (PT) daily (P)   -ER       Row Name 07/28/23 1320          Vital Signs    Pre Systolic BP Rehab 138 (P)   -ER     Pre Treatment Diastolic BP 81 (P)   lying  -ER     Intra Systolic BP Rehab 118 (P)   -ER     Intra Treatment  Diastolic BP 81 (P)   sitting  -ER     Post Systolic BP Rehab 129 (P)   -ER     Post Treatment Diastolic BP 79 (P)   standing  -ER     Pre Patient Position Supine (P)   -ER     Intra Patient Position Standing (P)   -ER     Post Patient Position Sitting (P)   -ER       Row Name 07/28/23 1320          Positioning and Restraints    Pre-Treatment Position in bed (P)   -ER     Post Treatment Position bed (P)   -ER     In Bed supine;exit alarm on;encouraged to call for assist;call light within reach;patient within staff view;side rails up x2 (P)   -ER               User Key  (r) = Recorded By, (t) = Taken By, (c) = Cosigned By      Initials Name Provider Type    Clarisa Teresa, PT Student PT Student                   Outcome Measures       Row Name 07/28/23 1320 07/28/23 0852       How much help from another person do you currently need...    Turning from your back to your side while in flat bed without using bedrails? 2 (P)   -ER 3  -JS    Moving from lying on back to sitting on the side of a flat bed without bedrails? 2 (P)   -ER 3  -JS    Moving to and from a bed to a chair (including a wheelchair)? 2 (P)   -ER 3  -JS    Standing up from a chair using your arms (e.g., wheelchair, bedside chair)? 3 (P)   -ER 3  -JS    Climbing 3-5 steps with a railing? 1 (P)   -ER 2  -JS    To walk in hospital room? 2 (P)   -ER 2  -JS    AM-PAC 6 Clicks Score (PT) 12 (P)   -ER 16  -JS    Highest level of mobility 4 --> Transferred to chair/commode (P)   -ER 5 --> Static standing  -JS      Row Name 07/28/23 1511 07/28/23 1320       Functional Assessment    Outcome Measure Options AM-PAC 6 Clicks Daily Activity (OT)  -MARIA ISABEL AM-PAC 6 Clicks Basic Mobility (PT) (P)   -ER              User Key  (r) = Recorded By, (t) = Taken By, (c) = Cosigned By      Initials Name Provider Type    Ivy Hardy, OT Occupational Therapist    Bo Whitehead RN Registered Nurse    Clarisa Teresa, PT Student PT Student                                  Physical Therapy Education       Title: PT OT SLP Therapies (In Progress)       Topic: Physical Therapy (Done)       Point: Mobility training (Done)       Learning Progress Summary             Patient Acceptance, E, VU by ER at 7/28/2023 1438    Comment: donning TLSO and R walking boot, spinal precautions, log rolling, purpose of PT, d/c planning                         Point: Home exercise program (Done)       Learning Progress Summary             Patient Acceptance, E, VU by ER at 7/28/2023 1438    Comment: donning TLSO and R walking boot, spinal precautions, log rolling, purpose of PT, d/c planning                         Point: Body mechanics (Done)       Learning Progress Summary             Patient Acceptance, E, VU by ER at 7/28/2023 1438    Comment: donning TLSO and R walking boot, spinal precautions, log rolling, purpose of PT, d/c planning                         Point: Precautions (Done)       Learning Progress Summary             Patient Acceptance, E, VU by ER at 7/28/2023 1438    Comment: donning TLSO and R walking boot, spinal precautions, log rolling, purpose of PT, d/c planning                                         User Key       Initials Effective Dates Name Provider Type Discipline    ER 04/20/23 -  Clarisa Carlson, PT Student PT Student PT                  PT Recommendation and Plan  Planned Therapy Interventions (PT): (P) balance training, bed mobility training, gait training, lumbar stabilization, motor coordination training, postural re-education, patient/family education, orthotic fitting/training, neuromuscular re-education, prosthetic fitting/training, ROM (range of motion), stair training, strengthening, transfer training  Plan of Care Reviewed With: (P) patient  Progress: (P) no change  Outcome Evaluation: (P) patient is limited by shortness of breath, dizziness, and weakness for bed mobility, transfers, and gait. Dependent for donning and doffing TLSO and R walking boot today,  currently unable to state if he will have any independence with this yet. patient requires extensive motivation for therapy. he is below previous functional mobility baseline and is unable to return home safely at this time. recommend skilled nursing facility upon d/c.     Time Calculation:   PT Evaluation Complexity  History, PT Evaluation Complexity: (P) 1-2 personal factors and/or comorbidities  Examination of Body Systems (PT Eval Complexity): (P) total of 3 or more elements  Clinical Presentation (PT Evaluation Complexity): (P) evolving  Clinical Decision Making (PT Evaluation Complexity): (P) moderate complexity  Overall Complexity (PT Evaluation Complexity): (P) moderate complexity     PT Charges       Row Name 07/28/23 1320             Time Calculation    Start Time 1320 (P)   -ER      PT Received On 07/28/23 (P)   -ER      PT Goal Re-Cert Due Date 08/07/23 (P)   -ER         Timed Charges    34218 - PT Therapeutic Activity Minutes 18 (P)   -ER         Untimed Charges    PT Eval/Re-eval Minutes 61 (P)   -ER         Total Minutes    Timed Charges Total Minutes 18 (P)   -ER      Untimed Charges Total Minutes 61 (P)   -ER       Total Minutes 79 (P)   -ER                User Key  (r) = Recorded By, (t) = Taken By, (c) = Cosigned By      Initials Name Provider Type    ER Clarisa Carlson, PT Student PT Student                  Therapy Charges for Today       Code Description Service Date Service Provider Modifiers Qty    [id number]  PT THERAPEUTIC ACT EA 15 MIN 7/28/2023 Clarisa Carlson, PT Student GP 1    [id number] HC-PT EVAL MOD COMPLEXITY 5 7/28/2023 Clarisa Carlson, PT Student  1            PT G-Codes  Outcome Measure Options: AM-PAC 6 Clicks Daily Activity (OT)  AM-PAC 6 Clicks Score (PT): (P) 12  AM-PAC 6 Clicks Score (OT): 13  PT Discharge Summary  Anticipated Discharge Disposition (PT): (P) skilled nursing facility    LEIDA Jackman  7/28/2023

## 2023-07-28 NOTE — PROGRESS NOTES
"NEUROSURGERY PROGRESS NOTE    HOD #4    Chief Complaint: Low back pain/compression fractures    Subjective: Patient states he is feeling okay this morning, per nursing note patient had some pain overnight which was relieved with p.o. pain medications.  Patient has no acute complaints this morning, [facility] bracing was contacted and the patient was fitted for a TLSO brace which is being delivered today.  On 2 L nasal cannula resting comfortably in bed.  MRI thoracic and lumbar spine performed last night.    Objective    Vital Signs: Blood pressure 145/92, pulse 83, temperature 96.8 øF (36 øC), temperature source Oral, resp. rate 18, height 182.9 cm (72.01\"), weight 92.7 kg (204 lb 4.8 oz), SpO2 95 %.    Physical Exam  Awake, alert and oriented x 3  Opens eyes spont  Pupils 3 mm rx bilat  Extraocular muscles intact bilaterally  Face symmetric bilaterally  Tongue midline  5/5 in all 4 ext  No pronator drift  Minimal pain to palpation and percussion thoracic/lumbar spine    Intake/Output:   Intake/Output Summary (Last 24 hours) at 7/28/2023 1126  Last data filed at 7/27/2023 1700  Gross per 24 hour   Intake --   Output 1600 ml   Net -1600 ml       Current Medications:   Current Facility-Administered Medications:     acetaminophen (TYLENOL) tablet 500 mg, 500 mg, Oral, Q6H PRN, Kellie Figueroa DO    sennosides-docusate (PERICOLACE) 8.6-50 MG per tablet 2 tablet, 2 tablet, Oral, BID **AND** polyethylene glycol (MIRALAX) packet 17 g, 17 g, Oral, Daily **AND** bisacodyl (DULCOLAX) EC tablet 5 mg, 5 mg, Oral, Daily PRN **AND** bisacodyl (DULCOLAX) suppository 10 mg, 10 mg, Rectal, Daily PRN, Venessa Sheikh, ASHA    Calcium Replacement - Follow Nurse / BPA Driven Protocol, , Does not apply, PRN, Kellie Figueroa DO    cefTRIAXone (ROCEPHIN) 2000 mg/100 mL 0.9% NS IVPB (MBP), 2,000 mg, Intravenous, Q24H, Kellie Figueroa, DO, 2,000 mg at 07/27/23 2032    dextrose (D50W) (25 g/50 mL) IV injection 25 g, 25 g, " Intravenous, Q15 Min PRN, Henry, Kellie G, DO    dextrose (GLUTOSE) oral gel 15 g, 15 g, Oral, Q15 Min PRN, Henry, Kellie G, DO    doxycycline (VIBRAMYCIN) 100 mg in sodium chloride 0.9 % 100 mL IVPB-VTB, 100 mg, Intravenous, Q12H, Henry, Kellie G, DO, 100 mg at 07/28/23 0853    folic acid-vit B6-vit B12 (FOLGARD) tablet 1 tablet, 1 tablet, Oral, Daily, Henry, Kellie G, DO, 1 tablet at 07/28/23 0856    glucagon (GLUCAGEN) injection 1 mg, 1 mg, Intramuscular, Q15 Min PRN, Henry, Kellie G, DO    heparin (porcine) 5000 UNIT/ML injection 5,000 Units, 5,000 Units, Subcutaneous, Q8H, Henry, Kellie G, DO, 5,000 Units at 07/28/23 0553    HYDROcodone-acetaminophen (NORCO) 7.5-325 MG per tablet 1 tablet, 1 tablet, Oral, Q6H PRN, Henry, Kellie G, DO, 1 tablet at 07/28/23 0853    Insulin Lispro (humaLOG) injection 2-7 Units, 2-7 Units, Subcutaneous, 4x Daily AC & at Bedtime, Henry, Kellie G, DO, 2 Units at 07/27/23 2206    ipratropium-albuterol (DUO-NEB) nebulizer solution 3 mL, 3 mL, Nebulization, Q4H PRN, Henry, Kellie G, DO    Magnesium Standard Dose Replacement - Follow Nurse / BPA Driven Protocol, , Does not apply, PRN, Henry, Kellie G, DO    melatonin tablet 5 mg, 5 mg, Oral, Nightly, Henry, Kellie G, DO, 5 mg at 07/27/23 2032    naloxone (NARCAN) injection 0.4 mg, 0.4 mg, Intravenous, PRN, Henry, Kellie G, DO    nicotine (NICODERM CQ) 21 MG/24HR patch 1 patch, 1 patch, Transdermal, Q24H, Henry, Kellie G, DO    Phosphorus Replacement - Follow Nurse / BPA Driven Protocol, , Does not apply, PRN, Henry, Kellie G, DO    potassium chloride (KLOR-CON) packet 40 mEq, 40 mEq, Oral, Q4H, Jorge Luis Gaviria MD, 40 mEq at 07/28/23 0945    Potassium Replacement - Follow Nurse / BPA Driven Protocol, , Does not apply, PRN, HenryKellie darnell G, DO    sodium chloride 0.9 % flush 10 mL, 10 mL, Intravenous, PRN, HenryLizzette darnelle G, DO    sodium chloride 0.9 % flush 10 mL, 10 mL, Intravenous, Q12H, HenryKellie darnell, DO, 10 mL at 07/28/23 0827     sodium chloride 0.9 % flush 10 mL, 10 mL, Intravenous, PRN, Henry, Kellie G, DO    sodium chloride 0.9 % infusion 40 mL, 40 mL, Intravenous, PRN, Henry, Kellie G, DO    thiamine (VITAMIN B-1) tablet 100 mg, 100 mg, Oral, Daily, Henry, Kellie G, DO, 100 mg at 07/28/23 0853    vitamin D3 capsule 5,000 Units, 5,000 Units, Oral, Daily, Henry, Kellie G, DO, 5,000 Units at 07/28/23 0853     Laboratory Results:       Lab 07/28/23  0848 07/27/23  0552 07/26/23  0421 07/25/23  1817 07/25/23  1613   WBC 8.08 10.12 11.31*  --  16.59*   HEMOGLOBIN 16.6 16.4 16.3  --  17.2   HEMATOCRIT 49.5 49.8 48.7  --  52.1*   PLATELETS 260 308 294  --  339   NEUTROS ABS 4.77 6.83 7.94*  --  13.40*   IMMATURE GRANS (ABS) 0.03 0.04 0.04  --  0.08*   LYMPHS ABS 2.42 2.25 2.17  --  1.83   MONOS ABS 0.66 0.89 1.07*  --  1.23*   EOS ABS 0.15 0.05 0.04  --  0.01   MCV 93.4 94.5 94.0  --  94.0   PROCALCITONIN  --   --   --  0.21  --    LACTATE  --   --   --   --  1.8         Lab 07/28/23  0848 07/27/23  0552 07/26/23  0421 07/25/23  1613   SODIUM 135* 136 135* 133*   POTASSIUM 3.3* 4.2 3.9 4.3   CHLORIDE 98 97* 99 96*   CO2 29.0 30.0* 22.0 22.0   ANION GAP 8.0 9.0 14.0 15.0   BUN 6* 5* 8 9   CREATININE 0.52* 0.51* 0.48* 0.65*   EGFR 103.8 104.4 106.4 97.0   GLUCOSE 179* 126* 136* 167*   CALCIUM 8.5* 8.7 8.8 9.0   MAGNESIUM 1.8 1.9  --  2.2   PHOSPHORUS 3.8 2.8  --   --    HEMOGLOBIN A1C  --   --  7.90*  --    TSH  --   --  1.940  --          Lab 07/28/23  0848 07/27/23  0552 07/25/23  1613   TOTAL PROTEIN 6.5 6.7 7.0   ALBUMIN 3.1* 3.2* 4.1   GLOBULIN 3.4 3.5 2.9   ALT (SGPT) 10 16 19   AST (SGOT) 34 51* 61*   BILIRUBIN 0.3 0.4 0.8   ALK PHOS 80 86 104         Lab 07/25/23  1817 07/25/23  1613   HSTROP T 69* 63*                 Brief Urine Lab Results  (Last result in the past 365 days)        Color   Clarity   Blood   Leuk Est   Nitrite   Protein   CREAT   Urine HCG        07/25/23 1720 Orange   Cloudy   Negative   Negative   Negative   30 mg/dL  (1+)                 Microbiology Results (last 10 days)       Procedure Component Value - Date/Time    COVID PRE-OP / PRE-PROCEDURE SCREENING ORDER (NO ISOLATION) - Swab, Nasopharynx [[id number]]  (Normal) Collected: 07/25/23 2340    Lab Status: Final result Specimen: Swab from Nasopharynx Updated: 07/26/23 0319    Narrative:      The following orders were created for panel order COVID PRE-OP / PRE-PROCEDURE SCREENING ORDER (NO ISOLATION) - Swab, Nasopharynx.  Procedure                               Abnormality         Status                     ---------                               -----------         ------                     Respiratory Panel PCR w/...[[id number]]  Normal              Final result                 Please view results for these tests on the individual orders.    Respiratory Panel PCR w/COVID-19(SARS-CoV-2) BRANDI/CYNTHIA/CASSI/PAD/COR/MAD/DAVIE In-House, NP Swab in UTM/VTM, 3-4 HR TAT - Swab, Nasopharynx [[id number]]  (Normal) Collected: 07/25/23 2340    Lab Status: Final result Specimen: Swab from Nasopharynx Updated: 07/26/23 0319     ADENOVIRUS, PCR Not Detected     Coronavirus 229E Not Detected     Coronavirus HKU1 Not Detected     Coronavirus NL63 Not Detected     Coronavirus OC43 Not Detected     COVID19 Not Detected     Human Metapneumovirus Not Detected     Human Rhinovirus/Enterovirus Not Detected     Influenza A PCR Not Detected     Influenza B PCR Not Detected     Parainfluenza Virus 1 Not Detected     Parainfluenza Virus 2 Not Detected     Parainfluenza Virus 3 Not Detected     Parainfluenza Virus 4 Not Detected     RSV, PCR Not Detected     Bordetella pertussis pcr Not Detected     Bordetella parapertussis PCR Not Detected     Chlamydophila pneumoniae PCR Not Detected     Mycoplasma pneumo by PCR Not Detected    Narrative:      In the setting of a positive respiratory panel with a viral infection PLUS a negative procalcitonin without other underlying concern for bacterial infection, consider  observing off antibiotics or discontinuation of antibiotics and continue supportive care. If the respiratory panel is positive for atypical bacterial infection (Bordetella pertussis, Chlamydophila pneumoniae, or Mycoplasma pneumoniae), consider antibiotic de-escalation to target atypical bacterial infection.    Blood Culture - Blood, Hand, Left [[id number]]  (Normal) Collected: 07/25/23 2255    Lab Status: Preliminary result Specimen: Blood from Hand, Left Updated: 07/27/23 2345     Blood Culture No growth at 2 days    Blood Culture - Blood, Hand, Left [[id number]]  (Normal) Collected: 07/25/23 2202    Lab Status: Preliminary result Specimen: Blood from Hand, Left Updated: 07/27/23 2345     Blood Culture No growth at 2 days    Urine Culture - Urine, Urine, Catheter [[id number]]  (Normal) Collected: 07/25/23 1720    Lab Status: Final result Specimen: Urine, Catheter Updated: 07/26/23 2121     Urine Culture No growth             Diagnostic Imaging: I reviewed and independently interpreted the new imaging.   MRI thoracic and lumbar spine performed on 7/27/2023 were reviewed along with his corresponding radiological report, patient has new compression fractures noted at T12 and L2, minimal retropulsion of the T12 fracture.  There are additional areas of compression seen that are post kyphoplasty with some persistent edema at T9 area.  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    Assessment/Plan:  Mid to low back pain/T12 and L2 compression fractures  -I discussed this patient's case with Dr. Pandey, patient has new interval development of compression deformities at T12 and L2 with some persistent edema in the T9 region, which is new from prior MRI lumbar and thoracic spine 11 months ago.  -Plan will be bracing with TLSO brace from [employer] for spine stability, comfort and pain control.  Patient is to wear the brace when  up and out of bed during activities for the next 8 to 12 weeks  - Continue current p.o. pain regimen as tolerated  - Good bowel regimen initiated  - Continue PT/OT as tolerated  - Continue medical management per hospitalist team.  We would like this patient to follow-up in the outpatient neurosurgical clinic in around 2 to 3 weeks time upon discharge.  -Patient may benefit from rehab placement after discharge.       Neurosurgery will sign off currently as no role for inpatient surgical intervention.  Patient will follow-up in the neurosurgical outpatient clinic in around 2 to 3 weeks time with Nino Christine PA-C or Susanna Peralta PA-C.  Please feel free to reconsult if any questions or concerns arise.        Any copied data from previous notes included in the (1) History of Present Illness, (2) Physical Examination and (3) Medical Decision Making and/or Assessment and Plan has been reviewed and is accurate as of 07/28/23      Nion Christine PA-C  07/28/23  11:26 EDT

## 2023-07-28 NOTE — THERAPY EVALUATION
Patient Name: Cristian Watt  : 1945    MRN: [id number]                              Today's Date: 2023       Admit Date: 2023    Visit Dx:     ICD-10-CM ICD-9-CM   1. Traumatic rhabdomyolysis, initial encounter  T79.6XXA 958.6   2. Compression fracture of lumbar vertebra, unspecified lumbar vertebral level, initial encounter  S32.000A 805.4   3. Dehydration  E86.0 276.51     Patient Active Problem List   Diagnosis    C. difficile colitis    Disc degeneration, lumbar    Osteoarthritis    Uncontrolled type 2 diabetes mellitus with hyperglycemia    Hypertension    Diarrhea    Mixed hyperlipidemia    Diabetic autonomic neuropathy associated with type 2 diabetes mellitus    Episode of recurrent major depressive disorder    Cigarette nicotine dependence with nicotine-induced disorder    Compression fracture of L3 vertebra    Compression fracture of T12 vertebra    Mood disorder    Chronic bilateral low back pain    Constipation    Intractable low back pain    Acute UTI    Rhabdomyolysis    Pneumonia    Swelling of right foot    Compression fracture of lumbar spine, non-traumatic    Fall     Past Medical History:   Diagnosis Date    Depression     Diabetes mellitus     Disc degeneration, lumbar     Emphysema of lung     Generalized anxiety disorder     GERD (gastroesophageal reflux disease)     Hyperlipidemia     Hypertension     Hypogonadism male     Osteoarthritis     Type 2 diabetes mellitus, uncontrolled     Vitamin D deficiency      Past Surgical History:   Procedure Laterality Date    HERNIA REPAIR      Inguinal    KNEE ARTHROPLASTY      KYPHOPLASTY N/A 2021    Procedure: KYPHOPLASTY L3 AND T12;  Surgeon: Ruddy Zepeda MD;  Location: [facility];  Service: Neurosurgery;  Laterality: N/A;      General Information       Row Name 23 1447          OT Time and Intention    Document Type evaluation  -MARIA ISABEL     Mode of Treatment occupational therapy  -MARIA ISABEL       Row Name 23 1447           General Information    Patient Profile Reviewed yes  -MARIA ISABEL     Prior Level of Function independent:;all household mobility;gait;transfer;bed mobility;feeding;grooming;dressing;mod assist:;bathing;max assist:;dependent:;home management;cooking;cleaning;driving;shopping  per pt. he has a lady that comes into assist with bathing in tub shower, dressing on those days, shopping, clening and cooking for him  -MARIA ISABEL     Existing Precautions/Restrictions fall;spinal;TLSO;other (see comments)  TLSO needs to be donned in supine due to cannot kelly in sitting due to body habitus, R walking boot to RLE, WBAT  -MARIA ISABEL     Barriers to Rehab medically complex;previous functional deficit  -MARIA ISABEL       Row Name 07/28/23 1447          Occupational Profile    Reason for Services/Referral (Occupational Profile) below baseline ADL independence  -MARIA ISABEL     Environmental Supports and Barriers (Occupational Profile) tub shower with grab bar and shower chair, low toilet and unable to reach grab bar (recent fall from toilet), cane, TLSO brace  -MARIA ISABEL       Row Name 07/28/23 1447          Living Environment    People in Home alone  -MARIA ISABEL       Row Name 07/28/23 1447          Home Main Entrance    Number of Stairs, Main Entrance one  -MARIA ISABEL     Stair Railings, Main Entrance railings on both sides of stairs  -MARIA ISABEL       Row Name 07/28/23 1447          Stairs Within Home, Primary    Number of Stairs, Within Home, Primary none  -MARIA ISABEL       Row Name 07/28/23 1447          Cognition    Orientation Status (Cognition) oriented x 3  -MARIA ISABEL       Row Name 07/28/23 1447          Safety Issues, Functional Mobility    Safety Issues Affecting Function (Mobility) awareness of need for assistance;insight into deficits/self-awareness;judgment;problem-solving;safety precaution awareness;safety precautions follow-through/compliance;sequencing abilities  -MARIA ISABEL     Impairments Affecting Function (Mobility) balance;endurance/activity tolerance;strength;pain;postural/trunk control  -MARIA ISABEL                User Key  (r) = Recorded By, (t) = Taken By, (c) = Cosigned By      Initials Name Provider Type    Ivy Hardy, OT Occupational Therapist                     Mobility/ADL's       Row Name 07/28/23 1456          Bed Mobility    Bed Mobility rolling left;rolling right;scooting/bridging;sit-sidelying;sidelying-sit-sidelying  -MARIA ISABEL     Rolling Left [place] (Bed Mobility) 2 person assist;verbal cues;nonverbal cues (demo/gesture);minimum assist (75% patient effort)  -MARIA ISABEL     Rolling Right [place] (Bed Mobility) 2 person assist;verbal cues;nonverbal cues (demo/gesture);minimum assist (75% patient effort)  -MARIA ISABEL     Scooting/Bridging [place] (Bed Mobility) dependent (less than 25% patient effort);2 person assist;verbal cues  -MARIA ISABEL     Sit-Sidelying [place] (Bed Mobility) moderate assist (50% patient effort);2 person assist;verbal cues;nonverbal cues (demo/gesture)  -MARIA ISABEL     Sidelying-Sit-Sidelying [place] (Bed Mobility) moderate assist (50% patient effort);2 person assist;verbal cues;nonverbal cues (demo/gesture)  -MARIA ISABEL     Bed Mobility, Safety Issues decreased use of legs for bridging/pushing;decreased use of arms for pushing/pulling;impaired trunk control for bed mobility  -     Assistive Device (Bed Mobility) bed rails;draw sheet  -     Comment, (Bed Mobility) cues to sequence mvmt  -       Row Name 07/28/23 4671          Transfers    Transfers sit-stand transfer;stand-sit transfer  -       Row Name 07/28/23 5694          Sit-Stand Transfer    Sit-Stand [place] (Transfers) contact guard;2 person assist  -     Assistive Device (Sit-Stand Transfers) walker, front-wheeled  -MARIA ISABEL       Row Name 07/28/23 0088          Stand-Sit Transfer    Stand-Sit [place] (Transfers) contact guard;2 person assist  -     Assistive Device (Stand-Sit Transfers) walker, front-wheeled  -MARIA ISABEL     Comment, (Stand-Sit Transfer) pt. with fear of falling throughout session  -       Row Name 07/28/23 3416           Functional Mobility    Functional Mobility- Ind. Level contact guard assist;2 person assist required  -MARIA ISABEL     Functional Mobility- Device walker, front-wheeled  -     Functional Mobility-Distance (Feet) --  2  -MARIA ISABEL     Functional Mobility- Safety Issues step length decreased;weight-shifting ability decreased  -     Functional Mobility- Comment pt. reported dizziness throghout session, but BP stable, pt. with high fear of falling, very unsteady, benefit from shoe on L foot to equal boot height  -MARIA ISABEL       Row Name 07/28/23 1454          Activities of Daily Living    BADL Assessment/Intervention lower body dressing;grooming;upper body dressing;toileting  -       Row Name 07/28/23 1454          Mobility    Extremity Weight-bearing Status right lower extremity  -MARIA ISABEL     Right Lower Extremity (Weight-bearing Status) weight-bearing as tolerated (WBAT)   with walking boot on  -MARIA ISABEL       Row Name 07/28/23 1454          Lower Body Dressing Assessment/Training    [place] Level (Lower Body Dressing) doff;don;dependent (less than 25% patient effort)  -MARIA ISABEL     Position (Lower Body Dressing) edge of bed sitting;supine  -MARIA ISABEL     Comment, (Lower Body Dressing) walking boot, doffed and donned twice  -MARIA ISABEL       Row Name 07/28/23 1454          Grooming Assessment/Training    [place] Level (Grooming) hair care, combing/brushing;minimum assist (75% patient effort);wash face, hands;set up  -MARIA ISABEL     Position (Grooming) supine  -MARIA ISABEL       Row Name 07/28/23 1454          Upper Body Dressing Assessment/Training    [place] Level (Upper Body Dressing) doff;don;dependent (less than 25% patient effort)  -MARIA ISABEL     Position (Upper Body Dressing) edge of bed sitting;supine  -MARIA ISABEL     Comment, (Upper Body Dressing) TLSO brace, attempted to kelly EOB due to pt. without help all the time and would have to kelly on own, brace unable to be donned EOB due to body habitus positioning at EOB, per pt. dizzy and initially not wanting to sit  back up, but then agreeable, able to kelly brace in supine with roll method  -       Row Name 07/28/23 1454          Toileting Assessment/Training    Comment, (Toileting) pt. with some urine incontinence on standing, which was cleaned  -               User Key  (r) = Recorded By, (t) = Taken By, (c) = Cosigned By      Initials Name Provider Type     Ivy Shelton OT Occupational Therapist                   Obj/Interventions       [facility] Name 07/28/23 1459          Sensory Assessment (Somatosensory)    Sensory Assessment (Somatosensory) UE sensation intact  -[facility] Name 07/28/23 1459          Vision Assessment/Intervention    Visual Impairment/Limitations corrective lenses full-time  -[facility] Name 07/28/23 1459          Range of Motion Comprehensive    General Range of Motion bilateral upper extremity ROM WFL  -[facility] Name 07/28/23 1459          Strength Comprehensive (MMT)    General Manual Muscle Testing (MMT) Assessment upper extremity strength deficits identified  -     Comment, General Manual Muscle Testing (MMT) Assessment min resistance only given BUE due to spinal precautions  -[facility] Name 07/28/23 1459          Shoulder (Therapeutic Exercise)    Shoulder (Therapeutic Exercise) AROM (active range of motion)  -     Shoulder AROM (Therapeutic Exercise) bilateral;flexion;extension;aBduction;aDduction;horizontal aBduction/aDduction;10 repetitions;supine  -[facility] Name 07/28/23 1452          Motor Skills    Motor Skills functional endurance  -     Functional Endurance fair  -     Therapeutic Exercise shoulder  -[facility] Name 07/28/23 1457          Balance    Static Sitting Balance supervision  -     Dynamic Sitting Balance supervision  -     Position, Sitting Balance unsupported;sitting edge of bed  -     Static Standing Balance contact guard  -     Dynamic Standing Balance contact guard  -     Position/Device Used, Standing Balance walker,  front-wheeled;supported  -MARIA ISABEL     Balance Interventions sit to stand;occupation based/functional task  -MARIA ISABEL     Comment, Balance kelly and doff brace/boot  -MARIA ISABEL               User Key  (r) = Recorded By, (t) = Taken By, (c) = Cosigned By      Initials Name Provider Type    Ivy Hardy, OT Occupational Therapist                   Goals/Plan       Row Name 07/28/23 1508          Bed Mobility Goal 1 (OT)    Activity/Assistive Device (Bed Mobility Goal 1, OT) rolling to left;rolling to right;sidelying to sit;sit to sidelying  log roll  -MARIA ISABEL     [place] Level/Cues Needed (Bed Mobility Goal 1, OT) minimum assist (75% or more patient effort)  -MARIA ISABEL     Time Frame (Bed Mobility Goal 1, OT) long term goal (LTG);10 days  -MARIA ISABEL     Progress/Outcomes (Bed Mobility Goal 1, OT) new goal  -MARIA ISABEL       Row Name 07/28/23 1508          Transfer Goal 1 (OT)    Activity/Assistive Device (Transfer Goal 1, OT) commode, bedside without drop arms;walker, rolling  -MARIA ISABEL     [place] Level/Cues Needed (Transfer Goal 1, OT) contact guard required  -MARIA ISABEL     Time Frame (Transfer Goal 1, OT) long term goal (LTG);10 days  -MARIA ISABEL     Progress/Outcome (Transfer Goal 1, OT) new goal  -MARIA ISABEL       Row Name 07/28/23 1503          Dressing Goal 1 (OT)    Activity/Device (Dressing Goal 1, OT) lower body dressing;long-handled shoe horn;reacher;sock-aid  -MARIA ISABEL     [place]/Cues Needed (Dressing Goal 1, OT) moderate assist (50-74% patient effort);nonverbal cues (demo/gesture) required;verbal cues required  -MARIA ISABEL     Time Frame (Dressing Goal 1, OT) long term goal (LTG);10 days  -MARIA ISABEL     Progress/Outcome (Dressing Goal 1, OT) new goal  -MARIA ISABEL       Row Name 07/28/23 1502          Toileting Goal 1 (OT)    Activity/Device (Toileting Goal 1, OT) adjust/manage clothing;commode, bedside without drop arms;perform perineal hygiene  -MARIA ISAEBL     [place] Level/Cues Needed (Toileting Goal 1, OT) moderate assist (50-74% patient effort)  -MARIA ISABEL     Time Frame (Toileting Goal 1,  OT) long term goal (LTG);10 days  -MARIA ISABEL     Progress/Outcome (Toileting Goal 1, OT) new goal  -MARIA ISABEL       Row Name 07/28/23 1508          Grooming Goal 1 (OT)    Activity/Device (Grooming Goal 1, OT) wash face, hands;hair care;oral care  -MARIA ISABEL     [place] (Grooming Goal 1, OT) supervision required;set-up required  -MARIA ISABEL     Time Frame (Grooming Goal 1, OT) long term goal (LTG);10 days  -MARIA ISABEL     Strategies/Barriers (Grooming Goal 1, OT) sitting sinkside  -MARIA ISABEL     Progress/Outcome (Grooming Goal 1, OT) new goal  -MARIA ISABEL       Row Name 07/28/23 1505          Therapy Assessment/Plan (OT)    Planned Therapy Interventions (OT) activity tolerance training;adaptive equipment training;BADL retraining;occupation/activity based interventions;patient/caregiver education/training;strengthening exercise;transfer/mobility retraining;functional balance retraining;ROM/therapeutic exercise  -MARIA ISABEL               User Key  (r) = Recorded By, (t) = Taken By, (c) = Cosigned By      Initials Name Provider Type    MARIA ISABEL Ivy Shelton, OT Occupational Therapist                   Clinical Impression       Row Name 07/28/23 1501          Pain Assessment    Pretreatment Pain Rating 6/10  -MARIA ISABEL     Posttreatment Pain Rating 6/10  -MARIA ISABEL     Pain Location - Side/Orientation Bilateral  -MARIA ISABEL     Pain Location - back  -MARIA ISABEL     Pain Intervention(s) Repositioned;Ambulation/increased activity  -       Row Name 07/28/23 1500          Plan of Care Review    Plan of Care Reviewed With patient  -MARIA ISABEL     Progress no change  -MARIA ISABEL     Outcome Evaluation Pt. presents with high pain along with generalized weakness, impaired safety awareness, ROM and activity tolerance.  Reported dizziness in all positions, but BP stable when orthostatics taken.  Pt. dependent donning/doff brace and boot.  Pt. only able to take a few side steps and needed mod of 2 with bed mobility.  Due to change in PLOF and deficit areas skilled OT services appropriate and recommend SNF at discharge with  possible need for ECF vs 24/7 home assist pending progress.  If home recommend BSC and wx issued.  -       Row Name 07/28/23 1501          Therapy Assessment/Plan (OT)    Patient/Family Therapy Goal Statement (OT) return home PLOF  -MARIA ISABEL     Rehab Potential (OT) good, to achieve stated therapy goals  -MARIA ISABEL     Criteria for Skilled Therapeutic Interventions Met (OT) yes;meets criteria;skilled treatment is necessary  -MARIA ISABEL     Therapy Frequency (OT) daily  -MARIA ISABEL       Row Name 07/28/23 1501          Therapy Plan Review/Discharge Plan (OT)    Equipment Needs Upon Discharge (OT) bathing equipment;dressing equipment;walker, rolling;commode chair  -MARIA ISABEL     Anticipated Discharge Disposition (OT) skilled nursing facility  -       Row Name 07/28/23 1501          Vital Signs    Pre Systolic BP Rehab 138  -MARIA ISABEL     Pre Treatment Diastolic BP 81  -MARIA ISABEL     Intra Systolic BP Rehab 118  -MARIA ISABEL     Intra Treatment Diastolic BP 79  -MARIA ISABEL     Post Systolic BP Rehab 129  -MARIA ISABEL     Post Treatment Diastolic BP 79  -MARIA ISABEL     Intratreatment Heart Rate (beats/min) 89  -MARIA ISABEL     Posttreatment Heart Rate (beats/min) 82  -MARIA ISABEL     O2 Delivery Pre Treatment room air  -MARIA ISABEL     O2 Delivery Intra Treatment room air  -MARIA ISABEL     Post SpO2 (%) 95  -MARIA ISABEL     O2 Delivery Post Treatment room air  -MARIA ISABEL     Pre Patient Position Supine  -MARIA ISABEL     Intra Patient Position Sitting  -MARIA ISABEL     Post Patient Position Standing  -MARIA ISABEL       Row Name 07/28/23 1501          Positioning and Restraints    Pre-Treatment Position in bed  -MARIA ISABEL     Post Treatment Position bed  -MARIA ISABEL     In Bed supine;exit alarm on;side rails up x2;call light within reach;encouraged to call for assist;with nsg  -MARIA ISABEL               User Key  (r) = Recorded By, (t) = Taken By, (c) = Cosigned By      Initials Name Provider Type    Ivy Hardy, OT Occupational Therapist                   Outcome Measures       Row Name 07/28/23 1061          How much help from another is currently needed...    Putting on and taking off  regular lower body clothing? 1  -MARIA ISABEL     Bathing (including washing, rinsing, and drying) 2  -MARIA ISABEL     Toileting (which includes using toilet bed pan or urinal) 2  -MARIA ISABEL     Putting on and taking off regular upper body clothing 2  -MARIA ISABEL     Taking care of personal grooming (such as brushing teeth) 3  -MARIA ISABEL     Eating meals 3  -MARIA ISABEL     AM-PAC 6 Clicks Score (OT) 13  -MARIA ISABEL       Row Name 07/28/23 1320 07/28/23 0852       How much help from another person do you currently need...    Turning from your back to your side while in flat bed without using bedrails? 2 (P)   -ER 3  -JS    Moving from lying on back to sitting on the side of a flat bed without bedrails? 2 (P)   -ER 3  -JS    Moving to and from a bed to a chair (including a wheelchair)? 2 (P)   -ER 3  -JS    Standing up from a chair using your arms (e.g., wheelchair, bedside chair)? 3 (P)   -ER 3  -JS    Climbing 3-5 steps with a railing? 1 (P)   -ER 2  -JS    To walk in hospital room? 2 (P)   -ER 2  -JS    AM-PAC 6 Clicks Score (PT) 12 (P)   -ER 16  -JS    Highest level of mobility 4 --> Transferred to chair/commode (P)   -ER 5 --> Static standing  -JS      Row Name 07/28/23 1511 07/28/23 1320       Functional Assessment    Outcome Measure Options AM-PAC 6 Clicks Daily Activity (OT)  -MARIA ISABEL AM-PAC 6 Clicks Basic Mobility (PT) (P)   -ER              User Key  (r) = Recorded By, (t) = Taken By, (c) = Cosigned By      Initials Name Provider Type    Ivy Hardy, OT Occupational Therapist    Bo Whitehead RN Registered Nurse    Clarisa Teresa, PT Student PT Student                    Occupational Therapy Education       Title: PT OT SLP Therapies (In Progress)       Topic: Occupational Therapy (In Progress)       Point: ADL training (Done)       Description:   Instruct learner(s) on proper safety adaptation and remediation techniques during self care or transfers.   Instruct in proper use of assistive devices.                  Learning Progress Summary              Patient Acceptance, E,D, VU,NR by MARIA ISABEL at 7/28/2023 1512    Comment: reason for consult, noted deficits, benefit of activity, TLSO and walking boot need/donning, log roll, shoe to LLE to balance with walking boot                         Point: Home exercise program (Not Started)       Description:   Instruct learner(s) on appropriate technique for monitoring, assisting and/or progressing therapeutic exercises/activities.                  Learner Progress:  Not documented in this visit.              Point: Precautions (Done)       Description:   Instruct learner(s) on prescribed precautions during self-care and functional transfers.                  Learning Progress Summary             Patient Acceptance, E,D, VU,NR by MARIA ISABEL at 7/28/2023 1512    Comment: reason for consult, noted deficits, benefit of activity, TLSO and walking boot need/donning, log roll, shoe to LLE to balance with walking boot                         Point: Body mechanics (Done)       Description:   Instruct learner(s) on proper positioning and spine alignment during self-care, functional mobility activities and/or exercises.                  Learning Progress Summary             Patient Acceptance, E,D, VU,NR by MARIA ISABEL at 7/28/2023 1512    Comment: reason for consult, noted deficits, benefit of activity, TLSO and walking boot need/donning, log roll, shoe to LLE to balance with walking boot                                         User Key       Initials Effective Dates Name Provider Type Discipline    MARIA ISABEL 07/11/23 -  Ivy Shelton, OT Occupational Therapist OT                  OT Recommendation and Plan  Planned Therapy Interventions (OT): activity tolerance training, adaptive equipment training, BADL retraining, occupation/activity based interventions, patient/caregiver education/training, strengthening exercise, transfer/mobility retraining, functional balance retraining, ROM/therapeutic exercise  Therapy Frequency (OT): daily  Plan of Care  Review  Plan of Care Reviewed With: patient  Progress: no change  Outcome Evaluation: Pt. presents with high pain along with generalized weakness, impaired safety awareness, ROM and activity tolerance.  Reported dizziness in all positions, but BP stable when orthostatics taken.  Pt. dependent donning/doff brace and boot.  Pt. only able to take a few side steps and needed mod of 2 with bed mobility.  Due to change in PLOF and deficit areas skilled OT services appropriate and recommend SNF at discharge with possible need for ECF vs 24/7 home assist pending progress.  If home recommend BSC and wx issued.     Time Calculation:   Evaluation Complexity (OT)  Review Occupational Profile/Medical/Therapy History Complexity: expanded/moderate complexity  Assessment, Occupational Performance/Identification of Deficit Complexity: 3-5 performance deficits  Clinical Decision Making Complexity (OT): detailed assessment/moderate complexity  Overall Complexity of Evaluation (OT): moderate complexity     Time Calculation- OT       Row Name 07/28/23 1514             Time Calculation- OT    OT Start Time 1301  -MARIA ISABEL      OT Received On 07/28/23  -MARIA ISABEL      OT Goal Re-Cert Due Date 08/07/23  -MARIA ISABEL         Timed Charges    18738 - OT Therapeutic Exercise Minutes 6  -MARIA ISABEL      27027 - OT Self Care/Mgmt Minutes 20  -MARIA ISABEL         Total Minutes    Timed Charges Total Minutes 26  -MARIA ISABEL       Total Minutes 26  -MARIA ISABEL                User Key  (r) = Recorded By, (t) = Taken By, (c) = Cosigned By      Initials Name Provider Type    Ivy Hardy OT Occupational Therapist                  Therapy Charges for Today       Code Description Service Date Service Provider Modifiers Qty    49164257198 HC OT THER PROC EA 15 MIN 7/28/2023 Ivy Shelton OT GO 1    90990374272 HC OT SELF CARE/MGMT/TRAIN EA 15 MIN 7/28/2023 Ivy Shelton OT GO 1    23993224011 HC OT EVAL MOD COMPLEXITY 4 7/28/2023 Ivy Shelton OT GO 1                 Ivy Shelton  OT  7/28/2023

## 2023-07-29 LAB
ANION GAP SERPL CALCULATED.3IONS-SCNC: 10 MMOL/L (ref 5–15)
BUN SERPL-MCNC: 7 MG/DL (ref 8–23)
BUN/CREAT SERPL: 12.5 (ref 7–25)
CALCIUM SPEC-SCNC: 8.7 MG/DL (ref 8.6–10.5)
CHLORIDE SERPL-SCNC: 98 MMOL/L (ref 98–107)
CO2 SERPL-SCNC: 29 MMOL/L (ref 22–29)
CREAT SERPL-MCNC: 0.56 MG/DL (ref 0.76–1.27)
EGFRCR SERPLBLD CKD-EPI 2021: 101.5 ML/MIN/1.73
GLUCOSE BLDC GLUCOMTR-MCNC: 113 MG/DL (ref 70–130)
GLUCOSE BLDC GLUCOMTR-MCNC: 138 MG/DL (ref 70–130)
GLUCOSE BLDC GLUCOMTR-MCNC: 160 MG/DL (ref 70–130)
GLUCOSE BLDC GLUCOMTR-MCNC: 176 MG/DL (ref 70–130)
GLUCOSE SERPL-MCNC: 137 MG/DL (ref 65–99)
POTASSIUM SERPL-SCNC: 3.8 MMOL/L (ref 3.5–5.2)
SODIUM SERPL-SCNC: 137 MMOL/L (ref 136–145)

## 2023-07-29 PROCEDURE — 25010000002 HEPARIN (PORCINE) PER 1000 UNITS: Performed by: INTERNAL MEDICINE

## 2023-07-29 PROCEDURE — 80048 BASIC METABOLIC PNL TOTAL CA: CPT | Performed by: NURSE PRACTITIONER

## 2023-07-29 PROCEDURE — 63710000001 INSULIN DETEMIR PER 5 UNITS: Performed by: NURSE PRACTITIONER

## 2023-07-29 PROCEDURE — 25010000002 CEFTRIAXONE PER 250 MG: Performed by: INTERNAL MEDICINE

## 2023-07-29 PROCEDURE — 63710000001 INSULIN LISPRO (HUMAN) PER 5 UNITS: Performed by: INTERNAL MEDICINE

## 2023-07-29 PROCEDURE — 82948 REAGENT STRIP/BLOOD GLUCOSE: CPT

## 2023-07-29 RX ORDER — LOSARTAN POTASSIUM 50 MG/1
50 TABLET ORAL
Status: DISCONTINUED | OUTPATIENT
Start: 2023-07-29 | End: 2023-08-01 | Stop reason: HOSPADM

## 2023-07-29 RX ADMIN — SODIUM CHLORIDE 2000 MG: 900 INJECTION INTRAVENOUS at 21:02

## 2023-07-29 RX ADMIN — INSULIN LISPRO 2 UNITS: 100 INJECTION, SOLUTION INTRAVENOUS; SUBCUTANEOUS at 21:03

## 2023-07-29 RX ADMIN — DOXYCYCLINE 100 MG: 100 INJECTION, POWDER, LYOPHILIZED, FOR SOLUTION INTRAVENOUS at 09:15

## 2023-07-29 RX ADMIN — THIAMINE HCL TAB 100 MG 100 MG: 100 TAB at 09:17

## 2023-07-29 RX ADMIN — HYDROCODONE BITARTRATE AND ACETAMINOPHEN 1 TABLET: 7.5; 325 TABLET ORAL at 21:02

## 2023-07-29 RX ADMIN — Medication 5000 UNITS: at 09:15

## 2023-07-29 RX ADMIN — INSULIN LISPRO 2 UNITS: 100 INJECTION, SOLUTION INTRAVENOUS; SUBCUTANEOUS at 12:40

## 2023-07-29 RX ADMIN — Medication 10 ML: at 09:00

## 2023-07-29 RX ADMIN — DOXYCYCLINE 100 MG: 100 INJECTION, POWDER, LYOPHILIZED, FOR SOLUTION INTRAVENOUS at 21:03

## 2023-07-29 RX ADMIN — SENNOSIDES AND DOCUSATE SODIUM 2 TABLET: 50; 8.6 TABLET ORAL at 21:02

## 2023-07-29 RX ADMIN — Medication 10 ML: at 21:02

## 2023-07-29 RX ADMIN — HEPARIN SODIUM 5000 UNITS: 5000 INJECTION, SOLUTION INTRAVENOUS; SUBCUTANEOUS at 15:01

## 2023-07-29 RX ADMIN — HEPARIN SODIUM 5000 UNITS: 5000 INJECTION, SOLUTION INTRAVENOUS; SUBCUTANEOUS at 06:03

## 2023-07-29 RX ADMIN — POLYETHYLENE GLYCOL 3350 17 G: 17 POWDER, FOR SOLUTION ORAL at 09:00

## 2023-07-29 RX ADMIN — Medication 5 MG: at 21:02

## 2023-07-29 RX ADMIN — INSULIN DETEMIR 8 UNITS: 100 INJECTION, SOLUTION SUBCUTANEOUS at 21:03

## 2023-07-29 RX ADMIN — SENNOSIDES AND DOCUSATE SODIUM 2 TABLET: 50; 8.6 TABLET ORAL at 09:15

## 2023-07-29 RX ADMIN — HYDROCODONE BITARTRATE AND ACETAMINOPHEN 1 TABLET: 7.5; 325 TABLET ORAL at 06:03

## 2023-07-29 RX ADMIN — Medication 1 TABLET: at 09:00

## 2023-07-29 RX ADMIN — HEPARIN SODIUM 5000 UNITS: 5000 INJECTION, SOLUTION INTRAVENOUS; SUBCUTANEOUS at 21:02

## 2023-07-29 RX ADMIN — LOSARTAN POTASSIUM 50 MG: 50 TABLET, FILM COATED ORAL at 15:26

## 2023-07-29 NOTE — PLAN OF CARE
Goal Outcome Evaluation:  Plan of Care Reviewed With: patient        Progress: no change  Outcome Evaluation: Slept on and off. Remains 2LNC. Attempted to wean O2 but unsuccesful. PRN pain med given with relief. SR on tele. VSS. Cont POC

## 2023-07-29 NOTE — CASE MANAGEMENT/SOCIAL WORK
Continued Stay Note  [facility]     Patient Name: Cristian Watt  MRN: [id number]  Today's Date: 7/29/2023    Admit Date: 7/25/2023    Plan: discharge plan   Discharge Plan       Row Name 07/29/23 1448       Plan    Plan discharge plan    Plan Comments I spoke with pt at beside regarding discharge plan and PT/OT's recommendations. Pt adamantly declines HH or inpatient rehab. He also states he needs  an ambulance transportation home and has 20 steps to go up. I explained to him that I was unsure if I could arrange that kind of transportation over weekend.,  He states that it took 3 people to get him up the stairs last time. Pt reports his son should be here tomorrow. Pt also asking for a raised toilet seat and aware that it is not covered by insurance but states he is able to pay out of pocket.. CM will cont to follow.                   Discharge Codes    No documentation.                 Expected Discharge Date and Time       Expected Discharge Date Expected Discharge Time    Jul 30, 2023               Linda Morrell RN

## 2023-07-29 NOTE — PROGRESS NOTES
"    [facility] Medicine Services  PROGRESS NOTE    Patient Name: Cristian Watt  : 1945  MRN: [id number]    Date of Admission: 2023  Primary Care Physician: No primary care provider on file.    Subjective   Subjective     CC:  Follow-up for fall and syncope    HPI:  Seen resting up in bed in no acute distress.  Awake and alert.  States he slept okay.  Denies nausea or vomiting.  Rates back pain 3/10 scale at rest but quickly escalates to 10/10 with movement. Still declining any inpatient rehab or home health services.  Lives alone.  Asking for an elevated toilet seat and transportation home that can \"carry him up the steps\".  States his TSLO brace does not fit right.  Wanting it adjusted.  However wants to wait to discharge home until tomorrow when his son is in town.    ROS:  General : no fevers, chills  CVS: No chest pain, palpitations  Respiratory: No cough, dyspnea  GI: No N/V/D, abd pain    10 point review of systems is negative except for what is mentioned in HPI    Objective   Objective     Vital Signs:   Temp:  [96.3 øF (35.7 øC)-97.9 øF (36.6 øC)] 97.9 øF (36.6 øC)  Heart Rate:  [76-85] 76  Resp:  [18] 18  BP: (133-182)/() 133/85  Flow (L/min):  [2] 2     Physical Exam:  Constitutional: No acute distress, awake, alert.  Chronically ill and frail appearing male.  Resting back in bed.  No visitors at bedside.  HENT: NCAT, mucous membranes moist  Respiratory: Clear to auscultation bilaterally but slightly decreased at bases, respiratory effort normal on 2 LNC with sats 94%  Cardiovascular: RRR, no murmurs, rubs, or gallops  Gastrointestinal: Positive bowel sounds, soft, nontender, nondistended.  Musculoskeletal: No bilateral ankle edema.  ALLRED spontaneously.  Psychiatric: Appropriate affect, cooperative  Neurologic: Oriented x 3, strength symmetric in all extremities, Cranial Nerves grossly intact to confrontation, speech clear.  Follows commands.  Skin: No " yoselin      Results Reviewed:  LAB RESULTS:      Lab 07/28/23  0848 07/27/23  0552 07/26/23  0421 07/25/23  1817 07/25/23  1613   WBC 8.08 10.12 11.31*  --  16.59*   HEMOGLOBIN 16.6 16.4 16.3  --  17.2   HEMATOCRIT 49.5 49.8 48.7  --  52.1*   PLATELETS 260 308 294  --  339   NEUTROS ABS 4.77 6.83 7.94*  --  13.40*   IMMATURE GRANS (ABS) 0.03 0.04 0.04  --  0.08*   LYMPHS ABS 2.42 2.25 2.17  --  1.83   MONOS ABS 0.66 0.89 1.07*  --  1.23*   EOS ABS 0.15 0.05 0.04  --  0.01   MCV 93.4 94.5 94.0  --  94.0   PROCALCITONIN  --   --   --  0.21  --    LACTATE  --   --   --   --  1.8         Lab 07/29/23  0702 07/28/23  1831 07/28/23  0848 07/27/23  0552 07/26/23  0421 07/25/23  1613   SODIUM 137  --  135* 136 135* 133*   POTASSIUM 3.8 4.5 3.3* 4.2 3.9 4.3   CHLORIDE 98  --  98 97* 99 96*   CO2 29.0  --  29.0 30.0* 22.0 22.0   ANION GAP 10.0  --  8.0 9.0 14.0 15.0   BUN 7*  --  6* 5* 8 9   CREATININE 0.56*  --  0.52* 0.51* 0.48* 0.65*   EGFR 101.5  --  103.8 104.4 106.4 97.0   GLUCOSE 137*  --  179* 126* 136* 167*   CALCIUM 8.7  --  8.5* 8.7 8.8 9.0   MAGNESIUM  --   --  1.8 1.9  --  2.2   PHOSPHORUS  --   --  3.8 2.8  --   --    HEMOGLOBIN A1C  --   --   --   --  7.90*  --    TSH  --   --   --   --  1.940  --          Lab 07/28/23  0848 07/27/23  0552 07/25/23  1613   TOTAL PROTEIN 6.5 6.7 7.0   ALBUMIN 3.1* 3.2* 4.1   GLOBULIN 3.4 3.5 2.9   ALT (SGPT) 10 16 19   AST (SGOT) 34 51* 61*   BILIRUBIN 0.3 0.4 0.8   ALK PHOS 80 86 104         Lab 07/25/23  1817 07/25/23  1613   HSTROP T 69* 63*                 Brief Urine Lab Results  (Last result in the past 365 days)        Color   Clarity   Blood   Leuk Est   Nitrite   Protein   CREAT   Urine HCG        07/25/23 1720 Orange   Cloudy   Negative   Negative   Negative   30 mg/dL (1+)                   Microbiology Results Abnormal       Procedure Component Value - Date/Time    Blood Culture - Blood, Hand, Left [[id number]]  (Normal) Collected: 07/25/23 2202    Lab Status:  Preliminary result Specimen: Blood from Hand, Left Updated: 07/28/23 2345     Blood Culture No growth at 3 days    Blood Culture - Blood, Hand, Left [[id number]]  (Normal) Collected: 07/25/23 2255    Lab Status: Preliminary result Specimen: Blood from Hand, Left Updated: 07/28/23 2345     Blood Culture No growth at 3 days    Urine Culture - Urine, Urine, Catheter [[id number]]  (Normal) Collected: 07/25/23 1720    Lab Status: Final result Specimen: Urine, Catheter Updated: 07/26/23 2121     Urine Culture No growth    COVID PRE-OP / PRE-PROCEDURE SCREENING ORDER (NO ISOLATION) - Swab, Nasopharynx [[id number]]  (Normal) Collected: 07/25/23 2340    Lab Status: Final result Specimen: Swab from Nasopharynx Updated: 07/26/23 0319    Narrative:      The following orders were created for panel order COVID PRE-OP / PRE-PROCEDURE SCREENING ORDER (NO ISOLATION) - Swab, Nasopharynx.  Procedure                               Abnormality         Status                     ---------                               -----------         ------                     Respiratory Panel PCR w/...[[id number]]  Normal              Final result                 Please view results for these tests on the individual orders.    Respiratory Panel PCR w/COVID-19(SARS-CoV-2) BRANDI/CYNTHIA/CASSI/PAD/COR/MAD/DAVIE In-House, NP Swab in UTM/VTM, 3-4 HR TAT - Swab, Nasopharynx [[id number]]  (Normal) Collected: 07/25/23 2340    Lab Status: Final result Specimen: Swab from Nasopharynx Updated: 07/26/23 0319     ADENOVIRUS, PCR Not Detected     Coronavirus 229E Not Detected     Coronavirus HKU1 Not Detected     Coronavirus NL63 Not Detected     Coronavirus OC43 Not Detected     COVID19 Not Detected     Human Metapneumovirus Not Detected     Human Rhinovirus/Enterovirus Not Detected     Influenza A PCR Not Detected     Influenza B PCR Not Detected     Parainfluenza Virus 1 Not Detected     Parainfluenza Virus 2 Not Detected     Parainfluenza Virus 3 Not Detected      Parainfluenza Virus 4 Not Detected     RSV, PCR Not Detected     Bordetella pertussis pcr Not Detected     Bordetella parapertussis PCR Not Detected     Chlamydophila pneumoniae PCR Not Detected     Mycoplasma pneumo by PCR Not Detected    Narrative:      In the setting of a positive respiratory panel with a viral infection PLUS a negative procalcitonin without other underlying concern for bacterial infection, consider observing off antibiotics or discontinuation of antibiotics and continue supportive care. If the respiratory panel is positive for atypical bacterial infection (Bordetella pertussis, Chlamydophila pneumoniae, or Mycoplasma pneumoniae), consider antibiotic de-escalation to target atypical bacterial infection.            MRI Thoracic Spine Without Contrast    Result Date: 7/28/2023  MRI THORACIC SPINE WO CONTRAST, MRI LUMBAR SPINE WO CONTRAST Date of Exam: 7/27/2023 11:17 PM EDT Indication: Multiple thoracic compression deformities, acute midthoracic back pain, history of vertebroplasty/multiple compression fractures, recent fall.  Comparison: 8/1/2022 MR thoracic and lumbar spine, CTA chest 7/27/2023, CT abdomen pelvis 7/27/2023 Technique:  Routine multiplanar/multisequence sequence images of the thoracic and lumbar spine were obtained without contrast administration. Findings: THORACIC: Localizer image: No visible abnormality. Alignment: Normal. Bone marrow: Marrow edema in T9 and T12. Inferior endplate marrow edema along T11. Vertebrae: Severe compression fracture of T9 with associated edema. There is approximately 4 mm of retropulsion which results in flattening of the ventral cord which is similar to 8/1/2022. Moderate compression fracture of T12 with associated edema and approximately 2 mm of retropulsion which is increased from 8/1/2020. No high-grade canal stenosis. Moderate left and mild to severe right T9-10, mild bilateral T10-T11 and severe left and moderate right T11-T12 neural foraminal  stenosis. The stenosis at T11-T12 appears to be increased from prior examination. Cord and conus: Normal. Extra-vertebral soft tissues: Diffuse fatty atrophy. Visualized chest: Unremarkable. LUMBAR: 6 lumbar-type vertebral bodies with lumbarization of S1 and last displaced being labeled as S1-S2. Distal cord and conus: Normal morphology and signal. The conus medullaris terminates at L1-L2. Cauda equina and nerve roots: Normal morphology and signal. Alignment: Mild retrolisthesis of L2 on L3 and L5 on S1. Grade 1 anterolisthesis of S1 on S2. Bones: Mild compression deformity with vertebral augmentation material seen in L1 and L4. Increasing now moderate compression fracture of L2 without substantial retropulsion. The bone marrow signal is within normal limits for age. No suspicious osseous lesions are demonstrated. Description of degenerative changes at specific levels is as follows: T12-L1: No spinal canal or neural foraminal stenosis. L1-2: No spinal canal or neural foraminal stenosis. L2-3: Minimal posterior disc bulge. Mild spinal canal stenosis. Mild bilateral neural foraminal stenosis. L3-4: No spinal canal or neural foraminal stenosis. L4-5: Facet arthropathy, epidural lipomatous hypertrophy and minimal posterior disc bulge. Mild spinal canal stenosis with narrowing of the left greater than right lateral recess. Mild left neural foraminal stenosis. L5-S1: Facet arthropathy. No spinal canal stenosis. Moderate right neural foraminal stenosis. S1-S2: Facet arthropathy and posterior disc bulge. Moderate right and mild left neural foraminal stenosis. No spinal canal stenosis. Extra-vertebral soft tissues: Fatty atrophy of the muscles. Visualized abdomen/pelvis: Renal cystic lesions.     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 New compression fractures of T12 and L2. There is minimal retropulsion of the T12 fracture. There is resultant severe left and moderate right  T11-T12 neural foraminal stenosis. Additional areas of compression fracture and post kyphoplasty are again seen. There is still edema seen within the severe T9 compression fracture similar to prior examination. Additionally there is approximately 4 mm of retropulsion flattening of the ventral cord at this level. Degenerative changes of the lumbar spine most advanced at L5-S1 and S1-S2. There is moderate right neural foraminal stenosis at both levels. Electronically Signed: Jaime Hiral  7/28/2023 9:06 AM EDT  Workstation ID: [id number]    MRI Lumbar Spine Without Contrast    Result Date: 7/28/2023  MRI THORACIC SPINE WO CONTRAST, MRI LUMBAR SPINE WO CONTRAST Date of Exam: 7/27/2023 11:17 PM EDT Indication: Multiple thoracic compression deformities, acute midthoracic back pain, history of vertebroplasty/multiple compression fractures, recent fall.  Comparison: 8/1/2022 MR thoracic and lumbar spine, CTA chest 7/27/2023, CT abdomen pelvis 7/27/2023 Technique:  Routine multiplanar/multisequence sequence images of the thoracic and lumbar spine were obtained without contrast administration. Findings: THORACIC: Localizer image: No visible abnormality. Alignment: Normal. Bone marrow: Marrow edema in T9 and T12. Inferior endplate marrow edema along T11. Vertebrae: Severe compression fracture of T9 with associated edema. There is approximately 4 mm of retropulsion which results in flattening of the ventral cord which is similar to 8/1/2022. Moderate compression fracture of T12 with associated edema and approximately 2 mm of retropulsion which is increased from 8/1/2020. No high-grade canal stenosis. Moderate left and mild to severe right T9-10, mild bilateral T10-T11 and severe left and moderate right T11-T12 neural foraminal stenosis. The stenosis at T11-T12 appears to be increased from prior examination. Cord and conus: Normal. Extra-vertebral soft tissues: Diffuse fatty atrophy. Visualized chest: Unremarkable. LUMBAR: 6  lumbar-type vertebral bodies with lumbarization of S1 and last displaced being labeled as S1-S2. Distal cord and conus: Normal morphology and signal. The conus medullaris terminates at L1-L2. Cauda equina and nerve roots: Normal morphology and signal. Alignment: Mild retrolisthesis of L2 on L3 and L5 on S1. Grade 1 anterolisthesis of S1 on S2. Bones: Mild compression deformity with vertebral augmentation material seen in L1 and L4. Increasing now moderate compression fracture of L2 without substantial retropulsion. The bone marrow signal is within normal limits for age. No suspicious osseous lesions are demonstrated. Description of degenerative changes at specific levels is as follows: T12-L1: No spinal canal or neural foraminal stenosis. L1-2: No spinal canal or neural foraminal stenosis. L2-3: Minimal posterior disc bulge. Mild spinal canal stenosis. Mild bilateral neural foraminal stenosis. L3-4: No spinal canal or neural foraminal stenosis. L4-5: Facet arthropathy, epidural lipomatous hypertrophy and minimal posterior disc bulge. Mild spinal canal stenosis with narrowing of the left greater than right lateral recess. Mild left neural foraminal stenosis. L5-S1: Facet arthropathy. No spinal canal stenosis. Moderate right neural foraminal stenosis. S1-S2: Facet arthropathy and posterior disc bulge. Moderate right and mild left neural foraminal stenosis. No spinal canal stenosis. Extra-vertebral soft tissues: Fatty atrophy of the muscles. Visualized abdomen/pelvis: Renal cystic lesions.     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 New compression fractures of T12 and L2. There is minimal retropulsion of the T12 fracture. There is resultant severe left and moderate right T11-T12 neural foraminal stenosis. Additional areas of compression fracture and post kyphoplasty are again seen. There is still edema seen within the severe T9 compression fracture similar to prior  examination. Additionally there is approximately 4 mm of retropulsion flattening of the ventral cord at this level. Degenerative changes of the lumbar spine most advanced at L5-S1 and S1-S2. There is moderate right neural foraminal stenosis at both levels. Electronically Signed: Jaime Blcakman  7/28/2023 9:06 AM EDT  Workstation ID: [id number]     Results for orders placed during the hospital encounter of 07/25/23    Adult Transthoracic Echo Complete W/ Cont if Necessary Per Protocol    Interpretation Summary    Left ventricular systolic function is normal. Estimated left ventricular EF = 60%    Left ventricular wall thickness is consistent with mild concentric hypertrophy.    Left ventricular diastolic function is consistent with (grade I) impaired relaxation.    The right ventricular cavity is mildly dilated.    No significant valvular abnormalities.      Current medications:  Scheduled Meds:cefTRIAXone, 2,000 mg, Intravenous, Q24H  doxycycline, 100 mg, Intravenous, Q12H  folic acid-vit B6-vit B12, 1 tablet, Oral, Daily  heparin (porcine), 5,000 Units, Subcutaneous, Q8H  insulin detemir, 8 Units, Subcutaneous, Nightly  insulin lispro, 2-7 Units, Subcutaneous, 4x Daily AC & at Bedtime  melatonin, 5 mg, Oral, Nightly  nicotine, 1 patch, Transdermal, Q24H  senna-docusate sodium, 2 tablet, Oral, BID   And  polyethylene glycol, 17 g, Oral, Daily  sodium chloride, 10 mL, Intravenous, Q12H  thiamine, 100 mg, Oral, Daily  vitamin D3, 5,000 Units, Oral, Daily      Continuous Infusions:     PRN Meds:.  acetaminophen    senna-docusate sodium **AND** polyethylene glycol **AND** bisacodyl **AND** bisacodyl    Calcium Replacement - Follow Nurse / BPA Driven Protocol    dextrose    dextrose    glucagon (human recombinant)    HYDROcodone-acetaminophen    ipratropium-albuterol    Magnesium Standard Dose Replacement - Follow Nurse / BPA Driven Protocol    naloxone    Phosphorus Replacement - Follow Nurse / BPA  Driven Protocol    Potassium Replacement - Follow Nurse / BPA Driven Protocol    sodium chloride    sodium chloride    sodium chloride    Assessment & Plan   Assessment & Plan     Active Hospital Problems    Diagnosis  POA    **Rhabdomyolysis [M62.82]  Yes    Pneumonia [J18.9]  Unknown    Swelling of right foot [M79.89]  Unknown    Compression fracture of lumbar spine, non-traumatic [M48.56XA]  Unknown    Fall [W19.XXXA]  Unknown    Mixed hyperlipidemia [E78.2]  Yes    Episode of recurrent major depressive disorder [F33.9]  Yes    Cigarette nicotine dependence with nicotine-induced disorder [F17.219]  Yes    Hypertension [I10]  Yes    Uncontrolled type 2 diabetes mellitus with hyperglycemia [E11.65]  Yes      Resolved Hospital Problems   No resolved problems to display.        Brief Hospital Course to date:  Cristian Watt is a 77 y.o. male with a PMH significant for diabetes mellitus type 2, emphysema, GERD, depression, HTN, HLD, history of compression fracture who presents to the ED after a fall.     Assessment and plan:    Concern for syncope  Fall and acute rhabdomyolysis  Patient has been having recurring falls  According to his description, this sounds like syncopal episode while using the bathroom.  Likely secondary to orthostasis  Echocardiogram with normal systolic function.  No significant valvular heart disease  Carotid ultrasound with no carotid artery stenosis  No arrhythmias noted on telemetry  CTA chest with no evidence of PE  Hold further IV fluids given his worsening pleural effusion.    Fall precautions  PT and OT consultation.    Today patient continues with significant back pain.  Recommendations for short-term rehab but patient is adamantly declining.  Further patient declines any home health services.    Compression fracture  Severe compression and T/L spines  Neurosurgery team recommendations appreciated  Neurosurgery has signed off.  Recommends TSLO brace in place with up out  of bed.  Patient to follow-up with [facility] in 2-3 weeks of discharge.  Follow-up with Nino Christine or Susanna Peralta PA-C.    Possible community-acquired pneumonia  Leukocytosis, improved  CTA chest with bilateral basal infiltrates and pleural effusion  Continue Rocephin, doxycycline.  Symptoms improved.  Completes antibiotic course today.  Cultures negative to date  Continue incentive spirometry    HTN  BP climbing.  Patient on losartan 50 mg daily outpatient.  Was held on admit.  Creatinine stable.  No syncope.  We will resume home losartan today.  Monitor.      Hypokalemia  Potassium 3.3 yesterday.  Replacement protocol in place.  Replace per protocol.  Today improved at 3.8.      Diabetes mellitus type 2  A1c 7.9%  Continue SSI with Accu-Cheks.  Patient eating better.  Glucoses all over the place.  As low as 96, as high as 337 last 24 hours.  Patient is not following his diabetic diet.  Difficult to adjust due to lability.  Continue current regimen and monitor.  Patient reports he does much better with his oral medicines outpatient.     Early Charcot changes right foot  X-ray right foot with early changes consistent with Charcot joint:  age indeterminate fractures of the intermediate cuneiform, lateral margin of the medial cuneiform, the navicular bone with widening and disruption of the Lisfranc ligament complex.  Partner prior discussed with Dr. Coronado over the phone and he recommended podiatry follow-up as outpatient  Weightbearing as tolerated  Right lower extremity venous duplex completed: Results, normal right lower extremity venous duplex scan.     Tobacco abuse  Counseled on cessation  Nicotine patch      Expected Discharge Location and Transportation: home tomorrow 7/30/2023.  Patient declining inpatient rehab or home health services.  Asking for elevated toilet seat and transportation home.  Management notified.  Expected Discharge   Expected Discharge Date: 7/30/2023; Expected Discharge Time:      DVT  prophylaxis:  Medical DVT prophylaxis orders are present.     AM-PAC 6 Clicks Score (PT): 12 (07/29/23 0800)    CODE STATUS:   Code Status and Medical Interventions:   Ordered at: 07/25/23 2763     Level Of Support Discussed With:    Patient     Code Status (Patient has no pulse and is not breathing):    CPR (Attempt to Resuscitate)     Medical Interventions (Patient has pulse or is breathing):    Full Support     Release to patient:    Routine Release     Copied text in this note has been reviewed and is accurate as of 07/29/23.     Venessa Sheikh, APRN  07/29/23

## 2023-07-29 NOTE — PLAN OF CARE
"Goal Outcome Evaluation:  Plan of Care Reviewed With: patient        Progress: no change  Outcome Evaluation: Slept on & off today. BP elevated today, restarted on Cozaar home medication. O2 @ 2L/NC Sats %. Jyoti with [employer], rechecked TLSO brace for c/o tightness and rubbing. Pt was upset about having to put the brace on, stating \"you're wasting your time, because I'm not going to wear it when I get home and I'm not gonna to any therapies.\" Tried to educate patient on elevated blood sugars as well, he still continued to ask for certain foods and ice cream. Cleared with nurse practiononer regarding diet. CM working on transportation home possibly tomorrow. Will continue to monitor.  Call light within reach. Continue with POC.         "

## 2023-07-30 LAB
BACTERIA SPEC AEROBE CULT: NORMAL
BACTERIA SPEC AEROBE CULT: NORMAL
GLUCOSE BLDC GLUCOMTR-MCNC: 112 MG/DL (ref 70–130)
GLUCOSE BLDC GLUCOMTR-MCNC: 125 MG/DL (ref 70–130)
GLUCOSE BLDC GLUCOMTR-MCNC: 159 MG/DL (ref 70–130)
GLUCOSE BLDC GLUCOMTR-MCNC: 183 MG/DL (ref 70–130)
L PNEUMO1 AG UR QL IA: NEGATIVE
S PNEUM AG SPEC QL LA: NEGATIVE

## 2023-07-30 PROCEDURE — 63710000001 INSULIN DETEMIR PER 5 UNITS: Performed by: NURSE PRACTITIONER

## 2023-07-30 PROCEDURE — 63710000001 INSULIN LISPRO (HUMAN) PER 5 UNITS: Performed by: INTERNAL MEDICINE

## 2023-07-30 PROCEDURE — 87449 NOS EACH ORGANISM AG IA: CPT | Performed by: INTERNAL MEDICINE

## 2023-07-30 PROCEDURE — 87899 AGENT NOS ASSAY W/OPTIC: CPT | Performed by: INTERNAL MEDICINE

## 2023-07-30 PROCEDURE — 82948 REAGENT STRIP/BLOOD GLUCOSE: CPT

## 2023-07-30 PROCEDURE — 25010000002 HEPARIN (PORCINE) PER 1000 UNITS: Performed by: INTERNAL MEDICINE

## 2023-07-30 RX ADMIN — Medication 1 TABLET: at 10:16

## 2023-07-30 RX ADMIN — HEPARIN SODIUM 5000 UNITS: 5000 INJECTION, SOLUTION INTRAVENOUS; SUBCUTANEOUS at 06:17

## 2023-07-30 RX ADMIN — SENNOSIDES AND DOCUSATE SODIUM 2 TABLET: 50; 8.6 TABLET ORAL at 10:15

## 2023-07-30 RX ADMIN — DOXYCYCLINE 100 MG: 100 INJECTION, POWDER, LYOPHILIZED, FOR SOLUTION INTRAVENOUS at 10:15

## 2023-07-30 RX ADMIN — SENNOSIDES AND DOCUSATE SODIUM 2 TABLET: 50; 8.6 TABLET ORAL at 20:35

## 2023-07-30 RX ADMIN — Medication 5000 UNITS: at 10:15

## 2023-07-30 RX ADMIN — LOSARTAN POTASSIUM 50 MG: 50 TABLET, FILM COATED ORAL at 10:15

## 2023-07-30 RX ADMIN — HYDROCODONE BITARTRATE AND ACETAMINOPHEN 1 TABLET: 7.5; 325 TABLET ORAL at 20:36

## 2023-07-30 RX ADMIN — THIAMINE HCL TAB 100 MG 100 MG: 100 TAB at 10:15

## 2023-07-30 RX ADMIN — Medication 5 MG: at 20:35

## 2023-07-30 RX ADMIN — INSULIN LISPRO 2 UNITS: 100 INJECTION, SOLUTION INTRAVENOUS; SUBCUTANEOUS at 17:41

## 2023-07-30 RX ADMIN — HEPARIN SODIUM 5000 UNITS: 5000 INJECTION, SOLUTION INTRAVENOUS; SUBCUTANEOUS at 22:18

## 2023-07-30 RX ADMIN — Medication 10 ML: at 10:16

## 2023-07-30 RX ADMIN — INSULIN LISPRO 2 UNITS: 100 INJECTION, SOLUTION INTRAVENOUS; SUBCUTANEOUS at 20:35

## 2023-07-30 RX ADMIN — INSULIN DETEMIR 8 UNITS: 100 INJECTION, SOLUTION SUBCUTANEOUS at 20:35

## 2023-07-30 RX ADMIN — POLYETHYLENE GLYCOL 3350 17 G: 17 POWDER, FOR SOLUTION ORAL at 10:16

## 2023-07-30 RX ADMIN — HEPARIN SODIUM 5000 UNITS: 5000 INJECTION, SOLUTION INTRAVENOUS; SUBCUTANEOUS at 15:38

## 2023-07-30 NOTE — PLAN OF CARE
Goal Outcome Evaluation:  Plan of Care Reviewed With: patient        Progress: no change  Outcome Evaluation: Slept better and longer than previous nights. No acute distress noted. Remains on 2LNC, SR on tele. Denies CP or SOA. Norco given and verbalized relief. No concerns at this time. Will cont with current POC

## 2023-07-30 NOTE — CASE MANAGEMENT/SOCIAL WORK
Continued Stay Note  [facility]     Patient Name: Cristian Watt  MRN: [id number]  Today's Date: 7/30/2023    Admit Date: 7/25/2023    Plan: discharge plan   Discharge Plan       Row Name 07/30/23 1431       Plan    Plan discharge plan    Plan Comments Per hospitalist, pt will possibly discharge tomorrow. Transportation requested with [facility] to transport tomorrow. CM waiting to hear back on time. Pt currently on 2 L O2 and does not wear home O2.  Pt still wanting to go home and no interested in  or inpatient rehab. CM will cont to follow    Final Discharge Disposition Code 01 - home or self-care                   Discharge Codes    No documentation.                 Expected Discharge Date and Time       Expected Discharge Date Expected Discharge Time    Jul 30, 2023               Linda Morrell RN

## 2023-07-30 NOTE — PROGRESS NOTES
[facility] Medicine Services  PROGRESS NOTE    Patient Name: Cristian Watt  : 1945  MRN: [id number]    Date of Admission: 2023  Primary Care Physician: No primary care provider on file.    Subjective   Subjective     CC:  Follow-up for fall and syncope    HPI:  Pt sitting up in bed, reporting back pain with movement.  He reports he now would like to have home health at discharge. He has declined rehab.       Objective   Objective     Vital Signs:   Temp:  [96.3 øF (35.7 øC)-97.9 øF (36.6 øC)] 97.9 øF (36.6 øC)  Heart Rate:  [76-85] 76  Resp:  [18] 18  BP: (133-182)/() 133/85  Flow (L/min):  [2] 2     Physical Exam:  Constitutional: Awake, alert, NAD, chronically ill appearing  HENT: NCAT, mucous membranes moist  Respiratory: Clear to auscultation bilaterally, nonlabored respirations   Cardiovascular: RRR, no murmurs, rubs, or gallops  Gastrointestinal: Positive bowel sounds, soft, nontender, nondistended  Musculoskeletal: No bilateral ankle edema,  Psychiatric: Flat affect, cooperative  Neurologic: Oriented, ALLRED, nonfocal, speech clear  Skin: No rashes      Results Reviewed:  LAB RESULTS:      Lab 23  0848 23  0552 23  0421 23  1817 23  1613   WBC 8.08 10.12 11.31*  --  16.59*   HEMOGLOBIN 16.6 16.4 16.3  --  17.2   HEMATOCRIT 49.5 49.8 48.7  --  52.1*   PLATELETS 260 308 294  --  339   NEUTROS ABS 4.77 6.83 7.94*  --  13.40*   IMMATURE GRANS (ABS) 0.03 0.04 0.04  --  0.08*   LYMPHS ABS 2.42 2.25 2.17  --  1.83   MONOS ABS 0.66 0.89 1.07*  --  1.23*   EOS ABS 0.15 0.05 0.04  --  0.01   MCV 93.4 94.5 94.0  --  94.0   PROCALCITONIN  --   --   --  0.21  --    LACTATE  --   --   --   --  1.8           Lab 23  0702 23  1831 23  0848 23  0552 23  0421 23  1613   SODIUM 137  --  135* 136 135* 133*   POTASSIUM 3.8 4.5 3.3* 4.2 3.9 4.3   CHLORIDE 98  --  98 97* 99 96*   CO2 29.0  --  29.0 30.0* 22.0 22.0    ANION GAP 10.0  --  8.0 9.0 14.0 15.0   BUN 7*  --  6* 5* 8 9   CREATININE 0.56*  --  0.52* 0.51* 0.48* 0.65*   EGFR 101.5  --  103.8 104.4 106.4 97.0   GLUCOSE 137*  --  179* 126* 136* 167*   CALCIUM 8.7  --  8.5* 8.7 8.8 9.0   MAGNESIUM  --   --  1.8 1.9  --  2.2   PHOSPHORUS  --   --  3.8 2.8  --   --    HEMOGLOBIN A1C  --   --   --   --  7.90*  --    TSH  --   --   --   --  1.940  --            Lab 07/28/23  0848 07/27/23  0552 07/25/23  1613   TOTAL PROTEIN 6.5 6.7 7.0   ALBUMIN 3.1* 3.2* 4.1   GLOBULIN 3.4 3.5 2.9   ALT (SGPT) 10 16 19   AST (SGOT) 34 51* 61*   BILIRUBIN 0.3 0.4 0.8   ALK PHOS 80 86 104           Lab 07/25/23  1817 07/25/23  1613   HSTROP T 69* 63*                   Brief Urine Lab Results  (Last result in the past 365 days)        Color   Clarity   Blood   Leuk Est   Nitrite   Protein   CREAT   Urine HCG        07/25/23 1720 Orange   Cloudy   Negative   Negative   Negative   30 mg/dL (1+)                   Microbiology Results Abnormal       Procedure Component Value - Date/Time    Blood Culture - Blood, Hand, Left [[id number]]  (Normal) Collected: 07/25/23 2202    Lab Status: Preliminary result Specimen: Blood from Hand, Left Updated: 07/29/23 2345     Blood Culture No growth at 4 days    Blood Culture - Blood, Hand, Left [[id number]]  (Normal) Collected: 07/25/23 2255    Lab Status: Preliminary result Specimen: Blood from Hand, Left Updated: 07/29/23 2345     Blood Culture No growth at 4 days    Urine Culture - Urine, Urine, Catheter [[id number]]  (Normal) Collected: 07/25/23 1720    Lab Status: Final result Specimen: Urine, Catheter Updated: 07/26/23 2121     Urine Culture No growth    COVID PRE-OP / PRE-PROCEDURE SCREENING ORDER (NO ISOLATION) - Swab, Nasopharynx [[id number]]  (Normal) Collected: 07/25/23 6130    Lab Status: Final result Specimen: Swab from Nasopharynx Updated: 07/26/23 0319    Narrative:      The following orders were created for panel order COVID PRE-OP / PRE-PROCEDURE  SCREENING ORDER (NO ISOLATION) - Swab, Nasopharynx.  Procedure                               Abnormality         Status                     ---------                               -----------         ------                     Respiratory Panel PCR w/...[[id number]]  Normal              Final result                 Please view results for these tests on the individual orders.    Respiratory Panel PCR w/COVID-19(SARS-CoV-2) BRANDI/CYNTHIA/CASSI/PAD/COR/MAD/DAVIE In-House, NP Swab in UTM/VTM, 3-4 HR TAT - Swab, Nasopharynx [[id number]]  (Normal) Collected: 07/25/23 2340    Lab Status: Final result Specimen: Swab from Nasopharynx Updated: 07/26/23 0319     ADENOVIRUS, PCR Not Detected     Coronavirus 229E Not Detected     Coronavirus HKU1 Not Detected     Coronavirus NL63 Not Detected     Coronavirus OC43 Not Detected     COVID19 Not Detected     Human Metapneumovirus Not Detected     Human Rhinovirus/Enterovirus Not Detected     Influenza A PCR Not Detected     Influenza B PCR Not Detected     Parainfluenza Virus 1 Not Detected     Parainfluenza Virus 2 Not Detected     Parainfluenza Virus 3 Not Detected     Parainfluenza Virus 4 Not Detected     RSV, PCR Not Detected     Bordetella pertussis pcr Not Detected     Bordetella parapertussis PCR Not Detected     Chlamydophila pneumoniae PCR Not Detected     Mycoplasma pneumo by PCR Not Detected    Narrative:      In the setting of a positive respiratory panel with a viral infection PLUS a negative procalcitonin without other underlying concern for bacterial infection, consider observing off antibiotics or discontinuation of antibiotics and continue supportive care. If the respiratory panel is positive for atypical bacterial infection (Bordetella pertussis, Chlamydophila pneumoniae, or Mycoplasma pneumoniae), consider antibiotic de-escalation to target atypical bacterial infection.            No radiology results from the last 24 hrs    Results for orders placed during the hospital  encounter of 07/25/23    Adult Transthoracic Echo Complete W/ Cont if Necessary Per Protocol    Interpretation Summary    Left ventricular systolic function is normal. Estimated left ventricular EF = 60%    Left ventricular wall thickness is consistent with mild concentric hypertrophy.    Left ventricular diastolic function is consistent with (grade I) impaired relaxation.    The right ventricular cavity is mildly dilated.    No significant valvular abnormalities.      Current medications:  Scheduled Meds:folic acid-vit B6-vit B12, 1 tablet, Oral, Daily  heparin (porcine), 5,000 Units, Subcutaneous, Q8H  insulin detemir, 8 Units, Subcutaneous, Nightly  insulin lispro, 2-7 Units, Subcutaneous, 4x Daily AC & at Bedtime  losartan, 50 mg, Oral, Q24H  melatonin, 5 mg, Oral, Nightly  nicotine, 1 patch, Transdermal, Q24H  senna-docusate sodium, 2 tablet, Oral, BID   And  polyethylene glycol, 17 g, Oral, Daily  sodium chloride, 10 mL, Intravenous, Q12H  thiamine, 100 mg, Oral, Daily  vitamin D3, 5,000 Units, Oral, Daily      Continuous Infusions:     PRN Meds:.  acetaminophen    senna-docusate sodium **AND** polyethylene glycol **AND** bisacodyl **AND** bisacodyl    Calcium Replacement - Follow Nurse / BPA Driven Protocol    dextrose    dextrose    glucagon (human recombinant)    HYDROcodone-acetaminophen    ipratropium-albuterol    Magnesium Standard Dose Replacement - Follow Nurse / BPA Driven Protocol    naloxone    Phosphorus Replacement - Follow Nurse / BPA Driven Protocol    Potassium Replacement - Follow Nurse / BPA Driven Protocol    sodium chloride    sodium chloride    sodium chloride    Assessment & Plan   Assessment & Plan     Active Hospital Problems    Diagnosis  POA    **Rhabdomyolysis [M62.82]  Yes    Pneumonia [J18.9]  Unknown    Swelling of right foot [M79.89]  Unknown    Compression fracture of lumbar spine, non-traumatic [M48.56XA]  Unknown    Fall [W19.XXXA]  Unknown    Mixed  hyperlipidemia [E78.2]  Yes    Episode of recurrent major depressive disorder [F33.9]  Yes    Cigarette nicotine dependence with nicotine-induced disorder [F17.219]  Yes    Hypertension [I10]  Yes    Uncontrolled type 2 diabetes mellitus with hyperglycemia [E11.65]  Yes      Resolved Hospital Problems   No resolved problems to display.        Brief Hospital Course to date:  Cristian Watt is a 77 y.o. male with a PMH significant for diabetes mellitus type 2, emphysema, GERD, depression, HTN, HLD, history of compression fracture who presents to the ED after a fall.     Assessment and plan:    Concern for syncope  Fall and acute rhabdomyolysis  Patient has been having recurring falls  According to his description, this sounds like syncopal episode while using the bathroom.  Likely secondary to orthostasis  Echocardiogram with normal systolic function.  No significant valvular heart disease  Carotid ultrasound with no carotid artery stenosis  No arrhythmias noted on telemetry  CTA chest with no evidence of PE  Hold further IV fluids given his worsening pleural effusion.    Fall precautions  PT and OT consultation.    Today patient continues with significant back pain.  Recommendations for short-term rehab but patient is adamantly declining.  Further patient declines any home health services.    Compression fracture  Severe compression and T/L spines  Neurosurgery team recommendations appreciated  Neurosurgery has signed off.  Recommends TSLO brace in place with up out of bed.  Patient to follow-up with [facility] in 2-3 weeks of discharge.  Follow-up with Nino Christine or Susanna Peralta PA-C.    Possible community-acquired pneumonia  Leukocytosis, improved  CTA chest with bilateral basal infiltrates and pleural effusion  Continue Rocephin, doxycycline.  Symptoms improved.  Completes antibiotic course today.  Cultures negative to date  Continue incentive spirometry    HTN  BP climbing.  Patient on losartan 50 mg daily  outpatient.  Was held on admit.  Creatinine stable.  No syncope.  We will resume home losartan today.  Monitor.      Hypokalemia  Potassium 3.3 yesterday.  Replacement protocol in place.  Replace per protocol.  Today improved at 3.8.      Diabetes mellitus type 2  A1c 7.9%  Continue SSI with Accu-Cheks.  Patient eating better.  Glucoses all over the place.  As low as 96, as high as 337 last 24 hours.  Patient is not following his diabetic diet.  Difficult to adjust due to lability.  Continue current regimen and monitor.  Patient reports he does much better with his oral medicines outpatient.     Early Charcot changes right foot  X-ray right foot with early changes consistent with Charcot joint:  age indeterminate fractures of the intermediate cuneiform, lateral margin of the medial cuneiform, the navicular bone with widening and disruption of the Lisfranc ligament complex.  Partner prior discussed with Dr. Coronado over the phone and he recommended podiatry follow-up as outpatient  Weightbearing as tolerated  Right lower extremity venous duplex completed: Results, normal right lower extremity venous duplex scan.     Tobacco abuse  Counseled on cessation  Nicotine patch      Expected Discharge Location and Transportation: home tomorrow 7/31/2023.  Patient declining inpatient rehab but requesting home gisele. Asking for elevated toilet seat and transportation home.  Management notified.  Expected Discharge   07/31/2023    DVT prophylaxis:  Medical DVT prophylaxis orders are present.     AM-PAC 6 Clicks Score (PT): 12 (07/29/23 0800)    CODE STATUS:   Code Status and Medical Interventions:   Ordered at: 07/25/23 2938     Level Of Support Discussed With:    Patient     Code Status (Patient has no pulse and is not breathing):    CPR (Attempt to Resuscitate)     Medical Interventions (Patient has pulse or is breathing):    Full Support     Release to patient:    Routine Release     Copied text in this note has been reviewed  and is accurate as of 07/30/23.     Izabel Smith, APRN  07/30/23

## 2023-07-31 LAB
GLUCOSE BLDC GLUCOMTR-MCNC: 116 MG/DL (ref 70–130)
GLUCOSE BLDC GLUCOMTR-MCNC: 164 MG/DL (ref 70–130)
GLUCOSE BLDC GLUCOMTR-MCNC: 167 MG/DL (ref 70–130)
GLUCOSE BLDC GLUCOMTR-MCNC: 176 MG/DL (ref 70–130)

## 2023-07-31 PROCEDURE — 25010000002 HEPARIN (PORCINE) PER 1000 UNITS: Performed by: INTERNAL MEDICINE

## 2023-07-31 PROCEDURE — 63710000001 INSULIN DETEMIR PER 5 UNITS: Performed by: NURSE PRACTITIONER

## 2023-07-31 PROCEDURE — 63710000001 INSULIN LISPRO (HUMAN) PER 5 UNITS: Performed by: INTERNAL MEDICINE

## 2023-07-31 PROCEDURE — 82948 REAGENT STRIP/BLOOD GLUCOSE: CPT

## 2023-07-31 RX ADMIN — INSULIN LISPRO 2 UNITS: 100 INJECTION, SOLUTION INTRAVENOUS; SUBCUTANEOUS at 13:27

## 2023-07-31 RX ADMIN — Medication 5 MG: at 20:50

## 2023-07-31 RX ADMIN — INSULIN DETEMIR 8 UNITS: 100 INJECTION, SOLUTION SUBCUTANEOUS at 20:50

## 2023-07-31 RX ADMIN — Medication 1 TABLET: at 09:37

## 2023-07-31 RX ADMIN — POLYETHYLENE GLYCOL 3350 17 G: 17 POWDER, FOR SOLUTION ORAL at 09:37

## 2023-07-31 RX ADMIN — Medication 5000 UNITS: at 09:37

## 2023-07-31 RX ADMIN — HEPARIN SODIUM 5000 UNITS: 5000 INJECTION, SOLUTION INTRAVENOUS; SUBCUTANEOUS at 13:27

## 2023-07-31 RX ADMIN — THIAMINE HCL TAB 100 MG 100 MG: 100 TAB at 09:37

## 2023-07-31 RX ADMIN — HEPARIN SODIUM 5000 UNITS: 5000 INJECTION, SOLUTION INTRAVENOUS; SUBCUTANEOUS at 22:40

## 2023-07-31 RX ADMIN — SENNOSIDES AND DOCUSATE SODIUM 2 TABLET: 50; 8.6 TABLET ORAL at 09:37

## 2023-07-31 RX ADMIN — INSULIN LISPRO 2 UNITS: 100 INJECTION, SOLUTION INTRAVENOUS; SUBCUTANEOUS at 20:50

## 2023-07-31 RX ADMIN — HYDROCODONE BITARTRATE AND ACETAMINOPHEN 1 TABLET: 7.5; 325 TABLET ORAL at 09:36

## 2023-07-31 RX ADMIN — BISACODYL 5 MG: 5 TABLET, COATED ORAL at 09:39

## 2023-07-31 RX ADMIN — HYDROCODONE BITARTRATE AND ACETAMINOPHEN 1 TABLET: 7.5; 325 TABLET ORAL at 20:50

## 2023-07-31 RX ADMIN — HEPARIN SODIUM 5000 UNITS: 5000 INJECTION, SOLUTION INTRAVENOUS; SUBCUTANEOUS at 05:10

## 2023-07-31 RX ADMIN — INSULIN LISPRO 2 UNITS: 100 INJECTION, SOLUTION INTRAVENOUS; SUBCUTANEOUS at 09:36

## 2023-07-31 RX ADMIN — LOSARTAN POTASSIUM 50 MG: 50 TABLET, FILM COATED ORAL at 09:37

## 2023-07-31 NOTE — CASE MANAGEMENT/SOCIAL WORK
Continued Stay Note  [facility]     Patient Name: Cristian Watt  MRN: [id number]  Today's Date: 7/31/2023    Admit Date: 7/25/2023    Plan: SNF   Discharge Plan       Row Name 07/31/23 1158       Plan    Plan SNF    Patient/Family in Agreement with Plan yes    Plan Comments Spoke with patient at bedside. Patient is more agreeable to rehab. CM called son and we discussed rehab option and would like referrals to [facility]. CM will follow for for discharge needs.    Final Discharge Disposition Code 03 - skilled nursing facility (SNF)                   Discharge Codes    No documentation.                 Expected Discharge Date and Time       Expected Discharge Date Expected Discharge Time    Aug 2, 2023               Shu Schmidt RN

## 2023-07-31 NOTE — CASE MANAGEMENT/SOCIAL WORK
Continued Stay Note  [facility]     Patient Name: Cristian Watt  MRN: [id number]  Today's Date: 7/31/2023    Admit Date: 7/25/2023    Plan: SNF   Discharge Plan       Row Name 07/31/23 1637       Plan    Plan SNF    Patient/Family in Agreement with Plan yes    Plan Comments Patient discharge plan is [facility] at  tomorrow at 8/01. Ambulance when  EMS at 0945. CM will follow.    Final Discharge Disposition Code 03 - skilled nursing facility (SNF)                   Discharge Codes    No documentation.                 Expected Discharge Date and Time       Expected Discharge Date Expected Discharge Time    Aug 1, 2023               Shu Schmidt RN

## 2023-07-31 NOTE — PLAN OF CARE
Goal Outcome Evaluation:  Plan of Care Reviewed With: patient           Outcome Evaluation: Pt a/o, VSS, 2 LNC, Sat. 94%, c/o pain addressed with PRN meds, continue monitoring. Continue monitoring.

## 2023-07-31 NOTE — PROGRESS NOTES
[facility] Medicine Services  PROGRESS NOTE    Patient Name: Cristian Watt  : 1945  MRN: [id number]    Date of Admission: 2023  Primary Care Physician: No primary care provider on file.    Subjective   Subjective     CC:  Follow-up for fall and syncope    HPI:  Patient states that he feels ok.  Reports that at home that he got up from the toilet and then fell.  He denies any dizziness or CP.  States that he will at least think about going to rehab.       Objective   Objective     Vital Signs:   Temp:  [96.3 øF (35.7 øC)-97.9 øF (36.6 øC)] 97.9 øF (36.6 øC)  Heart Rate:  [76-85] 76  Resp:  [18] 18  BP: (133-182)/() 133/85  Flow (L/min):  [2] 2     Physical Exam:  Constitutional: Awake, alert, NAD, chronically ill appearing, sitting up in bed  HENT: NCAT, mucous membranes moist  Respiratory: Clear to auscultation bilaterally, nonlabored respirations   Cardiovascular: RRR, no murmurs, rubs, or gallops  Gastrointestinal: Positive bowel sounds, soft, nontender, nondistended  Musculoskeletal: No bilateral ankle edema,  Psychiatric: Flat affect, cooperative  Neurologic: Oriented, ALLRED, nonfocal, speech clear  Skin: No rashes      Results Reviewed:  LAB RESULTS:      Lab 23  0848 23  0552 23  0421 23  1817 23  1613   WBC 8.08 10.12 11.31*  --  16.59*   HEMOGLOBIN 16.6 16.4 16.3  --  17.2   HEMATOCRIT 49.5 49.8 48.7  --  52.1*   PLATELETS 260 308 294  --  339   NEUTROS ABS 4.77 6.83 7.94*  --  13.40*   IMMATURE GRANS (ABS) 0.03 0.04 0.04  --  0.08*   LYMPHS ABS 2.42 2.25 2.17  --  1.83   MONOS ABS 0.66 0.89 1.07*  --  1.23*   EOS ABS 0.15 0.05 0.04  --  0.01   MCV 93.4 94.5 94.0  --  94.0   PROCALCITONIN  --   --   --  0.21  --    LACTATE  --   --   --   --  1.8         Lab 23  0702 23  1831 23  0848 23  0552 23  0421 23  1613   SODIUM 137  --  135* 136 135* 133*   POTASSIUM 3.8 4.5 3.3* 4.2 3.9 4.3   CHLORIDE  98  --  98 97* 99 96*   CO2 29.0  --  29.0 30.0* 22.0 22.0   ANION GAP 10.0  --  8.0 9.0 14.0 15.0   BUN 7*  --  6* 5* 8 9   CREATININE 0.56*  --  0.52* 0.51* 0.48* 0.65*   EGFR 101.5  --  103.8 104.4 106.4 97.0   GLUCOSE 137*  --  179* 126* 136* 167*   CALCIUM 8.7  --  8.5* 8.7 8.8 9.0   MAGNESIUM  --   --  1.8 1.9  --  2.2   PHOSPHORUS  --   --  3.8 2.8  --   --    HEMOGLOBIN A1C  --   --   --   --  7.90*  --    TSH  --   --   --   --  1.940  --          Lab 07/28/23  0848 07/27/23  0552 07/25/23  1613   TOTAL PROTEIN 6.5 6.7 7.0   ALBUMIN 3.1* 3.2* 4.1   GLOBULIN 3.4 3.5 2.9   ALT (SGPT) 10 16 19   AST (SGOT) 34 51* 61*   BILIRUBIN 0.3 0.4 0.8   ALK PHOS 80 86 104         Lab 07/25/23  1817 07/25/23  1613   HSTROP T 69* 63*                 Brief Urine Lab Results  (Last result in the past 365 days)        Color   Clarity   Blood   Leuk Est   Nitrite   Protein   CREAT   Urine HCG        07/25/23 1720 Orange   Cloudy   Negative   Negative   Negative   30 mg/dL (1+)                   Microbiology Results Abnormal       Procedure Component Value - Date/Time    Blood Culture - Blood, Hand, Left [[id number]]  (Normal) Collected: 07/25/23 2255    Lab Status: Final result Specimen: Blood from Hand, Left Updated: 07/30/23 2345     Blood Culture No growth at 5 days    Blood Culture - Blood, Hand, Left [[id number]]  (Normal) Collected: 07/25/23 2202    Lab Status: Final result Specimen: Blood from Hand, Left Updated: 07/30/23 2345     Blood Culture No growth at 5 days    S. Pneumo Ag Urine or CSF - Urine, Urine, Clean Catch [[id number]]  (Normal) Collected: 07/30/23 1749    Lab Status: Final result Specimen: Urine, Clean Catch Updated: 07/30/23 2133     Strep Pneumo Ag Negative    Legionella Antigen, Urine - Urine, Urine, Clean Catch [[id number]]  (Normal) Collected: 07/30/23 1749    Lab Status: Final result Specimen: Urine, Clean Catch Updated: 07/30/23 2133     LEGIONELLA ANTIGEN, URINE Negative    Urine Culture - Urine,  Urine, Catheter [[id number]]  (Normal) Collected: 07/25/23 1720    Lab Status: Final result Specimen: Urine, Catheter Updated: 07/26/23 2121     Urine Culture No growth    COVID PRE-OP / PRE-PROCEDURE SCREENING ORDER (NO ISOLATION) - Swab, Nasopharynx [[id number]]  (Normal) Collected: 07/25/23 2340    Lab Status: Final result Specimen: Swab from Nasopharynx Updated: 07/26/23 0319    Narrative:      The following orders were created for panel order COVID PRE-OP / PRE-PROCEDURE SCREENING ORDER (NO ISOLATION) - Swab, Nasopharynx.  Procedure                               Abnormality         Status                     ---------                               -----------         ------                     Respiratory Panel PCR w/...[[id number]]  Normal              Final result                 Please view results for these tests on the individual orders.    Respiratory Panel PCR w/COVID-19(SARS-CoV-2) BRANDI/CYNTHIA/CASSI/PAD/COR/MAD/DAVIE In-House, NP Swab in UTM/VTM, 3-4 HR TAT - Swab, Nasopharynx [[id number]]  (Normal) Collected: 07/25/23 2340    Lab Status: Final result Specimen: Swab from Nasopharynx Updated: 07/26/23 0319     ADENOVIRUS, PCR Not Detected     Coronavirus 229E Not Detected     Coronavirus HKU1 Not Detected     Coronavirus NL63 Not Detected     Coronavirus OC43 Not Detected     COVID19 Not Detected     Human Metapneumovirus Not Detected     Human Rhinovirus/Enterovirus Not Detected     Influenza A PCR Not Detected     Influenza B PCR Not Detected     Parainfluenza Virus 1 Not Detected     Parainfluenza Virus 2 Not Detected     Parainfluenza Virus 3 Not Detected     Parainfluenza Virus 4 Not Detected     RSV, PCR Not Detected     Bordetella pertussis pcr Not Detected     Bordetella parapertussis PCR Not Detected     Chlamydophila pneumoniae PCR Not Detected     Mycoplasma pneumo by PCR Not Detected    Narrative:      In the setting of a positive respiratory panel with a viral infection PLUS a negative procalcitonin  without other underlying concern for bacterial infection, consider observing off antibiotics or discontinuation of antibiotics and continue supportive care. If the respiratory panel is positive for atypical bacterial infection (Bordetella pertussis, Chlamydophila pneumoniae, or Mycoplasma pneumoniae), consider antibiotic de-escalation to target atypical bacterial infection.            No radiology results from the last 24 hrs    Results for orders placed during the hospital encounter of 07/25/23    Adult Transthoracic Echo Complete W/ Cont if Necessary Per Protocol    Interpretation Summary    Left ventricular systolic function is normal. Estimated left ventricular EF = 60%    Left ventricular wall thickness is consistent with mild concentric hypertrophy.    Left ventricular diastolic function is consistent with (grade I) impaired relaxation.    The right ventricular cavity is mildly dilated.    No significant valvular abnormalities.      Current medications:  Scheduled Meds:folic acid-vit B6-vit B12, 1 tablet, Oral, Daily  heparin (porcine), 5,000 Units, Subcutaneous, Q8H  insulin detemir, 8 Units, Subcutaneous, Nightly  insulin lispro, 2-7 Units, Subcutaneous, 4x Daily AC & at Bedtime  losartan, 50 mg, Oral, Q24H  melatonin, 5 mg, Oral, Nightly  nicotine, 1 patch, Transdermal, Q24H  senna-docusate sodium, 2 tablet, Oral, BID   And  polyethylene glycol, 17 g, Oral, Daily  sodium chloride, 10 mL, Intravenous, Q12H  thiamine, 100 mg, Oral, Daily  vitamin D3, 5,000 Units, Oral, Daily      Continuous Infusions:     PRN Meds:.  acetaminophen    senna-docusate sodium **AND** polyethylene glycol **AND** bisacodyl **AND** bisacodyl    Calcium Replacement - Follow Nurse / BPA Driven Protocol    dextrose    dextrose    glucagon (human recombinant)    HYDROcodone-acetaminophen    ipratropium-albuterol    Magnesium Standard Dose Replacement - Follow Nurse / BPA Driven Protocol    naloxone    Phosphorus  Replacement - Follow Nurse / BPA Driven Protocol    Potassium Replacement - Follow Nurse / BPA Driven Protocol    sodium chloride    sodium chloride    sodium chloride    Assessment & Plan   Assessment & Plan     Active Hospital Problems    Diagnosis  POA    **Rhabdomyolysis [M62.82]  Yes    Pneumonia [J18.9]  Unknown    Swelling of right foot [M79.89]  Unknown    Compression fracture of lumbar spine, non-traumatic [M48.56XA]  Unknown    Fall [W19.XXXA]  Unknown    Mixed hyperlipidemia [E78.2]  Yes    Episode of recurrent major depressive disorder [F33.9]  Yes    Cigarette nicotine dependence with nicotine-induced disorder [F17.219]  Yes    Hypertension [I10]  Yes    Uncontrolled type 2 diabetes mellitus with hyperglycemia [E11.65]  Yes      Resolved Hospital Problems   No resolved problems to display.        Brief Hospital Course to date:  Cristian Watt is a 77 y.o. male with a PMH significant for diabetes mellitus type 2, emphysema, GERD, depression, HTN, HLD, history of compression fracture who presents to the ED after a fall.     Assessment and plan:    Concern for syncope  Fall and acute rhabdomyolysis  Patient has been having recurring falls  According to his description, this sounds like syncopal episode while using the bathroom.  Likely secondary to orthostasis  Echocardiogram with normal systolic function.  No significant valvular heart disease  Carotid ultrasound with no carotid artery stenosis  No arrhythmias noted on telemetry  CTA chest with no evidence of PE  Hold further IV fluids given his worsening pleural effusion.    Fall precautions  PT and OT consultation.    Today patient continues with significant back pain.  Recommendations for short-term rehab, patient had declined but now states that he will consider.    Compression fracture  Severe compression and T/L spines  Neurosurgery team recommendations appreciated  Neurosurgery has signed off.  Recommends TSLO brace in place with  up out of bed.  Patient to follow-up with NS in 2-3 weeks of discharge.  Follow-up with Nino Christine or Susanna Peralta PA-C.    Possible community-acquired pneumonia  Leukocytosis, improved  CTA chest with bilateral basal infiltrates and pleural effusion  Continue Rocephin, doxycycline.  Symptoms improved.  Completes antibiotic course today.  Cultures negative to date  Continue incentive spirometry    HTN  BP climbing.  Patient on losartan 50 mg daily outpatient.  Was held on admit.  Creatinine stable.  No syncope.  We will resume home losartan today.  Monitor.      Hypokalemia  Potassium 3.3 yesterday.  Replacement protocol in place.  Replace per protocol.  Today improved at 3.8.      Diabetes mellitus type 2  A1c 7.9%  Continue SSI with Accu-Cheks.  Patient eating better.  Glucoses all over the place.  As low as 96, as high as 337 last 24 hours.  Patient is not following his diabetic diet.  Difficult to adjust due to lability.  Continue current regimen and monitor.  Patient reports he does much better with his oral medicines outpatient.     Early Charcot changes right foot  X-ray right foot with early changes consistent with Charcot joint:  age indeterminate fractures of the intermediate cuneiform, lateral margin of the medial cuneiform, the navicular bone with widening and disruption of the Lisfranc ligament complex.  Partner prior discussed with Dr. Coronado over the phone and he recommended podiatry follow-up as outpatient  Weightbearing as tolerated  Right lower extremity venous duplex completed: Results, normal right lower extremity venous duplex scan.     Tobacco abuse  Counseled on cessation  Nicotine patch      Expected Discharge Location and Transportation: ?transfer to the [facility] tomorrow at 9:45AM? If patient agreeable  Expected Discharge   8/1/2023    DVT prophylaxis:  Medical DVT prophylaxis orders are present.     AM-PAC 6 Clicks Score (PT): 12 (07/29/23 0800)    CODE STATUS:   Code Status and  Medical Interventions:   Ordered at: 07/25/23 2334     Level Of Support Discussed With:    Patient     Code Status (Patient has no pulse and is not breathing):    CPR (Attempt to Resuscitate)     Medical Interventions (Patient has pulse or is breathing):    Full Support     Release to patient:    Routine Release     Copied text in this note has been reviewed and is accurate as of 07/31/23.     Rudolph Duque MD  07/31/23

## 2023-07-31 NOTE — PLAN OF CARE
Goal Outcome Evaluation:  Plan of Care Reviewed With: patient           Outcome Evaluation: Pt a/o, VSS, 2 LNC, Sat. 94%, no c/o pain at this time, continue monitoring. Continue monitoring.

## 2023-07-31 NOTE — PLAN OF CARE
Goal Outcome Evaluation:              Outcome Evaluation: Pain medication given x1 this shift. Up to 3LNC while sleeping.

## 2023-08-01 VITALS
BODY MASS INDEX: 27.71 KG/M2 | WEIGHT: 204.6 LBS | DIASTOLIC BLOOD PRESSURE: 92 MMHG | RESPIRATION RATE: 18 BRPM | HEART RATE: 87 BPM | OXYGEN SATURATION: 92 % | HEIGHT: 72 IN | SYSTOLIC BLOOD PRESSURE: 151 MMHG | TEMPERATURE: 96.2 F

## 2023-08-01 LAB — GLUCOSE BLDC GLUCOMTR-MCNC: 113 MG/DL (ref 70–130)

## 2023-08-01 PROCEDURE — 82948 REAGENT STRIP/BLOOD GLUCOSE: CPT

## 2023-08-01 PROCEDURE — 25010000002 HEPARIN (PORCINE) PER 1000 UNITS: Performed by: INTERNAL MEDICINE

## 2023-08-01 RX ORDER — LOPERAMIDE HYDROCHLORIDE 2 MG/1
2 CAPSULE ORAL ONCE
Status: COMPLETED | OUTPATIENT
Start: 2023-08-01 | End: 2023-08-01

## 2023-08-01 RX ORDER — NICOTINE 21 MG/24HR
1 PATCH, TRANSDERMAL 24 HOURS TRANSDERMAL EVERY 24 HOURS
Start: 2023-08-01

## 2023-08-01 RX ORDER — HYDROCODONE BITARTRATE AND ACETAMINOPHEN 7.5; 325 MG/1; MG/1
1 TABLET ORAL EVERY 6 HOURS PRN
Qty: 12 TABLET | Refills: 0 | Status: SHIPPED | OUTPATIENT
Start: 2023-08-01

## 2023-08-01 RX ADMIN — LOSARTAN POTASSIUM 50 MG: 50 TABLET, FILM COATED ORAL at 08:38

## 2023-08-01 RX ADMIN — THIAMINE HCL TAB 100 MG 100 MG: 100 TAB at 08:38

## 2023-08-01 RX ADMIN — Medication 5000 UNITS: at 08:38

## 2023-08-01 RX ADMIN — LOPERAMIDE HYDROCHLORIDE 2 MG: 2 CAPSULE ORAL at 05:10

## 2023-08-01 RX ADMIN — Medication 1 TABLET: at 08:38

## 2023-08-01 RX ADMIN — HEPARIN SODIUM 5000 UNITS: 5000 INJECTION, SOLUTION INTRAVENOUS; SUBCUTANEOUS at 05:10

## 2023-08-01 NOTE — CASE MANAGEMENT/SOCIAL WORK
Case Management Discharge Note      Final Note: Patient discharging today, 08/01/23, to the [facility] at .  Transport with EMS at 0945. Nurse to call report to [phone]. CM will fax discharge summary to [phone]. PCS is on chart. Pharmacy has been changed to [facility]. Patient and son has been updated and in agreement with plan.         Selected Continued Care - Admitted Since 7/25/2023       Destination Coordination complete.      Service Provider Selected Services Address Phone Fax Patient Preferred    [facility] [place] [address] [phone] --              Durable Medical Equipment    No services have been selected for the patient.                Dialysis/Infusion    No services have been selected for the patient.                Home Medical Care    No services have been selected for the patient.                Therapy    No services have been selected for the patient.                Community Resources    No services have been selected for the patient.                Community & DME    No services have been selected for the patient.                    Transportation Services  Ambulance: [facility]    Final Discharge Disposition Code: 03 - skilled nursing facility (SNF)Case Management Discharge Note      Final Note: Patient discharging today, 08/01/23, to the [facility] at .  Transport with EMS at 0945. Nurse to call report to [phone]. CM will fax discharge summary to [phone]. PCS is on chart. Pharmacy has been changed to [facility]. Patient and son has been updated and in agreement with plan.         Selected Continued Care - Admitted Since 7/25/2023       Destination    No services have been selected for the patient.                Durable Medical Equipment    No services have been selected for the patient.                Dialysis/Infusion    No services have been selected for the patient.                 Home Medical Care    No services have been selected for the patient.                Therapy    No services have been selected for the patient.                Community Resources    No services have been selected for the patient.                Community & DME    No services have been selected for the patient.                    Transportation Services  Ambulance: [facility]    Final Discharge Disposition Code: 03 - skilled nursing facility (SNF)Continued Stay Note  [facility]     Patient Name: Cristian Watt  MRN: [id number]  Today's Date: 8/1/2023    Admit Date: 7/25/2023    Plan: SNF   Discharge Plan       Row Name 08/01/23 0854       Plan    Plan SNF    Patient/Family in Agreement with Plan yes    Final Discharge Disposition Code 03 - skilled nursing facility (SNF)    Final Note Patient discharging today, 08/01/23, to the [facility] at .  Transport with EMS at 0945. Nurse to call report to [phone]. CM will fax discharge summary to [phone]. PCS is on chart. Pharmacy has been changed to [facility]. Patient and son has been updated and in agreement with plan.      Row Name 08/01/23 0848       Plan    Plan SNF    Patient/Family in Agreement with Plan yes    Final Discharge Disposition Code 03 - skilled nursing facility (SNF)    Final Note Patient discharging today.                    Discharge Codes    No documentation.                 Expected Discharge Date and Time       Expected Discharge Date Expected Discharge Time    Aug 1, 2023               Shu Schmidt RN

## 2023-08-01 NOTE — DISCHARGE SUMMARY
[facility]  DISCHARGE SUMMARY    Patient Name: Cristian Watt  : 1945  MRN: [id number]    Date of Admission: 2023  3:53 PM  Date of Discharge:  2023  Primary Care Physician: No primary care provider on file.    Consults       Date and Time Order Name Status Description    2023  8:51 PM Inpatient Neurosurgery Consult Completed             Hospital Course     Presenting Problem:     Active Hospital Problems    Diagnosis  POA    **Rhabdomyolysis [M62.82]  Yes    Pneumonia [J18.9]  Unknown    Swelling of right foot [M79.89]  Unknown    Compression fracture of lumbar spine, non-traumatic [M48.56XA]  Unknown    Fall [W19.XXXA]  Unknown    Mixed hyperlipidemia [E78.2]  Yes    Episode of recurrent major depressive disorder [F33.9]  Yes    Cigarette nicotine dependence with nicotine-induced disorder [F17.219]  Yes    Hypertension [I10]  Yes    Uncontrolled type 2 diabetes mellitus with hyperglycemia [E11.65]  Yes      Resolved Hospital Problems   No resolved problems to display.          Hospital Course:  Cristian Watt is a 77 y.o. male with a PMH significant for diabetes mellitus type 2, emphysema, GERD, depression, HTN, HLD, history of compression fracture who presented to the ED after a fall.      Assessment and plan:     Concern for syncope  Fall and acute rhabdomyolysis  Patient has been having recurring falls  According to his description, this sounds like syncopal episode while using the bathroom.  Likely secondary to orthostasis  Echocardiogram with normal systolic function.  No significant valvular heart disease  Carotid ultrasound with no carotid artery stenosis  No arrhythmias noted on telemetry  CTA chest with no evidence of PE    Fall precautions  S/p PT and OT consultation.         Compression fracture  Severe compression and T/L spines  S/p Neurosurgery evaluation.  Recommends TSLO brace in place with up out of bed.  Patient to  follow-up with NS in 2-3 weeks of discharge.  Follow-up with Nino Christine or Susanna Peralta PA-C.     Possible community-acquired pneumonia  Leukocytosis, improved  CTA chest with bilateral basal infiltrates and pleural effusion  Completed antibiotics  Cultures negative to date  Continue incentive spirometry     HTN  Continue home losartan        Hypokalemia  Replaced per protocol      Diabetes mellitus type 2  A1c 7.9%  Continue home janumet     Early Charcot changes right foot  X-ray right foot with early changes consistent with Charcot joint:  age indeterminate fractures of the intermediate cuneiform, lateral margin of the medial cuneiform, the navicular bone with widening and disruption of the Lisfranc ligament complex.  Partner discussed with Dr. Coronado over the phone and he recommended podiatry follow-up as outpatient  Weightbearing as tolerated  Right lower extremity venous duplex normal     Tobacco abuse  Counseled on cessation  Nicotine patch      Discharge Follow Up Recommendations for outpatient labs/diagnostics:   F/u with PCP 1 week after discharge from rehab  F/u with Neurosurgery, Nino Christine or Susanna Peralta in 2-3 weeks  F/u with podiatry for early changes of right charcot foot    Day of Discharge     HPI:   Feels good.  Ready to go    Review of Systems  Gen- No fevers, chills  CV- No chest pain, palpitations  Resp- No cough, dyspnea  GI- No N/V/D, abd pain      Vital Signs:   Temp:  [96.1 øF (35.6 øC)-97.2 øF (36.2 øC)] 96.2 øF (35.7 øC)  Heart Rate:  [83-95] 87  Resp:  [18] 18  BP: (108-159)/() 151/92  Flow (L/min):  [2-3] 2      Physical Exam:  Constitutional: No acute distress, awake, alert  HENT: NCAT, mucous membranes moist  Respiratory: Clear to auscultation bilaterally, respiratory effort normal   Cardiovascular: RRR, no murmurs, rubs, or gallops  Gastrointestinal: Positive bowel sounds, soft, nontender, nondistended  Musculoskeletal: No bilateral ankle edema  Psychiatric:  Appropriate affect, cooperative  Neurologic: Oriented x 3, strength symmetric in all extremities, Cranial Nerves grossly intact to confrontation, speech clear  Skin: No rashes      Pertinent  and/or Most Recent Results     LAB RESULTS:      Lab 07/28/23  0848 07/27/23  0552 07/26/23  0421 07/25/23  1817 07/25/23  1613   WBC 8.08 10.12 11.31*  --  16.59*   HEMOGLOBIN 16.6 16.4 16.3  --  17.2   HEMATOCRIT 49.5 49.8 48.7  --  52.1*   PLATELETS 260 308 294  --  339   NEUTROS ABS 4.77 6.83 7.94*  --  13.40*   IMMATURE GRANS (ABS) 0.03 0.04 0.04  --  0.08*   LYMPHS ABS 2.42 2.25 2.17  --  1.83   MONOS ABS 0.66 0.89 1.07*  --  1.23*   EOS ABS 0.15 0.05 0.04  --  0.01   MCV 93.4 94.5 94.0  --  94.0   PROCALCITONIN  --   --   --  0.21  --    LACTATE  --   --   --   --  1.8         Lab 07/29/23  0702 07/28/23  1831 07/28/23  0848 07/27/23  0552 07/26/23  0421 07/25/23  1613   SODIUM 137  --  135* 136 135* 133*   POTASSIUM 3.8 4.5 3.3* 4.2 3.9 4.3   CHLORIDE 98  --  98 97* 99 96*   CO2 29.0  --  29.0 30.0* 22.0 22.0   ANION GAP 10.0  --  8.0 9.0 14.0 15.0   BUN 7*  --  6* 5* 8 9   CREATININE 0.56*  --  0.52* 0.51* 0.48* 0.65*   EGFR 101.5  --  103.8 104.4 106.4 97.0   GLUCOSE 137*  --  179* 126* 136* 167*   CALCIUM 8.7  --  8.5* 8.7 8.8 9.0   MAGNESIUM  --   --  1.8 1.9  --  2.2   PHOSPHORUS  --   --  3.8 2.8  --   --    HEMOGLOBIN A1C  --   --   --   --  7.90*  --    TSH  --   --   --   --  1.940  --          Lab 07/28/23  0848 07/27/23  0552 07/25/23  1613   TOTAL PROTEIN 6.5 6.7 7.0   ALBUMIN 3.1* 3.2* 4.1   GLOBULIN 3.4 3.5 2.9   ALT (SGPT) 10 16 19   AST (SGOT) 34 51* 61*   BILIRUBIN 0.3 0.4 0.8   ALK PHOS 80 86 104         Lab 07/25/23  1817 07/25/23  1613   HSTROP T 69* 63*                 Brief Urine Lab Results  (Last result in the past 365 days)        Color   Clarity   Blood   Leuk Est   Nitrite   Protein   CREAT   Urine HCG        07/25/23 1720 Orange   Cloudy   Negative   Negative   Negative   30 mg/dL (1+)                  Microbiology Results (last 10 days)       Procedure Component Value - Date/Time    Legionella Antigen, Urine - Urine, Urine, Clean Catch [[id number]]  (Normal) Collected: 07/30/23 1749    Lab Status: Final result Specimen: Urine, Clean Catch Updated: 07/30/23 2133     LEGIONELLA ANTIGEN, URINE Negative    S. Pneumo Ag Urine or CSF - Urine, Urine, Clean Catch [[id number]]  (Normal) Collected: 07/30/23 1749    Lab Status: Final result Specimen: Urine, Clean Catch Updated: 07/30/23 2133     Strep Pneumo Ag Negative    COVID PRE-OP / PRE-PROCEDURE SCREENING ORDER (NO ISOLATION) - Swab, Nasopharynx [[id number]]  (Normal) Collected: 07/25/23 2340    Lab Status: Final result Specimen: Swab from Nasopharynx Updated: 07/26/23 0319    Narrative:      The following orders were created for panel order COVID PRE-OP / PRE-PROCEDURE SCREENING ORDER (NO ISOLATION) - Swab, Nasopharynx.  Procedure                               Abnormality         Status                     ---------                               -----------         ------                     Respiratory Panel PCR w/...[[id number]]  Normal              Final result                 Please view results for these tests on the individual orders.    Respiratory Panel PCR w/COVID-19(SARS-CoV-2) BRANDI/CYNTHIA/CASSI/PAD/COR/MAD/DAVIE In-House, NP Swab in UTM/VTM, 3-4 HR TAT - Swab, Nasopharynx [[id number]]  (Normal) Collected: 07/25/23 2340    Lab Status: Final result Specimen: Swab from Nasopharynx Updated: 07/26/23 0319     ADENOVIRUS, PCR Not Detected     Coronavirus 229E Not Detected     Coronavirus HKU1 Not Detected     Coronavirus NL63 Not Detected     Coronavirus OC43 Not Detected     COVID19 Not Detected     Human Metapneumovirus Not Detected     Human Rhinovirus/Enterovirus Not Detected     Influenza A PCR Not Detected     Influenza B PCR Not Detected     Parainfluenza Virus 1 Not Detected     Parainfluenza Virus 2 Not Detected     Parainfluenza Virus 3 Not Detected      Parainfluenza Virus 4 Not Detected     RSV, PCR Not Detected     Bordetella pertussis pcr Not Detected     Bordetella parapertussis PCR Not Detected     Chlamydophila pneumoniae PCR Not Detected     Mycoplasma pneumo by PCR Not Detected    Narrative:      In the setting of a positive respiratory panel with a viral infection PLUS a negative procalcitonin without other underlying concern for bacterial infection, consider observing off antibiotics or discontinuation of antibiotics and continue supportive care. If the respiratory panel is positive for atypical bacterial infection (Bordetella pertussis, Chlamydophila pneumoniae, or Mycoplasma pneumoniae), consider antibiotic de-escalation to target atypical bacterial infection.    Blood Culture - Blood, Hand, Left [[id number]]  (Normal) Collected: 07/25/23 2255    Lab Status: Final result Specimen: Blood from Hand, Left Updated: 07/30/23 2345     Blood Culture No growth at 5 days    Blood Culture - Blood, Hand, Left [[id number]]  (Normal) Collected: 07/25/23 2202    Lab Status: Final result Specimen: Blood from Hand, Left Updated: 07/30/23 2345     Blood Culture No growth at 5 days    Urine Culture - Urine, Urine, Catheter [[id number]]  (Normal) Collected: 07/25/23 1720    Lab Status: Final result Specimen: Urine, Catheter Updated: 07/26/23 2121     Urine Culture No growth            Adult Transthoracic Echo Complete W/ Cont if Necessary Per Protocol    Result Date: 7/27/2023    Left ventricular systolic function is normal. Estimated left ventricular EF = 60%   Left ventricular wall thickness is consistent with mild concentric hypertrophy.   Left ventricular diastolic function is consistent with (grade I) impaired relaxation.   The right ventricular cavity is mildly dilated.   No significant valvular abnormalities.     CT Abdomen Pelvis Without Contrast    Result Date: 7/25/2023  CT ABDOMEN PELVIS WO CONTRAST Date of Exam: 7/25/2023 6:40 PM EDT Indication: fall, lower  abdominal pain. Comparison: 8/1/2022. 7/7/2021. Technique: Axial CT images were obtained of the abdomen and pelvis without the administration of contrast. Reconstructed coronal and sagittal images were also obtained. Automated exposure control and iterative construction methods were used. Findings: *Lower Thorax: Small bilateral pleural effusions. Bibasilar infiltrates. *Liver: Unremarkable. *Gallbladder: Normal gallbladder. *Pancreas: Normal. *Spleen: Normal. *Adrenal Glands: Unchanged small benign left adrenal gland adenoma. Unremarkable right adrenal gland. *Kidneys: 3 mm nonobstructing renal stones bilaterally. No hydronephrosis bilaterally. Bilateral perinephric fat stranding. Small right renal cyst. *Stomach: Gastric wall thickening. Small hiatal hernia. Distal esophageal wall thickening. *Bowel: Nonobstructive bowel gas pattern. No bowel wall inflammation. Diverticulosis coli. *Appendix: Normal appendix. *Peritoneal Cavity: No pneumoperitoneum.  No lymphadenopathy. *Urinary Bladder: Urinary bladder wall thickening. *Pelvis: No free pelvic fluid. *Bones: Unchanged compression deformity of L3 vertebral body with interval cement vertebroplasty. Worsening T12 vertebral body with interval cement vertebroplasty. Interval development of compression deformity of L5 vertebral body with 20% loss of interbody height. Interval development of severe compression deformity of L1 vertebral body with 50% loss of interbody height. Interval development of a severe compression deformity of T11 vertebral body with 70% loss of vertebral body height. Interval development of severe compression deformity of T8 vertebral body with 80% loss of interbody height. Multilevel spondylosis. Osteopenia. *     1.Small bilateral pleural effusions. Bibasilar infiltrates. Follow-up recommended. 2.3 mm nonobstructing bilateral renal stones. 3.Gastric wall thickening may represent gastritis or other etiologies. Distal esophageal wall thickening may  represent esophagitis or other etiologies. Small hiatal hernia. Follow-up recommended. 4.Diverticulosis without diverticulitis. 5.Urinary bladder wall thickening may represent under distention, cystitis or other etiologies. Please correlate with urinalysis. Follow-up recommended. 6.Interval development of multiple compression deformities of lower thoracic and lumbar spine as detailed above. Follow-up recommended. 7.Unchanged left adrenal gland benign adenoma. Electronically Signed: Esau Ali  7/25/2023 8:17 PM EDT  Workstation ID: [id number]    XR Foot 3+ View Right    Result Date: 7/25/2023  XR FOOT 3+ VW RIGHT Date of Exam: 7/25/2023 11:36 PM EDT Indication: foot swelling Comparison: None available. Findings: There is a Lisfranc injury with widening of the interspace between the first and second metatarsals with displaced fractures of the intermediate cuneiform, the navicular bone and likely the lateral margin of the medial cuneiform. This is age indeterminant. The osseous tractors appear diffusely osteopenic. Advanced degenerative changes are present within the midfoot. Diffuse soft tissue swelling is present.     Impression: Age-indeterminate fractures of the intermediate cuneiform, lateral margin of the medial cuneiform, the navicular bone with widening and disruption of the Lisfranc ligament complex. Advanced degenerative changes are present within the midfoot. I suspect these findings are at least partially chronic given presence of advanced degenerative changes. Mild diffuse soft tissue swelling is present. No suspicious periostitis. Electronically Signed: Joana Del Angel  7/25/2023 11:58 PM EDT  Workstation ID: [id number]    CT Head Without Contrast    Result Date: 7/25/2023  CT HEAD WO CONTRAST Date of Exam: 7/25/2023 6:40 PM EDT Indication: fall, head injury. Comparison: None available. Technique: Axial CT images were obtained of the head without contrast administration.  Automated exposure control and  iterative construction methods were used. Findings: *No acute intracranial hemorrhage. *No masses, mass effect, midline shift or hydrocephalus. *Chronic small vessel ischemic disease. Generalized brain atrophy. *Calvarium is intact. *Visualized orbits and globes are unremarkable without radiopaque foreign bodies. *Visualized paranasal sinuses are clear. *Visualized mastoid air cells are clear.     1.No acute intracranial hemorrhage. Calvarium is intact. 2.Chronic findings as detailed above. Electronically Signed: Esau Coon  7/25/2023 8:20 PM EDT  Workstation ID: [id number]    MRI Thoracic Spine Without Contrast    Result Date: 7/28/2023  MRI THORACIC SPINE WO CONTRAST, MRI LUMBAR SPINE WO CONTRAST Date of Exam: 7/27/2023 11:17 PM EDT Indication: Multiple thoracic compression deformities, acute midthoracic back pain, history of vertebroplasty/multiple compression fractures, recent fall.  Comparison: 8/1/2022 MR thoracic and lumbar spine, CTA chest 7/27/2023, CT abdomen pelvis 7/27/2023 Technique:  Routine multiplanar/multisequence sequence images of the thoracic and lumbar spine were obtained without contrast administration. Findings: THORACIC: Localizer image: No visible abnormality. Alignment: Normal. Bone marrow: Marrow edema in T9 and T12. Inferior endplate marrow edema along T11. Vertebrae: Severe compression fracture of T9 with associated edema. There is approximately 4 mm of retropulsion which results in flattening of the ventral cord which is similar to 8/1/2022. Moderate compression fracture of T12 with associated edema and approximately 2 mm of retropulsion which is increased from 8/1/2020. No high-grade canal stenosis. Moderate left and mild to severe right T9-10, mild bilateral T10-T11 and severe left and moderate right T11-T12 neural foraminal stenosis. The stenosis at T11-T12 appears to be increased from prior examination. Cord and conus: Normal. Extra-vertebral soft tissues: Diffuse fatty atrophy.  Visualized chest: Unremarkable. LUMBAR: 6 lumbar-type vertebral bodies with lumbarization of S1 and last displaced being labeled as S1-S2. Distal cord and conus: Normal morphology and signal. The conus medullaris terminates at L1-L2. Cauda equina and nerve roots: Normal morphology and signal. Alignment: Mild retrolisthesis of L2 on L3 and L5 on S1. Grade 1 anterolisthesis of S1 on S2. Bones: Mild compression deformity with vertebral augmentation material seen in L1 and L4. Increasing now moderate compression fracture of L2 without substantial retropulsion. The bone marrow signal is within normal limits for age. No suspicious osseous lesions are demonstrated. Description of degenerative changes at specific levels is as follows: T12-L1: No spinal canal or neural foraminal stenosis. L1-2: No spinal canal or neural foraminal stenosis. L2-3: Minimal posterior disc bulge. Mild spinal canal stenosis. Mild bilateral neural foraminal stenosis. L3-4: No spinal canal or neural foraminal stenosis. L4-5: Facet arthropathy, epidural lipomatous hypertrophy and minimal posterior disc bulge. Mild spinal canal stenosis with narrowing of the left greater than right lateral recess. Mild left neural foraminal stenosis. L5-S1: Facet arthropathy. No spinal canal stenosis. Moderate right neural foraminal stenosis. S1-S2: Facet arthropathy and posterior disc bulge. Moderate right and mild left neural foraminal stenosis. No spinal canal stenosis. Extra-vertebral soft tissues: Fatty atrophy of the muscles. Visualized abdomen/pelvis: Renal cystic lesions.     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 New compression fractures of T12 and L2. There is minimal retropulsion of the T12 fracture. There is resultant severe left and moderate right T11-T12 neural foraminal stenosis. Additional areas of compression fracture and post kyphoplasty are again seen. There is still edema seen within the severe T9 compression  fracture similar to prior examination. Additionally there is approximately 4 mm of retropulsion flattening of the ventral cord at this level. Degenerative changes of the lumbar spine most advanced at L5-S1 and S1-S2. There is moderate right neural foraminal stenosis at both levels. Electronically Signed: Jaime RoblesHiral  7/28/2023 9:06 AM EDT  Workstation ID: [id number]    MRI Lumbar Spine Without Contrast    Result Date: 7/28/2023  MRI THORACIC SPINE WO CONTRAST, MRI LUMBAR SPINE WO CONTRAST Date of Exam: 7/27/2023 11:17 PM EDT Indication: Multiple thoracic compression deformities, acute midthoracic back pain, history of vertebroplasty/multiple compression fractures, recent fall.  Comparison: 8/1/2022 MR thoracic and lumbar spine, CTA chest 7/27/2023, CT abdomen pelvis 7/27/2023 Technique:  Routine multiplanar/multisequence sequence images of the thoracic and lumbar spine were obtained without contrast administration. Findings: THORACIC: Localizer image: No visible abnormality. Alignment: Normal. Bone marrow: Marrow edema in T9 and T12. Inferior endplate marrow edema along T11. Vertebrae: Severe compression fracture of T9 with associated edema. There is approximately 4 mm of retropulsion which results in flattening of the ventral cord which is similar to 8/1/2022. Moderate compression fracture of T12 with associated edema and approximately 2 mm of retropulsion which is increased from 8/1/2020. No high-grade canal stenosis. Moderate left and mild to severe right T9-10, mild bilateral T10-T11 and severe left and moderate right T11-T12 neural foraminal stenosis. The stenosis at T11-T12 appears to be increased from prior examination. Cord and conus: Normal. Extra-vertebral soft tissues: Diffuse fatty atrophy. Visualized chest: Unremarkable. LUMBAR: 6 lumbar-type vertebral bodies with lumbarization of S1 and last displaced being labeled as S1-S2. Distal cord and conus: Normal morphology and signal. The conus medullaris  terminates at L1-L2. Cauda equina and nerve roots: Normal morphology and signal. Alignment: Mild retrolisthesis of L2 on L3 and L5 on S1. Grade 1 anterolisthesis of S1 on S2. Bones: Mild compression deformity with vertebral augmentation material seen in L1 and L4. Increasing now moderate compression fracture of L2 without substantial retropulsion. The bone marrow signal is within normal limits for age. No suspicious osseous lesions are demonstrated. Description of degenerative changes at specific levels is as follows: T12-L1: No spinal canal or neural foraminal stenosis. L1-2: No spinal canal or neural foraminal stenosis. L2-3: Minimal posterior disc bulge. Mild spinal canal stenosis. Mild bilateral neural foraminal stenosis. L3-4: No spinal canal or neural foraminal stenosis. L4-5: Facet arthropathy, epidural lipomatous hypertrophy and minimal posterior disc bulge. Mild spinal canal stenosis with narrowing of the left greater than right lateral recess. Mild left neural foraminal stenosis. L5-S1: Facet arthropathy. No spinal canal stenosis. Moderate right neural foraminal stenosis. S1-S2: Facet arthropathy and posterior disc bulge. Moderate right and mild left neural foraminal stenosis. No spinal canal stenosis. Extra-vertebral soft tissues: Fatty atrophy of the muscles. Visualized abdomen/pelvis: Renal cystic lesions.     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 New compression fractures of T12 and L2. There is minimal retropulsion of the T12 fracture. There is resultant severe left and moderate right T11-T12 neural foraminal stenosis. Additional areas of compression fracture and post kyphoplasty are again seen. There is still edema seen within the severe T9 compression fracture similar to prior examination. Additionally there is approximately 4 mm of retropulsion flattening of the ventral cord at this level. Degenerative changes of the lumbar spine most advanced at L5-S1 and  S1-S2. There is moderate right neural foraminal stenosis at both levels. Electronically Signed: Jaime Blackman  7/28/2023 9:06 AM EDT  Workstation ID: [id number]    XR Chest 1 View    Result Date: 7/25/2023  XR CHEST 1 VW Date of Exam: 7/25/2023 5:52 PM EDT Indication: Weak/Dizzy/AMS triage protocol Comparison: 8/3/2022. Findings: There are diffuse increased interstitial markings unchanged from prior examination to believed to be chronic in nature. There is mild bibasilar scarring unchanged from prior exam. There is no definite acute infiltrate however a small infiltrate could be obscured by the chronic markings. The heart is poorly evaluated but appears unremarkable. The osseous structures demonstrate no gross abnormality.     Diffuse increased interstitial markings with mild chronic scarring of the lung bases similar to that seen on 8/3/2022. No definite infiltrate identified however small infiltrate could be obscured by the chronic interstitial markings. * Electronically Signed: Angel Norris  7/25/2023 6:42 PM EDT  Workstation ID: [id number]    CT Angiogram Chest Pulmonary Embolism    Result Date: 7/27/2023  CT ANGIOGRAM CHEST PULMONARY EMBOLISM Date of Exam: 7/27/2023 12:07 PM EDT Indication: Pulmonary embolism (PE) suspected, high prob. Comparison: None available. Technique: Axial CT images were obtained of the chest after the uneventful intravenous administration of 95 mL Isovue 370 utilizing pulmonary embolism protocol.  Reconstructed coronal and sagittal images were also obtained. Automated exposure control and  iterative construction methods were used. Findings: There are no filling defects suspicious for pulmonary emboli. There are no enlarged mediastinal nodes. There are no hilar masses. There are minimal pleural effusions. There is subsegmental atelectasis in the lung bases. There is evidence of chronic underlying lung disease with infiltrates primarily in the lung peripheries.     Impression: Negative  exam for pulmonary embolism. Small effusions with mild bibasilar atelectasis. Evidence of chronic lung disease. Electronically Signed: Ronda Castro MD  7/27/2023 12:33 PM EDT  Workstation ID: [id number]    Duplex Carotid Ultrasound CAR    Result Date: 7/27/2023    Right internal carotid artery demonstrates normal flow without evidence of hemodynamically significant stenosis.   Left internal carotid artery demonstrates normal flow without evidence of hemodynamically significant stenosis.   Bilateral mild irregular plaque is noted.     Duplex Venous Lower Extremity - Right CAR    Result Date: 7/27/2023    Normal right lower extremity venous duplex scan.     XR Hip With or Without Pelvis 2 - 3 View Right    Result Date: 7/25/2023  XR HIP W OR WO PELVIS 2-3 VIEW RIGHT Date of Exam: 7/25/2023 5:52 PM EDT Indication: right hip pain after fall,suspect ramus fracture Comparison: None available. Findings: No acute fractures or dislocations. Joint spaces are well preserved. No joint effusion. No  soft tissue swelling. No osseous destructive lesions. No radiopaque foreign bodies.     1.No acute fractures or dislocations. Electronically Signed: Angel Norris  7/25/2023 6:40 PM EDT  Workstation ID: [id number]     Results for orders placed during the hospital encounter of 07/25/23    Duplex Carotid Ultrasound CAR    Interpretation Summary    Right internal carotid artery demonstrates normal flow without evidence of hemodynamically significant stenosis.    Left internal carotid artery demonstrates normal flow without evidence of hemodynamically significant stenosis.    Bilateral mild irregular plaque is noted.      Results for orders placed during the hospital encounter of 07/25/23    Duplex Carotid Ultrasound CAR    Interpretation Summary    Right internal carotid artery demonstrates normal flow without evidence of hemodynamically significant stenosis.    Left internal carotid artery demonstrates normal flow without  evidence of hemodynamically significant stenosis.    Bilateral mild irregular plaque is noted.      Results for orders placed during the hospital encounter of 07/25/23    Adult Transthoracic Echo Complete W/ Cont if Necessary Per Protocol    Interpretation Summary    Left ventricular systolic function is normal. Estimated left ventricular EF = 60%    Left ventricular wall thickness is consistent with mild concentric hypertrophy.    Left ventricular diastolic function is consistent with (grade I) impaired relaxation.    The right ventricular cavity is mildly dilated.    No significant valvular abnormalities.      Plan for Follow-up of Pending Labs/Results:   Discharge Details        Discharge Medications        New Medications        Instructions Start Date   HYDROcodone-acetaminophen 7.5-325 MG per tablet  Commonly known as: NORCO   1 tablet, Oral, Every 6 Hours PRN      Janumet  MG per tablet  Generic drug: sitaGLIPtin-metFORMIN   1 tablet, Oral, 2 Times Daily With Meals      losartan 50 MG tablet  Commonly known as: COZAAR   50 mg, Oral, Every 24 Hours Scheduled      melatonin 5 MG tablet tablet   5 mg, Oral, Nightly      nicotine 21 MG/24HR patch  Commonly known as: NICODERM CQ   1 patch, Transdermal, Every 24 Hours             Changes to Medications        Instructions Start Date   folic acid-vit B6-vit B12 2.2-25-1 MG tablet tablet  Commonly known as: FOLGARD  What changed: additional instructions   1 tablet, Oral, Daily      thiamine 100 MG tablet  Commonly known as: VITAMIN B1  What changed: additional instructions   100 mg, Oral, Daily      vitamin D3 125 MCG (5000 UT) capsule capsule  What changed: additional instructions   5,000 Units, Oral, Daily             Continue These Medications        Instructions Start Date   acetaminophen 500 MG tablet  Commonly known as: TYLENOL   500 mg, Oral, Every 6 Hours PRN, OTC             Stop These Medications      fish oil-omega-3 fatty acids 1000 MG  capsule     traMADol 50 MG tablet  Commonly known as: ULTRAM              Allergies   Allergen Reactions    Penicillins Rash     Tolerates ceftriaxone    Shellfish-Derived Products Hives     Pt says only applies to oysters         Discharge Disposition:  Skilled Nursing Facility (DC - External)    Diet:  Hospital:  Diet Order   Procedures    Diet: Cardiac Diets, Diabetic Diets; Healthy Heart (2-3 Na+); Consistent Carbohydrate; Texture: Regular Texture (IDDSI 7); Fluid Consistency: Thin (IDDSI 0)       Activity:      Restrictions or Other Recommendations:         CODE STATUS:    Code Status and Medical Interventions:   Ordered at: 07/25/23 3984     Level Of Support Discussed With:    Patient     Code Status (Patient has no pulse and is not breathing):    CPR (Attempt to Resuscitate)     Medical Interventions (Patient has pulse or is breathing):    Full Support     Release to patient:    Routine Release       Future Appointments   Date Time Provider Department Center   8/1/2023  9:45 AM EMS 1  [facility]       Additional Instructions for the Follow-ups that You Need to Schedule       Discharge Follow-up with Specified Provider: Neurosurgery/Nino Christine or Susanna Peralta; 2 Weeks   As directed      To: Neurosurgery/Nino Christine or Susanna Peralta   Follow Up: 2 Weeks   Follow Up Details: in 2-3 weeks        Discharge Follow-up with Specified Provider: with podiatry   As directed      To: with podiatry   Follow Up Details: for early changes of right charcot foot                      Rudolph Duque MD  08/01/23      Time Spent on Discharge:  I spent  39  minutes on this discharge activity which included: face-to-face encounter with the patient, reviewing the data in the system, coordination of the care with the nursing staff as well as consultants, documentation, and entering orders.

## 2023-08-01 NOTE — PLAN OF CARE
Problem: Fall Injury Risk  Goal: Absence of Fall and Fall-Related Injury  Intervention: Promote Injury-Free Environment  Recent Flowsheet Documentation  Taken 8/1/2023 0400 by Kristen Lua, JACKSON  Safety Promotion/Fall Prevention:   activity supervised   assistive device/personal items within reach   clutter free environment maintained   fall prevention program maintained   lighting adjusted   nonskid shoes/slippers when out of bed   room organization consistent   safety round/check completed  Taken 8/1/2023 0200 by Kristen Lua, JACKSON  Safety Promotion/Fall Prevention:   activity supervised   assistive device/personal items within reach   clutter free environment maintained   fall prevention program maintained   lighting adjusted   nonskid shoes/slippers when out of bed   room organization consistent   safety round/check completed  Taken 8/1/2023 0000 by Kristen Lua, RN  Safety Promotion/Fall Prevention:   activity supervised   assistive device/personal items within reach   clutter free environment maintained   fall prevention program maintained   lighting adjusted   nonskid shoes/slippers when out of bed   room organization consistent   safety round/check completed  Taken 7/31/2023 2200 by Kristen Lua, RN  Safety Promotion/Fall Prevention:   activity supervised   assistive device/personal items within reach   clutter free environment maintained   fall prevention program maintained   lighting adjusted   nonskid shoes/slippers when out of bed   room organization consistent   safety round/check completed  Taken 7/31/2023 2000 by Kristen Lua, RN  Safety Promotion/Fall Prevention:   activity supervised   assistive device/personal items within reach   clutter free environment maintained   fall prevention program maintained   lighting adjusted   nonskid shoes/slippers when out of bed   room organization consistent   safety round/check completed   Goal Outcome Evaluation:

## 2023-08-01 NOTE — CASE MANAGEMENT/SOCIAL WORK
Continued Stay Note  [facility]     Patient Name: Cristian Watt  MRN: [id number]  Today's Date: 8/1/2023    Admit Date: 7/25/2023    Plan: SW   Discharge Plan       Row Name 08/01/23 1106       Plan    Plan SW    Plan Comments SW'er faxed Signed PASRR to [employer] rep.         Discharge Codes    No documentation.                 Expected Discharge Date and Time       Expected Discharge Date Expected Discharge Time    Aug 1, 2023               JASMINE Kauffman (Kay)  Continued Stay Note   [place]     Patient Name: Cristian Watt  MRN: [id number]  Today's Date: 8/1/2023    Admit Date: 7/25/2023    Plan: BOLIVAR       Discharge Codes    No documentation.                 Expected Discharge Date and Time       Expected Discharge Date Expected Discharge Time    Aug 1, 2023               JASMINE Kauffman (Kay)

## 2024-09-03 NOTE — PROGRESS NOTES
"NEUROSURGERY PROGRESS NOTE     LOS: 3 days   Patient Care Team:  Amanda Tate MD as PCP - General (Family Medicine)    Chief Complaint: Low back pain.    Interval History:   Patient Complaints: Ongoing low back pain with movement.  Patient Denies: Lower extremity pain, weakness, sensory alteration.    Review of Systems:   Musculoskeletal and Neurological systems were reviewed and are negative except for:  Musculoskeletal: positive for back pain.  Neurological: positive for difficulty walking due to pain.    Vital Signs: Blood pressure 155/92, pulse 100, temperature 97.7 °F (36.5 °C), temperature source Oral, resp. rate 18, height 185.4 cm (73\"), weight 118 kg (260 lb), SpO2 95 %.  Intake/Output:     Intake/Output Summary (Last 24 hours) at 7/11/2021 0820  Last data filed at 7/11/2021 0515  Gross per 24 hour   Intake 1961 ml   Output 3000 ml   Net -1039 ml       Physical Exam:  The patient is resting quietly in bed.  He is not enthusiastic about moving given the back pain that worsens.  Motor function and tone are normal in his lower extremities.  Sensation is intact to light touch testing throughout.     Data Review:    Results from last 7 days   Lab Units 07/11/21  0601   SODIUM mmol/L 127*   POTASSIUM mmol/L 4.0   CHLORIDE mmol/L 93*   CO2 mmol/L 27.0   BUN mg/dL 5*   CREATININE mg/dL 0.54*   GLUCOSE mg/dL 185*   CALCIUM mg/dL 9.1         Assessment/Plan:  1.  T12 and L3 compression fractures.  The patient adamantly denies any history of trauma.  There are some healed rib fractures noted on the CT of his abdomen.  Fractures may be osteoporotic in their etiology.  They do not have a neoplastic appearance to them.  2.  Chronic mechanical low back pain.  3.  Low-grade L5-S1 spondylolisthesis.  4.  Hyponatremia.  Sodium is still running low.  5.  Uncontrolled diabetes mellitus.  6.  Disposition: I have recommended T12 and L3 kyphoplasty.  I have explained the nature of the intervention as well as the potential " PATIENT HAS BEEN SCHEDULED    risks, complications, and limitations of the procedure.  Continue to work on correcting sodium.  Tentatively, we will plan on kyphoplasty on Wednesday pending sodium and medical status.      Ruddy Zepeda MD  07/11/21  08:20 EDT

## (undated) DEVICE — BONE TAMP KIT KPX203PB FF X2 20/3 1 STP: Brand: KYPHOPAK™ TRAY

## (undated) DEVICE — BONE BIOPSY DEVICE F05A BBD SIZE 3: Brand: MEDTRONIC REUSABLE INSTRUMENTS

## (undated) DEVICE — PK KYPHOPLASTY 10

## (undated) DEVICE — CVR HNDL LIGHT RIGID

## (undated) DEVICE — APPL CHLORAPREP TINTED 26ML TEAL

## (undated) DEVICE — STRAP POSTN KN/BDY FM 5X72IN DISP

## (undated) DEVICE — GLV SURG PREMIERPRO MIC LTX PF SZ6.5 BRN

## (undated) DEVICE — GLV SURG PREMIERPRO MIC LTX PF SZ7.5 BRN

## (undated) DEVICE — GLV SURG PREMIERPRO MIC LTX PF SZ7 BRN

## (undated) DEVICE — MIXER A07A CEMENT

## (undated) DEVICE — GLV SURG RAD SENSICARE SHLD PF LF SZ8 STRL

## (undated) DEVICE — BONE TAMP KIT KPX203NB AF X2 20/3